# Patient Record
Sex: MALE | Race: WHITE | NOT HISPANIC OR LATINO | Employment: OTHER | URBAN - METROPOLITAN AREA
[De-identification: names, ages, dates, MRNs, and addresses within clinical notes are randomized per-mention and may not be internally consistent; named-entity substitution may affect disease eponyms.]

---

## 2017-03-09 ENCOUNTER — OFFICE VISIT (OUTPATIENT)
Dept: FAMILY MEDICINE | Facility: CLINIC | Age: 62
End: 2017-03-09
Payer: COMMERCIAL

## 2017-03-09 VITALS
RESPIRATION RATE: 18 BRPM | WEIGHT: 192.8 LBS | HEART RATE: 80 BPM | HEIGHT: 67 IN | BODY MASS INDEX: 30.26 KG/M2 | TEMPERATURE: 98.5 F | DIASTOLIC BLOOD PRESSURE: 72 MMHG | SYSTOLIC BLOOD PRESSURE: 128 MMHG

## 2017-03-09 DIAGNOSIS — J01.90 ACUTE SINUSITIS WITH SYMPTOMS > 10 DAYS: Primary | ICD-10-CM

## 2017-03-09 PROCEDURE — 99213 OFFICE O/P EST LOW 20 MIN: CPT | Performed by: PHYSICIAN ASSISTANT

## 2017-03-09 RX ORDER — LATANOPROST 50 UG/ML
SOLUTION/ DROPS OPHTHALMIC
COMMUNITY
Start: 2017-03-08 | End: 2017-03-24

## 2017-03-09 RX ORDER — OFLOXACIN 3 MG/ML
SOLUTION/ DROPS OPHTHALMIC
COMMUNITY
Start: 2017-03-08 | End: 2017-03-24

## 2017-03-09 ASSESSMENT — PAIN SCALES - GENERAL: PAINLEVEL: MODERATE PAIN (4)

## 2017-03-09 NOTE — PROGRESS NOTES
SUBJECTIVE:                                                    Deven Smith is a 61 year old male who presents to clinic today for the following health issues:    Patient has been having mild cold symptoms for several weeks and notes that earlier this week he developed facial pressure and pain as well as severe congestion. He also developed severe conjunctivitis in both eyes yesterday, he was seen by his eye doctor and started on eye drops but continues to have facial congestion and sinus symptoms.    Acute Illness   Acute illness concerns: eye and sinus  Onset: 03/05/17    Fever: YES- Feels like     Chills/Sweats: YES- chills    Headache (location?): YES- dizzy    Sinus Pressure:YES- tender, reddened, post-nasal drainage, facial pain and bloody nasal discharge    Conjunctivitis:  YES: bilateral, saw eye doctor yesterday rx given Ofloxacin    Ear Pain: YES- draining, popping    Rhinorrhea: YES    Congestion: YES    Sore Throat: YES- feels like a lump (pills getting stuck     Cough: YES    Wheeze: YES    Decreased Appetite: YES    Nausea: no    Vomiting: no    Diarrhea:  no    Dysuria/Freq.: no    Fatigue/Achiness: YES    Sick/Strep Exposure: YES- At crossfit     Therapies Tried and outcome: Hot shower, eye drops, warm rag       -------------------------------------    Problem list and histories reviewed & adjusted, as indicated.  Additional history: as documented    BP Readings from Last 3 Encounters:   03/09/17 128/72   04/04/16 157/90   02/08/16 124/68    Wt Readings from Last 3 Encounters:   03/09/17 192 lb 12.8 oz (87.5 kg)   04/04/16 185 lb (83.9 kg)   02/08/16 191 lb 11.2 oz (87 kg)         Reviewed and updated as needed this visit by clinical staff       Reviewed and updated as needed this visit by Provider         ROS:  Constitutional, HEENT, cardiovascular, pulmonary, gi and gu systems are negative, except as otherwise noted.    OBJECTIVE:                                                    /72  "(Cuff Size: Adult Large)  Pulse 80  Temp 98.5  F (36.9  C) (Temporal)  Resp 18  Ht 5' 7\" (1.702 m)  Wt 192 lb 12.8 oz (87.5 kg)  BMI 30.2 kg/m2  Body mass index is 30.2 kg/(m^2).  GENERAL: alert and no distress  EYES: PERRL, EOMI, eyelids- erythematous and conjunctiva/corneas- conjunctival injection bilaterally  HENT: normal cephalic/atraumatic, both ears: erythematous, rhinorrhea purulent, oropharynx clear, oral mucous membranes moist and sinuses: maxillary tenderness on both sides  NECK: bilateral anterior cervical adenopathy  RESP: lungs clear to auscultation - no rales, rhonchi or wheezes  CV: regular rates and rhythm, peripheral pulses strong and no peripheral edema  MS: no gross musculoskeletal defects noted, no edema  SKIN: no suspicious lesions or rashes    Diagnostic Test Results:  none      ASSESSMENT/PLAN:                                                        ICD-10-CM    1. Acute sinusitis with symptoms > 10 days J01.90 amoxicillin-clavulanate (AUGMENTIN) 875-125 MG per tablet       I will treat for sinusitis and have him follow up if worsening or not improving with treatment. Follow up with eye doctor on conjunctivitis as directed.   See Patient Instructions    Brissa Hancock PA-C  [facility]    "

## 2017-03-09 NOTE — MR AVS SNAPSHOT
After Visit Summary   3/9/2017    Deven Smith    MRN: [id number]           Patient Information     Date Of Birth          1955        Visit Information        Provider Department      3/9/2017 9:40 AM Brissa Hancock PA-C [facility]        Today's Diagnoses     Acute sinusitis with symptoms > 10 days    -  1      Care Instructions      Sinusitis (Antibiotic Treatment)    The sinuses are air-filled spaces within the bones of the face. They connect to the inside of the nose. Sinusitis is an inflammation of the tissue lining the sinus cavity. Sinus inflammation can occur during a cold. It can also be due to allergies to pollens and other particles in the air. Sinusitis can cause symptoms of sinus congestion and fullness. A sinus infection causes fever, headache and facial pain. There is often green or yellow drainage from the nose or into the back of the throat (post-nasal drip). You have been given antibiotics to treat this condition.  Home care:    Take the full course of antibiotics as instructed. Do not stop taking them, even if you feel better.    Drink plenty of water, hot tea, and other liquids. This may help thin mucus. It also may promote sinus drainage.    Heat may help soothe painful areas of the face. Use a towel soaked in hot water. Or,  the shower and direct the hot spray onto your face. Using a vaporizer along with a menthol rub at night may also help.     An expectorant containing guaifenesin may help thin the mucus and promote drainage from the sinuses.    Over-the-counter decongestants may be used unless a similar medicine was prescribed. Nasal sprays work the fastest. Use one that contains phenylephrine or oxymetazoline. First blow the nose gently. Then use the spray. Do not use these medicines more often than directed on the label or symptoms may get worse. You may also use tablets containing pseudoephedrine. Avoid products that combine ingredients,  because side effects may be increased. Read labels. You can also ask the pharmacist for help. (NOTE: Persons with high blood pressure should not use decongestants. They can raise blood pressure.)    Over-the-counter antihistamines may help if allergies contributed to your sinusitis.      Do not use nasal rinses or irrigation during an acute sinus infection, unless told to by your health care provider. Rinsing may spread the infection to other sinuses.    Use acetaminophen or ibuprofen to control pain, unless another pain medicine was prescribed. (If you have chronic liver or kidney disease or ever had a stomach ulcer, talk with your doctor before using these medicines. Aspirin should never be used in anyone under 18 years of age who is ill with a fever. It may cause severe liver damage.)    Don't smoke. This can worsen symptoms.  Follow-up care  Follow up with your healthcare provider or our staff if you are not improving within the next week.  When to seek medical advice  Call your healthcare provider if any of these occur:    Facial pain or headache becoming more severe    Stiff neck    Unusual drowsiness or confusion    Swelling of the forehead or eyelids    Vision problems, including blurred or double vision    Fever of 100.4 F (38 C) or higher, or as directed by your healthcare provider    Seizure    Breathing problems    Symptoms not resolving within 10 days    3486-4851 [employer]. [address] [place] [address], [place] [address]. All rights reserved. This information is not intended as a substitute for professional medical care. Always follow your healthcare professional's instructions.              Follow-ups after your visit        Who to contact     If you have questions or need follow up information about today's clinic visit or your schedule please contact [facility] directly at [phone].  Normal or non-critical lab and imaging results will be communicated to you by [employer],  "letter or phone within 4 business days after the clinic has received the results. If you do not hear from us within 7 days, please contact the clinic through [employer] or phone. If you have a critical or abnormal lab result, we will notify you by phone as soon as possible.  Submit refill requests through [employer] or call your pharmacy and they will forward the refill request to us. Please allow 3 business days for your refill to be completed.          Additional Information About Your Visit        Walkmore Information     [employer] gives you secure access to your electronic health record. If you see a primary care provider, you can also send messages to your care team and make appointments. If you have questions, please call your primary care clinic.  If you do not have a primary care provider, please call [phone] and they will assist you.        Care EveryWhere ID     This is your Care EveryWhere ID. This could be used by other organizations to access your [facility] medical records  [id number]        Your Vitals Were     Pulse Temperature Respirations Height BMI (Body Mass Index)       80 98.5  F (36.9  C) (Temporal) 18 5' 7\" (1.702 m) 30.2 kg/m2        Blood Pressure from Last 3 Encounters:   03/09/17 128/72   04/04/16 157/90   02/08/16 124/68    Weight from Last 3 Encounters:   03/09/17 192 lb 12.8 oz (87.5 kg)   04/04/16 185 lb (83.9 kg)   02/08/16 191 lb 11.2 oz (87 kg)              Today, you had the following     No orders found for display         Today's Medication Changes          These changes are accurate as of: 3/9/17  9:53 AM.  If you have any questions, ask your nurse or doctor.               Start taking these medicines.        Dose/Directions    amoxicillin-clavulanate 875-125 MG per tablet   Commonly known as:  AUGMENTIN   Used for:  Acute sinusitis with symptoms > 10 days   Started by:  Brissa Hancock PA-C        Dose:  1 tablet   Take 1 tablet by mouth 2 times daily   Quantity:  20 tablet "   Refills:  0            Where to get your medicines      These medications were sent to [facility] [place], [place] - [address]   [address] , [facility] [address]     Phone:  [phone]     amoxicillin-clavulanate 875-125 MG per tablet                Primary Care Provider Office Phone # Fax #    Isaac Christianson [phone]       [facility] [address] [facility] [address]        Thank you!     Thank you for choosing [facility]  for your care. Our goal is always to provide you with excellent care. Hearing back from our patients is one way we can continue to improve our services. Please take a few minutes to complete the written survey that you may receive in the mail after your visit with us. Thank you!             Your Updated Medication List - Protect others around you: Learn how to safely use, store and throw away your medicines at www.disposemymeds.org.          This list is accurate as of: 3/9/17  9:53 AM.  Always use your most recent med list.                   Brand Name Dispense Instructions for use    amoxicillin-clavulanate 875-125 MG per tablet    AUGMENTIN    20 tablet    Take 1 tablet by mouth 2 times daily       aspirin 81 MG tablet     0    1 tab po QD (Once per day)       Fish Oil 1200 MG Caps      Take 1 capsule by mouth daily       fluticasone 50 MCG/ACT spray    FLONASE    16 g    USE TWO SPRAYS IN EACH NOSTRIL EVERY DAY       GLUCOSAMINE CHONDROITIN ADV PO      Take 6 tablets by mouth daily.       latanoprost 0.005 % ophthalmic solution    XALATAN         MULTI-DAY vitamin  S Tabs     0    1 tablet daily       ofloxacin 0.3 % ophthalmic solution    OCUFLOX         oxyCODONE 5 MG IR tablet    ROXICODONE    18 tablet    Take 1-2 tablets (5-10 mg) by mouth every 6 hours as needed for pain       simvastatin 20 MG tablet    ZOCOR    90 tablet    TAKE ONE TABLET BY MOUTH EVERY DAY (LABS DUE)       TRAVATAN 0.004 % Soln   Generic  drug:  Travoprost      Reported on 3/9/2017

## 2017-03-09 NOTE — NURSING NOTE
"Chief Complaint   Patient presents with     Eye Problem     Panel Management     Hep C Screening, Lipids       Initial /72 (Cuff Size: Adult Large)  Pulse 80  Temp 98.5  F (36.9  C) (Temporal)  Resp 18  Ht 5' 7\" (1.702 m)  Wt 192 lb 12.8 oz (87.5 kg)  BMI 30.2 kg/m2 Estimated body mass index is 30.2 kg/(m^2) as calculated from the following:    Height as of this encounter: 5' 7\" (1.702 m).    Weight as of this encounter: 192 lb 12.8 oz (87.5 kg).  Medication Reconciliation: complete   Damaris Rivera CMA (AAMA)      "

## 2017-03-09 NOTE — PATIENT INSTRUCTIONS
Sinusitis (Antibiotic Treatment)    The sinuses are air-filled spaces within the bones of the face. They connect to the inside of the nose. Sinusitis is an inflammation of the tissue lining the sinus cavity. Sinus inflammation can occur during a cold. It can also be due to allergies to pollens and other particles in the air. Sinusitis can cause symptoms of sinus congestion and fullness. A sinus infection causes fever, headache and facial pain. There is often green or yellow drainage from the nose or into the back of the throat (post-nasal drip). You have been given antibiotics to treat this condition.  Home care:    Take the full course of antibiotics as instructed. Do not stop taking them, even if you feel better.    Drink plenty of water, hot tea, and other liquids. This may help thin mucus. It also may promote sinus drainage.    Heat may help soothe painful areas of the face. Use a towel soaked in hot water. Or,  the shower and direct the hot spray onto your face. Using a vaporizer along with a menthol rub at night may also help.     An expectorant containing guaifenesin may help thin the mucus and promote drainage from the sinuses.    Over-the-counter decongestants may be used unless a similar medicine was prescribed. Nasal sprays work the fastest. Use one that contains phenylephrine or oxymetazoline. First blow the nose gently. Then use the spray. Do not use these medicines more often than directed on the label or symptoms may get worse. You may also use tablets containing pseudoephedrine. Avoid products that combine ingredients, because side effects may be increased. Read labels. You can also ask the pharmacist for help. (NOTE: Persons with high blood pressure should not use decongestants. They can raise blood pressure.)    Over-the-counter antihistamines may help if allergies contributed to your sinusitis.      Do not use nasal rinses or irrigation during an acute sinus infection, unless told to by  your health care provider. Rinsing may spread the infection to other sinuses.    Use acetaminophen or ibuprofen to control pain, unless another pain medicine was prescribed. (If you have chronic liver or kidney disease or ever had a stomach ulcer, talk with your doctor before using these medicines. Aspirin should never be used in anyone under 18 years of age who is ill with a fever. It may cause severe liver damage.)    Don't smoke. This can worsen symptoms.  Follow-up care  Follow up with your healthcare provider or our staff if you are not improving within the next week.  When to seek medical advice  Call your healthcare provider if any of these occur:    Facial pain or headache becoming more severe    Stiff neck    Unusual drowsiness or confusion    Swelling of the forehead or eyelids    Vision problems, including blurred or double vision    Fever of 100.4 F (38 C) or higher, or as directed by your healthcare provider    Seizure    Breathing problems    Symptoms not resolving within 10 days    4650-5077 [employer]. [address] [place] [address], [place] [address]. All rights reserved. This information is not intended as a substitute for professional medical care. Always follow your healthcare professional's instructions.

## 2017-03-24 ENCOUNTER — OFFICE VISIT (OUTPATIENT)
Dept: FAMILY MEDICINE | Facility: CLINIC | Age: 62
End: 2017-03-24
Payer: COMMERCIAL

## 2017-03-24 VITALS
TEMPERATURE: 97.8 F | SYSTOLIC BLOOD PRESSURE: 100 MMHG | BODY MASS INDEX: 29.82 KG/M2 | OXYGEN SATURATION: 97 % | HEIGHT: 67 IN | WEIGHT: 190 LBS | RESPIRATION RATE: 18 BRPM | HEART RATE: 62 BPM | DIASTOLIC BLOOD PRESSURE: 50 MMHG

## 2017-03-24 DIAGNOSIS — Z00.00 ROUTINE GENERAL MEDICAL EXAMINATION AT A HEALTH CARE FACILITY: Primary | ICD-10-CM

## 2017-03-24 DIAGNOSIS — Z12.5 SCREENING FOR PROSTATE CANCER: ICD-10-CM

## 2017-03-24 DIAGNOSIS — E78.5 HYPERLIPIDEMIA WITH TARGET LDL LESS THAN 130: ICD-10-CM

## 2017-03-24 LAB
ALBUMIN SERPL-MCNC: 3.7 G/DL (ref 3.4–5)
ALBUMIN UR-MCNC: NEGATIVE MG/DL
ALP SERPL-CCNC: 64 U/L (ref 40–150)
ALT SERPL W P-5'-P-CCNC: 40 U/L (ref 0–70)
ANION GAP SERPL CALCULATED.3IONS-SCNC: 4 MMOL/L (ref 3–14)
APPEARANCE UR: CLEAR
AST SERPL W P-5'-P-CCNC: 34 U/L (ref 0–45)
BILIRUB SERPL-MCNC: 0.5 MG/DL (ref 0.2–1.3)
BILIRUB UR QL STRIP: NEGATIVE
BUN SERPL-MCNC: 20 MG/DL (ref 7–30)
CALCIUM SERPL-MCNC: 9 MG/DL (ref 8.5–10.1)
CHLORIDE SERPL-SCNC: 105 MMOL/L (ref 94–109)
CHOLEST SERPL-MCNC: 182 MG/DL
CO2 SERPL-SCNC: 34 MMOL/L (ref 20–32)
COLOR UR AUTO: YELLOW
CREAT SERPL-MCNC: 0.93 MG/DL (ref 0.66–1.25)
GFR SERPL CREATININE-BSD FRML MDRD: 83 ML/MIN/1.7M2
GLUCOSE SERPL-MCNC: 98 MG/DL (ref 70–99)
GLUCOSE UR STRIP-MCNC: NEGATIVE MG/DL
HCV AB SERPL QL IA: NORMAL
HDLC SERPL-MCNC: 69 MG/DL
HGB UR QL STRIP: NEGATIVE
KETONES UR STRIP-MCNC: NEGATIVE MG/DL
LDLC SERPL CALC-MCNC: 101 MG/DL
LEUKOCYTE ESTERASE UR QL STRIP: NEGATIVE
MUCOUS THREADS #/AREA URNS LPF: PRESENT /LPF
NITRATE UR QL: NEGATIVE
NONHDLC SERPL-MCNC: 113 MG/DL
PH UR STRIP: 5 PH (ref 5–7)
POTASSIUM SERPL-SCNC: 4.2 MMOL/L (ref 3.4–5.3)
PROT SERPL-MCNC: 6.8 G/DL (ref 6.8–8.8)
PSA SERPL-ACNC: 1.98 UG/L (ref 0–4)
RBC #/AREA URNS AUTO: 0 /HPF (ref 0–2)
SODIUM SERPL-SCNC: 143 MMOL/L (ref 133–144)
SP GR UR STRIP: 1.02 (ref 1–1.03)
TRIGL SERPL-MCNC: 59 MG/DL
URN SPEC COLLECT METH UR: ABNORMAL
UROBILINOGEN UR STRIP-MCNC: 0 MG/DL (ref 0–2)
WBC #/AREA URNS AUTO: 2 /HPF (ref 0–2)

## 2017-03-24 PROCEDURE — 80061 LIPID PANEL: CPT | Performed by: FAMILY MEDICINE

## 2017-03-24 PROCEDURE — 81001 URINALYSIS AUTO W/SCOPE: CPT | Performed by: FAMILY MEDICINE

## 2017-03-24 PROCEDURE — 99396 PREV VISIT EST AGE 40-64: CPT | Performed by: FAMILY MEDICINE

## 2017-03-24 PROCEDURE — 36415 COLL VENOUS BLD VENIPUNCTURE: CPT | Performed by: FAMILY MEDICINE

## 2017-03-24 PROCEDURE — G0103 PSA SCREENING: HCPCS | Performed by: FAMILY MEDICINE

## 2017-03-24 PROCEDURE — 86803 HEPATITIS C AB TEST: CPT | Performed by: FAMILY MEDICINE

## 2017-03-24 PROCEDURE — 80053 COMPREHEN METABOLIC PANEL: CPT | Performed by: FAMILY MEDICINE

## 2017-03-24 RX ORDER — SIMVASTATIN 20 MG
TABLET ORAL
Qty: 90 TABLET | Refills: 3 | Status: CANCELLED | OUTPATIENT
Start: 2017-03-24

## 2017-03-24 NOTE — PROGRESS NOTES
SUBJECTIVE:     CC: Deven Smith is an 61 year old male who presents for preventative health visit.     Healthy Habits:    Do you get at least three servings of calcium containing foods daily (dairy, green leafy vegetables, etc.)? yes    Amount of exercise or daily activities, outside of work: 5-6 day(s) per week    Problems taking medications regularly No    Medication side effects: No    Have you had an eye exam in the past two years? yes    Do you see a dentist twice per year? yes    Do you have sleep apnea, excessive snoring or daytime drowsiness?no            Today's PHQ-2 Score:   PHQ-2 ( 1999 Pfizer) 3/9/2017 2/8/2016   Q1: Little interest or pleasure in doing things 0 0   Q2: Feeling down, depressed or hopeless 0 0   PHQ-2 Score 0 0       Abuse: Current or Past(Physical, Sexual or Emotional)- No  Do you feel safe in your environment - Yes    Social History   Substance Use Topics     Smoking status: Former Smoker     Packs/day: 2.00     Years: 15.00     Quit date: 11/8/1987     Smokeless tobacco: Never Used     Alcohol use 36.0 oz/week     The patient does not drink >3 drinks per day nor >7 drinks per week.    Last PSA:   PSA   Date Value Ref Range Status   09/04/2015 1.60 0 - 4 ug/L Final       Recent Labs   Lab Test  09/04/15   0810  01/21/14   0807   CHOL  156  142   HDL  71  60   LDL  78  68   TRIG  36  65   CHOLHDLRATIO  2.2  2.0       Reviewed orders with patient. Reviewed health maintenance and updated orders accordingly - Yes    Reviewed and updated as needed this visit by clinical staff  Tobacco  Allergies  Meds  Soc Hx        Reviewed and updated as needed this visit by Provider        Past Medical History:   Diagnosis Date     DDD (degenerative disc disease), cervical      Hyperlipidemia LDL goal < 130       Past Surgical History:   Procedure Laterality Date     COLONOSCOPY  03/21/2007    Repeat  for screening purposes in 10 yrs.     COLONOSCOPY  1/2/2013    Procedure: COLONOSCOPY;   "Colonoscopy;  Surgeon: Emmett San MD;  Location: [facility]     [facility] SHLDR ARTHROSCOP,SURG,W/ROTAT CUFF REPR Left 2010     HC VASECTOMY UNILAT/BILAT W POSTOP SEMEN  1998    Vasectomy       ROS:  C: NEGATIVE for fever, chills, change in weight  I: NEGATIVE for worrisome rashes, moles or lesions  E: NEGATIVE for vision changes or irritation  ENT: NEGATIVE for ear, mouth and throat problems  R: NEGATIVE for significant cough or SOB  CV: NEGATIVE for chest pain, palpitations or peripheral edema  GI: NEGATIVE for nausea, abdominal pain, heartburn, or change in bowel habits   male: negative for dysuria, hematuria, decreased urinary stream, erectile dysfunction, urethral discharge  M: NEGATIVE for significant arthralgias or myalgia  N: NEGATIVE for weakness, dizziness or paresthesias  P: NEGATIVE for changes in mood or affect      OBJECTIVE:     /50 (Cuff Size: Adult Regular)  Pulse 62  Temp 97.8  F (36.6  C) (Temporal)  Resp 18  Ht 5' 7\" (1.702 m)  Wt 190 lb (86.2 kg)  SpO2 97%  BMI 29.76 kg/m2  EXAM:  GENERAL: healthy, alert and no distress  EYES: Eyes grossly normal to inspection, PERRL and conjunctivae and sclerae normal  HENT: ear canals and TM's normal, nose and mouth without ulcers or lesions  NECK: no adenopathy, no asymmetry, masses, or scars and thyroid normal to palpation  RESP: lungs clear to auscultation - no rales, rhonchi or wheezes  CV: regular rate and rhythm, normal S1 S2, no S3 or S4, no murmur, click or rub, no peripheral edema and peripheral pulses strong  ABDOMEN: soft, nontender, no hepatosplenomegaly, no masses and bowel sounds normal  MS: no gross musculoskeletal defects noted, no edema  SKIN: no suspicious lesions or rashes  NEURO: Normal strength and tone, mentation intact and speech normal  PSYCH: mentation appears normal, affect normal/bright    ASSESSMENT/PLAN:     1. Routine general medical examination at a health care facility  Generally healthy in good physical shape. Due " "for colonoscopy next year.  - Hepatitis C antibody  - UA reflex to Microscopic and Culture    2. Hyperlipidemia with target LDL less than 130  He discontinued his Zocor 3 months ago when he ran out. He was told he couldn't get any more until he had a physical. We will see what his lipid panel as an restart this if need be. He does have a strong family history of hyperlipidemia.  - Lipid Profile  - Comprehensive metabolic panel (BMP + Alb, Alk Phos, ALT, AST, Total. Bili, TP)    3. Screening for prostate cancer  No troubles here we'll notify him with results.  - Prostate spec antigen screen    COUNSELING:           reports that he quit smoking about 29 years ago. He has a 30.00 pack-year smoking history. He has never used smokeless tobacco.    Estimated body mass index is 29.76 kg/(m^2) as calculated from the following:    Height as of this encounter: 5' 7\" (1.702 m).    Weight as of this encounter: 190 lb (86.2 kg).       Counseling Resources:  ATP IV Guidelines  Pooled Cohorts Equation Calculator  FRAX Risk Assessment  ICSI Preventive Guidelines  Dietary Guidelines for Americans, 2010  USDA's MyPlate  ASA Prophylaxis  Lung CA Screening    Marc Pinedo MD  [facility]  "

## 2017-03-24 NOTE — NURSING NOTE
"Chief Complaint   Patient presents with     Physical       Initial /50 (Cuff Size: Adult Regular)  Pulse 62  Temp 97.8  F (36.6  C) (Temporal)  Resp 18  Ht 5' 7\" (1.702 m)  Wt 190 lb (86.2 kg)  SpO2 97%  BMI 29.76 kg/m2 Estimated body mass index is 29.76 kg/(m^2) as calculated from the following:    Height as of this encounter: 5' 7\" (1.702 m).    Weight as of this encounter: 190 lb (86.2 kg).  Medication Reconciliation: complete    "

## 2017-03-24 NOTE — LETTER
[address] [address]  Phone: [phone]  Fax: [phone]          April 6, 2017    Deven Smith  [id number] [address] [place] [id number]          Dear Deven,      LAB RESULTS:     The results of your recent labs were NORMAL.  If you have any further questions or problems, please contact our office.          Sincerely,      Marc Pinedo M.D.

## 2017-03-24 NOTE — MR AVS SNAPSHOT
After Visit Summary   3/24/2017    Deven Smith    MRN: [id number]           Patient Information     Date Of Birth          1955        Visit Information        Provider Department      3/24/2017 7:40 AM Marc Pinedo MD [facility]        Today's Diagnoses     Routine general medical examination at a health care facility    -  1    Hyperlipidemia with target LDL less than 130        Screening for prostate cancer          Care Instructions      Preventive Health Recommendations  Male Ages 50 - 64    Yearly exam:             See your health care provider every year in order to  o   Review health changes.   o   Discuss preventive care.    o   Review your medicines if your doctor has prescribed any.     Have a cholesterol test every 5 years, or more frequently if you are at risk for high cholesterol/heart disease.     Have a diabetes test (fasting glucose) every three years. If you are at risk for diabetes, you should have this test more often.     Have a colonoscopy at age 50, or have a yearly FIT test (stool test). These exams will check for colon cancer.      Talk with your health care provider about whether or not a prostate cancer screening test (PSA) is right for you.    You should be tested each year for STDs (sexually transmitted diseases), if you re at risk.     Shots: Get a flu shot each year. Get a tetanus shot every 10 years.     Nutrition:    Eat at least 5 servings of fruits and vegetables daily.     Eat whole-grain bread, whole-wheat pasta and brown rice instead of white grains and rice.     Talk to your provider about Calcium and Vitamin D.     Lifestyle    Exercise for at least 150 minutes a week (30 minutes a day, 5 days a week). This will help you control your weight and prevent disease.     Limit alcohol to one drink per day.     No smoking.     Wear sunscreen to prevent skin cancer.     See your dentist every six months for an exam and cleaning.     See  "your eye doctor every 1 to 2 years.          Follow-ups after your visit        Who to contact     If you have questions or need follow up information about today's clinic visit or your schedule please contact [facility] directly at [phone].  Normal or non-critical lab and imaging results will be communicated to you by Spotlimehart, letter or phone within 4 business days after the clinic has received the results. If you do not hear from us within 7 days, please contact the clinic through Compassoftt or phone. If you have a critical or abnormal lab result, we will notify you by phone as soon as possible.  Submit refill requests through Gazemetrix or call your pharmacy and they will forward the refill request to us. Please allow 3 business days for your refill to be completed.          Additional Information About Your Visit        SpotlimeharHEMINGWAY Information     Gazemetrix gives you secure access to your electronic health record. If you see a primary care provider, you can also send messages to your care team and make appointments. If you have questions, please call your primary care clinic.  If you do not have a primary care provider, please call [phone] and they will assist you.        Care EveryWhere ID     This is your Care EveryWhere ID. This could be used by other organizations to access your [place] medical records  [id number]        Your Vitals Were     Pulse Temperature Respirations Height Pulse Oximetry BMI (Body Mass Index)    62 97.8  F (36.6  C) (Temporal) 18 5' 7\" (1.702 m) 97% 29.76 kg/m2       Blood Pressure from Last 3 Encounters:   03/24/17 100/50   03/09/17 128/72   04/04/16 157/90    Weight from Last 3 Encounters:   03/24/17 190 lb (86.2 kg)   03/09/17 192 lb 12.8 oz (87.5 kg)   04/04/16 185 lb (83.9 kg)              We Performed the Following     Comprehensive metabolic panel (BMP + Alb, Alk Phos, ALT, AST, Total. Bili, TP)     Hepatitis C antibody     Lipid Profile     Prostate spec " antigen screen     UA reflex to Microscopic and Culture        Primary Care Provider Office Phone # Fax #    Isaac Christianson [phone]       [facility] [address] [facility] [address]        Thank you!     Thank you for choosing [facility]  for your care. Our goal is always to provide you with excellent care. Hearing back from our patients is one way we can continue to improve our services. Please take a few minutes to complete the written survey that you may receive in the mail after your visit with us. Thank you!             Your Updated Medication List - Protect others around you: Learn how to safely use, store and throw away your medicines at www.disposemymeds.org.          This list is accurate as of: 3/24/17  8:07 AM.  Always use your most recent med list.                   Brand Name Dispense Instructions for use    aspirin 81 MG tablet     0    1 tab po QD (Once per day)       Fish Oil 1200 MG Caps      Take 1 capsule by mouth daily       fluticasone 50 MCG/ACT spray    FLONASE    16 g    USE TWO SPRAYS IN EACH NOSTRIL EVERY DAY       GLUCOSAMINE CHONDROITIN ADV PO      Take 6 tablets by mouth daily.       MULTI-DAY vitamin  S Tabs     0    1 tablet daily       simvastatin 20 MG tablet    ZOCOR    90 tablet    TAKE ONE TABLET BY MOUTH EVERY DAY (LABS DUE)       TRAVATAN 0.004 % Soln   Generic drug:  Travoprost      Reported on 3/9/2017

## 2017-04-06 NOTE — PROGRESS NOTES
Labs show urinalysis was normal  hepatitis C negative. PSA was normal. Chemistry panel was normal. And the good news is her cholesterol was 182 with an LDL of 101 this is acceptable. HDL was 69. I would keep the diet and recheck in a year.

## 2017-05-15 ENCOUNTER — OFFICE VISIT (OUTPATIENT)
Dept: FAMILY MEDICINE | Facility: OTHER | Age: 62
End: 2017-05-15
Payer: COMMERCIAL

## 2017-05-15 ENCOUNTER — RADIANT APPOINTMENT (OUTPATIENT)
Dept: GENERAL RADIOLOGY | Facility: OTHER | Age: 62
End: 2017-05-15
Attending: NURSE PRACTITIONER
Payer: COMMERCIAL

## 2017-05-15 VITALS
RESPIRATION RATE: 20 BRPM | TEMPERATURE: 96.4 F | DIASTOLIC BLOOD PRESSURE: 62 MMHG | BODY MASS INDEX: 30.61 KG/M2 | HEIGHT: 67 IN | SYSTOLIC BLOOD PRESSURE: 138 MMHG | HEART RATE: 60 BPM | OXYGEN SATURATION: 97 % | WEIGHT: 195 LBS

## 2017-05-15 DIAGNOSIS — R07.81 RIB PAIN ON RIGHT SIDE: Primary | ICD-10-CM

## 2017-05-15 DIAGNOSIS — R07.81 RIB PAIN ON RIGHT SIDE: ICD-10-CM

## 2017-05-15 PROCEDURE — 71101 X-RAY EXAM UNILAT RIBS/CHEST: CPT | Mod: RT

## 2017-05-15 PROCEDURE — 99213 OFFICE O/P EST LOW 20 MIN: CPT | Performed by: NURSE PRACTITIONER

## 2017-05-15 RX ORDER — HYDROCODONE BITARTRATE AND ACETAMINOPHEN 5; 325 MG/1; MG/1
1-2 TABLET ORAL EVERY 4 HOURS PRN
Qty: 20 TABLET | Refills: 0 | Status: SHIPPED | OUTPATIENT
Start: 2017-05-15 | End: 2017-11-10

## 2017-05-15 ASSESSMENT — PAIN SCALES - GENERAL: PAINLEVEL: EXTREME PAIN (8)

## 2017-05-15 NOTE — MR AVS SNAPSHOT
After Visit Summary   5/15/2017    Deven Smith    MRN: [id number]           Patient Information     Date Of Birth          1955        Visit Information        Provider Department      5/15/2017 9:40 AM Elsy Ya APRN [facility]        Today's Diagnoses     Rib pain on right side    -  1      Care Instructions    I did not see any abnormalities on your x-ray.  The radiologist will review it as well and they will call if they see anything I did not see.     Follow up if symptoms fail to resolve as expected.     Take Acetaminophen (Tylenol) 650 mg by mouth every 4-6 hours for fevers or pain. Do not take more than 4000 mg total in a 24 hour period.     Take Ibuprofen 600 mg by mouth every 6-8 hours for pain/fevers.  Take this with food to avoid an upset stomach.  Do not take more than 3200 mg total in a 24 hour period.    Use the Norco as needed at night for sleep.  This may make you drowsy.     Rib Fracture    You broke one or more ribs. This is called a rib fracture. Rib fractures do not require a cast like other bones. They will heal by themselves in about 4-6 weeks. The first 3-4 weeks will be the most painful because deep breathing, coughing, or changing position from sitting to lying down, may cause the broken ends to move slightly.  Home care    Rest. You should not be doing any heavy lifting or strenuous exertion until the pain goes away.    It hurts to breathe when you have a broken rib. This puts you at risk of getting pneumonia from poor airflow through your lungs. To prevent this:    Take several very deep breaths once an hour while you're awake. Exhale through pursed lips as if you are blowing up a balloon. If possible, actually blow up a balloon or a rubber glove. This exercise builds up pressure inside the lung and prevents collapse of the small air sacs of the lung. This exercise may cause some pain at the site of injury, which is normal.    You may have  gotten a breathing exercise device called an incentive spirometer. Use it at least 4 times a day, or as directed.    Apply an ice pack over the injured area for 15 to 20 minutes every 1 to 2 hours. You should do this for the first 24 to 48 hours. You can make an ice pack by filling a plastic bag that seals at the top with ice cubes and then wrapping it with a thin towel. Continue with ice packs as needed for the relief of pain and swelling.    You may use over-the-counter pain medicine to control pain, unless another pain medicine was prescribed. If you have chronic liver or kidney disease or ever had a stomach ulcer or GI bleeding, talk with your healthcare provider before using these medicines.    If your pain is not controlled, contact your healthcare provider. Sometimes a stronger pain medicine may be needed. A nerve block can be done in case of severe pain. It will numb the nerve between the ribs.  Follow-up care  Follow up with your healthcare provider, or as advised. Rarely, a broken rib will cause complications within the first few days that may not be evident during your initial exam. This can include collapsed lung, bleeding around the lung or into the abdomen, or pneumonia. Therefore, watch for the signs below.  If X-rays were taken, you will be told of any new findings that may affect your care.  Call 911  Call 911 if you have:    Dizziness, weakness or fainting    Shortness of breath with or without chest discomfort    New or worsening abdominal pain    Discomfort in other areas of your upper body such as your shoulders, jaw, neck, or arms  When to seek medical advice  Call your healthcare provider right away if any of these occur:    Increasing chest pain with breathing    Fever of 100.4 F (38 C) or above lasting for 24 to 48 hours    Congested cough    8167-5457 [employer]. [address] [place] [address], [place] [address]. All rights reserved. This information is not intended as a substitute  "for professional medical care. Always follow your healthcare professional's instructions.              Follow-ups after your visit        Who to contact     If you have questions or need follow up information about today's clinic visit or your schedule please contact [facility] directly at [phone].  Normal or non-critical lab and imaging results will be communicated to you by Lethart, letter or phone within 4 business days after the clinic has received the results. If you do not hear from us within 7 days, please contact the clinic through Lethart or phone. If you have a critical or abnormal lab result, we will notify you by phone as soon as possible.  Submit refill requests through Needl or call your pharmacy and they will forward the refill request to us. Please allow 3 business days for your refill to be completed.          Additional Information About Your Visit        Lethart Information     Needl gives you secure access to your electronic health record. If you see a primary care provider, you can also send messages to your care team and make appointments. If you have questions, please call your primary care clinic.  If you do not have a primary care provider, please call [phone] and they will assist you.        Care EveryWhere ID     This is your Care EveryWhere ID. This could be used by other organizations to access your [place] medical records  [id number]        Your Vitals Were     Pulse Temperature Respirations Height Pulse Oximetry BMI (Body Mass Index)    60 96.4  F (35.8  C) (Tympanic) 20 5' 7\" (1.702 m) 97% 30.54 kg/m2       Blood Pressure from Last 3 Encounters:   05/15/17 138/62   03/24/17 100/50   03/09/17 128/72    Weight from Last 3 Encounters:   05/15/17 195 lb (88.5 kg)   03/24/17 190 lb (86.2 kg)   03/09/17 192 lb 12.8 oz (87.5 kg)                 Today's Medication Changes          These changes are accurate as of: 5/15/17 10:33 AM.  If you have any questions, ask " your nurse or doctor.               Start taking these medicines.        Dose/Directions    HYDROcodone-acetaminophen 5-325 MG per tablet   Commonly known as:  NORCO   Used for:  Rib pain on right side   Started by:  Elsy Ya APRN CNP        Dose:  1-2 tablet   Take 1-2 tablets by mouth every 4 hours as needed for moderate to severe pain maximum 3 tablet(s) per day   Quantity:  20 tablet   Refills:  0            Where to get your medicines      Some of these will need a paper prescription and others can be bought over the counter.  Ask your nurse if you have questions.     Bring a paper prescription for each of these medications     HYDROcodone-acetaminophen 5-325 MG per tablet                Primary Care Provider Office Phone # Fax #    Isaac Christianson [phone]       [facility] [address] [facility] [address]        Thank you!     Thank you for choosing [facility]  for your care. Our goal is always to provide you with excellent care. Hearing back from our patients is one way we can continue to improve our services. Please take a few minutes to complete the written survey that you may receive in the mail after your visit with us. Thank you!             Your Updated Medication List - Protect others around you: Learn how to safely use, store and throw away your medicines at www.disposemymeds.org.          This list is accurate as of: 5/15/17 10:33 AM.  Always use your most recent med list.                   Brand Name Dispense Instructions for use    aspirin 81 MG tablet     0    1 tab po QD (Once per day)       Fish Oil 1200 MG Caps      Take 1 capsule by mouth daily       fluticasone 50 MCG/ACT spray    FLONASE    16 g    USE TWO SPRAYS IN EACH NOSTRIL EVERY DAY       GLUCOSAMINE CHONDROITIN ADV PO      Take 6 tablets by mouth daily.       HYDROcodone-acetaminophen 5-325 MG per tablet    NORCO    20 tablet    Take 1-2 tablets by mouth every 4 hours  as needed for moderate to severe pain maximum 3 tablet(s) per day       MULTI-DAY vitamin  S Tabs     0    1 tablet daily       TRAVATAN 0.004 % Soln   Generic drug:  Travoprost      Reported on 3/9/2017

## 2017-05-15 NOTE — PATIENT INSTRUCTIONS
I did not see any abnormalities on your x-ray.  The radiologist will review it as well and they will call if they see anything I did not see.     Follow up if symptoms fail to resolve as expected.     Take Acetaminophen (Tylenol) 650 mg by mouth every 4-6 hours for fevers or pain. Do not take more than 4000 mg total in a 24 hour period.     Take Ibuprofen 600 mg by mouth every 6-8 hours for pain/fevers.  Take this with food to avoid an upset stomach.  Do not take more than 3200 mg total in a 24 hour period.    Use the Norco as needed at night for sleep.  This may make you drowsy.     Rib Fracture    You broke one or more ribs. This is called a rib fracture. Rib fractures do not require a cast like other bones. They will heal by themselves in about 4-6 weeks. The first 3-4 weeks will be the most painful because deep breathing, coughing, or changing position from sitting to lying down, may cause the broken ends to move slightly.  Home care    Rest. You should not be doing any heavy lifting or strenuous exertion until the pain goes away.    It hurts to breathe when you have a broken rib. This puts you at risk of getting pneumonia from poor airflow through your lungs. To prevent this:    Take several very deep breaths once an hour while you're awake. Exhale through pursed lips as if you are blowing up a balloon. If possible, actually blow up a balloon or a rubber glove. This exercise builds up pressure inside the lung and prevents collapse of the small air sacs of the lung. This exercise may cause some pain at the site of injury, which is normal.    You may have gotten a breathing exercise device called an incentive spirometer. Use it at least 4 times a day, or as directed.    Apply an ice pack over the injured area for 15 to 20 minutes every 1 to 2 hours. You should do this for the first 24 to 48 hours. You can make an ice pack by filling a plastic bag that seals at the top with ice cubes and then wrapping it with a  thin towel. Continue with ice packs as needed for the relief of pain and swelling.    You may use over-the-counter pain medicine to control pain, unless another pain medicine was prescribed. If you have chronic liver or kidney disease or ever had a stomach ulcer or GI bleeding, talk with your healthcare provider before using these medicines.    If your pain is not controlled, contact your healthcare provider. Sometimes a stronger pain medicine may be needed. A nerve block can be done in case of severe pain. It will numb the nerve between the ribs.  Follow-up care  Follow up with your healthcare provider, or as advised. Rarely, a broken rib will cause complications within the first few days that may not be evident during your initial exam. This can include collapsed lung, bleeding around the lung or into the abdomen, or pneumonia. Therefore, watch for the signs below.  If X-rays were taken, you will be told of any new findings that may affect your care.  Call 911  Call 911 if you have:    Dizziness, weakness or fainting    Shortness of breath with or without chest discomfort    New or worsening abdominal pain    Discomfort in other areas of your upper body such as your shoulders, jaw, neck, or arms  When to seek medical advice  Call your healthcare provider right away if any of these occur:    Increasing chest pain with breathing    Fever of 100.4 F (38 C) or above lasting for 24 to 48 hours    Congested cough    8670-9671 [employer]. [address] [place] [address], [place] [address]. All rights reserved. This information is not intended as a substitute for professional medical care. Always follow your healthcare professional's instructions.

## 2017-05-15 NOTE — NURSING NOTE
"Chief Complaint   Patient presents with     Rib Pain     x3 days       Initial /62  Pulse 60  Temp 96.4  F (35.8  C) (Tympanic)  Resp 20  Ht 5' 7\" (1.702 m)  Wt 195 lb (88.5 kg)  SpO2 97%  BMI 30.54 kg/m2 Estimated body mass index is 30.54 kg/(m^2) as calculated from the following:    Height as of this encounter: 5' 7\" (1.702 m).    Weight as of this encounter: 195 lb (88.5 kg).  Medication Reconciliation: complete   ................Adrian Adams LPN,   May 15, 2017,      10:00 AM,   [facility]     "

## 2017-05-15 NOTE — PROGRESS NOTES
SUBJECTIVE:                                                    Deven Smith is a 61 year old male who presents to clinic today for the following health issues:      Musculoskeletal problem/pain      Duration: 3 days    Description  Location: ribs, right sided    Intensity:  severe    Accompanying signs and symptoms: none    History  Previous similar problem: no   Previous evaluation:  none    Precipitating or alleviating factors:  Trauma or overuse: YES  Aggravating factors include: twisting, sitting up, moving the wrong way    Therapies tried and outcome: heat and NSAID - ibuprofen helped a little       Problem list and histories reviewed & adjusted, as indicated.  Additional history: none    Patient Active Problem List   Diagnosis     Other specified glaucoma     Allergic rhinitis     Family history of other cardiovascular diseases     Labyrinthitis     Hyperlipidemia with target LDL less than 130     Advanced directives, counseling/discussion     DDD (degenerative disc disease), cervical     Supraspinatus tendon tear, left, subsequent encounter     Past Surgical History:   Procedure Laterality Date     COLONOSCOPY  2007    Repeat  for screening purposes in 10 yrs.     COLONOSCOPY  2013    Procedure: COLONOSCOPY;  Colonoscopy;  Surgeon: Emmett San MD;  Location: [facility]     HC SHLDR ARTHROSCOP,SURG,W/ROTAT CUFF REPR Left      HC VASECTOMY UNILAT/BILAT W POSTOP SEMEN  1998    Vasectomy       Social History   Substance Use Topics     Smoking status: Former Smoker     Packs/day: 2.00     Years: 15.00     Quit date: 1987     Smokeless tobacco: Never Used     Alcohol use 36.0 oz/week     Family History   Problem Relation Age of Onset     Allergies Brother      on meds     Arthritis Mother      Depression Mother      on meds. is in nursing home     Hypertension Mother      OSTEOPOROSIS Mother      HEART DISEASE Father       at age 57  ? MI--no autopsy     CANCER Father       "jaw--smoked [place]     Hypertension Brother      OSTEOPOROSIS Sister      on meds     Family History Negative Other      No breast or colon cancer hx in family     Hypertension Sister      OSTEOPOROSIS Brother          Current Outpatient Prescriptions   Medication Sig Dispense Refill     fluticasone (FLONASE) 50 MCG/ACT nasal spray USE TWO SPRAYS IN EACH NOSTRIL EVERY DAY 16 g 6     Omega-3 Fatty Acids (FISH OIL) 1200 MG CAPS Take 1 capsule by mouth daily       Misc Natural Products (GLUCOSAMINE CHONDROITIN ADV PO) Take 6 tablets by mouth daily.       TRAVATAN 0.004 % OP SOLN Reported on 3/9/2017  0     ASPIRIN 81 MG OR TABS 1 tab po QD (Once per day) 0 0     MULTI-DAY VITAMINS OR TABS 1 tablet daily 0 0     Allergies   Allergen Reactions     Seasonal Allergies      Tramadol      Muscle spasms,headache     BP Readings from Last 3 Encounters:   05/15/17 138/62   03/24/17 100/50   03/09/17 128/72    Wt Readings from Last 3 Encounters:   05/15/17 195 lb (88.5 kg)   03/24/17 190 lb (86.2 kg)   03/09/17 192 lb 12.8 oz (87.5 kg)                    Reviewed and updated as needed this visit by clinical staff  Tobacco  Allergies  Meds  Med Hx  Surg Hx  Fam Hx  Soc Hx      Reviewed and updated as needed this visit by Provider         ROS:  C: NEGATIVE for fever, chills, change in weight  E/M: NEGATIVE for ear, mouth and throat problems  R: NEGATIVE for significant cough or SOB  CV: NEGATIVE for chest pain, palpitations or peripheral edema  MUSCULOSKELETAL: right side rib pain as above     OBJECTIVE:                                                    /62  Pulse 60  Temp 96.4  F (35.8  C) (Tympanic)  Resp 20  Ht 5' 7\" (1.702 m)  Wt 195 lb (88.5 kg)  SpO2 97%  BMI 30.54 kg/m2  Body mass index is 30.54 kg/(m^2).  GENERAL: healthy, alert and no distress  NECK: no adenopathy, no asymmetry, masses, or scars and thyroid normal to palpation  RESP: lungs clear to auscultation - no rales, rhonchi or " wheezes  CV: regular rates and rhythm, normal S1 S2, no S3 or S4, no murmur, click or rub, peripheral pulses strong and no peripheral edema  ABDOMEN: soft, nontender, no hepatosplenomegaly, no masses and bowel sounds normal  MS: no gross musculoskeletal defects noted, no edema   He has significant point tenderness on the right side lower ribs.     Diagnostic Test Results:  Xray - chest with right rib detail: possible irregularity on the medial aspect of ninth rib.  It is not significantly displaced.  Reviewed by myself, pending radiology review.      ASSESSMENT/PLAN:                                                        1. Rib pain on right side  I think this is likely a fractured rib.  Will treat with activity modification and pain medications as needed.  Follow up if symptoms fail to resolve as expected.   - XR Ribs & Chest Right G/E 3 Views; Future  - HYDROcodone-acetaminophen (NORCO) 5-325 MG per tablet; Take 1-2 tablets by mouth every 4 hours as needed for moderate to severe pain maximum 3 tablet(s) per day  Dispense: 20 tablet; Refill: 0    FUTURE APPOINTMENTS:       - Follow up if symptoms fail to resolve as expected.   See Patient Instructions    SCOTT Griffiths [facility]

## 2017-05-16 DIAGNOSIS — J30.9 ALLERGIC RHINITIS: ICD-10-CM

## 2017-05-16 RX ORDER — FLUTICASONE PROPIONATE 50 MCG
SPRAY, SUSPENSION (ML) NASAL
Qty: 16 G | Refills: 6 | Status: SHIPPED | OUTPATIENT
Start: 2017-05-16 | End: 2018-04-07

## 2017-05-16 NOTE — TELEPHONE ENCOUNTER
fluticasone (FLONASE) 50 MCG/ACT nasal spray      Last Written Prescription Date: 7/7/16  Last Fill Quantity: 16,  # refills: 6   Last Office Visit with [facility], [facility] or [facility] prescribing provider: 5/16/16

## 2017-05-16 NOTE — TELEPHONE ENCOUNTER
Prescription approved per [facility] Refill Protocol.    Sanjana Melara RN  [facility]

## 2017-07-20 ENCOUNTER — TRANSFERRED RECORDS (OUTPATIENT)
Dept: HEALTH INFORMATION MANAGEMENT | Facility: CLINIC | Age: 62
End: 2017-07-20

## 2017-11-09 ENCOUNTER — TRANSFERRED RECORDS (OUTPATIENT)
Dept: HEALTH INFORMATION MANAGEMENT | Facility: CLINIC | Age: 62
End: 2017-11-09

## 2017-11-10 ENCOUNTER — RADIANT APPOINTMENT (OUTPATIENT)
Dept: GENERAL RADIOLOGY | Facility: CLINIC | Age: 62
End: 2017-11-10
Attending: FAMILY MEDICINE
Payer: COMMERCIAL

## 2017-11-10 ENCOUNTER — OFFICE VISIT (OUTPATIENT)
Dept: ORTHOPEDICS | Facility: CLINIC | Age: 62
End: 2017-11-10
Payer: COMMERCIAL

## 2017-11-10 VITALS
WEIGHT: 186.7 LBS | HEART RATE: 67 BPM | OXYGEN SATURATION: 97 % | DIASTOLIC BLOOD PRESSURE: 71 MMHG | SYSTOLIC BLOOD PRESSURE: 180 MMHG | HEIGHT: 69 IN | BODY MASS INDEX: 27.65 KG/M2

## 2017-11-10 DIAGNOSIS — M54.16 LUMBAR RADICULOPATHY: Primary | ICD-10-CM

## 2017-11-10 DIAGNOSIS — M54.16 LUMBAR RADICULOPATHY: ICD-10-CM

## 2017-11-10 DIAGNOSIS — M47.816 LUMBAR FACET ARTHROPATHY: ICD-10-CM

## 2017-11-10 PROCEDURE — 99213 OFFICE O/P EST LOW 20 MIN: CPT | Performed by: FAMILY MEDICINE

## 2017-11-10 PROCEDURE — 72100 X-RAY EXAM L-S SPINE 2/3 VWS: CPT | Performed by: RADIOLOGY

## 2017-11-10 RX ORDER — DICLOFENAC SODIUM 75 MG/1
75 TABLET, DELAYED RELEASE ORAL 2 TIMES DAILY PRN
Qty: 60 TABLET | Refills: 1 | Status: SHIPPED | OUTPATIENT
Start: 2017-11-10 | End: 2018-02-08

## 2017-11-10 ASSESSMENT — PAIN SCALES - GENERAL: PAINLEVEL: MILD PAIN (2)

## 2017-11-10 NOTE — PROGRESS NOTES
"CHIEF COMPLAINT:  Consult and Lumbar/SI       HISTORY OF PRESENT ILLNESS  Mr. Smith is a pleasant 62 year old year old male who presents to clinic today with back pain.    Homero started to have a new pain in his lower back that he noticed a couple of months ago.  Focal, positional, getting worse over time. He points to a very specific spot in his low back, just off the right side of his L5 to S1 vertebral junction. Worse the more he is on his feet, somewhat better when he can rest. Homero is a very active flaquito and has a active job and in addition does cross for 3-5 days a week. At times he does feel numbness and tingling down his right leg, \"throughout the whole leg.\" Mostly down the posterior aspect of his right thigh and then radiating down his leg.    Additional history: as documented    MEDICAL HISTORY  Patient Active Problem List   Diagnosis     Other specified glaucoma     Allergic rhinitis     Family history of other cardiovascular diseases     Labyrinthitis     Hyperlipidemia with target LDL less than 130     Advanced directives, counseling/discussion     DDD (degenerative disc disease), cervical     Supraspinatus tendon tear, left, subsequent encounter       Current Outpatient Prescriptions   Medication Sig Dispense Refill     fluticasone (FLONASE) 50 MCG/ACT spray SPRAY TWO SPRAYS IN EACH NOSTRIL EVERY DAY 16 g 6     Omega-3 Fatty Acids (FISH OIL) 1200 MG CAPS Take 1 capsule by mouth daily       Misc Natural Products (GLUCOSAMINE CHONDROITIN ADV PO) Take 6 tablets by mouth daily.       TRAVATAN 0.004 % OP SOLN Reported on 3/9/2017  0     ASPIRIN 81 MG OR TABS 1 tab po QD (Once per day) 0 0     MULTI-DAY VITAMINS OR TABS 1 tablet daily 0 0       Allergies   Allergen Reactions     Seasonal Allergies      Tramadol      Muscle spasms,headache       Family History   Problem Relation Age of Onset     Allergies Brother      on meds     Arthritis Mother      Depression Mother      on meds. is in nursing home     " "Hypertension Mother      OSTEOPOROSIS Mother      HEART DISEASE Father       at age 57  ? MI--no autopsy     CANCER Father      jaw--smoked [place]     Hypertension Brother      OSTEOPOROSIS Sister      on meds     Family History Negative Other      No breast or colon cancer hx in family     Hypertension Sister      OSTEOPOROSIS Brother        Additional medical/Social/Surgical histories reviewed in [facility] and updated as appropriate.     REVIEW OF SYSTEMS (11/10/2017)  CONSTITUTIONAL: Denies fever and weight loss  EYES: Denies acute vision changes  ENT: Denies hearing changes or difficulty swallowing  CARDIAC: Denies chest pain or edema  RESPIRATORY: Denies dyspnea, cough or wheeze  GASTROINTESTINAL: Denies abdominal pain, nausea, vomiting  MUSCULOSKELETAL: See HPI  SKIN: Denies any recent rash or lesion  NEUROLOGICAL: Denies numbness or focal weakness  PSYCHIATRIC: No history of psychiatric symptoms or problems  ENDOCRINE: Denies current diagnosis of diabetes   HEMATOLOGY: Denies episodes of easy bleeding      PHYSICAL EXAM  Vitals:    11/10/17 0654   BP: 180/71   Pulse: 67   SpO2: 97%   Weight: 84.7 kg (186 lb 11.2 oz)   Height: 1.745 m (5' 8.7\")     General  - normal appearance, in no obvious distress  CV  - normal peripheral perfusion  Pulm  - normal respiratory pattern, non-labored  Musculoskeletal - lumbar spine  - stance: normal giat  - inspection: normal bone and joint alignment, no obvious scoliosis  - palpation: no pain with palpation of the lumbar spine or paraspinals  - ROM: pain with extension, extended rotation  - strength: lower extremities 5/5 in all planes  - special tests:  (-) slump test on right  Neuro  - patellar and Achilles DTRs 2+ bilaterally, no sensory or motor deficit, grossly normal coordination, normal muscle tone  Skin  - no ecchymosis, erythema, warmth, or induration, no obvious rash  Psych  - interactive, appropriate, normal mood and affect        IMAGING :  Xray lumbar spine. " Final results and radiologist's interpretation available in the [facility] health record. Images were reviewed with the patient / family members in the office today. My personal interpretation of the performed imaging is positive for multilevel degenerative changes of the lumbar spine.         ASSESSMENT & PLAN  Mr. Smith is a 62 year old year old male who presents to clinic today with low back pain.    I do believe that he more than likely has a component of facet arthropathy with or without radiculopathy.  We discussed managements in detail including preventative measures for him to take while working out.  We also discussed the utility of an MRI.    I do think at this point is reasonable to employ anti-inflammatories, activity modification, and avoidance of exacerbating activities.  He is going to modify his CrossFit schedule and take full tear in, which I prescribed to him, as needed.    Homero is going to let me know how he is doing in the next 3-4 weeks.  If no better I will likely order an MRI.    It was a pleasure seeing Homero.        Taz Maurice DO, CAQSM  Primary Care Sports Medicine

## 2017-11-10 NOTE — PATIENT INSTRUCTIONS
Thanks for coming today.  Ortho/Sports Medicine Clinic  [address] [place] [address]    To schedule future appointments in Ortho Clinic, you may call [phone].    To schedule ordered imaging by your Provider: Call [facility] at [phone]    CyberSponse available online at:   [email or web address]    Please call if any further questions or concerns [phone] and ask for the Orthopedic Department. Clinic hours 8 am to 5 pm.    Return to clinic if symptoms worsen.

## 2017-11-10 NOTE — NURSING NOTE
"Deven Smith's goals for this visit include: Low back pain-worse the last couple of months.   He requests these members of his care team be copied on today's visit information: yes    PCP: Isaac Christianson    Referring Provider:  Referred Self, MD  No address on file    Chief Complaint   Patient presents with     Consult     Lumbar/SI       Initial /71  Pulse 67  Ht 1.745 m (5' 8.7\")  Wt 84.7 kg (186 lb 11.2 oz)  SpO2 97%  BMI 27.81 kg/m2 Estimated body mass index is 27.81 kg/(m^2) as calculated from the following:    Height as of this encounter: 1.745 m (5' 8.7\").    Weight as of this encounter: 84.7 kg (186 lb 11.2 oz).  Medication Reconciliation: complete    "

## 2017-11-10 NOTE — MR AVS SNAPSHOT
After Visit Summary   11/10/2017    Deven Smith    MRN: [id number]           Patient Information     Date Of Birth          1955        Visit Information        Provider Department      11/10/2017 7:00 AM Taz Maurice, DO [facility]        Today's Diagnoses     Lumbar radiculopathy    -  1    Lumbar facet arthropathy          Care Instructions    Thanks for coming today.  Ortho/Sports Medicine Clinic  [address] [place] [address]    To schedule future appointments in Ortho Clinic, you may call [phone].    To schedule ordered imaging by your Provider: Call [facility] at [phone]    Wisecam available online at:   [email or web address]    Please call if any further questions or concerns [phone] and ask for the Orthopedic Department. Clinic hours 8 am to 5 pm.    Return to clinic if symptoms worsen.            Follow-ups after your visit        Who to contact     If you have questions or need follow up information about today's clinic visit or your schedule please contact [facility] directly at [phone].  Normal or non-critical lab and imaging results will be communicated to you by [employer], letter or phone within 4 business days after the clinic has received the results. If you do not hear from us within 7 days, please contact the clinic through Wisecam or phone. If you have a critical or abnormal lab result, we will notify you by phone as soon as possible.  Submit refill requests through Wisecam or call your pharmacy and they will forward the refill request to us. Please allow 3 business days for your refill to be completed.          Additional Information About Your Visit        [employer] [facility] Information     Wisecam gives you secure access to your electronic health record. If you see a primary care provider, you can also send messages to your care team and make appointments. If you have questions, please call your  "primary care clinic.  If you do not have a primary care provider, please call [phone] and they will assist you.      Software [employer]istry is an electronic gateway that provides easy, online access to your medical records. With [employer], you can request a clinic appointment, read your test results, renew a prescription or communicate with your care team.     To access your existing account, please contact your [facility] or call [phone] for assistance.        Care EveryWhere ID     This is your Care EveryWhere ID. This could be used by other organizations to access your [facility] medical records  [id number]        Your Vitals Were     Pulse Height Pulse Oximetry BMI (Body Mass Index)          67 1.745 m (5' 8.7\") 97% 27.81 kg/m2         Blood Pressure from Last 3 Encounters:   11/10/17 180/71   05/15/17 138/62   03/24/17 100/50    Weight from Last 3 Encounters:   11/10/17 84.7 kg (186 lb 11.2 oz)   05/15/17 88.5 kg (195 lb)   03/24/17 86.2 kg (190 lb)                 Today's Medication Changes          These changes are accurate as of: 11/10/17 11:42 AM.  If you have any questions, ask your nurse or doctor.               Start taking these medicines.        Dose/Directions    diclofenac 75 MG EC tablet   Commonly known as:  VOLTAREN   Used for:  Lumbar facet arthropathy   Started by:  Taz Maurice DO        Dose:  75 mg   Take 1 tablet (75 mg) by mouth 2 times daily as needed for moderate pain   Quantity:  60 tablet   Refills:  1            Where to get your medicines      These medications were sent to [facility] [place], [place] - [address]   [address] [facility] [address]     Phone:  [phone]     diclofenac 75 MG EC tablet                Primary Care Provider Office Phone # Fax #    Isaac Christianson [phone]       [address] [facility] [address]        Equal Access to Services     GREY YAN AH: Hadii neida Chun " judd ana fallonebenezer dent. So [facility] [phone].    ATENCIÓN: Si mary ann tyson, tiene a jimenez disposición servicios gratuitos de asistencia lingüística. Mckay al [phone].    We comply with applicable federal civil rights laws and [place] laws. We do not discriminate on the basis of race, color, national origin, age, disability, sex, sexual orientation, or gender identity.            Thank you!     Thank you for choosing [facility]  for your care. Our goal is always to provide you with excellent care. Hearing back from our patients is one way we can continue to improve our services. Please take a few minutes to complete the written survey that you may receive in the mail after your visit with us. Thank you!             Your Updated Medication List - Protect others around you: Learn how to safely use, store and throw away your medicines at www.disposemymeds.org.          This list is accurate as of: 11/10/17 11:42 AM.  Always use your most recent med list.                   Brand Name Dispense Instructions for use Diagnosis    aspirin 81 MG tablet     0    1 tab po QD (Once per day)        diclofenac 75 MG EC tablet    VOLTAREN    60 tablet    Take 1 tablet (75 mg) by mouth 2 times daily as needed for moderate pain    Lumbar facet arthropathy       fish Oil 1200 MG capsule      Take 1 capsule by mouth daily        fluticasone 50 MCG/ACT spray    FLONASE    16 g    SPRAY TWO SPRAYS IN EACH NOSTRIL EVERY DAY    Allergic rhinitis       GLUCOSAMINE CHONDROITIN ADV PO      Take 6 tablets by mouth daily.        MULTI-DAY vitamin  S Tabs     0    1 tablet daily        TRAVATAN 0.004 % Soln   Generic drug:  Travoprost      Reported on 3/9/2017    Other specified glaucoma

## 2017-12-20 ENCOUNTER — MYC MEDICAL ADVICE (OUTPATIENT)
Dept: ORTHOPEDICS | Facility: CLINIC | Age: 62
End: 2017-12-20

## 2017-12-20 ENCOUNTER — RADIANT APPOINTMENT (OUTPATIENT)
Dept: MRI IMAGING | Facility: CLINIC | Age: 62
End: 2017-12-20
Attending: FAMILY MEDICINE
Payer: COMMERCIAL

## 2017-12-20 DIAGNOSIS — M54.16 LUMBAR RADICULOPATHY: Primary | ICD-10-CM

## 2017-12-20 DIAGNOSIS — M54.16 LUMBAR RADICULOPATHY: ICD-10-CM

## 2017-12-20 PROCEDURE — 72148 MRI LUMBAR SPINE W/O DYE: CPT | Performed by: RADIOLOGY

## 2018-01-04 ENCOUNTER — RADIANT APPOINTMENT (OUTPATIENT)
Dept: GENERAL RADIOLOGY | Facility: CLINIC | Age: 63
End: 2018-01-04
Attending: FAMILY MEDICINE
Payer: COMMERCIAL

## 2018-01-04 PROCEDURE — 62323 NJX INTERLAMINAR LMBR/SAC: CPT | Performed by: RADIOLOGY

## 2018-01-04 RX ORDER — LIDOCAINE HYDROCHLORIDE 10 MG/ML
5 INJECTION, SOLUTION EPIDURAL; INFILTRATION; INTRACAUDAL; PERINEURAL ONCE
Status: COMPLETED | OUTPATIENT
Start: 2018-01-04 | End: 2018-01-04

## 2018-01-04 RX ORDER — BUPIVACAINE HYDROCHLORIDE 5 MG/ML
3 INJECTION, SOLUTION PERINEURAL ONCE
Status: COMPLETED | OUTPATIENT
Start: 2018-01-04 | End: 2018-01-04

## 2018-01-04 RX ORDER — IOPAMIDOL 408 MG/ML
10 INJECTION, SOLUTION INTRATHECAL ONCE
Status: COMPLETED | OUTPATIENT
Start: 2018-01-04 | End: 2018-01-04

## 2018-01-04 RX ORDER — METHYLPREDNISOLONE ACETATE 80 MG/ML
80 INJECTION, SUSPENSION INTRA-ARTICULAR; INTRALESIONAL; INTRAMUSCULAR; SOFT TISSUE ONCE
Status: COMPLETED | OUTPATIENT
Start: 2018-01-04 | End: 2018-01-04

## 2018-01-04 RX ADMIN — IOPAMIDOL 10 ML: 408 INJECTION, SOLUTION INTRATHECAL at 15:23

## 2018-01-04 RX ADMIN — METHYLPREDNISOLONE ACETATE 80 MG: 80 INJECTION, SUSPENSION INTRA-ARTICULAR; INTRALESIONAL; INTRAMUSCULAR; SOFT TISSUE at 15:23

## 2018-01-04 RX ADMIN — LIDOCAINE HYDROCHLORIDE 50 MG: 10 INJECTION, SOLUTION EPIDURAL; INFILTRATION; INTRACAUDAL; PERINEURAL at 15:23

## 2018-01-04 RX ADMIN — BUPIVACAINE HYDROCHLORIDE 50 MG: 5 INJECTION, SOLUTION PERINEURAL at 15:22

## 2018-01-04 NOTE — PROGRESS NOTES
: Deven was seen in X-ray today for a lumbar epidural injection. Patient rated pain before procedure 2/10. After procedure patient rated pain 0/10. This pain level is (is/is not) acceptable to patient. Patient discharged home with Wife      AFTER YOU GO HOME    ? DO relax; minimize your activity for 24 hours  ? You may resume normal activity tomorrow  ? You may remove the bandage in the evening or next morning  ? You may resume bathing the next day  ? Drink at least 4 extra glasses of fluid today if not on fluid restrictions  ? DO NOT drive or operate machinery at home or at work for at least 24 hours      VISIT THE EMERGENCY ROOM OR URGENT CARE IF:    ? There is redness or swelling at the injection site  ? There is discharge from the injection site  ? You develop a temperature of 101  F or greater      ADDITIONAL INSTRUCTIONS:     ? You may resume your Coumadin or other blood thinner at your regular dose today.  Follow up with your physician to have your INR rechecked if indicated.  ? If you gain no relief from the injection after two (2) weeks, follow-up with your provider for your options.        Contacts:    During business hours from 8 to 5 pm, you may call [phone] to reach a nurse advisor at [facility].  After hours, call [facility]  [phone].  Ask for the Radiologist on-call.  Someone is on-call 24 hrs/day.  [facility] Toll Free Number   [phone]

## 2018-01-29 ENCOUNTER — ALLIED HEALTH/NURSE VISIT (OUTPATIENT)
Dept: FAMILY MEDICINE | Facility: CLINIC | Age: 63
End: 2018-01-29
Payer: COMMERCIAL

## 2018-01-29 DIAGNOSIS — Z23 NEED FOR PROPHYLACTIC VACCINATION AND INOCULATION AGAINST INFLUENZA: Primary | ICD-10-CM

## 2018-01-29 PROCEDURE — 90686 IIV4 VACC NO PRSV 0.5 ML IM: CPT

## 2018-01-29 PROCEDURE — 99207 ZZC NO CHARGE NURSE ONLY: CPT

## 2018-01-29 PROCEDURE — 90471 IMMUNIZATION ADMIN: CPT

## 2018-01-29 NOTE — PROGRESS NOTES

## 2018-01-29 NOTE — NURSING NOTE
Prior to injection verified patient identity using patient's name and date of birth.  Due to injection administration, patient instructed to remain in clinic for 15 minutes  afterwards, and to report any adverse reaction to me immediately.

## 2018-01-29 NOTE — MR AVS SNAPSHOT
After Visit Summary   1/29/2018    Deven Smith    MRN: [id number]           Patient Information     Date Of Birth          1955        Visit Information        Provider Department      1/29/2018 4:30 PM JESS YAO [facility]        Today's Diagnoses     Need for prophylactic vaccination and inoculation against influenza    -  1       Follow-ups after your visit        Your next 10 appointments already scheduled     Jan 29, 2018  4:30 PM CST   Nurse Only with [facility] ([facility])    [address] [place] [address] [phone]              Who to contact     If you have questions or need follow up information about today's clinic visit or your schedule please contact [facility] directly at [phone].  Normal or non-critical lab and imaging results will be communicated to you by MyChart, letter or phone within 4 business days after the clinic has received the results. If you do not hear from us within 7 days, please contact the clinic through MyChart or phone. If you have a critical or abnormal lab result, we will notify you by phone as soon as possible.  Submit refill requests through [employer] or call your pharmacy and they will forward the refill request to us. Please allow 3 business days for your refill to be completed.          Additional Information About Your Visit        MyChart Information     Benesight gives you secure access to your electronic health record. If you see a primary care provider, you can also send messages to your care team and make appointments. If you have questions, please call your primary care clinic.  If you do not have a primary care provider, please call [phone] and they will assist you.        Care EveryWhere ID     This is your Care EveryWhere ID. This could be used by other organizations to access your [facility] medical records  [id number]         Blood  Pressure from Last 3 Encounters:   11/10/17 180/71   05/15/17 138/62   03/24/17 100/50    Weight from Last 3 Encounters:   11/10/17 186 lb 11.2 oz (84.7 kg)   05/15/17 195 lb (88.5 kg)   03/24/17 190 lb (86.2 kg)              We Performed the Following     FLU VAC, SPLIT VIRUS IM > 3 YO (QUADRIVALENT) [35804]     Vaccine Administration, Initial [90491]        Primary Care Provider Office Phone # Fax #    Marc Pinedo [phone]       [facility] [address] [place] [address]        Equal Access to Services     GREY YAN : Hadii dione lazaro Somitra, waaxda luqadaha, qaybta kaalmada adeashleyyabrain, ebenezer torrez. So [facility] [phone].    ATENCIÓN: Si habla español, tiene a jimenez disposición servicios gratuitos de asistencia lingüística. Llame al [phone].    We comply with applicable federal civil rights laws and [place] laws. We do not discriminate on the basis of race, color, national origin, age, disability, sex, sexual orientation, or gender identity.            Thank you!     Thank you for choosing [facility]  for your care. Our goal is always to provide you with excellent care. Hearing back from our patients is one way we can continue to improve our services. Please take a few minutes to complete the written survey that you may receive in the mail after your visit with us. Thank you!             Your Updated Medication List - Protect others around you: Learn how to safely use, store and throw away your medicines at www.disposemymeds.org.          This list is accurate as of 1/29/18  4:16 PM.  Always use your most recent med list.                   Brand Name Dispense Instructions for use Diagnosis    aspirin 81 MG tablet     0    1 tab po QD (Once per day)        diclofenac 75 MG EC tablet    VOLTAREN    60 tablet    Take 1 tablet (75 mg) by mouth 2 times daily as needed for moderate pain    Lumbar facet arthropathy        fish Oil 1200 MG capsule      Take 1 capsule by mouth daily        fluticasone 50 MCG/ACT spray    FLONASE    16 g    SPRAY TWO SPRAYS IN EACH NOSTRIL EVERY DAY    Allergic rhinitis       GLUCOSAMINE CHONDROITIN ADV PO      Take 6 tablets by mouth daily.        MULTI-DAY vitamin  S Tabs     0    1 tablet daily        TRAVATAN 0.004 % Soln   Generic drug:  Travoprost      Reported on 3/9/2017    Other specified glaucoma

## 2018-02-08 ENCOUNTER — OFFICE VISIT (OUTPATIENT)
Dept: FAMILY MEDICINE | Facility: OTHER | Age: 63
End: 2018-02-08
Payer: COMMERCIAL

## 2018-02-08 VITALS
HEART RATE: 83 BPM | OXYGEN SATURATION: 98 % | TEMPERATURE: 98.7 F | WEIGHT: 190.3 LBS | SYSTOLIC BLOOD PRESSURE: 114 MMHG | BODY MASS INDEX: 28.35 KG/M2 | DIASTOLIC BLOOD PRESSURE: 60 MMHG | RESPIRATION RATE: 20 BRPM

## 2018-02-08 DIAGNOSIS — J01.90 ACUTE SINUSITIS WITH SYMPTOMS > 10 DAYS: Primary | ICD-10-CM

## 2018-02-08 PROCEDURE — 99213 OFFICE O/P EST LOW 20 MIN: CPT | Performed by: FAMILY MEDICINE

## 2018-02-08 RX ORDER — LATANOPROST 50 UG/ML
0.01 SOLUTION/ DROPS OPHTHALMIC AT BEDTIME
COMMUNITY
Start: 2018-01-30

## 2018-02-08 ASSESSMENT — PAIN SCALES - GENERAL: PAINLEVEL: MODERATE PAIN (4)

## 2018-02-08 NOTE — PROGRESS NOTES
SUBJECTIVE:   Deven Smith is a 62 year old male who presents to clinic today for the following health issues:        Acute Illness   Acute illness concerns: Sinus right side  Onset: over 2 weeks    Fever: YES    Chills/Sweats: YES    Headache (location?): YES    Sinus Pressure:YES- tender, facial pain and teeth pain right upper    Conjunctivitis:  YES: right    Ear Pain: YES: left    Rhinorrhea: YES    Congestion: YES    Sore Throat: no     Cough: YES-productive of yellow sputum, productive of green sputum in the morning    Wheeze: YES    Decreased Appetite: no    Nausea: no    Vomiting: no    Diarrhea:  no    Dysuria/Freq.: no    Fatigue/Achiness: no    Sick/Strep Exposure: YES, at work     Therapies Tried and outcome: Nyquil, IBU, Tylenol, Mucinex not much relief. Thought was better over the weekend but came back.         Problem list and histories reviewed & adjusted, as indicated.  Additional history: as documented    Patient Active Problem List   Diagnosis     Other specified glaucoma     Allergic rhinitis     Family history of other cardiovascular diseases     Labyrinthitis     Hyperlipidemia with target LDL less than 130     Advanced directives, counseling/discussion     DDD (degenerative disc disease), cervical     Supraspinatus tendon tear, left, subsequent encounter     Past Surgical History:   Procedure Laterality Date     COLONOSCOPY  03/21/2007    Repeat  for screening purposes in 10 yrs.     COLONOSCOPY  1/2/2013    Procedure: COLONOSCOPY;  Colonoscopy;  Surgeon: Emmett San MD;  Location:  GI     HC SHLDR ARTHROSCOP,SURG,W/ROTAT CUFF REPR Left 2010      VASECTOMY UNILAT/BILAT W POSTOP SEMEN  1998    Vasectomy       Social History   Substance Use Topics     Smoking status: Former Smoker     Packs/day: 2.00     Years: 15.00     Quit date: 11/8/1987     Smokeless tobacco: Never Used     Alcohol use 36.0 oz/week     Family History   Problem Relation Age of Onset     Allergies  Brother      on meds     Arthritis Mother      Depression Mother      on meds. is in nursing home     Hypertension Mother      OSTEOPOROSIS Mother      HEART DISEASE Father       at age 57  ? MI--no autopsy     CANCER Father      jaw--smoked [place]     Hypertension Brother      OSTEOPOROSIS Sister      on meds     Family History Negative Other      No breast or colon cancer hx in family     Hypertension Sister      OSTEOPOROSIS Brother          Current Outpatient Prescriptions   Medication Sig Dispense Refill     latanoprost (XALATAN) 0.005 % ophthalmic solution Place 0.005 drops into both eyes At Bedtime       fluticasone (FLONASE) 50 MCG/ACT spray SPRAY TWO SPRAYS IN EACH NOSTRIL EVERY DAY 16 g 6     Omega-3 Fatty Acids (FISH OIL) 1200 MG CAPS Take 1 capsule by mouth daily       Misc Natural Products (GLUCOSAMINE CHONDROITIN ADV PO) Take 6 tablets by mouth daily.       ASPIRIN 81 MG OR TABS 1 tab po QD (Once per day) 0 0     MULTI-DAY VITAMINS OR TABS 1 tablet daily 0 0     Allergies   Allergen Reactions     Seasonal Allergies      Tramadol      Muscle spasms,headache     Recent Labs   Lab Test  17   0752  09/16/15   1155  09/04/15   0810  14   0807   LDL  101*   --   78  68   HDL  69   --   71  60   TRIG  59   --   36  65   ALT  40  49  47  27   CR  0.93  1.05  1.00  0.89   GFRESTIMATED  83  72  77  88   GFRESTBLACK  >90   GFR Calc    88  >90   GFR Calc    >90   POTASSIUM  4.2  4.3  4.0  4.5   TSH   --    --   1.89  1.25      BP Readings from Last 3 Encounters:   18 114/60   11/10/17 180/71   05/15/17 138/62    Wt Readings from Last 3 Encounters:   18 190 lb 4.8 oz (86.3 kg)   11/10/17 186 lb 11.2 oz (84.7 kg)   05/15/17 195 lb (88.5 kg)                  Labs reviewed in EPIC    Reviewed and updated as needed this visit by clinical staff  Tobacco  Allergies  Meds  Problems  Med Hx  Surg Hx  Fam Hx  Soc Hx        Reviewed and updated as  needed this visit by Provider  Allergies  Meds  Problems         ROS:  C: NEGATIVE for fever, chills, change in weight  ENT/MOUTH: POSITIVE for Hx sinus infections, nasal congestion, rhinorrhea-clear, sinus pressure and tooth pain and NEGATIVE for fever and sore throat  R: NEGATIVE for significant cough or SOB  CV: NEGATIVE for chest pain, palpitations or peripheral edema  ROS otherwise negative    OBJECTIVE:     /60 (BP Location: Left arm, Patient Position: Chair, Cuff Size: Adult Large)  Pulse 83  Temp 98.7  F (37.1  C) (Temporal)  Resp 20  Wt 190 lb 4.8 oz (86.3 kg)  SpO2 98%  BMI 28.35 kg/m2  Body mass index is 28.35 kg/(m^2).  GENERAL: healthy, alert and no distress  HENT: normal cephalic/atraumatic, ear canals and TM's normal, nose and mouth without ulcers or lesions, nasal mucosa edematous , rhinorrhea purulent, oropharynx clear, oral mucous membranes moist and sinuses: maxillary, frontal tenderness on right  NECK: no adenopathy, no asymmetry, masses, or scars and thyroid normal to palpation  RESP: lungs clear to auscultation - no rales, rhonchi or wheezes  CV: regular rate and rhythm, normal S1 S2, no S3 or S4, no murmur, click or rub, no peripheral edema and peripheral pulses strong  ABDOMEN: soft, nontender, no hepatosplenomegaly, no masses and bowel sounds normal  MS: no gross musculoskeletal defects noted, no edema    Diagnostic Test Results:  none     ASSESSMENT/PLAN:     1. Acute sinusitis with symptoms > 10 days  Acute sinusitis for 2 weeks despite conservative treatment with NS and antihistamines. Symptomatic therapy suggested: gargle for sore throat, use mist at bedside for congestion.  Apply facial warm packs for sinus pain.  May use ibuprofen, antihistamine-decongestant of choice, saline nasal irrigation as needed prn.   - amoxicillin-clavulanate (AUGMENTIN) 875-125 MG per tablet; Take 1 tablet by mouth 2 times daily  Dispense: 20 tablet; Refill: 0    Patient Instructions      Sinusitis (Antibiotic Treatment)    The sinuses are air-filled spaces within the bones of the face. They connect to the inside of the nose. Sinusitis is an inflammation of the tissue lining the sinus cavity. Sinus inflammation can occur during a cold. It can also be due to allergies to pollens and other particles in the air. Sinusitis can cause symptoms of sinus congestion and fullness. A sinus infection causes fever, headache and facial pain. There is often green or yellow drainage from the nose or into the back of the throat (post-nasal drip). You have been given antibiotics to treat this condition.  Home care:    Take the full course of antibiotics as instructed. Do not stop taking them, even if you feel better.    Drink plenty of water, hot tea, and other liquids. This may help thin mucus. It also may promote sinus drainage.    Heat may help soothe painful areas of the face. Use a towel soaked in hot water. Or,  the shower and direct the hot spray onto your face. Using a vaporizer along with a menthol rub at night may also help.     An expectorant containing guaifenesin may help thin the mucus and promote drainage from the sinuses.    Over-the-counter decongestants may be used unless a similar medicine was prescribed. Nasal sprays work the fastest. Use one that contains phenylephrine or oxymetazoline. First blow the nose gently. Then use the spray. Do not use these medicines more often than directed on the label or symptoms may get worse. You may also use tablets containing pseudoephedrine. Avoid products that combine ingredients, because side effects may be increased. Read labels. You can also ask the pharmacist for help. (NOTE: Persons with high blood pressure should not use decongestants. They can raise blood pressure.)    Over-the-counter antihistamines may help if allergies contributed to your sinusitis.      Do not use nasal rinses or irrigation during an acute sinus infection, unless told to  by your health care provider. Rinsing may spread the infection to other sinuses.    Use acetaminophen or ibuprofen to control pain, unless another pain medicine was prescribed. (If you have chronic liver or kidney disease or ever had a stomach ulcer, talk with your doctor before using these medicines. Aspirin should never be used in anyone under 18 years of age who is ill with a fever. It may cause severe liver damage.)    Don't smoke. This can worsen symptoms.  Follow-up care  Follow up with your healthcare provider or our staff if you are not improving within the next week.  When to seek medical advice  Call your healthcare provider if any of these occur:    Facial pain or headache becoming more severe    Stiff neck    Unusual drowsiness or confusion    Swelling of the forehead or eyelids    Vision problems, including blurred or double vision    Fever of 100.4 F (38 C) or higher, or as directed by your healthcare provider    Seizure    Breathing problems    Symptoms not resolving within 10 days  Date Last Reviewed: 4/13/2015 2000-2017 [employer]. [address] [place] [address], [place] [address]. All rights reserved. This information is not intended as a substitute for professional medical care. Always follow your healthcare professional's instructions.            Simon Montero MD  [facility]

## 2018-02-08 NOTE — NURSING NOTE
"Chief Complaint   Patient presents with     Sinus Problem       Initial /60 (BP Location: Left arm, Patient Position: Chair, Cuff Size: Adult Large)  Pulse 83  Temp 98.7  F (37.1  C) (Temporal)  Resp 20  Wt 190 lb 4.8 oz (86.3 kg)  SpO2 98%  BMI 28.35 kg/m2 Estimated body mass index is 28.35 kg/(m^2) as calculated from the following:    Height as of 11/10/17: 5' 8.7\" (1.745 m).    Weight as of this encounter: 190 lb 4.8 oz (86.3 kg).  Medication Reconciliation: complete     Shari Polanco MA 2/8/2018  9:11 AM            "

## 2018-02-08 NOTE — PATIENT INSTRUCTIONS
Sinusitis (Antibiotic Treatment)    The sinuses are air-filled spaces within the bones of the face. They connect to the inside of the nose. Sinusitis is an inflammation of the tissue lining the sinus cavity. Sinus inflammation can occur during a cold. It can also be due to allergies to pollens and other particles in the air. Sinusitis can cause symptoms of sinus congestion and fullness. A sinus infection causes fever, headache and facial pain. There is often green or yellow drainage from the nose or into the back of the throat (post-nasal drip). You have been given antibiotics to treat this condition.  Home care:    Take the full course of antibiotics as instructed. Do not stop taking them, even if you feel better.    Drink plenty of water, hot tea, and other liquids. This may help thin mucus. It also may promote sinus drainage.    Heat may help soothe painful areas of the face. Use a towel soaked in hot water. Or,  the shower and direct the hot spray onto your face. Using a vaporizer along with a menthol rub at night may also help.     An expectorant containing guaifenesin may help thin the mucus and promote drainage from the sinuses.    Over-the-counter decongestants may be used unless a similar medicine was prescribed. Nasal sprays work the fastest. Use one that contains phenylephrine or oxymetazoline. First blow the nose gently. Then use the spray. Do not use these medicines more often than directed on the label or symptoms may get worse. You may also use tablets containing pseudoephedrine. Avoid products that combine ingredients, because side effects may be increased. Read labels. You can also ask the pharmacist for help. (NOTE: Persons with high blood pressure should not use decongestants. They can raise blood pressure.)    Over-the-counter antihistamines may help if allergies contributed to your sinusitis.      Do not use nasal rinses or irrigation during an acute sinus infection, unless told to by  your health care provider. Rinsing may spread the infection to other sinuses.    Use acetaminophen or ibuprofen to control pain, unless another pain medicine was prescribed. (If you have chronic liver or kidney disease or ever had a stomach ulcer, talk with your doctor before using these medicines. Aspirin should never be used in anyone under 18 years of age who is ill with a fever. It may cause severe liver damage.)    Don't smoke. This can worsen symptoms.  Follow-up care  Follow up with your healthcare provider or our staff if you are not improving within the next week.  When to seek medical advice  Call your healthcare provider if any of these occur:    Facial pain or headache becoming more severe    Stiff neck    Unusual drowsiness or confusion    Swelling of the forehead or eyelids    Vision problems, including blurred or double vision    Fever of 100.4 F (38 C) or higher, or as directed by your healthcare provider    Seizure    Breathing problems    Symptoms not resolving within 10 days  Date Last Reviewed: 4/13/2015 2000-2017 [employer]. [address] [place] [address], [place] [id number]. All rights reserved. This information is not intended as a substitute for professional medical care. Always follow your healthcare professional's instructions.

## 2018-02-08 NOTE — MR AVS SNAPSHOT
After Visit Summary   2/8/2018    Deven Smith    MRN: [id number]           Patient Information     Date Of Birth          1955        Visit Information        Provider Department      2/8/2018 9:00 AM Simon Montero MD [facility]        Today's Diagnoses     Acute sinusitis with symptoms > 10 days    -  1      Care Instructions      Sinusitis (Antibiotic Treatment)    The sinuses are air-filled spaces within the bones of the face. They connect to the inside of the nose. Sinusitis is an inflammation of the tissue lining the sinus cavity. Sinus inflammation can occur during a cold. It can also be due to allergies to pollens and other particles in the air. Sinusitis can cause symptoms of sinus congestion and fullness. A sinus infection causes fever, headache and facial pain. There is often green or yellow drainage from the nose or into the back of the throat (post-nasal drip). You have been given antibiotics to treat this condition.  Home care:    Take the full course of antibiotics as instructed. Do not stop taking them, even if you feel better.    Drink plenty of water, hot tea, and other liquids. This may help thin mucus. It also may promote sinus drainage.    Heat may help soothe painful areas of the face. Use a towel soaked in hot water. Or,  the shower and direct the hot spray onto your face. Using a vaporizer along with a menthol rub at night may also help.     An expectorant containing guaifenesin may help thin the mucus and promote drainage from the sinuses.    Over-the-counter decongestants may be used unless a similar medicine was prescribed. Nasal sprays work the fastest. Use one that contains phenylephrine or oxymetazoline. First blow the nose gently. Then use the spray. Do not use these medicines more often than directed on the label or symptoms may get worse. You may also use tablets containing pseudoephedrine. Avoid products that combine ingredients,  because side effects may be increased. Read labels. You can also ask the pharmacist for help. (NOTE: Persons with high blood pressure should not use decongestants. They can raise blood pressure.)    Over-the-counter antihistamines may help if allergies contributed to your sinusitis.      Do not use nasal rinses or irrigation during an acute sinus infection, unless told to by your health care provider. Rinsing may spread the infection to other sinuses.    Use acetaminophen or ibuprofen to control pain, unless another pain medicine was prescribed. (If you have chronic liver or kidney disease or ever had a stomach ulcer, talk with your doctor before using these medicines. Aspirin should never be used in anyone under 18 years of age who is ill with a fever. It may cause severe liver damage.)    Don't smoke. This can worsen symptoms.  Follow-up care  Follow up with your healthcare provider or our staff if you are not improving within the next week.  When to seek medical advice  Call your healthcare provider if any of these occur:    Facial pain or headache becoming more severe    Stiff neck    Unusual drowsiness or confusion    Swelling of the forehead or eyelids    Vision problems, including blurred or double vision    Fever of 100.4 F (38 C) or higher, or as directed by your healthcare provider    Seizure    Breathing problems    Symptoms not resolving within 10 days  Date Last Reviewed: 4/13/2015 2000-2017 [employer]. [address] [place] [address], [place] [id number]. All rights reserved. This information is not intended as a substitute for professional medical care. Always follow your healthcare professional's instructions.                Follow-ups after your visit        Who to contact     If you have questions or need follow up information about today's clinic visit or your schedule please contact [facility] directly at [phone].  Normal or non-critical lab and imaging results will be  communicated to you by Stayzillahart, letter or phone within 4 business days after the clinic has received the results. If you do not hear from us within 7 days, please contact the clinic through [employer] or phone. If you have a critical or abnormal lab result, we will notify you by phone as soon as possible.  Submit refill requests through [employer] or call your pharmacy and they will forward the refill request to us. Please allow 3 business days for your refill to be completed.          Additional Information About Your Visit        [employer] Information     [employer] gives you secure access to your electronic health record. If you see a primary care provider, you can also send messages to your care team and make appointments. If you have questions, please call your primary care clinic.  If you do not have a primary care provider, please call [phone] and they will assist you.        Care EveryWhere ID     This is your Care EveryWhere ID. This could be used by other organizations to access your [facility] medical records  [id number]        Your Vitals Were     Pulse Temperature Respirations Pulse Oximetry BMI (Body Mass Index)       83 98.7  F (37.1  C) (Temporal) 20 98% 28.35 kg/m2        Blood Pressure from Last 3 Encounters:   02/08/18 114/60   11/10/17 180/71   05/15/17 138/62    Weight from Last 3 Encounters:   02/08/18 190 lb 4.8 oz (86.3 kg)   11/10/17 186 lb 11.2 oz (84.7 kg)   05/15/17 195 lb (88.5 kg)              Today, you had the following     No orders found for display         Today's Medication Changes          These changes are accurate as of 2/8/18  9:33 AM.  If you have any questions, ask your nurse or doctor.               Start taking these medicines.        Dose/Directions    amoxicillin-clavulanate 875-125 MG per tablet   Commonly known as:  AUGMENTIN   Used for:  Acute sinusitis with symptoms > 10 days   Started by:  Simon Montero MD        Dose:  1 tablet   Take 1 tablet by mouth 2 times daily    Quantity:  20 tablet   Refills:  0            Where to get your medicines      These medications were sent to [facility] [place] - [address]  [address] , [facility] [address]     Phone:  [phone]     amoxicillin-clavulanate 875-125 MG per tablet                Primary Care Provider Office Phone # Fax #    Marc Pinedo [phone]       [facility] [address] [place] [address]        Equal Access to Services     [facility] CenterSTEPHANIE : Hadii aad ku hadasho Soomaali, waaxda luqadaha, qaybta kaalmada adeegyada, waxay idiin hayaan adeeg kharash lafranny . So [facility] [phone].    ATENCIÓN: Si habla español, tiene a jimenez disposición servicios gratuitos de asistencia lingüística. [facility] [phone].    We comply with applicable federal civil rights laws and [place] laws. We do not discriminate on the basis of race, color, national origin, age, disability, sex, sexual orientation, or gender identity.            Thank you!     Thank you for choosing [facility]  for your care. Our goal is always to provide you with excellent care. Hearing back from our patients is one way we can continue to improve our services. Please take a few minutes to complete the written survey that you may receive in the mail after your visit with us. Thank you!             Your Updated Medication List - Protect others around you: Learn how to safely use, store and throw away your medicines at www.disposemymeds.org.          This list is accurate as of 2/8/18  9:33 AM.  Always use your most recent med list.                   Brand Name Dispense Instructions for use Diagnosis    amoxicillin-clavulanate 875-125 MG per tablet    AUGMENTIN    20 tablet    Take 1 tablet by mouth 2 times daily    Acute sinusitis with symptoms > 10 days       aspirin 81 MG tablet     0    1 tab po QD (Once per day)        fish Oil 1200 MG capsule      Take 1 capsule by mouth daily         fluticasone 50 MCG/ACT spray    FLONASE    16 g    SPRAY TWO SPRAYS IN EACH NOSTRIL EVERY DAY    Allergic rhinitis       GLUCOSAMINE CHONDROITIN ADV PO      Take 6 tablets by mouth daily.        latanoprost 0.005 % ophthalmic solution    XALATAN     Place 0.005 drops into both eyes At Bedtime        MULTI-DAY vitamin  S Tabs     0    1 tablet daily

## 2018-02-27 ENCOUNTER — TRANSFERRED RECORDS (OUTPATIENT)
Dept: HEALTH INFORMATION MANAGEMENT | Facility: CLINIC | Age: 63
End: 2018-02-27

## 2018-04-07 DIAGNOSIS — J30.2 SEASONAL ALLERGIC RHINITIS, UNSPECIFIED CHRONICITY, UNSPECIFIED TRIGGER: Primary | ICD-10-CM

## 2018-04-07 DIAGNOSIS — J30.9 ALLERGIC RHINITIS: ICD-10-CM

## 2018-04-09 RX ORDER — FLUTICASONE PROPIONATE 50 MCG
SPRAY, SUSPENSION (ML) NASAL
Qty: 16 G | Refills: 6 | Status: SHIPPED | OUTPATIENT
Start: 2018-04-09 | End: 2019-02-21

## 2018-04-09 NOTE — TELEPHONE ENCOUNTER
Routing refill request to provider for review/approval because:  A break in medication- pt should have ran out in December  Sara Overton, RN, BSN

## 2018-04-09 NOTE — TELEPHONE ENCOUNTER
"Last Written Prescription Date:  5/16/2017  Last Fill Quantity: 16 g ,  # refills: 6   Last office visit: 2/8/2018 with prescribing provider:  Dr. Montero   Future Office Visit:    Requested Prescriptions   Pending Prescriptions Disp Refills     fluticasone (FLONASE) 50 MCG/ACT spray 16 g 6     Sig: SPRAY TWO SPRAYS IN EACH NOSTRIL EVERY DAY    Inhaled Steroids Protocol Failed    4/7/2018 10:09 AM       Failed - Recent (12 mo) or future (30 days) visit within the authorizing provider's specialty    Patient had office visit in the last 12 months or has a visit in the next 30 days with authorizing provider or within the authorizing provider's specialty.  See \"Patient Info\" tab in inbasket, or \"Choose Columns\" in Meds & Orders section of the refill encounter.           Passed - Patient is age 12 or older          "

## 2018-05-10 ENCOUNTER — TRANSFERRED RECORDS (OUTPATIENT)
Dept: HEALTH INFORMATION MANAGEMENT | Facility: CLINIC | Age: 63
End: 2018-05-10

## 2018-06-27 ENCOUNTER — OFFICE VISIT (OUTPATIENT)
Dept: FAMILY MEDICINE | Facility: CLINIC | Age: 63
End: 2018-06-27
Payer: COMMERCIAL

## 2018-06-27 VITALS
OXYGEN SATURATION: 98 % | BODY MASS INDEX: 27.3 KG/M2 | WEIGHT: 184.3 LBS | DIASTOLIC BLOOD PRESSURE: 70 MMHG | HEIGHT: 69 IN | SYSTOLIC BLOOD PRESSURE: 124 MMHG | TEMPERATURE: 97.2 F | HEART RATE: 54 BPM

## 2018-06-27 DIAGNOSIS — Z12.11 SPECIAL SCREENING FOR MALIGNANT NEOPLASM OF COLON: ICD-10-CM

## 2018-06-27 DIAGNOSIS — Z00.00 ROUTINE GENERAL MEDICAL EXAMINATION AT A HEALTH CARE FACILITY: Primary | ICD-10-CM

## 2018-06-27 DIAGNOSIS — Z12.5 SCREENING FOR PROSTATE CANCER: ICD-10-CM

## 2018-06-27 DIAGNOSIS — E78.5 HYPERLIPIDEMIA WITH TARGET LDL LESS THAN 130: ICD-10-CM

## 2018-06-27 LAB
ALBUMIN SERPL-MCNC: 3.7 G/DL (ref 3.4–5)
ALP SERPL-CCNC: 58 U/L (ref 40–150)
ALT SERPL W P-5'-P-CCNC: 27 U/L (ref 0–70)
ANION GAP SERPL CALCULATED.3IONS-SCNC: 8 MMOL/L (ref 3–14)
AST SERPL W P-5'-P-CCNC: 21 U/L (ref 0–45)
BILIRUB SERPL-MCNC: 0.4 MG/DL (ref 0.2–1.3)
BUN SERPL-MCNC: 22 MG/DL (ref 7–30)
CALCIUM SERPL-MCNC: 8.8 MG/DL (ref 8.5–10.1)
CHLORIDE SERPL-SCNC: 103 MMOL/L (ref 94–109)
CHOLEST SERPL-MCNC: 194 MG/DL
CO2 SERPL-SCNC: 30 MMOL/L (ref 20–32)
CREAT SERPL-MCNC: 1 MG/DL (ref 0.66–1.25)
GFR SERPL CREATININE-BSD FRML MDRD: 76 ML/MIN/1.7M2
GLUCOSE SERPL-MCNC: 96 MG/DL (ref 70–99)
HDLC SERPL-MCNC: 71 MG/DL
LDLC SERPL CALC-MCNC: 113 MG/DL
NONHDLC SERPL-MCNC: 123 MG/DL
POTASSIUM SERPL-SCNC: 4 MMOL/L (ref 3.4–5.3)
PROT SERPL-MCNC: 6.7 G/DL (ref 6.8–8.8)
PSA SERPL-ACNC: 1.5 UG/L (ref 0–4)
SODIUM SERPL-SCNC: 141 MMOL/L (ref 133–144)
TRIGL SERPL-MCNC: 50 MG/DL

## 2018-06-27 PROCEDURE — 99396 PREV VISIT EST AGE 40-64: CPT | Performed by: FAMILY MEDICINE

## 2018-06-27 PROCEDURE — 80061 LIPID PANEL: CPT | Performed by: FAMILY MEDICINE

## 2018-06-27 PROCEDURE — G0103 PSA SCREENING: HCPCS | Performed by: FAMILY MEDICINE

## 2018-06-27 PROCEDURE — 80053 COMPREHEN METABOLIC PANEL: CPT | Performed by: FAMILY MEDICINE

## 2018-06-27 PROCEDURE — 36415 COLL VENOUS BLD VENIPUNCTURE: CPT | Performed by: FAMILY MEDICINE

## 2018-06-27 NOTE — LETTER
June 28, 2018      Deven Smith  [address] [address] [place] [id number]        Dear ,    We are writing to inform you of your test results.    Your PSA was normal.  Chemistry panel including blood sugar was all normal.  Your cholesterol is fine at 194 with an excellent HDL cholesterol of 71 your LDL cholesterol was acceptable at 113.     Resulted Orders   Lipid panel reflex to direct LDL Fasting   Result Value Ref Range    Cholesterol 194 <200 mg/dL    Triglycerides 50 <150 mg/dL      Comment:      Fasting specimen    HDL Cholesterol 71 >39 mg/dL    LDL Cholesterol Calculated 113 (H) <100 mg/dL      Comment:      Above desirable:  100-129 mg/dl  Borderline High:  130-159 mg/dL  High:             160-189 mg/dL  Very high:       >189 mg/dl      Non HDL Cholesterol 123 <130 mg/dL   PSA, screen   Result Value Ref Range    PSA 1.50 0 - 4 ug/L      Comment:      Assay Method:  Chemiluminescence using Siemens Vista analyzer   Comprehensive metabolic panel   Result Value Ref Range    Sodium 141 133 - 144 mmol/L    Potassium 4.0 3.4 - 5.3 mmol/L    Chloride 103 94 - 109 mmol/L    Carbon Dioxide 30 20 - 32 mmol/L    Anion Gap 8 3 - 14 mmol/L    Glucose 96 70 - 99 mg/dL      Comment:      Fasting specimen    Urea Nitrogen 22 7 - 30 mg/dL    Creatinine 1.00 0.66 - 1.25 mg/dL    GFR Estimate 76 >60 mL/min/1.7m2      Comment:      Non  GFR Calc    GFR Estimate If Black >90 >60 mL/min/1.7m2      Comment:       GFR Calc    Calcium 8.8 8.5 - 10.1 mg/dL    Bilirubin Total 0.4 0.2 - 1.3 mg/dL    Albumin 3.7 3.4 - 5.0 g/dL    Protein Total 6.7 (L) 6.8 - 8.8 g/dL    Alkaline Phosphatase 58 40 - 150 U/L    ALT 27 0 - 70 U/L    AST 21 0 - 45 U/L       If you have any questions or concerns, please call the clinic at the number listed above.       Sincerely,        Marc Pinedo MD

## 2018-06-27 NOTE — MR AVS SNAPSHOT
After Visit Summary   6/27/2018    Deven Smith    MRN: [id number]           Patient Information     Date Of Birth          1955        Visit Information        Provider Department      6/27/2018 7:40 AM Marc Pinedo MD [facility]        Today's Diagnoses     Routine general medical examination at a health care facility    -  1    Supraspinatus tendon tear, left, subsequent encounter        Hyperlipidemia with target LDL less than 130        Screening for prostate cancer        Special screening for malignant neoplasm of colon          Care Instructions      Preventive Health Recommendations  Male Ages 50 - 64    Yearly exam:             See your health care provider every year in order to  o   Review health changes.   o   Discuss preventive care.    o   Review your medicines if your doctor has prescribed any.     Have a cholesterol test every 5 years, or more frequently if you are at risk for high cholesterol/heart disease.     Have a diabetes test (fasting glucose) every three years. If you are at risk for diabetes, you should have this test more often.     Have a colonoscopy at age 50, or have a yearly FIT test (stool test). These exams will check for colon cancer.      Talk with your health care provider about whether or not a prostate cancer screening test (PSA) is right for you.    You should be tested each year for STDs (sexually transmitted diseases), if you re at risk.     Shots: Get a flu shot each year. Get a tetanus shot every 10 years.     Nutrition:    Eat at least 5 servings of fruits and vegetables daily.     Eat whole-grain bread, whole-wheat pasta and brown rice instead of white grains and rice.     Get adequate Calcium and Vitamin D.     Lifestyle    Exercise for at least 150 minutes a week (30 minutes a day, 5 days a week). This will help you control your weight and prevent disease.     Limit alcohol to one drink per day.     No smoking.     Wear  sunscreen to prevent skin cancer.     See your dentist every six months for an exam and cleaning.     See your eye doctor every 1 to 2 years.            Follow-ups after your visit        Additional Services     GASTROENTEROLOGY ADULT REF PROCEDURE ONLY [facility][note 3] [phone]       Last Lab Result: Creatinine (mg/dL)       Date                     Value                 06/27/2018               1.00             ----------  Body mass index is 27.45 kg/(m^2).      Patient will be contacted to schedule procedure.     Please be aware that coverage of these services is subject to the terms and limitations of your health insurance plan.  Call member services at your health plan with any benefit or coverage questions.  Any procedures must be performed at a [facility] facility OR coordinated by your clinic's referral office.    Please bring the following with you to your appointment:    (1) Any X-Rays, CTs or MRIs which have been performed.  Contact the facility where they were done to arrange for  prior to your scheduled appointment.    (2) List of current medications   (3) This referral request   (4) Any documents/labs given to you for this referral            ORTHOPEDICS ADULT REFERRAL       Your provider has referred you to: [facility]: [facility] [phone]   [email or web address]    Please be aware that coverage of these services is subject to the terms and limitations of your health insurance plan.  Call member services at your health plan with any benefit or coverage questions.      Please bring the following to your appointment:    >>   Any x-rays, CTs or MRIs which have been performed.  Contact the facility where they were done to arrange for  prior to your scheduled appointment.  Any new CT, MRI or other procedures ordered by your specialist must be performed at a  "[facility] facility or coordinated by your clinic's referral office.    >>   List of current medications   >>   This referral request   >>   Any documents/labs given to you for this referral                  Who to contact     If you have questions or need follow up information about today's clinic visit or your schedule please contact [facility] directly at [phone].  Normal or non-critical lab and imaging results will be communicated to you by MyChart, letter or phone within 4 business days after the clinic has received the results. If you do not hear from us within 7 days, please contact the clinic through vozerohart or phone. If you have a critical or abnormal lab result, we will notify you by phone as soon as possible.  Submit refill requests through [employer] or call your pharmacy and they will forward the refill request to us. Please allow 3 business days for your refill to be completed.          Additional Information About Your Visit        vozerohart Information     [employer] gives you secure access to your electronic health record. If you see a primary care provider, you can also send messages to your care team and make appointments. If you have questions, please call your primary care clinic.  If you do not have a primary care provider, please call [phone] and they will assist you.        Care EveryWhere ID     This is your Care EveryWhere ID. This could be used by other organizations to access your [facility] medical records  [id number]        Your Vitals Were     Pulse Temperature Height Pulse Oximetry BMI (Body Mass Index)       54 97.2  F (36.2  C) (Temporal) 5' 8.7\" (1.745 m) 98% 27.45 kg/m2        Blood Pressure from Last 3 Encounters:   06/27/18 124/70   02/08/18 114/60   11/10/17 180/71    Weight from Last 3 Encounters:   06/27/18 184 lb 4.8 oz (83.6 kg)   02/08/18 190 lb 4.8 oz (86.3 kg)   11/10/17 186 lb 11.2 oz (84.7 kg)              We Performed the Following     Comprehensive " metabolic panel     GASTROENTEROLOGY ADULT REF PROCEDURE ONLY [facility] [phone]     Lipid panel reflex to direct LDL Fasting     ORTHOPEDICS ADULT REFERRAL     PSA, screen        Primary Care Provider Office Phone # Fax #    Marc Pinedo [phone] [address] [facility] [phone]        Equal Access to Services     GREY YAN : Hadii aad ku hadasho Soomaali, waaxda luqadaha, qaybta kaalmada adeegyada, waxay idiin hayaan adeeg kharash laMaria Del Carmenaan . So [facility] [phone].    ATENCIÓN: Si habla español, tiene a jimenez disposición servicios gratuitos de asistencia lingüística. Rafaame al [phone].    We comply with applicable federal civil rights laws and [place] laws. We do not discriminate on the basis of race, color, national origin, age, disability, sex, sexual orientation, or gender identity.            Thank you!     Thank you for choosing [facility]  for your care. Our goal is always to provide you with excellent care. Hearing back from our patients is one way we can continue to improve our services. Please take a few minutes to complete the written survey that you may receive in the mail after your visit with us. Thank you!             Your Updated Medication List - Protect others around you: Learn how to safely use, store and throw away your medicines at www.disposemymeds.org.          This list is accurate as of 6/27/18  8:36 AM.  Always use your most recent med list.                   Brand Name Dispense Instructions for use Diagnosis    aspirin 81 MG tablet     0    1 tab po QD (Once per day)        fish Oil 1200 MG capsule      Take 1 capsule by mouth daily        fluticasone 50 MCG/ACT spray    FLONASE    16 g    SPRAY TWO SPRAYS IN EACH NOSTRIL EVERY DAY    Seasonal allergic rhinitis, unspecified chronicity, unspecified trigger       GLUCOSAMINE CHONDROITIN ADV PO      Take 6 tablets by mouth daily.        latanoprost 0.005 % ophthalmic  solution    XALATAN     Place 0.005 drops into both eyes At Bedtime        MULTI-DAY vitamin  S Tabs     0    1 tablet daily

## 2018-06-27 NOTE — PROGRESS NOTES
SUBJECTIVE:   CC: Deven Smith is an 63 year old male who presents for preventative health visit.       Patient has questions about his bilateral shoulder pain. He has been seeing [facility] for a few years now and knows that he will need to replace one shoulder. He would like to discuss options.       Physical   Annual:     Getting at least 3 servings of Calcium per day:  Yes    Bi-annual eye exam:  Yes    Dental care twice a year:  Yes    Sleep apnea or symptoms of sleep apnea:  None    Diet:  Regular (no restrictions)    Frequency of exercise:  4-5 days/week    Duration of exercise:  45-60 minutes    Taking medications regularly:  Not Applicable    Additional concerns today:  YES            Today's PHQ-2 Score:   PHQ-2 ( 1999 [employer]) 6/25/2018   Q1: Little interest or pleasure in doing things 0   Q2: Feeling down, depressed or hopeless 0   PHQ-2 Score 0   Q1: Little interest or pleasure in doing things Not at all   Q2: Feeling down, depressed or hopeless Not at all   PHQ-2 Score 0       Abuse: Current or Past(Physical, Sexual or Emotional)- No  Do you feel safe in your environment - Yes    Social History   Substance Use Topics     Smoking status: Former Smoker     Packs/day: 2.00     Years: 15.00     Quit date: 11/8/1987     Smokeless tobacco: Never Used     Alcohol use 36.0 oz/week     Alcohol Use 6/25/2018   If you drink alcohol do you typically have greater than 3 drinks per day OR greater than 7 drinks per week? No   No flowsheet data found.    Last PSA:   PSA   Date Value Ref Range Status   03/24/2017 1.98 0 - 4 ug/L Final     Comment:     Assay Method:  Chemiluminescence using Siemens Vista analyzer       Reviewed orders with patient. Reviewed health maintenance and updated orders accordingly - Yes      Reviewed and updated as needed this visit by clinical staff         Reviewed and updated as needed this visit by Provider        Past Medical History:   Diagnosis Date     DDD (degenerative disc  disease), cervical      Hyperlipidemia LDL goal < 130       Past Surgical History:   Procedure Laterality Date     COLONOSCOPY  03/21/2007    Repeat  for screening purposes in 10 yrs.     COLONOSCOPY  1/2/2013    Procedure: COLONOSCOPY;  Colonoscopy;  Surgeon: Emmett San MD;  Location: [facility]     HC SHLDR ARTHROSCOP,SURG,W/ROTAT CUFF REPR Left 2010     HC VASECTOMY UNILAT/BILAT W POSTOP SEMEN  1998    Vasectomy       Review of Systems  CONSTITUTIONAL: NEGATIVE for fever, chills, change in weight  INTEGUMENTARY/SKIN: NEGATIVE for worrisome rashes, moles or lesions  EYES: NEGATIVE for vision changes or irritation  ENT: NEGATIVE for ear, mouth and throat problems  RESP: NEGATIVE for significant cough or SOB  CV: NEGATIVE for chest pain, palpitations or peripheral edema  GI: NEGATIVE for nausea, abdominal pain, heartburn, or change in bowel habits   male: negative for dysuria, hematuria, decreased urinary stream, erectile dysfunction, urethral discharge  MUSCULOSKELETAL: Long-term trouble with his shoulders left more than right.  Has had numerous injections.  They have entertained the idea of a total shoulder replacement.  NEURO: NEGATIVE for weakness, dizziness or paresthesias  PSYCHIATRIC: NEGATIVE for changes in mood or affect    OBJECTIVE:   There were no vitals taken for this visit.    Physical Exam  GENERAL: healthy, alert and no distress  EYES: Eyes grossly normal to inspection, PERRL and conjunctivae and sclerae normal  HENT: ear canals and TM's normal, nose and mouth without ulcers or lesions  NECK: no adenopathy, no asymmetry, masses, or scars and thyroid normal to palpation  RESP: lungs clear to auscultation - no rales, rhonchi or wheezes  CV: regular rate and rhythm, normal S1 S2, no S3 or S4, no murmur, click or rub, no peripheral edema and peripheral pulses strong  ABDOMEN: soft, nontender, no hepatosplenomegaly, no masses and bowel sounds normal  MS: Crepitance to range of motion of the left  "shoulder.  Fairly good range of motion.  SKIN: no suspicious lesions or rashes  NEURO: Normal strength and tone, mentation intact and speech normal  PSYCH: mentation appears normal, affect normal/bright        ASSESSMENT/PLAN:   1. Routine general medical examination at a health care facility  Really healthy works out.  Sees ophthalmology for glaucoma.  Another colonoscopy history of family polyps.    2. Supraspinatus tendon tear, left, subsequent encounter  He has seen a doctor at the [place] system wants to follow-up with her    3. Hyperlipidemia with target LDL less than 130  Was on statins in the past but through diet and exercise he has been able to maintain his lipids down we will see what it is at now off of statins for over a year.  - Lipid panel reflex to direct LDL Fasting  - Comprehensive metabolic panel    4. Screening for prostate cancer  We will notify with results.  - PSA, screen    5. Special screening for malignant neoplasm of colon  See above.  - GASTROENTEROLOGY ADULT REF PROCEDURE ONLY [facility] [phone]    6. Left shoulder pain  See #2 above  - ORTHOPEDICS ADULT REFERRAL    COUNSELING:   Reviewed preventive health counseling, as reflected in patient instructions       Regular exercise       Healthy diet/nutrition    BP Readings from Last 1 Encounters:   02/08/18 114/60     Estimated body mass index is 28.35 kg/(m^2) as calculated from the following:    Height as of 11/10/17: 5' 8.7\" (1.745 m).    Weight as of 2/8/18: 190 lb 4.8 oz (86.3 kg).           reports that he quit smoking about 30 years ago. He has a 30.00 pack-year smoking history. He has never used smokeless tobacco.      Counseling Resources:  ATP IV Guidelines  Pooled Cohorts Equation Calculator  FRAX Risk Assessment  ICSI Preventive Guidelines  Dietary Guidelines for Americans, 2010  [employer]'s MyPlate  ASA Prophylaxis  Lung CA Screening    Marc Pinedo MD  [facility]  Answers for HPI/ROS submitted " by the patient on 6/25/2018   PHQ-2 Score: 0

## 2018-06-27 NOTE — PATIENT INSTRUCTIONS
Preventive Health Recommendations  Male Ages 50   64    Yearly exam:             See your health care provider every year in order to  o   Review health changes.   o   Discuss preventive care.    o   Review your medicines if your doctor has prescribed any.     Have a cholesterol test every 5 years, or more frequently if you are at risk for high cholesterol/heart disease.     Have a diabetes test (fasting glucose) every three years. If you are at risk for diabetes, you should have this test more often.     Have a colonoscopy at age 50, or have a yearly FIT test (stool test). These exams will check for colon cancer.      Talk with your health care provider about whether or not a prostate cancer screening test (PSA) is right for you.    You should be tested each year for STDs (sexually transmitted diseases), if you re at risk.     Shots: Get a flu shot each year. Get a tetanus shot every 10 years.     Nutrition:    Eat at least 5 servings of fruits and vegetables daily.     Eat whole-grain bread, whole-wheat pasta and brown rice instead of white grains and rice.     Get adequate Calcium and Vitamin D.     Lifestyle    Exercise for at least 150 minutes a week (30 minutes a day, 5 days a week). This will help you control your weight and prevent disease.     Limit alcohol to one drink per day.     No smoking.     Wear sunscreen to prevent skin cancer.     See your dentist every six months for an exam and cleaning.     See your eye doctor every 1 to 2 years.

## 2018-06-28 ENCOUNTER — TELEPHONE (OUTPATIENT)
Dept: FAMILY MEDICINE | Facility: CLINIC | Age: 63
End: 2018-06-28

## 2018-06-28 NOTE — LETTER
[address] [address]  [phone]        June 28, 2018    Deven Smith  [address] [address] [place] [id number]

## 2018-06-28 NOTE — LETTER

## 2018-06-28 NOTE — PROGRESS NOTES
Your PSA was normal.  Chemistry panel including blood sugar was all normal.  Your cholesterol is fine at 194 with an excellent HDL cholesterol of 71 your LDL cholesterol was acceptable at 113.

## 2018-06-28 NOTE — TELEPHONE ENCOUNTER
Date of colonoscopy/EGD: 8/6/18  Surgeon: Dr. Moreland  Prep:Miralax  Packet:Colonoscopy/EGD instructions mailed to patient's home address.   Date: 6/28/2018      Surgery Scheduler

## 2018-07-03 ENCOUNTER — PRE VISIT (OUTPATIENT)
Dept: ORTHOPEDICS | Facility: CLINIC | Age: 63
End: 2018-07-03

## 2018-07-03 DIAGNOSIS — G89.29 CHRONIC PAIN OF BOTH SHOULDERS: Primary | ICD-10-CM

## 2018-07-03 DIAGNOSIS — M25.511 CHRONIC PAIN OF BOTH SHOULDERS: Primary | ICD-10-CM

## 2018-07-03 DIAGNOSIS — M25.512 CHRONIC PAIN OF BOTH SHOULDERS: Primary | ICD-10-CM

## 2018-07-03 NOTE — TELEPHONE ENCOUNTER
Bilateral shoulder pain, [facility] provider Oscar, injections every 3-4 bilat shoulders, rec'd TSA on left, right has most pain.   Called over to [facility], records to be sent, images on left shoulder pushed already.     1. Do you have recent xrays (if not seen in EPIC)? Yes of left, no of right  2. Do you have any MRI's (if not seen in EPIC)?Yes - Confirmed in EPIC.  3. Have you had surgery in the past for the same issue?No.   4. Are you being referred by another provider? No  If yes-Records in Epic or being obtained by: na.  5. Is this work comp or MVA related? No.     Mickey Argueta RN

## 2018-07-05 ENCOUNTER — RADIANT APPOINTMENT (OUTPATIENT)
Dept: GENERAL RADIOLOGY | Facility: CLINIC | Age: 63
End: 2018-07-05
Attending: ORTHOPAEDIC SURGERY
Payer: COMMERCIAL

## 2018-07-05 ENCOUNTER — OFFICE VISIT (OUTPATIENT)
Dept: ORTHOPEDICS | Facility: CLINIC | Age: 63
End: 2018-07-05
Payer: COMMERCIAL

## 2018-07-05 VITALS
DIASTOLIC BLOOD PRESSURE: 77 MMHG | SYSTOLIC BLOOD PRESSURE: 148 MMHG | WEIGHT: 184.5 LBS | HEART RATE: 57 BPM | HEIGHT: 69 IN | OXYGEN SATURATION: 98 % | BODY MASS INDEX: 27.33 KG/M2

## 2018-07-05 DIAGNOSIS — M19.012 ARTHRITIS OF SHOULDER REGION, LEFT: ICD-10-CM

## 2018-07-05 DIAGNOSIS — G89.29 CHRONIC PAIN OF BOTH SHOULDERS: ICD-10-CM

## 2018-07-05 DIAGNOSIS — M75.122 COMPLETE TEAR OF LEFT ROTATOR CUFF: Primary | ICD-10-CM

## 2018-07-05 DIAGNOSIS — M25.512 CHRONIC PAIN OF BOTH SHOULDERS: ICD-10-CM

## 2018-07-05 DIAGNOSIS — M19.011 ARTHRITIS OF SHOULDER REGION, RIGHT: ICD-10-CM

## 2018-07-05 DIAGNOSIS — M25.511 CHRONIC PAIN OF BOTH SHOULDERS: ICD-10-CM

## 2018-07-05 PROCEDURE — 99214 OFFICE O/P EST MOD 30 MIN: CPT | Performed by: ORTHOPAEDIC SURGERY

## 2018-07-05 PROCEDURE — 73030 X-RAY EXAM OF SHOULDER: CPT | Mod: RT | Performed by: RADIOLOGY

## 2018-07-05 NOTE — NURSING NOTE
"Deven Smith's chief complaint for this visit includes:  Chief Complaint   Patient presents with     Left Shoulder - Pain     Right Shoulder - Pain     Consult     PCP: Marc Pinedo    Referring Provider:  No referring provider defined for this encounter.    /77 (BP Location: Left arm)  Pulse 57  Ht 1.753 m (5' 9\")  Wt 83.7 kg (184 lb 8 oz)  SpO2 98%  BMI 27.25 kg/m2  Data Unavailable     Do you need any medication refills at today's visit? No    Right handed       Flaquita Reina [facility]        "

## 2018-07-05 NOTE — MR AVS SNAPSHOT
After Visit Summary   2018    Deven Smith    MRN: [id number]           Patient Information     Date Of Birth          1955        Visit Information        Provider Department      2018 9:00 AM Kell Carpenter MD [facility]        Today's Diagnoses     Complete tear of left rotator cuff    -  1    Arthritis of shoulder region, right        Arthritis of shoulder region, left          Care Instructions    Thanks for coming today.  Ortho/Sports Medicine Clinic  [address] [place] [address]    To schedule future appointments in Ortho Clinic, you may call [phone].    To schedule ordered imaging by your provider:   Call Central Imaging [phone]    To schedule an injection ordered by your provider:  Call Central Imaging Injection scheduling line: [phone]  MyChart available online at:  [email or web address]    Please call if any further questions or concerns ([phone]).  Clinic hours 8 am to 5 pm.    Return to clinic (call) if symptoms worsen or fail to improve.            Follow-ups after your visit        Your next 10 appointments already scheduled     2018  5:45 PM CDT   MR SHOULDER RIGHT W/O CONTRAST with PHMR1   [facility] MRI ([facility])    [address] [place] [id number] [phone]           Take your medicines as usual, unless your doctor tells you not to. Bring a list of your current medicines to your exam (including vitamins, minerals and over-the-counter drugs). Also bring the results of similar scans you may have had.  Please remove any body piercings and hair extensions before you arrive.  Follow your doctor s orders. If you do not, we may have to postpone your exam.  You may or may not receive IV contrast for this exam pending the discretion of the Radiologist.  You do not need to do anything special to prepare.  The MRI machine uses a strong magnet.  Please wear clothes without metal (snaps, zippers). A sweatsuit works well, or we may give you a hospital gown.   **IMPORTANT** THE INSTRUCTIONS BELOW ARE ONLY FOR THOSE PATIENTS WHO HAVE BEEN PRESCRIBED SEDATION OR GENERAL ANESTHESIA DURING THEIR MRI PROCEDURE:  IF YOUR DOCTOR PRESCRIBED ORAL SEDATION (take medicine to help you relax during your exam):   You must get the medicine from your doctor (oral medication) before you arrive. Bring the medicine to the exam. Do not take it at home. You ll be told when to take it upon arriving for your exam.   Arrive one hour early. Bring someone who can take you home after the test. Your medicine will make you sleepy. After the exam, you may not drive, take a bus or take a taxi by yourself.  IF YOUR DOCTOR PRESCRIBED IV SEDATION:   Arrive one hour early. Bring someone who can take you home after the test. Your medicine will make you sleepy. After the exam, you may not drive, take a bus or take a taxi by yourself.   No eating 6 hours before your exam. You may have clear liquids up until 4 hours before your exam. (Clear liquids include water, clear tea, black coffee and fruit juice without pulp.)  IF YOUR DOCTOR PRESCRIBED ANESTHESIA (be asleep for your exam):   Arrive 1 1/2 hours early. Bring someone who can take you home after the test. You may not drive, take a bus or take a taxi by yourself.   No eating 8 hours before your exam. You may have clear liquids up until 4 hours before your exam. (Clear liquids include water, clear tea, black coffee and fruit juice without pulp.)   You will spend four to five hours in the recovery room.  Please call the Imaging Department at your exam site with any questions.            Jul 18, 2018  7:00 AM CDT   PROCEDURE with Taz Maurice DO   [facility] ([facility])    [address] [place] [address] [phone]            Jul 30, 2018  1:00 PM CDT   Ortho Eval with Danette Apodaca, PT    [facility] ([facility])    [address] [place] [address] Dr Staci [place] [id number] [phone]            Aug 06, 2018   Procedure with Israel Moreland MD   [facility] ([facility])    [address] [facility] [address] [place] [id number] [phone]              Who to contact     If you have questions or need follow up information about today's clinic visit or your schedule please contact [facility] directly at [phone].  Normal or non-critical lab and imaging results will be communicated to you by Gro[employer], letter or phone within 4 business days after the clinic has received the results. If you do not hear from us within 7 days, please contact the clinic through [employer] or phone. If you have a critical or abnormal lab result, we will notify you by phone as soon as possible.  Submit refill requests through [facility] or call your pharmacy and they will forward the refill request to us. Please allow 3 business days for your refill to be completed.          Additional Information About Your Visit        [employer] Information     [facility] gives you secure access to your electronic health record. If you see a primary care provider, you can also send messages to your care team and make appointments. If you have questions, please call your primary care clinic.  If you do not have a primary care provider, please call [phone] and they will assist you.      [facility] is an electronic gateway that provides easy, online access to your medical records. With [facility], you can request a clinic appointment, read your test results, renew a prescription or communicate with your care team.     To access your existing account, please contact your [facility] or call [phone] for assistance.        Care EveryWhere ID     This is your Care EveryWhere ID. This could be used by other organizations to access your [place]  "medical records  [id number]        Your Vitals Were     Pulse Height Pulse Oximetry BMI (Body Mass Index)          57 1.753 m (5' 9\") 98% 27.25 kg/m2         Blood Pressure from Last 3 Encounters:   07/05/18 148/77   06/27/18 124/70   02/08/18 114/60    Weight from Last 3 Encounters:   07/05/18 83.7 kg (184 lb 8 oz)   06/27/18 83.6 kg (184 lb 4.8 oz)   02/08/18 86.3 kg (190 lb 4.8 oz)              Today, you had the following     No orders found for display       Primary Care Provider Office Phone # Fax #    Marc Pinedo [phone]       [address] [facility] [address]        Equal Access to Services     [facility]: Hadii dione lazaro Somitra, waaxda luqadaha, qaybta kaalmada greg, ebenezer rob . So [facility] [phone].    ATENCIÓN: Si habla español, tiene a jimenez disposición servicios gratuitos de asistencia lingüística. [facility] [phone].    We comply with applicable federal civil rights laws and [place] laws. We do not discriminate on the basis of race, color, national origin, age, disability, sex, sexual orientation, or gender identity.            Thank you!     Thank you for choosing [facility]  for your care. Our goal is always to provide you with excellent care. Hearing back from our patients is one way we can continue to improve our services. Please take a few minutes to complete the written survey that you may receive in the mail after your visit with us. Thank you!             Your Updated Medication List - Protect others around you: Learn how to safely use, store and throw away your medicines at www.disposemymeds.org.          This list is accurate as of 7/5/18 11:59 PM.  Always use your most recent med list.                   Brand Name Dispense Instructions for use Diagnosis    aspirin 81 MG tablet     0    1 tab po QD (Once per day)        fish Oil 1200 MG capsule      Take 1 capsule by mouth daily        " fluticasone 50 MCG/ACT spray    FLONASE    16 g    SPRAY TWO SPRAYS IN EACH NOSTRIL EVERY DAY    Seasonal allergic rhinitis, unspecified chronicity, unspecified trigger       GLUCOSAMINE CHONDROITIN ADV PO      Take 6 tablets by mouth daily.        latanoprost 0.005 % ophthalmic solution    XALATAN     Place 0.005 drops into both eyes At Bedtime        MULTI-DAY vitamin  S Tabs     0    1 tablet daily

## 2018-07-05 NOTE — LETTER
"    7/5/2018         RE: Deven Smith  [address] [facility] [address]        Dear Colleague,    Thank you for referring your patient, Deven Smith, to the [facility]. Please see a copy of my visit note below.    CHIEF CONCERN:  Bilateral shoulder pain    HISTORY OF PRESENT ILLNESS:  Mr. Smith is a 63 year old male who is seen today for bilateral shoulder pain. I last saw him on 12/3/15 as a second opinion for his irreparable left cuff tear (s/p prior repair). He has been having more trouble with both shoulders. He see's a physician at [facility] and has been getting bilateral shoulder injections, last 5/10/18 for his right and bilateral on 2/27/18. The injections no longer last him as long. He is a very active gentleman and only recently \"backed off\" on his Crossfit activities. He had recently competed at \"[employer]\" and he felt that activity really set him back in terms of his shoulder symptoms. Both shoulders hurt, not one more than the other. He has accepted he is going to need to scale back his aggressive physical fitness but he is still able to be quite active.    Past Medical History:   Diagnosis Date     DDD (degenerative disc disease), cervical      Hyperlipidemia LDL goal < 130        Past Surgical History:   Procedure Laterality Date     COLONOSCOPY  03/21/2007    Repeat  for screening purposes in 10 yrs.     COLONOSCOPY  1/2/2013    Procedure: COLONOSCOPY;  Colonoscopy;  Surgeon: Emmett San MD;  Location: [facility]     HC SHLDR ARTHROSCOP,SURG,W/ROTAT CUFF REPR Left 2010      VASECTOMY UNILAT/BILAT W POSTOP SEMEN  1998    Vasectomy       Current Outpatient Prescriptions   Medication Sig Dispense Refill     ASPIRIN 81 MG OR TABS 1 tab po QD (Once per day) 0 0     fluticasone (FLONASE) 50 MCG/ACT spray SPRAY TWO SPRAYS IN EACH NOSTRIL EVERY DAY 16 g 6     latanoprost (XALATAN) 0.005 % ophthalmic solution Place 0.005 drops into both eyes At Bedtime       Misc Natural " Products (GLUCOSAMINE CHONDROITIN ADV PO) Take 6 tablets by mouth daily.       MULTI-DAY VITAMINS OR TABS 1 tablet daily 0 0     Omega-3 Fatty Acids (FISH OIL) 1200 MG CAPS Take 1 capsule by mouth daily            Allergies   Allergen Reactions     Seasonal Allergies      Tramadol      Muscle spasms,headache       SOCIAL HISTORY:    Social History     Social History     Marital status:      Spouse name: koko     Number of children: 2     Years of education: 13     Occupational History           Social History Main Topics     Smoking status: Former Smoker     Packs/day: 2.00     Years: 15.00     Quit date: 11/8/1987     Smokeless tobacco: Never Used     Alcohol use 36.0 oz/week      Comment: rare      Drug use: No     Sexual activity: Yes     Partners: Female     Birth control/ protection: Surgical      Comment: Vasectomy     Other Topics Concern      Service No     Blood Transfusions No     Caffeine Concern No     3 cans soda/day     Occupational Exposure Yes     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     Hobby Hazards Yes     [employer]     Sleep Concern Yes     is awake after 6 hrs of sleep-     Stress Concern No     Weight Concern No     Special Diet No     Back Care No     Exercise Yes     walks, does physical labor,     Bike Helmet No     Seat Belt Yes     Self-Exams No     Social History Narrative       FAMILY HISTORY: Reviewed in EMR      REVIEW OF SYSTEMS: Positive for that noted in past medical history and history of present illness and otherwise reviewed in EMR     PHYSICAL EXAM:    Adult male in no distress. Seen with his wife.  Respirations even and unlabored  Focused upper extremity exam: Skin intact. No erythema. Sensation intact all dermatomes into the hand to light touch. EPL, FPL, and Intrinsics intact. Right shoulder active motion is FE to 175, ER at side to 75, and IR to L2. Left shoulder active motion is FE to 175, ER to 0 (passive to 30 so a 30 degree ER lag), and IR to  T10.  Positive Neer and Abbott bilaterally. No pain on palpation over the AC joint. No focal pain on palpation over the long head of the biceps either shoulder. FE strength 4-/5 on the left, 5-/5 on the Right.  ER strength 3-4/5 on the left, 5/5 on the right.    IMAGING:  Right shoulder xrays today demonstrate narrowing of the glenohumeral joint space (but not completely loss). There is cystic change of the greater tuberosity suggestive of cuff disease. No marked superior humeral migration.  Left shoulder xrays 5/10/18 from outside clinic demonstrate superior humeral migration and joint space narrowing suggestive of cuff tear arthropathy    The patient states he has had a right shoulder MRI which showed a cuff tear but those images are not available for my review today.    ASSESSMENT:    1. Left shoulder cuff tear arthropathy  2. Right glenohumeral arthritis with report of a right shoulder cuff tear    PLAN:  I reviewed the imaging and exam with the patient and his wife and I described them using lay language. We discussed that as before, his surgical option on the left is a reverse TSA. We discussed the expected postop lifetime restrictions and he does not feel he would be able to tolerate those now (and I agree, Reverse TSA would not be a great option for him now given his current activities/abilities and desires). I would have to review his MRI on the right but depending on the tear size and tendon/muscle quality he could be a candidate for anatomic TSA with cuff repair. Either way, he would have a 50# lifting restriction and he is not sure that is something he would want right now. He and I agree arthroplasty is not currently a great option for him. We discussed pain management options including suprascapular nerve block (as glenohumeral injections no longer work for him) and he is interested in this. We also discussed alternative medical pain management options which he is looking into.  He will schedule the  suprascapular nerve block with Dr. Maurice and I remain available to him as needed. We discussed monitoring his arthrosis annually with xrays.    Kell Carpenter MD      Again, thank you for allowing me to participate in the care of your patient.        Sincerely,        Kell Carpenter MD

## 2018-07-05 NOTE — PATIENT INSTRUCTIONS
Thanks for coming today.  Ortho/Sports Medicine Clinic  [address] [place] [address]    To schedule future appointments in Ortho Clinic, you may call [phone].    To schedule ordered imaging by your provider:   Call Central Imaging [phone]    To schedule an injection ordered by your provider:  Call Central Imaging Injection scheduling line: [phone]  Tr[employer] available online at:  [email or web address]    Please call if any further questions or concerns ([phone]).  Clinic hours 8 am to 5 pm.    Return to clinic (call) if symptoms worsen or fail to improve.

## 2018-07-09 ENCOUNTER — TELEPHONE (OUTPATIENT)
Dept: ORTHOPEDICS | Facility: CLINIC | Age: 63
End: 2018-07-09

## 2018-07-09 DIAGNOSIS — M25.512 CHRONIC PAIN OF BOTH SHOULDERS: Primary | ICD-10-CM

## 2018-07-09 DIAGNOSIS — G89.29 CHRONIC PAIN OF BOTH SHOULDERS: Primary | ICD-10-CM

## 2018-07-09 DIAGNOSIS — M25.511 CHRONIC PAIN OF BOTH SHOULDERS: Primary | ICD-10-CM

## 2018-07-09 NOTE — TELEPHONE ENCOUNTER
Called and discussed with Pt. He would prefer to have the injection done closer to home. Need to ask Dr. Carpenter if ok to place injection order and then have him do it at [facility]. I advised that once injection order is in the computer, they will be able to see and schedule off of it.  Pain management referral placed, Pt ok with [place] location. Will send him the referral and fax to the pain clinic as well.    Mickey Argueta RN

## 2018-07-09 NOTE — TELEPHONE ENCOUNTER
7.9.18  Pt is waiting for order to be put in for injection of left shoulder with ultrasound and information and number to a pain clinic.  Please call back today [phone]    Or page patient at [phone] at his work

## 2018-07-09 NOTE — TELEPHONE ENCOUNTER
Checked with Dr. Carpenter and called [facility], they do not do SS nerve blocks. Advised Pt and he will stick with Dr. Maurice then. Injection scheduled for Wednesday next week.     Mickey Argueta RN

## 2018-07-11 ENCOUNTER — TRANSFERRED RECORDS (OUTPATIENT)
Dept: HEALTH INFORMATION MANAGEMENT | Facility: CLINIC | Age: 63
End: 2018-07-11

## 2018-07-11 LAB — PHQ9 SCORE: 4

## 2018-07-15 NOTE — PROGRESS NOTES
"CHIEF CONCERN:  Bilateral shoulder pain    HISTORY OF PRESENT ILLNESS:  Mr. Smith is a 63 year old male who is seen today for bilateral shoulder pain. I last saw him on 12/3/15 as a second opinion for his irreparable left cuff tear (s/p prior repair). He has been having more trouble with both shoulders. He see's a physician at [facility] and has been getting bilateral shoulder injections, last 5/10/18 for his right and bilateral on 2/27/18. The injections no longer last him as long. He is a very active gentleman and only recently \"backed off\" on his Crossfit activities. He had recently competed at \"[employer]\" and he felt that activity really set him back in terms of his shoulder symptoms. Both shoulders hurt, not one more than the other. He has accepted he is going to need to scale back his aggressive physical fitness but he is still able to be quite active.    Past Medical History:   Diagnosis Date     DDD (degenerative disc disease), cervical      Hyperlipidemia LDL goal < 130        Past Surgical History:   Procedure Laterality Date     COLONOSCOPY  03/21/2007    Repeat  for screening purposes in 10 yrs.     COLONOSCOPY  1/2/2013    Procedure: COLONOSCOPY;  Colonoscopy;  Surgeon: Emmett San MD;  Location: [facility]     HC SHLDR ARTHROSCOP,SURG,W/ROTAT CUFF REPR Left 2010      VASECTOMY UNILAT/BILAT W POSTOP SEMEN  1998    Vasectomy       Current Outpatient Prescriptions   Medication Sig Dispense Refill     ASPIRIN 81 MG OR TABS 1 tab po QD (Once per day) 0 0     fluticasone (FLONASE) 50 MCG/ACT spray SPRAY TWO SPRAYS IN EACH NOSTRIL EVERY DAY 16 g 6     latanoprost (XALATAN) 0.005 % ophthalmic solution Place 0.005 drops into both eyes At Bedtime       Misc Natural Products (GLUCOSAMINE CHONDROITIN ADV PO) Take 6 tablets by mouth daily.       MULTI-DAY VITAMINS OR TABS 1 tablet daily 0 0     Omega-3 Fatty Acids (FISH OIL) 1200 MG CAPS Take 1 capsule by mouth daily            Allergies   Allergen Reactions     " Seasonal Allergies      Tramadol      Muscle spasms,headache       SOCIAL HISTORY:    Social History     Social History     Marital status:      Spouse name: koko     Number of children: 2     Years of education: 13     Occupational History           Social History Main Topics     Smoking status: Former Smoker     Packs/day: 2.00     Years: 15.00     Quit date: 11/8/1987     Smokeless tobacco: Never Used     Alcohol use 36.0 oz/week      Comment: rare      Drug use: No     Sexual activity: Yes     Partners: Female     Birth control/ protection: Surgical      Comment: Vasectomy     Other Topics Concern      Service No     Blood Transfusions No     Caffeine Concern No     3 cans soda/day     Occupational Exposure Yes     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     Hobby Hazards Yes     [employer]     Sleep Concern Yes     is awake after 6 hrs of sleep-     Stress Concern No     Weight Concern No     Special Diet No     Back Care No     Exercise Yes     walks, does physical labor,     Bike Helmet No     Seat Belt Yes     Self-Exams No     Social History Narrative       FAMILY HISTORY: Reviewed in EMR      REVIEW OF SYSTEMS: Positive for that noted in past medical history and history of present illness and otherwise reviewed in EMR     PHYSICAL EXAM:    Adult male in no distress. Seen with his wife.  Respirations even and unlabored  Focused upper extremity exam: Skin intact. No erythema. Sensation intact all dermatomes into the hand to light touch. EPL, FPL, and Intrinsics intact. Right shoulder active motion is FE to 175, ER at side to 75, and IR to L2. Left shoulder active motion is FE to 175, ER to 0 (passive to 30 so a 30 degree ER lag), and IR to T10.  Positive Neer and Abbott bilaterally. No pain on palpation over the AC joint. No focal pain on palpation over the long head of the biceps either shoulder. FE strength 4-/5 on the left, 5-/5 on the Right.  ER strength 3-4/5 on the left, 5/5 on  the right.    IMAGING:  Right shoulder xrays today demonstrate narrowing of the glenohumeral joint space (but not completely loss). There is cystic change of the greater tuberosity suggestive of cuff disease. No marked superior humeral migration.  Left shoulder xrays 5/10/18 from outside clinic demonstrate superior humeral migration and joint space narrowing suggestive of cuff tear arthropathy    The patient states he has had a right shoulder MRI which showed a cuff tear but those images are not available for my review today.    ASSESSMENT:    1. Left shoulder cuff tear arthropathy  2. Right glenohumeral arthritis with report of a right shoulder cuff tear    PLAN:  I reviewed the imaging and exam with the patient and his wife and I described them using lay language. We discussed that as before, his surgical option on the left is a reverse TSA. We discussed the expected postop lifetime restrictions and he does not feel he would be able to tolerate those now (and I agree, Reverse TSA would not be a great option for him now given his current activities/abilities and desires). I would have to review his MRI on the right but depending on the tear size and tendon/muscle quality he could be a candidate for anatomic TSA with cuff repair. Either way, he would have a 50# lifting restriction and he is not sure that is something he would want right now. He and I agree arthroplasty is not currently a great option for him. We discussed pain management options including suprascapular nerve block (as glenohumeral injections no longer work for him) and he is interested in this. We also discussed alternative medical pain management options which he is looking into.  He will schedule the suprascapular nerve block with Dr. Maurice and I remain available to him as needed. We discussed monitoring his arthrosis annually with xrays.    Kell Carpenter MD

## 2018-07-16 ENCOUNTER — HOSPITAL ENCOUNTER (OUTPATIENT)
Dept: MRI IMAGING | Facility: CLINIC | Age: 63
Discharge: HOME | End: 2018-07-16
Attending: ORTHOPAEDIC SURGERY | Admitting: ORTHOPAEDIC SURGERY
Payer: COMMERCIAL

## 2018-07-16 DIAGNOSIS — G89.29 CHRONIC RIGHT SHOULDER PAIN: ICD-10-CM

## 2018-07-16 DIAGNOSIS — M25.511 CHRONIC RIGHT SHOULDER PAIN: ICD-10-CM

## 2018-07-16 PROCEDURE — 73221 MRI JOINT UPR EXTREM W/O DYE: CPT | Mod: RT

## 2018-07-18 ENCOUNTER — OFFICE VISIT (OUTPATIENT)
Dept: ORTHOPEDICS | Facility: CLINIC | Age: 63
End: 2018-07-18
Payer: COMMERCIAL

## 2018-07-18 VITALS — SYSTOLIC BLOOD PRESSURE: 141 MMHG | HEART RATE: 50 BPM | OXYGEN SATURATION: 98 % | DIASTOLIC BLOOD PRESSURE: 72 MMHG

## 2018-07-18 DIAGNOSIS — G89.29 CHRONIC LEFT SHOULDER PAIN: Primary | ICD-10-CM

## 2018-07-18 DIAGNOSIS — M25.512 CHRONIC LEFT SHOULDER PAIN: Primary | ICD-10-CM

## 2018-07-18 PROCEDURE — 99207 ZZC NO CHARGE LOS: CPT | Performed by: FAMILY MEDICINE

## 2018-07-18 PROCEDURE — 64450 NJX AA&/STRD OTHER PN/BRANCH: CPT | Mod: LT | Performed by: FAMILY MEDICINE

## 2018-07-18 RX ORDER — TRIAMCINOLONE ACETONIDE 40 MG/ML
40 INJECTION, SUSPENSION INTRA-ARTICULAR; INTRAMUSCULAR ONCE
Qty: 1 ML | Refills: 0 | OUTPATIENT
Start: 2018-07-18 | End: 2018-07-18

## 2018-07-18 ASSESSMENT — PAIN SCALES - GENERAL: PAINLEVEL: MILD PAIN (2)

## 2018-07-18 NOTE — PATIENT INSTRUCTIONS
Thanks for coming today.  Ortho/Sports Medicine Clinic  [address] [place] [address]    To schedule future appointments in Ortho Clinic, you may call [phone].    To schedule ordered imaging by your provider:   Call Central Imaging [phone]    To schedule an injection ordered by your provider:  Call Central Imaging Injection scheduling line: [phone]  Nemediahart available online at:  [email or web address]    Please call if any further questions or concerns ([phone]).  Clinic hours 8 am to 5 pm.    Return to clinic (call) if symptoms worsen or fail to improve.

## 2018-07-18 NOTE — MR AVS SNAPSHOT
After Visit Summary   2018    Deven Smith    MRN: [id number]           Patient Information     Date Of Birth          1955        Visit Information        Provider Department      2018 7:00 AM Taz Maurice,  [facility]        Today's Diagnoses     Chronic left shoulder pain    -  1      Care Instructions    Thanks for coming today.  Ortho/Sports Medicine Clinic  [address] [place] [address]    To schedule future appointments in Ortho Clinic, you may call [phone].    To schedule ordered imaging by your provider:   Call Central Imaging [phone]    To schedule an injection ordered by your provider:  Call Central Imaging Injection scheduling line: [phone]  MyChart available online at:  [email or web address]    Please call if any further questions or concerns ([phone]).  Clinic hours 8 am to 5 pm.    Return to clinic (call) if symptoms worsen or fail to improve.            Follow-ups after your visit        Your next 10 appointments already scheduled     2018  1:00 PM CDT   Ortho Eval with Danette Apodaca PT   [facility] Physical Therapy ([facility])    [address] [place] [address] [phone]            Aug 06, 2018   Procedure with Israel Moreland MD   [facility] Endoscopy ([facility])    [address] [place] [address] [phone]              Future tests that were ordered for you today     Open Future Orders        Priority Expected Expires Ordered    US Guided Needle Placement Routine  2019            Who to contact     If you have questions or need follow up information about today's clinic visit or your schedule please contact [facility] directly at [phone].  Normal or non-critical lab and imaging results will be communicated to you by MyChart, letter or phone within 4  business days after the clinic has received the results. If you do not hear from us within 7 days, please contact the clinic through Posterbee or phone. If you have a critical or abnormal lab result, we will notify you by phone as soon as possible.  Submit refill requests through Posterbee or call your pharmacy and they will forward the refill request to us. Please allow 3 business days for your refill to be completed.          Additional Information About Your Visit        Posterbee Information     Posterbee gives you secure access to your electronic health record. If you see a primary care provider, you can also send messages to your care team and make appointments. If you have questions, please call your primary care clinic.  If you do not have a primary care provider, please call [phone] and they will assist you.      Posterbee is an electronic gateway that provides easy, online access to your medical records. With Posterbee, you can request a clinic appointment, read your test results, renew a prescription or communicate with your care team.     To access your existing account, please contact your [facility] or call [phone] for assistance.        Care EveryWhere ID     This is your Care EveryWhere ID. This could be used by other organizations to access your [place] medical records  [id number]        Your Vitals Were     Pulse Pulse Oximetry                50 98%           Blood Pressure from Last 3 Encounters:   07/18/18 141/72   07/05/18 148/77   06/27/18 124/70    Weight from Last 3 Encounters:   07/05/18 83.7 kg (184 lb 8 oz)   06/27/18 83.6 kg (184 lb 4.8 oz)   02/08/18 86.3 kg (190 lb 4.8 oz)              We Performed the Following     INJECTION NERVE BLOCK, OTHER PERIPHERAL          Today's Medication Changes          These changes are accurate as of 7/18/18  7:35 AM.  If you have any questions, ask your nurse or doctor.               Start taking these medicines.         Dose/Directions    triamcinolone acetonide 40 MG/ML injection   Commonly known as:  KENALOG   Used for:  Chronic left shoulder pain   Started by:  Taz Maurice,         Dose:  40 mg   1 mL (40 mg) by INTRA-ARTICULAR route once for 1 dose   Quantity:  1 mL   Refills:  0            Where to get your medicines      Some of these will need a paper prescription and others can be bought over the counter.  Ask your nurse if you have questions.     You don't need a prescription for these medications     triamcinolone acetonide 40 MG/ML injection                Primary Care Provider Office Phone # Fax #    Marc Pinedo [phone]        [facility] [address]        Equal Access to Services     GREY YAN : Shantel Lebron, wakathyda judd, qaybta kaalmada greg, ebenezer torrez. So [facility] [phone].    ATENCIÓN: Si habla español, tiene a jimenez disposición servicios gratuitos de asistencia lingüística. Llame al [phone].    We comply with applicable federal civil rights laws and [place] laws. We do not discriminate on the basis of race, color, national origin, age, disability, sex, sexual orientation, or gender identity.            Thank you!     Thank you for choosing [facility]  for your care. Our goal is always to provide you with excellent care. Hearing back from our patients is one way we can continue to improve our services. Please take a few minutes to complete the written survey that you may receive in the mail after your visit with us. Thank you!             Your Updated Medication List - Protect others around you: Learn how to safely use, store and throw away your medicines at www.disposemymeds.org.          This list is accurate as of 7/18/18  7:35 AM.  Always use your most recent med list.                   Brand Name Dispense Instructions for use Diagnosis    aspirin 81 MG tablet     0    1 tab  po QD (Once per day)        diclofenac 1 % Gel topical gel    VOLTAREN     Place onto the skin 4 times daily        fish Oil 1200 MG capsule      Take 1 capsule by mouth daily        fluticasone 50 MCG/ACT spray    FLONASE    16 g    SPRAY TWO SPRAYS IN EACH NOSTRIL EVERY DAY    Seasonal allergic rhinitis, unspecified chronicity, unspecified trigger       GABAPENTIN PO      Take 300 mg by mouth 2 times daily        GLUCOSAMINE CHONDROITIN ADV PO      Take 6 tablets by mouth daily.        latanoprost 0.005 % ophthalmic solution    XALATAN     Place 0.005 drops into both eyes At Bedtime        MULTI-DAY vitamin  S Tabs     0    1 tablet daily        triamcinolone acetonide 40 MG/ML injection    KENALOG    1 mL    1 mL (40 mg) by INTRA-ARTICULAR route once for 1 dose    Chronic left shoulder pain

## 2018-07-18 NOTE — NURSING NOTE
Deven Smith's chief complaint for this visit includes:  Chief Complaint   Patient presents with     RECHECK     Left shoulder injection.      PCP: Marc Pinedo    Referring Provider:  No referring provider defined for this encounter.    /72  Pulse 50  SpO2 98%  Mild Pain (2)     Do you need any medication refills at today's visit? No

## 2018-07-18 NOTE — PROGRESS NOTES
Homero is here for a left suprascapular nerve block, he has a history of chronic left shoulder pain.     Vitals:    07/18/18 0702   BP: 141/72   Pulse: 50   SpO2: 98%       Suprascapular Nerve Injection - Ultrasound Guided  The patient was informed of the risks and the benefits of the procedure and a written consent was signed.  The patient s left scapula was prepped with chlorhexidine in sterile fashion.   40 mg of triamcinolone suspension was drawn up into a 5 mL syringe with 4 mL of 1% lidocaine.  Injection was performed using sterile technique.  Under ultrasound guidance a 3.5-inch 22-gauge needle was used to visualize the suprascapular nerve at the spinoglenoid notch.  Injection performed in long axis to the probe with a medial to lateral approach.  There were no complications. The patient tolerated the procedure well. There was negligible bleeding.   The patient was instructed to ice the shoulder upon leaving clinic and refrain from overuse over the next 3 days.   The patient was instructed to call or go to the emergency room with any unusual pain, swelling, redness, or if otherwise concerned.  A follow up appointment will be scheduled to evaluate response to the injection, and to assess range of motion and pain.  Kenalog: NDC 1876-0822-36        Marek Maurice DO CAQSM

## 2018-07-18 NOTE — LETTER
7/18/2018         RE: Deven Smith  [address] [facility] [address]        Dear Colleague,    Thank you for referring your patient, Deven Smith, to the [facility]. Please see a copy of my visit note below.    Homero is here for a left suprascapular nerve block, he has a history of chronic left shoulder pain.     Vitals:    07/18/18 0702   BP: 141/72   Pulse: 50   SpO2: 98%       Suprascapular Nerve Injection - Ultrasound Guided  The patient was informed of the risks and the benefits of the procedure and a written consent was signed.  The patient s left scapula was prepped with chlorhexidine in sterile fashion.   40 mg of triamcinolone suspension was drawn up into a 5 mL syringe with 4 mL of 1% lidocaine.  Injection was performed using sterile technique.  Under ultrasound guidance a 3.5-inch 22-gauge needle was used to visualize the suprascapular nerve at the spinoglenoid notch.  Injection performed in long axis to the probe with a medial to lateral approach.  There were no complications. The patient tolerated the procedure well. There was negligible bleeding.   The patient was instructed to ice the shoulder upon leaving clinic and refrain from overuse over the next 3 days.   The patient was instructed to call or go to the emergency room with any unusual pain, swelling, redness, or if otherwise concerned.  A follow up appointment will be scheduled to evaluate response to the injection, and to assess range of motion and pain.  Kenalog: NDC 4937-6279-85        Marek Maurice DO [facility]      Again, thank you for allowing me to participate in the care of your patient.        Sincerely,        Taz Maurice DO

## 2018-07-27 ENCOUNTER — OFFICE VISIT (OUTPATIENT)
Dept: ORTHOPEDICS | Facility: CLINIC | Age: 63
End: 2018-07-27
Payer: COMMERCIAL

## 2018-07-27 VITALS
WEIGHT: 184 LBS | HEIGHT: 69 IN | HEART RATE: 47 BPM | SYSTOLIC BLOOD PRESSURE: 138 MMHG | BODY MASS INDEX: 27.25 KG/M2 | DIASTOLIC BLOOD PRESSURE: 73 MMHG

## 2018-07-27 DIAGNOSIS — M25.511 CHRONIC RIGHT SHOULDER PAIN: Primary | ICD-10-CM

## 2018-07-27 DIAGNOSIS — G89.29 CHRONIC RIGHT SHOULDER PAIN: Primary | ICD-10-CM

## 2018-07-27 PROCEDURE — 64450 NJX AA&/STRD OTHER PN/BRANCH: CPT | Mod: RT | Performed by: FAMILY MEDICINE

## 2018-07-27 PROCEDURE — 99207 ZZC NO CHARGE LOS: CPT | Performed by: FAMILY MEDICINE

## 2018-07-27 RX ORDER — TRIAMCINOLONE ACETONIDE 40 MG/ML
40 INJECTION, SUSPENSION INTRA-ARTICULAR; INTRAMUSCULAR ONCE
Qty: 1 ML | Refills: 0 | OUTPATIENT
Start: 2018-07-27 | End: 2018-07-27

## 2018-07-27 ASSESSMENT — PAIN SCALES - GENERAL: PAINLEVEL: MILD PAIN (2)

## 2018-07-27 NOTE — PROGRESS NOTES
"Homero is here for a RIGHT suprascapular nerve block.    Vitals:    07/27/18 0715   BP: 138/73   Pulse: (!) 47   Weight: 83.5 kg (184 lb)   Height: 1.753 m (5' 9\")     Suprascapular Nerve Injection - Ultrasound Guided  The patient was informed of the risks and the benefits of the procedure and a written consent was signed.  The patient s right scapula was prepped with chlorhexidine in sterile fashion.   40 mg of triamcinolone suspension was drawn up into a 5 mL syringe with 4 mL of 1% lidocaine.  Injection was performed using sterile technique.  Under ultrasound guidance a 3.5-inch 22-gauge needle was used to visualize the suprascapular nerve at the spinoglenoid notch.  Injection performed in long axis to the probe with a medial to lateral approach.  There were no complications. The patient tolerated the procedure well. There was negligible bleeding.   The patient was instructed to ice the shoulder upon leaving clinic and refrain from overuse over the next 3 days.   The patient was instructed to call or go to the emergency room with any unusual pain, swelling, redness, or if otherwise concerned.  A follow up appointment will be scheduled to evaluate response to the injection, and to assess range of motion and pain.  Kenalog: NDC 7104-4739-75        DO LAURA Galeano    "

## 2018-07-27 NOTE — LETTER
"    7/27/2018         RE: Deven Smith  [address] [address] [facility] [address]        Dear Colleague,    Thank you for referring your patient, Deven Smith, to the [facility]. Please see a copy of my visit note below.    Homero is here for a RIGHT suprascapular nerve block.    Vitals:    07/27/18 0715   BP: 138/73   Pulse: (!) 47   Weight: 83.5 kg (184 lb)   Height: 1.753 m (5' 9\")     Suprascapular Nerve Injection - Ultrasound Guided  The patient was informed of the risks and the benefits of the procedure and a written consent was signed.  The patient s right scapula was prepped with chlorhexidine in sterile fashion.   40 mg of triamcinolone suspension was drawn up into a 5 mL syringe with 4 mL of 1% lidocaine.  Injection was performed using sterile technique.  Under ultrasound guidance a 3.5-inch 22-gauge needle was used to visualize the suprascapular nerve at the spinoglenoid notch.  Injection performed in long axis to the probe with a medial to lateral approach.  There were no complications. The patient tolerated the procedure well. There was negligible bleeding.   The patient was instructed to ice the shoulder upon leaving clinic and refrain from overuse over the next 3 days.   The patient was instructed to call or go to the emergency room with any unusual pain, swelling, redness, or if otherwise concerned.  A follow up appointment will be scheduled to evaluate response to the injection, and to assess range of motion and pain.  Kenalog: NDC 7143-2232-15        Marek [facility] DO [facility]      Again, thank you for allowing me to participate in the care of your patient.        Sincerely,        Taz Maurice DO    "

## 2018-07-27 NOTE — MR AVS SNAPSHOT
After Visit Summary   2018    Deven Smith    MRN: [id number]           Patient Information     Date Of Birth          1955        Visit Information        Provider Department      2018 7:20 AM Taz Maurice,  [facility]        Today's Diagnoses     Chronic right shoulder pain    -  1      Care Instructions    Thanks for coming today.  Ortho/Sports Medicine Clinic  [address] [place] [address]    To schedule future appointments in Ortho Clinic, you may call [phone].    To schedule ordered imaging by your provider:   Call Central Imaging [phone]    To schedule an injection ordered by your provider:  Call Central Imaging Injection scheduling line: [phone]  MyChart available online at:  [email or web address]    Please call if any further questions or concerns ([phone]).  Clinic hours 8 am to 5 pm.    Return to clinic (call) if symptoms worsen or fail to improve.          Follow-ups after your visit        Your next 10 appointments already scheduled     2018  1:00 PM CDT   Ortho Eval with Danette Apodaca PT   [facility] Physical Therapy ([facility])    [address] [place] [address] [phone]            Aug 06, 2018   Procedure with Israel Moreland MD   [facility] Endoscopy ([facility])    [address] [place] [address] [phone]              Who to contact     If you have questions or need follow up information about today's clinic visit or your schedule please contact [facility] directly at [phone].  Normal or non-critical lab and imaging results will be communicated to you by MyChart, letter or phone within 4 business days after the clinic has received the results. If you do not hear from us within 7 days, please contact the clinic through MyChart or phone. If you have a critical or abnormal  "lab result, we will notify you by phone as soon as possible.  Submit refill requests through CrowdCompass or call your pharmacy and they will forward the refill request to us. Please allow 3 business days for your refill to be completed.          Additional Information About Your Visit        CausePlayhart Information     CrowdCompass gives you secure access to your electronic health record. If you see a primary care provider, you can also send messages to your care team and make appointments. If you have questions, please call your primary care clinic.  If you do not have a primary care provider, please call [phone] and they will assist you.      CrowdCompass is an electronic gateway that provides easy, online access to your medical records. With CrowdCompass, you can request a clinic appointment, read your test results, renew a prescription or communicate with your care team.     To access your existing account, please contact your [facility] or call [phone] for assistance.        Care EveryWhere ID     This is your Care EveryWhere ID. This could be used by other organizations to access your [place] medical records  [id number]        Your Vitals Were     Pulse Height BMI (Body Mass Index)             47 1.753 m (5' 9\") 27.17 kg/m2          Blood Pressure from Last 3 Encounters:   07/27/18 138/73   07/18/18 141/72   07/05/18 148/77    Weight from Last 3 Encounters:   07/27/18 83.5 kg (184 lb)   07/05/18 83.7 kg (184 lb 8 oz)   06/27/18 83.6 kg (184 lb 4.8 oz)              We Performed the Following     INJECTION NERVE BLOCK, OTHER PERIPHERAL          Today's Medication Changes          These changes are accurate as of 7/27/18  8:05 AM.  If you have any questions, ask your nurse or doctor.               Start taking these medicines.        Dose/Directions    triamcinolone acetonide 40 MG/ML injection   Commonly known as:  KENALOG   Used for:  Chronic right shoulder pain        Dose:  40 mg   1 mL " (40 mg) by INTRA-ARTICULAR route once for 1 dose   Quantity:  1 mL   Refills:  0         Stop taking these medicines if you haven't already. Please contact your care team if you have questions.     diclofenac 1 % Gel topical gel   Commonly known as:  VOLTAREN                Where to get your medicines      Some of these will need a paper prescription and others can be bought over the counter.  Ask your nurse if you have questions.     You don't need a prescription for these medications     triamcinolone acetonide 40 MG/ML injection                Primary Care Provider Office Phone # Fax #    Marc Pinedo [phone] [address] [facility] [address]        Equal Access to Services     [facility] : Hadii dione Lebron, waaxda judd, qaybta kaalmada greg, ebenezer torrez. So [facility] [phone].    ATENCIÓN: Si habla español, tiene a jimenez disposición servicios gratuitos de asistencia lingüística. Llame al [phone].    We comply with applicable federal civil rights laws and [place] laws. We do not discriminate on the basis of race, color, national origin, age, disability, sex, sexual orientation, or gender identity.            Thank you!     Thank you for choosing [facility]  for your care. Our goal is always to provide you with excellent care. Hearing back from our patients is one way we can continue to improve our services. Please take a few minutes to complete the written survey that you may receive in the mail after your visit with us. Thank you!             Your Updated Medication List - Protect others around you: Learn how to safely use, store and throw away your medicines at www.disposemymeds.org.          This list is accurate as of 7/27/18  8:05 AM.  Always use your most recent med list.                   Brand Name Dispense Instructions for use Diagnosis    aspirin 81 MG tablet     0    1 tab po QD (Once  per day)        fish Oil 1200 MG capsule      Take 1 capsule by mouth daily        fluticasone 50 MCG/ACT spray    FLONASE    16 g    SPRAY TWO SPRAYS IN EACH NOSTRIL EVERY DAY    Seasonal allergic rhinitis, unspecified chronicity, unspecified trigger       GABAPENTIN PO      Take 300 mg by mouth 2 times daily        GLUCOSAMINE CHONDROITIN ADV PO      Take 6 tablets by mouth daily.        latanoprost 0.005 % ophthalmic solution    XALATAN     Place 0.005 drops into both eyes At Bedtime        MULTI-DAY vitamin  S Tabs     0    1 tablet daily        triamcinolone acetonide 40 MG/ML injection    KENALOG    1 mL    1 mL (40 mg) by INTRA-ARTICULAR route once for 1 dose    Chronic right shoulder pain

## 2018-07-27 NOTE — PATIENT INSTRUCTIONS
Thanks for coming today.  Ortho/Sports Medicine Clinic  [address] [place] [address]    To schedule future appointments in Ortho Clinic, you may call [phone].    To schedule ordered imaging by your provider:   Call Central Imaging [phone]    To schedule an injection ordered by your provider:  Call Central Imaging Injection scheduling line: [phone]  [employer] Display Systemhart available online at:  [email or web address]    Please call if any further questions or concerns ([phone]).  Clinic hours 8 am to 5 pm.    Return to clinic (call) if symptoms worsen or fail to improve.

## 2018-07-30 ENCOUNTER — HOSPITAL ENCOUNTER (OUTPATIENT)
Dept: PHYSICAL THERAPY | Facility: CLINIC | Age: 63
Setting detail: THERAPIES SERIES
End: 2018-07-30
Attending: PHYSICIAN ASSISTANT
Payer: COMMERCIAL

## 2018-07-30 PROCEDURE — 97162 PT EVAL MOD COMPLEX 30 MIN: CPT | Mod: GP | Performed by: PHYSICAL THERAPIST

## 2018-07-30 PROCEDURE — 40000718 ZZHC STATISTIC PT DEPARTMENT ORTHO VISIT: Performed by: PHYSICAL THERAPIST

## 2018-07-30 PROCEDURE — 97110 THERAPEUTIC EXERCISES: CPT | Mod: GP | Performed by: PHYSICAL THERAPIST

## 2018-07-30 NOTE — PROGRESS NOTES
07/30/18 6217   General Information   Type of Visit Initial OP Ortho PT Evaluation   Start of Care Date 07/30/18   Referring Physician Josh Mendieta PAC   Patient/Family Goals Statement hold off on Justyna bilaterally, Main issue is to decrease symptoms which injections have helped.    Orders Evaluate and Treat   Orders Comment TENS unit and 10 visits   Date of Order 06/08/18   Insurance Type Private   Insurance Comments/Visits Authorized [employer]   Medical Diagnosis Pain in left shoulder, Pain in right shoulder   Surgical/Medical history reviewed Yes   Precautions/Limitations other (see comments)  (Limit activity to manage symptoms)   Weight-Bearing Status - LUE weight-bearing as tolerated   Weight-Bearing Status - RUE weight-bearing as tolerated   Weight-Bearing Status - LLE full weight-bearing   Weight-Bearing Status - RLE full weight-bearing       Present No   Body Part(s)   Body Part(s) Shoulder   Presentation and Etiology   Pertinent history of current problem (include personal factors and/or comorbidities that impact the POC) 64 yo male with bilateral chronic pain. He is R handed. He is on medication so he sleeps better. He had injections that also helped. He is trying to avoid TSAs and he is adjusting his activities. He is at midline in maintenance. He can lift heavy objects next to body. He has increased symptoms with reaching especially when lifting. Pronation and supination can also increase symptoms. He is being seen at the [facility] and has a follow up in the near future. He finished the ointment (Diclofenac Sodium 1%) he is also on NOHEMY. He is continuing with cross fit and his gym workouts as much as he is able. He is still stretching with the PVC pipe. However he has increased symptoms with pectoralis stretch position with arms at 90 degrees. He has had shoulder issues for yrs. He had a surgery for the L shoulder and has a torn biceps on the R which cannot be  surgerically repaired at this time. He has no had L arm tingling in a while. He did use cool pack for the R shoulder and hot tub which help. He feels stretching is helping the most. Spring 2013 R injury and 2015 found out he had retorn the L RC. He was trying to compete in cross fit. He is contnuing to complete dead lifts and other lifts (around #300) as he has less pain when arms next to body.    Impairments A. Pain;D. Decreased ROM;E. Decreased flexibility;F. Decreased strength and endurance   Functional Limitations perform activities of daily living;perform required work activities;perform desired leisure / sports activities   Symptom Location anterior superior shoulder. No arm or neck symptoms. Noting muscle spasms in the lower mid back and burning in the L upper to lower thoracic area. This began approximately the third day of the NOHEMY.    How/Where did it occur (Chronic)   Onset date of current episode/exacerbation (yrs, torn R 2015 and retorn the L at some time but not sure)   Chronicity Chronic   Pain rating (0-10 point scale) Other   Pain rating comment R: 1-2/10 increasing to 10+/10; L: now: 1-2/10 increasing to 10+/10   Pain quality D. Burning;A. Sharp;H. Other   Pain quality comment Crunching - audible and palpable   Frequency of pain/symptoms A. Constant   Pain/symptoms are: Other   Pain symptoms comment activity related   Pain exacerbation comment lifting, breaking nuts and bolts loose (inital break to let loose), arm in wrong position, WB, computer work, rolling over    Pain/symptoms eased by K. Other   Pain eased by comment Cool, heat, injections, getting out of position of discomfort   Progression of symptoms since onset: Improved  (better since injection)   Current / Previous Interventions   Diagnostic Tests: MRI   MRI Results Results   MRI results See EPIC: R shoulder torn supraspinatus, infraspinatus, subscapularis and biceps   Prior Level of Function   Functional Level Prior Comment independent   - for a while, ADLs were difficult in the morning. Better now.    Current Level of Function   Current Community Support Family/friend caregiver  (wife)   Patient role/employment history A. Employed   Employment Comments maintenance   Living environment House/townhome   Home/community accessibility no concerns   Current equipment-Gait/Locomotion None   Current equipment-ADL None   Fall Risk Screen   Fall screen completed by PT   Have you fallen 2 or more times in the past year? No   Have you fallen and had an injury in the past year? No   Is patient a fall risk? No   Functional Scales   Functional Scales Other   Other Scales  SPADI: 60-12-50   Shoulder Objective Findings   Observation no acute distress   Integumentary  well healed incision L   Posture forward   Cervical Screen (ROM, quadrant) WNL except R SB more tight than L SB.    Shoulder ROM Comment Shoulder standing AROM (R/L): flexion: 0-140 degrees/162 degrees; extension: 0-68 degrees/68 degrees; abduction: 0-174 degrees/172 degrees; rotation 90 degrees abduction in the plane of the scapulae: ER: 0-100 degrees/60 degrees; IR: R: -10 degrees/13 degrees   Pec Minor (supine) Flexibility moderately tight   Planned Therapy Interventions   Planned Therapy Interventions Comment Scapular stabilization, shoulder AROM (gentle and to tolerance)->stability, posture, shoulder protection and home progra    Planned Modality Interventions   Planned Modality Interventions Comments Will try TNS the next 2-3 visits and issue if it is helpful.    Clinical Impression   Criteria for Skilled Therapeutic Interventions Met yes, treatment indicated   PT Diagnosis Decreased AROM, pain and limited activity due to bilateral rotator cuff and biceps tendon tears   Influenced by the following impairments pain, limited AROM and strength   Functional limitations due to impairments Reaching, lifting and carrying weight away from body, computer work, running comfortably, completing job/home and  workout activities, rolling over in bed   Clinical Presentation Evolving/Changing   Clinical Presentation Rationale Decreased ROM, strength, activity level limited, pain   Clinical Decision Making (Complexity) Moderate complexity   Predicted Duration of Therapy Intervention (days/wks) 1-2 times per week for 5-10 weeks/10 total visits based on patient's needs   Risk & Benefits of therapy have been explained Yes   Patient, Family & other staff in agreement with plan of care Yes   Clinical Impression Comments Very active 62 yo male with pain in his shoulders. He has had shoulder pain for a long time as noted above. He wants to remain active and believes he may get to a point when he cannot work. He has difficulty ER the L shoulder due to the retears of the rotator cuff. He wants to continue to be as active as he can and avoid surgeryif able due to the limits of lifting. He is very motivated.    Education Assessment   Preferred Learning Style Reading   Barriers to Learning No barriers   ORTHO GOALS   PT Ortho Eval Goals 2;3;1   Ortho Goal 1   Goal Identifier Symptoms   Goal Description Homero will be able to decrease his symptoms to 25% with activity so he can complete his workouts and job tasks with less pain   Target Date 09/28/18   Ortho Goal 2   Goal Identifier AROM   Goal Description Homero wll be able to use AAROM and AROM of his shoulders to prevent capsular tightness, muscle tightness allowing him to reach overhead and to improve ER L 10 degrees   Target Date 08/31/18   Ortho Goal 3   Goal Identifier Home program   Goal Description Homero will be able to complete a home prorgam of strengthening and ROM as well as scapular stabilization so he can continue with his job and workouts    Target Date 09/28/18   Total Evaluation Time   Total Evaluation Time 20     Thank you for referring Homero  to [facility] - Staci Apodaca, PT  [phone]

## 2018-08-06 ENCOUNTER — ANESTHESIA (OUTPATIENT)
Dept: GASTROENTEROLOGY | Facility: CLINIC | Age: 63
End: 2018-08-06
Payer: COMMERCIAL

## 2018-08-06 ENCOUNTER — HOSPITAL ENCOUNTER (OUTPATIENT)
Facility: CLINIC | Age: 63
Discharge: HOME | End: 2018-08-06
Attending: FAMILY MEDICINE | Admitting: FAMILY MEDICINE
Payer: COMMERCIAL

## 2018-08-06 ENCOUNTER — ANESTHESIA EVENT (OUTPATIENT)
Dept: GASTROENTEROLOGY | Facility: CLINIC | Age: 63
End: 2018-08-06
Payer: COMMERCIAL

## 2018-08-06 ENCOUNTER — SURGERY (OUTPATIENT)
Age: 63
End: 2018-08-06
Payer: COMMERCIAL

## 2018-08-06 VITALS
SYSTOLIC BLOOD PRESSURE: 118 MMHG | RESPIRATION RATE: 16 BRPM | TEMPERATURE: 97.9 F | OXYGEN SATURATION: 96 % | DIASTOLIC BLOOD PRESSURE: 76 MMHG

## 2018-08-06 LAB — COLONOSCOPY: NORMAL

## 2018-08-06 PROCEDURE — 25000125 ZZHC RX 250: Performed by: FAMILY MEDICINE

## 2018-08-06 PROCEDURE — 25000128 H RX IP 250 OP 636: Performed by: FAMILY MEDICINE

## 2018-08-06 PROCEDURE — 25000128 H RX IP 250 OP 636: Performed by: NURSE ANESTHETIST, CERTIFIED REGISTERED

## 2018-08-06 PROCEDURE — 25000125 ZZHC RX 250: Performed by: NURSE ANESTHETIST, CERTIFIED REGISTERED

## 2018-08-06 PROCEDURE — 45378 DIAGNOSTIC COLONOSCOPY: CPT | Performed by: FAMILY MEDICINE

## 2018-08-06 PROCEDURE — 40000297 ZZH STATISTIC ENDO RECOVERY CLASS 1:2 EACH ADDL HOUR: Performed by: FAMILY MEDICINE

## 2018-08-06 PROCEDURE — 40000296 ZZH STATISTIC ENDO RECOVERY CLASS 1:2 FIRST HOUR: Performed by: FAMILY MEDICINE

## 2018-08-06 PROCEDURE — 25000132 ZZH RX MED GY IP 250 OP 250 PS 637: Performed by: FAMILY MEDICINE

## 2018-08-06 PROCEDURE — G0105 COLORECTAL SCRN; HI RISK IND: HCPCS | Performed by: FAMILY MEDICINE

## 2018-08-06 PROCEDURE — G0121 COLON CA SCRN NOT HI RSK IND: HCPCS | Performed by: FAMILY MEDICINE

## 2018-08-06 RX ORDER — LIDOCAINE HYDROCHLORIDE 20 MG/ML
INJECTION, SOLUTION INFILTRATION; PERINEURAL PRN
Status: DISCONTINUED | OUTPATIENT
Start: 2018-08-06 | End: 2018-08-06

## 2018-08-06 RX ORDER — ONDANSETRON 2 MG/ML
4 INJECTION INTRAMUSCULAR; INTRAVENOUS
Status: DISCONTINUED | OUTPATIENT
Start: 2018-08-06 | End: 2018-08-06 | Stop reason: HOSPADM

## 2018-08-06 RX ORDER — ONDANSETRON 2 MG/ML
4 INJECTION INTRAMUSCULAR; INTRAVENOUS EVERY 30 MIN PRN
Status: DISCONTINUED | OUTPATIENT
Start: 2018-08-06 | End: 2018-08-06 | Stop reason: HOSPADM

## 2018-08-06 RX ORDER — SODIUM CHLORIDE, SODIUM LACTATE, POTASSIUM CHLORIDE, CALCIUM CHLORIDE 600; 310; 30; 20 MG/100ML; MG/100ML; MG/100ML; MG/100ML
INJECTION, SOLUTION INTRAVENOUS CONTINUOUS
Status: DISCONTINUED | OUTPATIENT
Start: 2018-08-06 | End: 2018-08-06 | Stop reason: HOSPADM

## 2018-08-06 RX ORDER — PROPOFOL 10 MG/ML
INJECTION, EMULSION INTRAVENOUS PRN
Status: DISCONTINUED | OUTPATIENT
Start: 2018-08-06 | End: 2018-08-06

## 2018-08-06 RX ORDER — LIDOCAINE 40 MG/G
CREAM TOPICAL
Status: DISCONTINUED | OUTPATIENT
Start: 2018-08-06 | End: 2018-08-06 | Stop reason: HOSPADM

## 2018-08-06 RX ORDER — GLYCOPYRROLATE 0.2 MG/ML
INJECTION, SOLUTION INTRAMUSCULAR; INTRAVENOUS PRN
Status: DISCONTINUED | OUTPATIENT
Start: 2018-08-06 | End: 2018-08-06

## 2018-08-06 RX ORDER — MEPERIDINE HYDROCHLORIDE 25 MG/ML
12.5 INJECTION INTRAMUSCULAR; INTRAVENOUS; SUBCUTANEOUS
Status: DISCONTINUED | OUTPATIENT
Start: 2018-08-06 | End: 2018-08-06 | Stop reason: HOSPADM

## 2018-08-06 RX ORDER — PROPOFOL 10 MG/ML
INJECTION, EMULSION INTRAVENOUS CONTINUOUS PRN
Status: DISCONTINUED | OUTPATIENT
Start: 2018-08-06 | End: 2018-08-06

## 2018-08-06 RX ORDER — NALOXONE HYDROCHLORIDE 0.4 MG/ML
.1-.4 INJECTION, SOLUTION INTRAMUSCULAR; INTRAVENOUS; SUBCUTANEOUS
Status: DISCONTINUED | OUTPATIENT
Start: 2018-08-06 | End: 2018-08-06 | Stop reason: HOSPADM

## 2018-08-06 RX ORDER — ONDANSETRON 4 MG/1
4 TABLET, ORALLY DISINTEGRATING ORAL EVERY 30 MIN PRN
Status: DISCONTINUED | OUTPATIENT
Start: 2018-08-06 | End: 2018-08-06 | Stop reason: HOSPADM

## 2018-08-06 RX ADMIN — GLYCOPYRROLATE 0.2 MCG: 0.2 INJECTION, SOLUTION INTRAMUSCULAR; INTRAVENOUS at 10:41

## 2018-08-06 RX ADMIN — PROPOFOL 150 MCG/KG/MIN: 10 INJECTION, EMULSION INTRAVENOUS at 10:25

## 2018-08-06 RX ADMIN — PROPOFOL 20 MG: 10 INJECTION, EMULSION INTRAVENOUS at 10:27

## 2018-08-06 RX ADMIN — PROPOFOL 40 MG: 10 INJECTION, EMULSION INTRAVENOUS at 10:25

## 2018-08-06 RX ADMIN — LIDOCAINE HYDROCHLORIDE 1 ML: 10 INJECTION, SOLUTION EPIDURAL; INFILTRATION; INTRACAUDAL; PERINEURAL at 10:07

## 2018-08-06 RX ADMIN — SIMETHICONE 250 ML: 20 SUSPENSION/ DROPS ORAL at 10:22

## 2018-08-06 RX ADMIN — SODIUM CHLORIDE, POTASSIUM CHLORIDE, SODIUM LACTATE AND CALCIUM CHLORIDE: 600; 310; 30; 20 INJECTION, SOLUTION INTRAVENOUS at 10:12

## 2018-08-06 RX ADMIN — LIDOCAINE HYDROCHLORIDE 40 MG: 20 INJECTION, SOLUTION INFILTRATION; PERINEURAL at 10:25

## 2018-08-06 ASSESSMENT — LIFESTYLE VARIABLES: TOBACCO_USE: 1

## 2018-08-06 NOTE — ANESTHESIA POSTPROCEDURE EVALUATION
Patient: Deven Smith    Procedure(s):  colonoscopy - Wound Class: II-Clean Contaminated    Diagnosis:Screening   Diagnosis Additional Information: No value filed.    Anesthesia Type:  MAC    Note:  Anesthesia Post Evaluation    Patient location during evaluation: Phase 2  Patient participation: Able to fully participate in evaluation  Level of consciousness: awake and alert  Pain management: adequate  Airway patency: patent  Cardiovascular status: blood pressure returned to baseline  Respiratory status: ventilator and spontaneous ventilation  Hydration status: acceptable  PONV: none     Anesthetic complications: None    Comments: Patient was very pleased with his anesthesia today. He is currently resting without complaint. No anesthesia concerns.         Last vitals:  Vitals:    08/06/18 0948 08/06/18 1100   BP: 157/81 118/76   Resp: 16    Temp: 97.9  F (36.6  C)    SpO2: 97% 96%         Electronically Signed By: SCOTT Mobley CRNA  August 6, 2018  11:36 AM

## 2018-08-06 NOTE — ANESTHESIA CARE TRANSFER NOTE
Patient: Deven Smith    Procedure(s):  colonoscopy - Wound Class: II-Clean Contaminated    Diagnosis: Screening   Diagnosis Additional Information: No value filed.    Anesthesia Type:   MAC     Note:    Patient transferred to:Phase II  Handoff Report: Identifed the Patient, Identified the Reponsible Provider, Reviewed the pertinent medical history, Discussed the surgical course, Reviewed Intra-OP anesthesia mangement and issues during anesthesia, Set expectations for post-procedure period and Allowed opportunity for questions and acknowledgement of understanding      Vitals: (Last set prior to Anesthesia Care Transfer)    CRNA VITALS  8/6/2018 1026 - 8/6/2018 1056      8/6/2018             Resp Rate (observed): 20                Electronically Signed By: SCOTT Mobley CRNA  August 6, 2018  10:56 AM

## 2018-08-06 NOTE — ANESTHESIA PREPROCEDURE EVALUATION
Anesthesia Evaluation     . Pt has had prior anesthetic. Type: MAC and General           ROS/MED HX    ENT/Pulmonary:     (+)allergic rhinitis, other ENT- labyrinthitis, tobacco use, Past use , . .    Neurologic:       Cardiovascular:     (+) Dyslipidemia, ----. : . . . :. . Previous cardiac testing Echodate:4/24/14results:EF 55-60  No regional wall motion abnormalitiesdate: results:ECG reviewed date:4/24/14 results:NSR date: results:          METS/Exercise Tolerance:     Hematologic:         Musculoskeletal:   (+) arthritis, , , other musculoskeletal- DDD cervical      GI/Hepatic:  - neg GI/hepatic ROS       Renal/Genitourinary:  - ROS Renal section negative       Endo:  - neg endo ROS       Psychiatric:         Infectious Disease:  - neg infectious disease ROS       Malignancy:      - no malignancy   Other:    (+) No chance of pregnancy C-spine cleared: N/A, H/O Chronic Pain,                   Physical Exam  Normal systems: cardiovascular, pulmonary and dental    Airway   Mallampati: II  TM distance: >3 FB  Neck ROM: full    Dental     Cardiovascular   Rhythm and rate: regular and normal      Pulmonary    breath sounds clear to auscultation                    Anesthesia Plan      History & Physical Review  History and physical reviewed and following examination; no interval change.    ASA Status:  2 .    NPO Status:  > 8 hours    Plan for MAC with Intravenous and Propofol induction. Maintenance will be Balanced.  Reason for MAC:  Deep or markedly invasive procedure (G8)         Postoperative Care  Postoperative pain management:  Oral pain medications.      Consents  Anesthetic plan, risks, benefits and alternatives discussed with:  Patient..                          .

## 2018-08-06 NOTE — H&P
"Pre-Endoscopy History and Physical     Deven Smith MRN# [id number]   YOB: 1955 Age: 63 year old     Date of Procedure: 8/6/2018  Primary care provider: Marc Pinedo  Type of Endoscopy: colonoscopy  Type of Anesthesia Anticipated: MAC    HPI:    Deven is a 63 year old male who will be undergoing the above procedure.      Deven is feeling well today. Can get up a single flight of stairs without dyspnea. Estimated METS > 4. The risks and benefits of the procedure and the sedation options and risks were discussed with the patient. These include infection, bleeding, and small risk of colon perforation (1/1000 to 1/17357 depending on patient characteristics and type of procedure). Deven was also explained to alternatives for colo-rectal screening. All questions were answered and informed consent was obtained.      Patient Active Problem List   Diagnosis     Other specified glaucoma     Allergic rhinitis     Family history of other cardiovascular diseases     Labyrinthitis     Hyperlipidemia with target LDL less than 130     Advanced directives, counseling/discussion     DDD (degenerative disc disease), cervical     Supraspinatus tendon tear, left, subsequent encounter        No prescriptions prior to admission.        REVIEW OF SYSTEMS:     5 point ROS negative except as noted above in HPI, including Gen., Resp., CV, GI &  system review.      PHYSICAL EXAM:   /76  Temp 97.9  F (36.6  C) (Oral)  Resp 16  SpO2 96%   Estimated body mass index is 27.17 kg/(m^2) as calculated from the following:    Height as of 7/27/18: 5' 9\" (1.753 m).    Weight as of 7/27/18: 184 lb (83.5 kg).    GENERAL APPEARANCE: alert and no distress  RESP: lungs clear and unlabored  CV: regular  ABD: soft, nt/nd    DIAGNOSTICS:    Not indicated      IMPRESSION   ASA Class 2 - Mild systemic disease        PLAN:     Plan for colonoscopy. No medical contraindications to proceed, or further work up needed. We " discussed the risks, benefits and alternatives and the patient wished to proceed.    The above has been forwarded to the consulting provider.      Signed Electronically by: Israel Moreland  August 6, 2018

## 2018-08-06 NOTE — IP AVS SNAPSHOT
[facility]    [address] [address]    Phone:  [phone]                                       After Visit Summary   8/6/2018    Deven Smith    MRN: [id number]           After Visit Summary Signature Page     I have received my discharge instructions, and my questions have been answered. I have discussed any challenges I see with this plan with the nurse or doctor.    ..........................................................................................................................................  Patient/Patient Representative Signature      ..........................................................................................................................................  Patient Representative Print Name and Relationship to Patient    ..................................................               ................................................  Date                                            Time    ..........................................................................................................................................  Reviewed by Signature/Title    ...................................................              ..............................................  Date                                                            Time

## 2018-08-06 NOTE — IP AVS SNAPSHOT
MRN:[id number]                      After Visit Summary   8/6/2018    Deven Smith    MRN: [id number]           Thank you!     Thank you for choosing [facility] for your care. Our goal is always to provide you with excellent care. Hearing back from our patients is one way we can continue to improve our services. Please take a few minutes to complete the written survey that you may receive in the mail after you visit with us. Thank you!        Patient Information     Date Of Birth          1955        About your hospital stay     You were admitted on:  August 6, 2018 You last received care in the:  [facility]    You were discharged on:  August 6, 2018       Who to Call     For medical emergencies, please call 911.  For non-urgent questions about your medical care, please call your primary care provider or clinic, [phone]  For questions related to your surgery, please call your surgery clinic        Attending Provider     Provider Israel Mendoza MD [facility]       Primary Care Provider Office Phone # Fax #    Marc Pinedo [phone]      Your next 10 appointments already scheduled     Aug 10, 2018  2:30 PM CDT   Ortho Treatment with Danette Apodaca, PT   [facility] ([facility])    [address] [place] [address] Dr Staci [place] [address]   [phone]            Aug 15, 2018  2:30 PM CDT   Ortho Treatment with Danette Apodaca, PT   [facility] ([facility])    [address] [place] [address] Dr [place] [place] [address]   [phone]            Aug 22, 2018  1:00 PM CDT   Ortho Treatment with Danette Apodaca, PT   [facility] ([facility])    [address] [place] [address] Dr [place]aci [place] [address]   [phone]            Aug 29, 2018  1:00 PM CDT   Ortho Treatment with Danette Apodaca, PT   [facility] ([facility])    [address] [place] [place] [address]   [phone]            Sep 19, 2018  1:00 PM CDT   Ortho Treatment with Danette Apodaca, PT   [facility] ([facility])    [address] [place] [place] [address]   [phone]            Sep 26, 2018  1:00 PM CDT   Ortho Treatment with Danette Apodaca, PT   [facility] ([facility])    [address] [place] [place] [address]   [phone]              Further instructions from your care team         [facility]    Home Care Following Endoscopy    Dr. Moreland       Activity:    You have just undergone an endoscopic procedure usually performed with conscious sedation.  Do not work or operate machinery (including a car) for at least 12 hours.      I encourage you to walk and attempt to pass this air as soon as possible.    Diet:    Return to the diet you were on before your procedure but eat lightly for the first 12-24 hours.    Drink plenty of water.    Resume any regular medications unless otherwise advised by your physician.  Please begin any new medication prescribed as a result of your procedure as directed by your physician.     If you had any biopsy or polyp removed please refrain from aspirin or aspirin products for 2 days.  If on Coumadin please restart as instructed by your physician.   Pain:    You may take Tylenol as needed for pain.  Expected Recovery:    You can expect some mild abdominal fullness and/or discomfort due to the air used to inflate your intestinal tract. It is also normal to have a mild sore throat after upper endoscopy.    Call Your Physician if You Have:    After Upper Endoscopy:  o Shoulder, back or chest pain.  o Difficulty breathing or swallowing.  o Vomiting blood.    After Colonoscopy:  o Worsening persisting abdominal pain which is worse with activity.  o Fevers (>101 degrees F), chills or shakes.  o Passage of continued blood  with bowel movements.   Any questions or concerns about your recovery, please call [phone] or after hours [phone] Nurse Advice Line.    Follow-up Care:    You should receive a call or letter with your results within 1 week. Please call if you have not received a notification of your results.  If asked to return to clinic please make an appointment 1 week after your procedure.  Call [phone].     Pending Results     No orders found from 8/4/2018 to 8/7/2018.            Admission Information     Date & Time Provider Department Dept. Phone    8/6/2018 Israel Moreland MD [facility] [phone]      Your Vitals Were     Blood Pressure Temperature Respirations Pulse Oximetry          157/81 97.9  F (36.6  C) (Oral) 16 97%        MyChart Information     [employer] gives you secure access to your electronic health record. If you see a primary care provider, you can also send messages to your care team and make appointments. If you have questions, please call your primary care clinic.  If you do not have a primary care provider, please call [phone] and they will assist you.        Care EveryWhere ID     This is your Care EveryWhere ID. This could be used by other organizations to access your [facility] medical records  [id number]        Equal Access to Services     GREY YAN : Hadii dione lazaro Somitra, waaxda luqadaha, qaybta kaalmada adeashleyyada, ebenezer torrez. So [facility] [phone].    ATENCIÓN: Si habla español, tiene a jimenez disposición servicios gratuitos de asistencia lingüística. Llame al [phone].    We comply with applicable federal civil rights laws and [place] laws. We do not discriminate on the basis of race, color, national origin, age, disability, sex, sexual orientation, or gender identity.               Review of your medicines      UNREVIEWED medicines. Ask your doctor about these medicines        Dose / Directions    aspirin 81 MG  tablet        1 tab po QD (Once per day)   Quantity:  0   Refills:  0       fish Oil 1200 MG capsule        Dose:  1 capsule   Take 1 capsule by mouth daily   Refills:  0       fluticasone 50 MCG/ACT spray   Commonly known as:  FLONASE   Used for:  Seasonal allergic rhinitis, unspecified chronicity, unspecified trigger        SPRAY TWO SPRAYS IN EACH NOSTRIL EVERY DAY   Quantity:  16 g   Refills:  6       GABAPENTIN PO        Dose:  300 mg   Take 300 mg by mouth 2 times daily   Refills:  0       GLUCOSAMINE CHONDROITIN ADV PO        Dose:  6 tablet   Take 6 tablets by mouth daily.   Refills:  0       latanoprost 0.005 % ophthalmic solution   Commonly known as:  XALATAN        Dose:  0.005 drop   Place 0.005 drops into both eyes At Bedtime   Refills:  0       MULTI-DAY vitamin  S Tabs        1 tablet daily   Quantity:  0   Refills:  0                Protect others around you: Learn how to safely use, store and throw away your medicines at www.disposemymeds.org.             Medication List: This is a list of all your medications and when to take them. Check marks below indicate your daily home schedule. Keep this list as a reference.      Medications           Morning Afternoon Evening Bedtime As Needed    aspirin 81 MG tablet   1 tab po QD (Once per day)                                fish Oil 1200 MG capsule   Take 1 capsule by mouth daily                                fluticasone 50 MCG/ACT spray   Commonly known as:  FLONASE   SPRAY TWO SPRAYS IN EACH NOSTRIL EVERY DAY                                GABAPENTIN PO   Take 300 mg by mouth 2 times daily                                GLUCOSAMINE CHONDROITIN ADV PO   Take 6 tablets by mouth daily.                                latanoprost 0.005 % ophthalmic solution   Commonly known as:  XALATAN   Place 0.005 drops into both eyes At Bedtime                                MULTI-DAY vitamin  S Tabs   1 tablet daily

## 2018-08-06 NOTE — DISCHARGE INSTRUCTIONS
[facility]    Home Care Following Endoscopy    Dr. Moreland       Activity:    You have just undergone an endoscopic procedure usually performed with conscious sedation.  Do not work or operate machinery (including a car) for at least 12 hours.      I encourage you to walk and attempt to pass this air as soon as possible.    Diet:    Return to the diet you were on before your procedure but eat lightly for the first 12-24 hours.    Drink plenty of water.    Resume any regular medications unless otherwise advised by your physician.  Please begin any new medication prescribed as a result of your procedure as directed by your physician.     If you had any biopsy or polyp removed please refrain from aspirin or aspirin products for 2 days.  If on Coumadin please restart as instructed by your physician.   Pain:    You may take Tylenol as needed for pain.  Expected Recovery:    You can expect some mild abdominal fullness and/or discomfort due to the air used to inflate your intestinal tract. It is also normal to have a mild sore throat after upper endoscopy.    Call Your Physician if You Have:    After Upper Endoscopy:  o Shoulder, back or chest pain.  o Difficulty breathing or swallowing.  o Vomiting blood.    After Colonoscopy:  o Worsening persisting abdominal pain which is worse with activity.  o Fevers (>101 degrees F), chills or shakes.  o Passage of continued blood with bowel movements.   Any questions or concerns about your recovery, please call [phone] or after hours [phone] Nurse Advice Line.    Follow-up Care:    You should receive a call or letter with your results within 1 week. Please call if you have not received a notification of your results.  If asked to return to clinic please make an appointment 1 week after your procedure.  Call [phone].

## 2018-08-10 ENCOUNTER — HOSPITAL ENCOUNTER (OUTPATIENT)
Dept: PHYSICAL THERAPY | Facility: CLINIC | Age: 63
Setting detail: THERAPIES SERIES
End: 2018-08-10
Attending: PHYSICIAN ASSISTANT
Payer: COMMERCIAL

## 2018-08-10 PROCEDURE — 40000718 ZZHC STATISTIC PT DEPARTMENT ORTHO VISIT: Performed by: PHYSICAL THERAPIST

## 2018-08-10 PROCEDURE — 97110 THERAPEUTIC EXERCISES: CPT | Mod: GP | Performed by: PHYSICAL THERAPIST

## 2018-08-15 ENCOUNTER — TRANSFERRED RECORDS (OUTPATIENT)
Dept: HEALTH INFORMATION MANAGEMENT | Facility: CLINIC | Age: 63
End: 2018-08-15

## 2018-08-15 LAB — PHQ9 SCORE: 0

## 2018-08-22 ENCOUNTER — HOSPITAL ENCOUNTER (OUTPATIENT)
Dept: PHYSICAL THERAPY | Facility: CLINIC | Age: 63
Setting detail: THERAPIES SERIES
End: 2018-08-22
Attending: PHYSICIAN ASSISTANT
Payer: COMMERCIAL

## 2018-08-22 PROCEDURE — 97110 THERAPEUTIC EXERCISES: CPT | Mod: GP | Performed by: PHYSICAL THERAPIST

## 2018-08-22 PROCEDURE — 40000718 ZZHC STATISTIC PT DEPARTMENT ORTHO VISIT: Performed by: PHYSICAL THERAPIST

## 2018-08-24 NOTE — PROGRESS NOTES
Outpatient Physical Therapy Progress Note     Patient: Deven Smith  : 1955    Beginning/End Dates of Reporting Period:  3 visits  Between 18 and 18    Referring Provider: Josh Mendieta PAC    Therapy Diagnosis: Decreased AROM, pain and limited activity due to bilateral rotator cuff and biceps tendon tears     Client Self Report: Feels he is the best he has been in at 2 yrs as far as pain level. He has 2-3/10 with arm overhead and this increases with reaching out and when lifting weights. The reaching out is burning and sharp. He stops when he feels the increase in symptoms. There is still significant discomfort pulling medium weight quilt over him - IR with adduction movement. It is the pulling vs the weight that increases the symptoms.     Objective Measurements:  Objective Measure: Shoulder AROM in standing  Details: Rod AROM in standing: elevation: 0-161 L/164 degrees; extension: 0-62 degrees L/64 degrees R; abduction: 0-180 degrees bilaterally; ER at 90 degrees elevation in scapular plane: 0-57 degrees/95 degrees; IR: 0-26 degrees  Objective Measure: SPADI  Details: 11.56 degrees from 50  Objective Measure: Shoulder MMT  Details: 5-/5 for abduction, adduction, IR flexion and extension in neutral and ER is 3+/5 bilaterally       Goals:  Goal Identifier Symptoms   Goal Description Homero will be able to decrease his symptoms to 25% with activity so he can complete his workouts and job tasks with less pain (2-3/10 w/elevation above 90deg. 80% improvement)   Target Date 18   Date Met      Progress:     Goal Identifier AROM   Goal Description Homero wll be able to use AAROM and AROM of his shoulders to prevent capsular tightness, muscle tightness allowing him to reach overhead and to improve ER L 10 degrees   Target Date 18   Date Met      Progress:     Goal Identifier Home program   Goal Description Homero will be able to complete a home prorgam of strengthening and ROM as well as  scapular stabilization so he can continue with his job and workouts  (progressing)   Target Date 09/28/18   Date Met      Progress:       Progress Toward Goals:   Progress this reporting period: Homero feels he is doing great. He continues to have discomfort, but he is able to move through this and he feels he knows when to stop the activities. He is continuing to work on his exercises regularly. He felt ready to continue with a home program. He was asked to work on the ER strength and he understood his AROM especially the L and strength for ER may take time and amount of improvement is unclear. He would like try home program x 3 weeks and then we will call for follow up. He was asked to call if he had issues.           Plan:  Changes to therapy plan of care: Hold x 3 weeks and he will complete a home program. Will recheck by phone in 3 weeks and he will schedule appt if needed.     Discharge:  No    Thank you for referring Homero  to [facility]   [place]    Danette Apodaca, PT  [phone]

## 2018-09-21 ENCOUNTER — OFFICE VISIT (OUTPATIENT)
Dept: PODIATRY | Facility: CLINIC | Age: 63
End: 2018-09-21
Payer: COMMERCIAL

## 2018-09-21 ENCOUNTER — RADIANT APPOINTMENT (OUTPATIENT)
Dept: GENERAL RADIOLOGY | Facility: CLINIC | Age: 63
End: 2018-09-21
Attending: PODIATRIST
Payer: COMMERCIAL

## 2018-09-21 VITALS
WEIGHT: 184 LBS | DIASTOLIC BLOOD PRESSURE: 64 MMHG | BODY MASS INDEX: 27.25 KG/M2 | SYSTOLIC BLOOD PRESSURE: 138 MMHG | HEIGHT: 69 IN

## 2018-09-21 DIAGNOSIS — M79.672 LEFT FOOT PAIN: Primary | ICD-10-CM

## 2018-09-21 DIAGNOSIS — M72.2 PLANTAR FASCIAL FIBROMATOSIS: ICD-10-CM

## 2018-09-21 PROCEDURE — 99203 OFFICE O/P NEW LOW 30 MIN: CPT | Performed by: PODIATRIST

## 2018-09-21 PROCEDURE — 73650 X-RAY EXAM OF HEEL: CPT | Mod: TC

## 2018-09-21 RX ORDER — NAPROXEN 500 MG/1
500 TABLET ORAL 2 TIMES DAILY WITH MEALS
Qty: 60 TABLET | Refills: 1 | Status: SHIPPED | OUTPATIENT
Start: 2018-09-21 | End: 2019-11-18

## 2018-09-21 ASSESSMENT — PAIN SCALES - GENERAL: PAINLEVEL: MODERATE PAIN (5)

## 2018-09-21 NOTE — NURSING NOTE
Dispensed 1 Dorsal (Anterior) Night Splint, Size S/M, with [facility] agreement signed by patient. Sabiha Dowling CMA, September 21, 2018

## 2018-09-21 NOTE — PROGRESS NOTES
HPI:  Deven Smith is a 63 year old male who is seen in consultation at the request of self.    Pt presents for eval of:   (Onset, Location, L/R, Character, Treatments, Injury if yes)    XR Left calcaneus today, 9/21/2018     Onset late Aug 2018, plantar Left heel and arch pain from increase in physical activity. Presents today with supportive athletic shoes.  Intermittent, sharp, throbbing, pain 5-10  Constant, dull ache  OTC inserts, Night Splint  Patient has had this in the right foot a year ago and continues CrossFit and fitness.    Works in maintenance.    Weight management plan: Patient was referred to their PCP to discuss a diet and exercise plan.       ROS: 10 point ROS neg other than the symptoms noted above in the HPI.    Patient Active Problem List   Diagnosis     Other specified glaucoma     Allergic rhinitis     Family history of other cardiovascular diseases     Labyrinthitis     Hyperlipidemia with target LDL less than 130     Advanced directives, counseling/discussion     DDD (degenerative disc disease), cervical     Supraspinatus tendon tear, left, subsequent encounter       PAST MEDICAL HISTORY:   Past Medical History:   Diagnosis Date     DDD (degenerative disc disease), cervical      Hyperlipidemia LDL goal < 130      PAST SURGICAL HISTORY:   Past Surgical History:   Procedure Laterality Date     COLONOSCOPY  03/21/2007    Repeat  for screening purposes in 10 yrs.     COLONOSCOPY  1/2/2013    Procedure: COLONOSCOPY;  Colonoscopy;  Surgeon: Emmett San MD;  Location:  GI     COLONOSCOPY N/A 8/6/2018    Procedure: COLONOSCOPY;  colonoscopy;  Surgeon: Israel Moreland MD;  Location:  GI     HC SHLDR ARTHROSCOP,SURG,W/ROTAT CUFF REPR Left 2010     HC VASECTOMY UNILAT/BILAT W POSTOP SEMEN  1998    Vasectomy     MEDICATIONS:   Current Outpatient Prescriptions:      fluticasone (FLONASE) 50 MCG/ACT spray, SPRAY TWO SPRAYS IN EACH NOSTRIL EVERY DAY, Disp: 16 g, Rfl: 6      GABAPENTIN PO, Take 300 mg by mouth 2 times daily, Disp: , Rfl:      latanoprost (XALATAN) 0.005 % ophthalmic solution, Place 0.005 drops into both eyes At Bedtime, Disp: , Rfl:      Misc Natural Products (GLUCOSAMINE CHONDROITIN ADV PO), Take 6 tablets by mouth daily., Disp: , Rfl:      MULTI-DAY VITAMINS OR TABS, 1 tablet daily, Disp: 0, Rfl: 0     naproxen (NAPROSYN) 500 MG tablet, Take 1 tablet (500 mg) by mouth 2 times daily (with meals), Disp: 60 tablet, Rfl: 1     Omega-3 Fatty Acids (FISH OIL) 1200 MG CAPS, Take 1 capsule by mouth daily, Disp: , Rfl:      ASPIRIN 81 MG OR TABS, 1 tab po QD (Once per day), Disp: 0, Rfl: 0  ALLERGIES:    Allergies   Allergen Reactions     Seasonal Allergies      Tramadol      Muscle spasms,headache     SOCIAL HISTORY:   Social History     Social History     Marital status:      Spouse name: koko     Number of children: 2     Years of education: 13     Occupational History           Social History Main Topics     Smoking status: Former Smoker     Packs/day: 2.00     Years: 15.00     Quit date: 11/8/1987     Smokeless tobacco: Never Used     Alcohol use 36.0 oz/week      Comment: rare      Drug use: No     Sexual activity: Yes     Partners: Female     Birth control/ protection: Surgical      Comment: Vasectomy     Other Topics Concern      Service No     Blood Transfusions No     Caffeine Concern No     3 cans soda/day     Occupational Exposure Yes     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     Hobby Hazards Yes     [employer] hunting     Sleep Concern Yes     is awake after 6 hrs of sleep-     Stress Concern No     Weight Concern No     Special Diet No     Back Care No     Exercise Yes     walks, does physical labor,     Bike Helmet No     Seat Belt Yes     Self-Exams No     Social History Narrative     FAMILY HISTORY:   Family History   Problem Relation Age of Onset     Allergies Brother      on meds     Arthritis Mother      Depression Mother      on meds. is  "in nursing home     Hypertension Mother      Osteoporosis Mother      HEART DISEASE Father       at age 57  ? MI--no autopsy     Cancer Father      jaw--smoked [place]     Hypertension Brother      Osteoporosis Sister      on meds     Family History Negative Other      No breast or colon cancer hx in family     Hypertension Sister      Osteoporosis Brother        EXAM:Vitals: /64 (BP Location: Left arm, Cuff Size: Adult Regular)  Ht 5' 9\" (1.753 m)  Wt 184 lb (83.5 kg)  BMI 27.17 kg/m2  BMI= Body mass index is 27.17 kg/(m^2).    General appearance: Patient is alert and fully cooperative with history & exam.  No sign of distress is noted during the visit.     Psychiatric: Affect is pleasant & appropriate.  Patient appears motivated to improve health.     Respiratory: Breathing is regular & unlabored while sitting.     HEENT: Hearing is intact to spoken word.  Speech is clear.  No gross evidence of visual impairment that would impact ambulation.     Vascular: DP & PT 2/4 & regular bilaterally.  No significant edema, rubor or varicosities noted.  CFT and skin temperature is normal to both lower extremities.       Neurologic: Lower extremity sensation is intact to light touch.  No evidence of weakness in the lower extremities.  No evidence of neuropathy and negative tinel sign.     Dermatologic: Skin is intact to both lower extremities without significant lesions, rash or abrasion.  Normal texture turgor and tone. No paronychia or evidence of soft tissue infection is noted.    Musculoskeletal: Patient is ambulatory without assistive device or brace. Pain is noted with firm palpation along the medial band of the plantar fascia left foot most notably at the origination upon the calcaneus not through the arch.  No pain with compression of the calcaneus medial to lateral or with palpation of the achilles, peroneal or posterior tibial tendons.  Slightly more than 0  of ankle joint dorsiflexion without " crepitus or pain throughout the ankle, subtalar or midtarsal joints.  No pain or limitations throughout manual muscle strength testing plus 5/5 to all four quadrants bilateral.  No palpable edema noted.      Radiographs:  Osteophyte noted about the plantar calcaneus consistent with plantar fasciitis.     ASSESSMENT:       ICD-10-CM    1. Left foot pain M79.672 XR Calcaneus Left G/E 2 Views   2. Plantar fascial fibromatosis M72.2 ORTHOTICS REFERRAL     naproxen (NAPROSYN) 500 MG tablet       PLAN:  Reviewed patient's [facility] in [facility] and discussed etiology and treatment options.      Treatments:  9/21/2018  Seen and interpreted radiographs  Discontinue barefoot walking or unsupported walking in shoes without shank.  Dispensed written instructions to obtain appropriate shoe gear and/or OTC inserts.  Dispensed anterior night splint to use all night every night.  Prescription oral Naprosyn for a short course. Discussed risks.  Prescription for custom molded orthotics 9/21/2018, as he has had this in his other foot one year ago.  Follow up in 4-5 weeks   Continue CrossFit no specific work restrictions.  Reduce running intensity by 10%.      Vu Tineo DPM

## 2018-09-21 NOTE — MR AVS SNAPSHOT
After Visit Summary   9/21/2018    Deven Smith    MRN: [id number]           Patient Information     Date Of Birth          1955        Visit Information        Provider Department      9/21/2018 7:30 AM Vu Tineo DPM [facility]'s Diagnoses     Left foot pain    -  1    Plantar fascial fibromatosis          Care Instructions    Reliable shoe stores: To maximize your experience and provide the best possible fit.  Be sure to show them your foot concerns and tell them Dr. Tineo sent you.      Stores listed in bold have only athletic shoes, and stores that are not bold are mostly casual or variety of shoes    [employer]  [address] [place] [address]  [phone]    [employer] [place]  [address] [place] [address]  [phone]    [employer] [place]  [address] [place] [address]  [phone]    [employer]  [address] [facility]  [facility] [address]  [phone]    [employer]  [address] [place] [address]  [phone]    [employer]  [address] [place] [address]  [phone]                         [employer] Locations:     [address] [place] [address]   [phone]     [address] [place] [address] [address] [place] [address]   [phone]     [address] [place] [address]   [phone]     [address] [place] [address]   [phone]     [address] [place] [address]   [phone]     [address] [place] [address]   [phone]     [address] [facility] [address]   [place] [address]   [phone]     [address]   [place], [place] [id number] [phone]             How to find reasonable shoes          The correct width    Correct Fitting    Correct Length      Foot Distortion    Posture Distortion                          Torsional Rigidity      Grasp behind the heel and  underneath the foot and twist      Bad    Excessive torsion/twist in midfoot     Less torsion/twist in midfoot is better                   Heel Counter Rigidity      Grasp just above   midsole and squeeze      Bad    Soft heel counter      Good    Rigid Heel Counter      Flexion Rigidity      Grasp shoe and bend from forefoot to rearfoot              PLANTAR FASCIITIS  The  plantar fascia  is a thick fibrous layer of tissue that covers the bones on the bottom of your foot. It supports the foot bones in an arched position.  Plantar fasciitis  is a painful inflammation of the plantar fascia due to overuse. This can develop gradually or suddenly. It usually affects one foot at a time but can affect both feet. Heel pain can be sharp and feel like a knife sticking in the bottom of your foot. Pain may occur after exercising, long distance jogging, stair climbing, long periods of standing, or after getting up from a seated position.  Risk factors include arthritis, diabetes, obesity or recent weight gain, flat-foot, high arch, wearing high heels or loose shoes or shoes with poor arch support.  Sudden changes in activity or shoe gear may contribute to symptoms.  Foot pain from this condition is usually worse in the morning and improves with walking. By the end of the day there may be a dull aching. Treatment requires improved support of feet, short-term rest and controlling inflammation. It may take up to nine months before all symptoms go away with the measures described below.  A steroid injection into the foot, or surgery may be needed if this is becomes long standing or severe.  HOME CARE  1. If you are overweight, lose weight to promote healing.  2. Choose supportive shoes (stiff through the shank) with good arch support and shock absorbency. Replace athletic shoes when they become worn out. Don t walk or run barefoot.  3. Shoe inserts are an important part of treatment. You can buy off-the-shelf shoe inserts  "inexpensively such as [employer].  The best ones are custom molded to your foot with a prescription.  4. Night splints keep the plantar fascia gently stretched while you sleep and will eliminate morning pain. Wear it ALL NIGHT EVERY NIGHT, or any time you sit for a long time.  5. Reduce by 10% or more the activities that stress the feet: jogging, prolonged standing or walking, high impact sports, etc.  6. Stretch your feet. Gently flex your ankle by leaning into a wall or counter or drop your heel from a step.  Stretch two minutes of every hour you are awake.  7. Icing or massage may help heel pain. Apply an ice pack or frozen water or Coke bottle to the heel for 10-20 minutes as a preventive or after an acute flare of symptoms. You may repeat this as needed.   Follow up with your Doctor in 3 weeks as instructed.            Follow-ups after your visit        Additional Services     ORTHOTICS REFERRAL       [facility] scheduling staff may contact patient to arrange appointments for casting of orthotics and often do not leave messages.  The patient may call this number for scheduling at their convenience. Scheduling Phone [phone].      One pair custom functional foot orthotics.   Flexible polypropylene shell with 1/8\" Spenco cushioned top cover, crepe rearfoot post, medial density arch fill, WILLARD bottom cover.  Aerobic model.                  Who to contact     If you have questions or need follow up information about today's clinic visit or your schedule please contact [facility] directly at [phone].  Normal or non-critical lab and imaging results will be communicated to you by MyChart, letter or phone within 4 business days after the clinic has received the results. If you do not hear from us within 7 days, please contact the clinic through [employer] or phone. If you have a critical or abnormal lab result, we will notify you by phone as soon as possible.  Submit refill requests through [employer] or " "call your pharmacy and they will forward the refill request to us. Please allow 3 business days for your refill to be completed.          Additional Information About Your Visit        "[employer] Information     [employer] (Closed) gives you secure access to your electronic health record. If you see a primary care provider, you can also send messages to your care team and make appointments. If you have questions, please call your primary care clinic.  If you do not have a primary care provider, please call [phone] and they will assist you.        Care EveryWhere ID     This is your Care EveryWhere ID. This could be used by other organizations to access your [facility] medical records  [id number]        Your Vitals Were     Height BMI (Body Mass Index)                5' 9\" (1.753 m) 27.17 kg/m2           Blood Pressure from Last 3 Encounters:   09/21/18 138/64   08/06/18 118/76   07/27/18 138/73    Weight from Last 3 Encounters:   09/21/18 184 lb (83.5 kg)   07/27/18 184 lb (83.5 kg)   07/05/18 184 lb 8 oz (83.7 kg)              We Performed the Following     ORTHOTICS REFERRAL     XR Calcaneus Left G/E 2 Views          Today's Medication Changes          These changes are accurate as of 9/21/18  8:00 AM.  If you have any questions, ask your nurse or doctor.               Start taking these medicines.        Dose/Directions    naproxen 500 MG tablet   Commonly known as:  NAPROSYN   Used for:  Plantar fascial fibromatosis   Started by:  Vu Tineo DPM        Dose:  500 mg   Take 1 tablet (500 mg) by mouth 2 times daily (with meals)   Quantity:  60 tablet   Refills:  1            Where to get your medicines      These medications were sent to [facility] [place], [place] - [address]  [address] [facility] [address]     Phone:  [phone]     naproxen 500 MG tablet                Primary Care Provider Office Phone # Fax #    Marc Pinedo [phone]       [address]  [facility] [address]        Equal Access to Services     GREY YAN : Hadii aad ku hadmirandatrenton Lebron, wakathybrain whaley, ericksonallyson princemabrain garza, ebenezer valverdereinakeshia torrez. So [facility] [phone].    ATENCIÓN: Si habla español, tiene a jimenez disposición servicios gratuitos de asistencia lingüística. [facility] [phone].    We comply with applicable federal civil rights laws and [place] laws. We do not discriminate on the basis of race, color, national origin, age, disability, sex, sexual orientation, or gender identity.            Thank you!     Thank you for choosing [facility]  for your care. Our goal is always to provide you with excellent care. Hearing back from our patients is one way we can continue to improve our services. Please take a few minutes to complete the written survey that you may receive in the mail after your visit with us. Thank you!             Your Updated Medication List - Protect others around you: Learn how to safely use, store and throw away your medicines at www.disposemymeds.org.          This list is accurate as of 9/21/18  8:00 AM.  Always use your most recent med list.                   Brand Name Dispense Instructions for use Diagnosis    aspirin 81 MG tablet     0    1 tab po QD (Once per day)        fish Oil 1200 MG capsule      Take 1 capsule by mouth daily        fluticasone 50 MCG/ACT spray    FLONASE    16 g    SPRAY TWO SPRAYS IN EACH NOSTRIL EVERY DAY    Seasonal allergic rhinitis, unspecified chronicity, unspecified trigger       GABAPENTIN PO      Take 300 mg by mouth 2 times daily        GLUCOSAMINE CHONDROITIN ADV PO      Take 6 tablets by mouth daily.        latanoprost 0.005 % ophthalmic solution    XALATAN     Place 0.005 drops into both eyes At Bedtime        MULTI-DAY vitamin  S Tabs     0    1 tablet daily        naproxen 500 MG tablet    NAPROSYN    60 tablet    Take 1 tablet (500 mg) by mouth 2 times daily (with meals)     Plantar fascial fibromatosis

## 2018-09-21 NOTE — LETTER
9/21/2018         RE: Deven Smith  [address] [facility] [address]        Dear Colleague,    Thank you for referring your patient, Deven Smith, to the [facility]. Please see a copy of my visit note below.    HPI:  Deven Smith is a 63 year old male who is seen in consultation at the request of self.    Pt presents for eval of:   (Onset, Location, L/R, Character, Treatments, Injury if yes)    XR Left calcaneus today, 9/21/2018     Onset late Aug 2018, plantar Left heel and arch pain from increase in physical activity. Presents today with supportive athletic shoes.  Intermittent, sharp, throbbing, pain 5-10  Constant, dull ache  OTC inserts, Night Splint  Patient has had this in the right foot a year ago and continues CrossFit and fitness.    Works in maintenance.    Weight management plan: Patient was referred to their PCP to discuss a diet and exercise plan.       ROS: 10 point ROS neg other than the symptoms noted above in the HPI.    Patient Active Problem List   Diagnosis     Other specified glaucoma     Allergic rhinitis     Family history of other cardiovascular diseases     Labyrinthitis     Hyperlipidemia with target LDL less than 130     Advanced directives, counseling/discussion     DDD (degenerative disc disease), cervical     Supraspinatus tendon tear, left, subsequent encounter       PAST MEDICAL HISTORY:   Past Medical History:   Diagnosis Date     DDD (degenerative disc disease), cervical      Hyperlipidemia LDL goal < 130      PAST SURGICAL HISTORY:   Past Surgical History:   Procedure Laterality Date     COLONOSCOPY  03/21/2007    Repeat  for screening purposes in 10 yrs.     COLONOSCOPY  1/2/2013    Procedure: COLONOSCOPY;  Colonoscopy;  Surgeon: Emmett San MD;  Location: [facility]     COLONOSCOPY N/A 8/6/2018    Procedure: COLONOSCOPY;  colonoscopy;  Surgeon: Israel Moreland MD;  Location:  GI     HC SHLDR ARTHROSCOP,SURG,W/ROTAT CUFF  REPR Left 2010     HC VASECTOMY UNILAT/BILAT W POSTOP SEMEN  1998    Vasectomy     MEDICATIONS:   Current Outpatient Prescriptions:      fluticasone (FLONASE) 50 MCG/ACT spray, SPRAY TWO SPRAYS IN EACH NOSTRIL EVERY DAY, Disp: 16 g, Rfl: 6     GABAPENTIN PO, Take 300 mg by mouth 2 times daily, Disp: , Rfl:      latanoprost (XALATAN) 0.005 % ophthalmic solution, Place 0.005 drops into both eyes At Bedtime, Disp: , Rfl:      Misc Natural Products (GLUCOSAMINE CHONDROITIN ADV PO), Take 6 tablets by mouth daily., Disp: , Rfl:      MULTI-DAY VITAMINS OR TABS, 1 tablet daily, Disp: 0, Rfl: 0     naproxen (NAPROSYN) 500 MG tablet, Take 1 tablet (500 mg) by mouth 2 times daily (with meals), Disp: 60 tablet, Rfl: 1     Omega-3 Fatty Acids (FISH OIL) 1200 MG CAPS, Take 1 capsule by mouth daily, Disp: , Rfl:      ASPIRIN 81 MG OR TABS, 1 tab po QD (Once per day), Disp: 0, Rfl: 0  ALLERGIES:    Allergies   Allergen Reactions     Seasonal Allergies      Tramadol      Muscle spasms,headache     SOCIAL HISTORY:   Social History     Social History     Marital status:      Spouse name: koko     Number of children: 2     Years of education: 13     Occupational History           Social History Main Topics     Smoking status: Former Smoker     Packs/day: 2.00     Years: 15.00     Quit date: 11/8/1987     Smokeless tobacco: Never Used     Alcohol use 36.0 oz/week      Comment: rare      Drug use: No     Sexual activity: Yes     Partners: Female     Birth control/ protection: Surgical      Comment: Vasectomy     Other Topics Concern      Service No     Blood Transfusions No     Caffeine Concern No     3 cans soda/day     Occupational Exposure Yes     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     Hobby Hazards Yes     [employer]     Sleep Concern Yes     is awake after 6 hrs of sleep-     Stress Concern No     Weight Concern No     Special Diet No     Back Care No     Exercise Yes     walks, does physical labor,      "Bike Helmet No     Seat Belt Yes     Self-Exams No     Social History Narrative     FAMILY HISTORY:   Family History   Problem Relation Age of Onset     Allergies Brother      on meds     Arthritis Mother      Depression Mother      on meds. is in nursing home     Hypertension Mother      Osteoporosis Mother      HEART DISEASE Father       at age 57  ? MI--no autopsy     Cancer Father      jaw--smoked [place]     Hypertension Brother      Osteoporosis Sister      on meds     Family History Negative Other      No breast or colon cancer hx in family     Hypertension Sister      Osteoporosis Brother        EXAM:Vitals: /64 (BP Location: Left arm, Cuff Size: Adult Regular)  Ht 5' 9\" (1.753 m)  Wt 184 lb (83.5 kg)  BMI 27.17 kg/m2  BMI= Body mass index is 27.17 kg/(m^2).    General appearance: Patient is alert and fully cooperative with history & exam.  No sign of distress is noted during the visit.     Psychiatric: Affect is pleasant & appropriate.  Patient appears motivated to improve health.     Respiratory: Breathing is regular & unlabored while sitting.     HEENT: Hearing is intact to spoken word.  Speech is clear.  No gross evidence of visual impairment that would impact ambulation.     Vascular: DP & PT 2/4 & regular bilaterally.  No significant edema, rubor or varicosities noted.  CFT and skin temperature is normal to both lower extremities.       Neurologic: Lower extremity sensation is intact to light touch.  No evidence of weakness in the lower extremities.  No evidence of neuropathy and negative tinel sign.     Dermatologic: Skin is intact to both lower extremities without significant lesions, rash or abrasion.  Normal texture turgor and tone. No paronychia or evidence of soft tissue infection is noted.    Musculoskeletal: Patient is ambulatory without assistive device or brace. Pain is noted with firm palpation along the medial band of the plantar fascia left foot most notably at the " origination upon the calcaneus not through the arch.  No pain with compression of the calcaneus medial to lateral or with palpation of the achilles, peroneal or posterior tibial tendons.  Slightly more than 0  of ankle joint dorsiflexion without crepitus or pain throughout the ankle, subtalar or midtarsal joints.  No pain or limitations throughout manual muscle strength testing plus 5/5 to all four quadrants bilateral.  No palpable edema noted.      Radiographs:  Osteophyte noted about the plantar calcaneus consistent with plantar fasciitis.     ASSESSMENT:       ICD-10-CM    1. Left foot pain M79.672 XR Calcaneus Left G/E 2 Views   2. Plantar fascial fibromatosis M72.2 ORTHOTICS REFERRAL     naproxen (NAPROSYN) 500 MG tablet       PLAN:  Reviewed patient's chart in [facility] and discussed etiology and treatment options.      Treatments:  9/21/2018  Seen and interpreted radiographs  Discontinue barefoot walking or unsupported walking in shoes without shank.  Dispensed written instructions to obtain appropriate shoe gear and/or OTC inserts.  Dispensed anterior night splint to use all night every night.  Prescription oral Naprosyn for a short course. Discussed risks.  Prescription for custom molded orthotics 9/21/2018, as he has had this in his other foot one year ago.  Follow up in 4-5 weeks   Continue CrossFit no specific work restrictions.  Reduce running intensity by 10%.      Vu Tineo DPM      Again, thank you for allowing me to participate in the care of your patient.        Sincerely,        Vu Tineo DPM

## 2018-09-21 NOTE — PATIENT INSTRUCTIONS
Reliable shoe stores: To maximize your experience and provide the best possible fit.  Be sure to show them your foot concerns and tell them Dr. Tineo sent you.      Stores listed in bold have only athletic shoes, and stores that are not bold are mostly casual or variety of shoes    [employer]  [address] [place] [address]  [phone]    [employer] [place]  [address] [place] [address]  [phone]     [employer]  [address] [place] [address]  [phone]    [employer]  [address] [facility] [address]  [phone]    [employer]  [address] [place], [place] [address] [phone]    [employer]  [address] [place] [address]  [phone]                         [employer] Locations:     [address] [place] [address]   [phone]     [address] [place] [address] [address] [place] [address]   [phone]     [address] [place] [address]   [phone]     [address] [place] [address]   [phone]     [address] [place] [address]   [phone]     [address] [place] [address]   [phone]     [address] [facility] [address]   [place] [address]   [phone]     [address] [facility]   [place] [address]   [phone]             How to find reasonable shoes          The correct width    Correct Fitting    Correct Length      Foot Distortion    Posture Distortion                          Torsional Rigidity      Grasp behind the heel and underneath the foot and twist      Bad    Excessive torsion/twist in midfoot     Less torsion/twist in midfoot is better                   Heel Counter Rigidity      Grasp just above   midsole and squeeze      Bad    Soft heel counter      Good    Rigid Heel Counter      Flexion Rigidity      Grasp shoe and bend from forefoot to rearfoot              PLANTAR FASCIITIS  The  plantar fascia  is a  thick fibrous layer of tissue that covers the bones on the bottom of your foot. It supports the foot bones in an arched position.  Plantar fasciitis  is a painful inflammation of the plantar fascia due to overuse. This can develop gradually or suddenly. It usually affects one foot at a time but can affect both feet. Heel pain can be sharp and feel like a knife sticking in the bottom of your foot. Pain may occur after exercising, long distance jogging, stair climbing, long periods of standing, or after getting up from a seated position.  Risk factors include arthritis, diabetes, obesity or recent weight gain, flat-foot, high arch, wearing high heels or loose shoes or shoes with poor arch support.  Sudden changes in activity or shoe gear may contribute to symptoms.  Foot pain from this condition is usually worse in the morning and improves with walking. By the end of the day there may be a dull aching. Treatment requires improved support of feet, short-term rest and controlling inflammation. It may take up to nine months before all symptoms go away with the measures described below.  A steroid injection into the foot, or surgery may be needed if this is becomes long standing or severe.  HOME CARE  1. If you are overweight, lose weight to promote healing.  2. Choose supportive shoes (stiff through the shank) with good arch support and shock absorbency. Replace athletic shoes when they become worn out. Don t walk or run barefoot.  3. Shoe inserts are an important part of treatment. You can buy off-the-shelf shoe inserts inexpensively such as [employer].  The best ones are custom molded to your foot with a prescription.  4. Night splints keep the plantar fascia gently stretched while you sleep and will eliminate morning pain. Wear it ALL NIGHT EVERY NIGHT, or any time you sit for a long time.  5. Reduce by 10% or more the activities that stress the feet: jogging, prolonged standing or walking, high impact sports, etc.  6.  Stretch your feet. Gently flex your ankle by leaning into a wall or counter or drop your heel from a step.  Stretch two minutes of every hour you are awake.  7. Icing or massage may help heel pain. Apply an ice pack or frozen water or Coke bottle to the heel for 10-20 minutes as a preventive or after an acute flare of symptoms. You may repeat this as needed.   Follow up with your Doctor in 3 weeks as instructed.

## 2018-09-25 ENCOUNTER — TELEPHONE (OUTPATIENT)
Dept: PHYSICAL THERAPY | Facility: CLINIC | Age: 63
End: 2018-09-25

## 2018-09-25 NOTE — DISCHARGE SUMMARY
"Outpatient Physical Therapy Discharge Note     Patient: Deven Smith  : 1955    Beginning/End Dates of Reporting Period:  3 visits  Between 18 and 18    Referring Provider: Josh Mendieta PAC    Therapy Diagnosis: Decreased AROM, pain and limited activity due to bilateral rotator cuff and biceps tendon tears     Client Self Report:  PLease see the 18 note for details. He returned writer's phone message and stated he was doing \"very well\" and did not need further PT at this time.     Objective Measurements:  Please see 18 note for details.     Goals:  Goal Identifier Symptoms   Goal Description Homero will be able to decrease his symptoms to 25% with activity so he can complete his workouts and job tasks with less pain (2-3/10 w/elevation above 90deg. 80% improvement)   Target Date 18   Date Met      Progress:     Goal Identifier AROM   Goal Description Homero wll be able to use AAROM and AROM of his shoulders to prevent capsular tightness, muscle tightness allowing him to reach overhead and to improve ER L 10 degrees   Target Date 18   Date Met      Progress:     Goal Identifier Home program   Goal Description Homero will be able to complete a home prorgam of strengthening and ROM as well as scapular stabilization so he can continue with his job and workouts  (progressing)   Target Date 18   Date Met      Progress:     Progress Toward Goals:   Not assessed this period.    Plan:  Discharge from therapy.    Discharge:    Reason for Discharge: Patient has met all goals.    Equipment Issued: band    Discharge Plan: Patient to continue home program.    Thank you for referring Homero  to [facility] - ,Staci Apodaca, PT  [phone]    "

## 2018-09-25 NOTE — ADDENDUM NOTE
Encounter addended by: Danette Apodaca PT on: 9/25/2018 12:37 PM<BR>     Actions taken: Episode resolved, Sign clinical note

## 2018-11-01 ENCOUNTER — OFFICE VISIT (OUTPATIENT)
Dept: PODIATRY | Facility: CLINIC | Age: 63
End: 2018-11-01
Payer: COMMERCIAL

## 2018-11-01 VITALS
WEIGHT: 187 LBS | DIASTOLIC BLOOD PRESSURE: 58 MMHG | BODY MASS INDEX: 27.7 KG/M2 | HEIGHT: 69 IN | SYSTOLIC BLOOD PRESSURE: 114 MMHG

## 2018-11-01 DIAGNOSIS — M76.70 PERONEAL TENDINITIS, UNSPECIFIED LATERALITY: ICD-10-CM

## 2018-11-01 DIAGNOSIS — M72.2 PLANTAR FASCIAL FIBROMATOSIS: Primary | ICD-10-CM

## 2018-11-01 PROCEDURE — 99213 OFFICE O/P EST LOW 20 MIN: CPT | Performed by: PODIATRIST

## 2018-11-01 ASSESSMENT — PAIN SCALES - GENERAL: PAINLEVEL: MILD PAIN (2)

## 2018-11-01 NOTE — MR AVS SNAPSHOT
After Visit Summary   11/1/2018    Deven Smith    MRN: [id number]           Patient Information     Date Of Birth          1955        Visit Information        Provider Department      11/1/2018 10:45 AM Vu Tineo DPM [facility]        Today's Diagnoses     Plantar fascial fibromatosis    -  1    Peroneal tendinitis, unspecified laterality          Care Instructions    PLANTAR FASCIITIS  The  plantar fascia  is a thick fibrous layer of tissue that covers the bones on the bottom of your foot. It supports the foot bones in an arched position.  Plantar fasciitis  is a painful inflammation of the plantar fascia due to overuse. This can develop gradually or suddenly. It usually affects one foot at a time but can affect both feet. Heel pain can be sharp and feel like a knife sticking in the bottom of your foot. Pain may occur after exercising, long distance jogging, stair climbing, long periods of standing, or after getting up from a seated position.  Risk factors include arthritis, diabetes, obesity or recent weight gain, flat-foot, high arch, wearing high heels or loose shoes or shoes with poor arch support.  Sudden changes in activity or shoe gear may contribute to symptoms.  Foot pain from this condition is usually worse in the morning and improves with walking. By the end of the day there may be a dull aching. Treatment requires improved support of feet, short-term rest and controlling inflammation. It may take up to nine months before all symptoms go away with the measures described below.  A steroid injection into the foot, or surgery may be needed if this is becomes long standing or severe.  HOME CARE  1. If you are overweight, lose weight to promote healing.  2. Choose supportive shoes (stiff through the shank) with good arch support and shock absorbency. Replace athletic shoes when they become worn out. Don t walk or run barefoot.  3. Shoe inserts are an  important part of treatment. You can buy off-the-shelf shoe inserts inexpensively such as [employer].  The best ones are custom molded to your foot with a prescription.  4. Night splints keep the plantar fascia gently stretched while you sleep and will eliminate morning pain. Wear it ALL NIGHT EVERY NIGHT, or any time you sit for a long time.  5. Reduce by 10% or more the activities that stress the feet: jogging, prolonged standing or walking, high impact sports, etc.  6. Stretch your feet. Gently flex your ankle by leaning into a wall or counter or drop your heel from a step.  Stretch two minutes of every hour you are awake.  7. Icing or massage may help heel pain. Apply an ice pack or frozen water or Coke bottle to the heel for 10-20 minutes as a preventive or after an acute flare of symptoms. You may repeat this as needed.   Follow up with your Doctor in 3 weeks as instructed.            Follow-ups after your visit        Additional Services     ORTHOTICS REFERRAL       [facility] scheduling staff may contact patient to arrange appointments for casting of orthotics and often do not leave messages.  The patient may call this number for scheduling at their convenience. Scheduling Phone [phone].      Lower the arch height of orthotics just a bit to relieve the lateral column of foot and peroneals                  Who to contact     If you have questions or need follow up information about today's clinic visit or your schedule please contact [facility] directly at [phone].  Normal or non-critical lab and imaging results will be communicated to you by MyChart, letter or phone within 4 business days after the clinic has received the results. If you do not hear from us within 7 days, please contact the clinic through [employer] or phone. If you have a critical or abnormal lab result, we will notify you by phone as soon as possible.  Submit refill requests through [employer] or call your pharmacy and  "they will forward the refill request to us. Please allow 3 business days for your refill to be completed.          Additional Information About Your Visit        [employer] Information     [facility] gives you secure access to your electronic health record. If you see a primary care provider, you can also send messages to your care team and make appointments. If you have questions, please call your primary care clinic.  If you do not have a primary care provider, please call [phone] and they will assist you.        Care EveryWhere ID     This is your Care EveryWhere ID. This could be used by other organizations to access your [facility] medical records  [id number]        Your Vitals Were     Height BMI (Body Mass Index)                5' 9\" (1.753 m) 27.62 kg/m2           Blood Pressure from Last 3 Encounters:   11/01/18 114/58   09/21/18 138/64   08/06/18 118/76    Weight from Last 3 Encounters:   11/01/18 187 lb (84.8 kg)   09/21/18 184 lb (83.5 kg)   07/27/18 184 lb (83.5 kg)              We Performed the Following     ORTHOTICS REFERRAL        Primary Care Provider Office Phone # Fax #    Marc Pinedo [phone]       [address] [facility] [address]        Equal Access to Services     GREY YAN : Hadii aad ku hadasho Soomaali, waaxda luqadaha, qaybta kaalmada adeegyada, waxay idiin hayhoracen sean torrez. So [facility] [phone].    ATENCIÓN: Si habla español, tiene a jimenez disposición servicios gratuitos de asistencia lingüística. Llame al [phone].    We comply with applicable federal civil rights laws and [place] laws. We do not discriminate on the basis of race, color, national origin, age, disability, sex, sexual orientation, or gender identity.            Thank you!     Thank you for choosing [facility]  for your care. Our goal is always to provide you with excellent care. Hearing back from our patients is one way we can continue to improve our " services. Please take a few minutes to complete the written survey that you may receive in the mail after your visit with us. Thank you!             Your Updated Medication List - Protect others around you: Learn how to safely use, store and throw away your medicines at www.disposemymeds.org.          This list is accurate as of 11/1/18 12:45 PM.  Always use your most recent med list.                   Brand Name Dispense Instructions for use Diagnosis    aspirin 81 MG tablet     0    1 tab po QD (Once per day)        fish Oil 1200 MG capsule      Take 1 capsule by mouth daily        fluticasone 50 MCG/ACT spray    FLONASE    16 g    SPRAY TWO SPRAYS IN EACH NOSTRIL EVERY DAY    Seasonal allergic rhinitis, unspecified chronicity, unspecified trigger       GABAPENTIN PO      Take 300 mg by mouth 2 times daily        GLUCOSAMINE CHONDROITIN ADV PO      Take 6 tablets by mouth daily.        latanoprost 0.005 % ophthalmic solution    XALATAN     Place 0.005 drops into both eyes At Bedtime        MULTI-DAY vitamin  S Tabs     0    1 tablet daily        naproxen 500 MG tablet    NAPROSYN    60 tablet    Take 1 tablet (500 mg) by mouth 2 times daily (with meals)    Plantar fascial fibromatosis

## 2018-11-01 NOTE — PATIENT INSTRUCTIONS
PLANTAR FASCIITIS  The  plantar fascia  is a thick fibrous layer of tissue that covers the bones on the bottom of your foot. It supports the foot bones in an arched position.  Plantar fasciitis  is a painful inflammation of the plantar fascia due to overuse. This can develop gradually or suddenly. It usually affects one foot at a time but can affect both feet. Heel pain can be sharp and feel like a knife sticking in the bottom of your foot. Pain may occur after exercising, long distance jogging, stair climbing, long periods of standing, or after getting up from a seated position.  Risk factors include arthritis, diabetes, obesity or recent weight gain, flat-foot, high arch, wearing high heels or loose shoes or shoes with poor arch support.  Sudden changes in activity or shoe gear may contribute to symptoms.  Foot pain from this condition is usually worse in the morning and improves with walking. By the end of the day there may be a dull aching. Treatment requires improved support of feet, short-term rest and controlling inflammation. It may take up to nine months before all symptoms go away with the measures described below.  A steroid injection into the foot, or surgery may be needed if this is becomes long standing or severe.  HOME CARE  1. If you are overweight, lose weight to promote healing.  2. Choose supportive shoes (stiff through the shank) with good arch support and shock absorbency. Replace athletic shoes when they become worn out. Don t walk or run barefoot.  3. Shoe inserts are an important part of treatment. You can buy off-the-shelf shoe inserts inexpensively such as [employer].  The best ones are custom molded to your foot with a prescription.  4. Night splints keep the plantar fascia gently stretched while you sleep and will eliminate morning pain. Wear it ALL NIGHT EVERY NIGHT, or any time you sit for a long time.  5. Reduce by 10% or more the activities that stress the feet: jogging, prolonged  standing or walking, high impact sports, etc.  6. Stretch your feet. Gently flex your ankle by leaning into a wall or counter or drop your heel from a step.  Stretch two minutes of every hour you are awake.  7. Icing or massage may help heel pain. Apply an ice pack or frozen water or Coke bottle to the heel for 10-20 minutes as a preventive or after an acute flare of symptoms. You may repeat this as needed.   Follow up with your Doctor in 3 weeks as instructed.

## 2018-11-01 NOTE — PROGRESS NOTES
"Chief Complaint   Patient presents with     RECHECK     100%, NS, orthotics for 3 weeks, no pain - Left heel plantar fasciitis, onset Aug 2018; LOV 9/21/2018     Consult     lateral B/L foot pain after rec orthotics       Weight management plan: Patient was referred to their PCP to discuss a diet and exercise plan.     HPI:  Deven Smith is a 63 year old male who is seen in consultation at the request of self.    Pt presents for eval of:   (Onset, Location, L/R, Character, Treatments, Injury if yes)    XR Left calcaneus today, 9/21/2018     Onset late Aug 2018, plantar Left heel and arch pain from increase in physical activity. Presents today with supportive athletic shoes.  Intermittent, sharp, throbbing, pain 5-10  Constant, dull ache  OTC inserts, Night Splint  Patient has had this in the right foot a year ago and continues CrossFit and fitness.    Works in maintenance.    EXAM:Vitals: /58 (BP Location: Left arm, Cuff Size: Adult Regular)  Ht 5' 9\" (1.753 m)  Wt 187 lb (84.8 kg)  BMI 27.62 kg/m2  BMI= Body mass index is 27.62 kg/(m^2).    General appearance: Patient is alert and fully cooperative with history & exam.  No sign of distress is noted during the visit.     Psychiatric: Affect is pleasant & appropriate.  Patient appears motivated to improve health.     Respiratory: Breathing is regular & unlabored while sitting.     HEENT: Hearing is intact to spoken word.  Speech is clear.  No gross evidence of visual impairment that would impact ambulation.     Vascular: DP & PT 2/4 & regular bilaterally.  No significant edema, rubor or varicosities noted.  CFT and skin temperature is normal to both lower extremities.       Neurologic: Lower extremity sensation is intact to light touch.  No evidence of weakness in the lower extremities.  No evidence of neuropathy and negative tinel sign.     Dermatologic: Skin is intact to both lower extremities without significant lesions, rash or abrasion.  Normal " texture turgor and tone. No paronychia or evidence of soft tissue infection is noted.    Musculoskeletal: Patient is ambulatory without assistive device or brace.  No further discomfort is noted with firm palpation along the medial band of the plantar fascia left foot most notably at the origination upon the calcaneus not through the arch.  Generally a high arch foot type is noted.  No painful or limited range of motion throughout the ankle subtalar mid tarsal metatarsal phalangeal joint.  Most discomfort noted with very firm palpation about the plantar and lateral aspect of the fifth metatarsal base and lateral cuboid peroneal brevis tendon bilateral.  No peroneal edema is identified.  Manual muscle strength was 5/5 to all 4 quadrants.    Radiographs:  Osteophyte noted about the plantar calcaneus consistent with plantar fasciitis.     ASSESSMENT:       ICD-10-CM    1. Plantar fascial fibromatosis M72.2    2. Peroneal tendinitis, unspecified laterality M76.70        PLAN:  Reviewed patient's chart in [facility] and discussed etiology and treatment options.      Treatments:  9/21/2018  Seen and interpreted radiographs  Discontinue barefoot walking or unsupported walking in shoes without shank.  Dispensed written instructions to obtain appropriate shoe gear and/or OTC inserts.  Dispensed anterior night splint to use all night every night.  Prescription oral Naprosyn for a short course. Discussed risks.  Prescription for custom molded orthotics 9/21/2018, as he has had this in his other foot one year ago.  Follow up in 4-5 weeks   Continue CrossFit no specific work restrictions.  Reduce running intensity by 10%.    11/1/2018  Heel pain gone but now pain at bilateral 5th met base.    Stopped pills and night splint  During this time he won a CrossFit competition.  Discomfort is noted with very firm palpation at the fifth metatarsal base and lateral cuboid peroneal brevis tendon.  Manual muscle strength remains 5/5 bilateral.   Will reduce arch height subtly to reduce overloading of the lateral column as he has a high instep foot.  Follow-up in the next month or 2 if this remains symptomatic in any way otherwise as needed      Vu Tineo DPM

## 2019-02-21 DIAGNOSIS — J30.2 SEASONAL ALLERGIC RHINITIS: ICD-10-CM

## 2019-02-22 RX ORDER — FLUTICASONE PROPIONATE 50 MCG
SPRAY, SUSPENSION (ML) NASAL
Qty: 16 G | Refills: 4 | Status: SHIPPED | OUTPATIENT
Start: 2019-02-22 | End: 2019-10-11

## 2019-02-22 NOTE — TELEPHONE ENCOUNTER
"Requested Prescriptions   Pending Prescriptions Disp Refills     fluticasone (FLONASE) 50 MCG/ACT nasal spray [Pharmacy Med Name: FLUTICASONE 50MCG NASAL SPRAY] 16 g 6    Last Written Prescription Date:  4/9/18  Last Fill Quantity: 16g,  # refills: 6   Last office visit: 6/27/2018 with prescribing provider:     Future Office Visit:     Sig: USE TWO SPRAYS IN EACH NOSTRIL EVERY DAY    Inhaled Steroids Protocol Passed - 2/21/2019  7:53 PM       Passed - Patient is age 12 or older       Passed - Recent (12 mo) or future (30 days) visit within the authorizing provider's specialty    Patient had office visit in the last 12 months or has a visit in the next 30 days with authorizing provider or within the authorizing provider's specialty.  See \"Patient Info\" tab in inbasket, or \"Choose Columns\" in Meds & Orders section of the refill encounter.             Passed - Medication is active on med list        Rx refilled per RN protocol.  TRUDI RomeroN, RN    "

## 2019-05-15 ENCOUNTER — OFFICE VISIT (OUTPATIENT)
Dept: ORTHOPEDICS | Facility: CLINIC | Age: 64
End: 2019-05-15
Payer: COMMERCIAL

## 2019-05-15 VITALS — WEIGHT: 187 LBS | BODY MASS INDEX: 27.7 KG/M2 | HEIGHT: 69 IN

## 2019-05-15 DIAGNOSIS — M25.511 CHRONIC RIGHT SHOULDER PAIN: Primary | ICD-10-CM

## 2019-05-15 DIAGNOSIS — M25.512 CHRONIC LEFT SHOULDER PAIN: ICD-10-CM

## 2019-05-15 DIAGNOSIS — G89.29 CHRONIC LEFT SHOULDER PAIN: ICD-10-CM

## 2019-05-15 DIAGNOSIS — G89.29 CHRONIC RIGHT SHOULDER PAIN: Primary | ICD-10-CM

## 2019-05-15 PROCEDURE — 99207 ZZC DROP WITH A PROCEDURE: CPT | Performed by: FAMILY MEDICINE

## 2019-05-15 PROCEDURE — 64418 NJX AA&/STRD SPRSCAP NRV: CPT | Mod: 50 | Performed by: FAMILY MEDICINE

## 2019-05-15 RX ORDER — TRIAMCINOLONE ACETONIDE 40 MG/ML
40 INJECTION, SUSPENSION INTRA-ARTICULAR; INTRAMUSCULAR ONCE
Status: COMPLETED | OUTPATIENT
Start: 2019-05-15 | End: 2019-05-15

## 2019-05-15 RX ADMIN — TRIAMCINOLONE ACETONIDE 40 MG: 40 INJECTION, SUSPENSION INTRA-ARTICULAR; INTRAMUSCULAR at 13:50

## 2019-05-15 ASSESSMENT — PAIN SCALES - GENERAL: PAINLEVEL: MILD PAIN (3)

## 2019-05-15 ASSESSMENT — MIFFLIN-ST. JEOR: SCORE: 1643.61

## 2019-05-15 NOTE — PROGRESS NOTES
"Homero has bilateral chronic shoulder pain.  He is here for repeat suprascapular nerve blocks.      Suprascapular Nerve Injection - Ultrasound Guided  The patient was informed of the risks and the benefits of the procedure and a written consent was signed.  The patient s left scapula was prepped with chlorhexidine in sterile fashion.   40 mg of triamcinolone suspension was drawn up into a 5 mL syringe with 4 mL of 1% lidocaine.  Injection was performed using sterile technique.  Under ultrasound guidance a 3.5-inch 22-gauge needle was used to visualize the suprascapular nerve at the spinoglenoid notch.  Injection performed in long axis to the probe with a medial to lateral approach.  The patient s right scapula was prepped with chlorhexidine in sterile fashion.   40 mg of triamcinolone suspension was drawn up into a 5 mL syringe with 4 mL of 1% lidocaine.  Injection was performed using sterile technique.  Under ultrasound guidance a 3.5-inch 22-gauge needle was used to visualize the suprascapular nerve at the spinoglenoid notch.  Injection performed in long axis to the probe with a medial to lateral approach.  There were no complications. The patient tolerated the procedure well. There was negligible bleeding.   The patient was instructed to ice the shoulder upon leaving clinic and refrain from overuse over the next 3 days.   The patient was instructed to call or go to the emergency room with any unusual pain, swelling, redness, or if otherwise concerned.          Taz Maurice DO, CAQSM            [facility] FOLLOW-UP VISIT 5/15/2019    Deven Smith's chief complaint for this visit includes:  Chief Complaint   Patient presents with     Procedure     The patient is here today for bilateral shoulder injections      PCP: Marc Pinedo    Referring Provider:  No referring provider defined for this encounter.    Ht 1.753 m (5' 9\")   Wt 84.8 kg (187 lb)   BMI 27.62 kg/m    Mild Pain (3) "       Interval History:     Follow up reason: bilateral shoulder injections     Following Therapeutic Plan: Yes     Pain: Worsening    Function: Worsening      Medical History:    Any recent changes to your medical history? No    Any new medication prescribed since last visit? No    Review of Systems:    Do you have fever, chills, weight loss? No    Do you have any vision problems? No    Do you have any chest pain or edema? No    Do you have any shortness of breath or wheezing?  No    Do you have stomach problems? No    Do you have any numbness or focal weakness? No    Do you have diabetes? No    Do you have problems with bleeding or clotting? No    Do you have an rashes or other skin lesions? No

## 2019-05-15 NOTE — LETTER
5/15/2019         RE: Deven Smith  [id number] [address] [place] [id number]        Dear Colleague,    Thank you for referring your patient, Deven Smith, to the [facility]. Please see a copy of my visit note below.    Homero has bilateral chronic shoulder pain.  He is here for repeat suprascapular nerve blocks.      Suprascapular Nerve Injection - Ultrasound Guided  The patient was informed of the risks and the benefits of the procedure and a written consent was signed.  The patient s left scapula was prepped with chlorhexidine in sterile fashion.   40 mg of triamcinolone suspension was drawn up into a 5 mL syringe with 4 mL of 1% lidocaine.  Injection was performed using sterile technique.  Under ultrasound guidance a 3.5-inch 22-gauge needle was used to visualize the suprascapular nerve at the spinoglenoid notch.  Injection performed in long axis to the probe with a medial to lateral approach.  The patient s right scapula was prepped with chlorhexidine in sterile fashion.   40 mg of triamcinolone suspension was drawn up into a 5 mL syringe with 4 mL of 1% lidocaine.  Injection was performed using sterile technique.  Under ultrasound guidance a 3.5-inch 22-gauge needle was used to visualize the suprascapular nerve at the spinoglenoid notch.  Injection performed in long axis to the probe with a medial to lateral approach.  There were no complications. The patient tolerated the procedure well. There was negligible bleeding.   The patient was instructed to ice the shoulder upon leaving clinic and refrain from overuse over the next 3 days.   The patient was instructed to call or go to the emergency room with any unusual pain, swelling, redness, or if otherwise concerned.          Taz Maurice DO, CAQSRidge[facility] FOLLOW-UP VISIT 5/15/2019    Deven Smith's chief complaint for this visit includes:  Chief Complaint   Patient presents with     Procedure     The  "patient is here today for bilateral shoulder injections      PCP: Marc Pinedo    Referring Provider:  No referring provider defined for this encounter.    Ht 1.753 m (5' 9\")   Wt 84.8 kg (187 lb)   BMI 27.62 kg/m     Mild Pain (3)       Interval History:     Follow up reason: bilateral shoulder injections     Following Therapeutic Plan: Yes     Pain: Worsening    Function: Worsening      Medical History:    Any recent changes to your medical history? No    Any new medication prescribed since last visit? No    Review of Systems:    Do you have fever, chills, weight loss? No    Do you have any vision problems? No    Do you have any chest pain or edema? No    Do you have any shortness of breath or wheezing?  No    Do you have stomach problems? No    Do you have any numbness or focal weakness? No    Do you have diabetes? No    Do you have problems with bleeding or clotting? No    Do you have an rashes or other skin lesions? No      Again, thank you for allowing me to participate in the care of your patient.        Sincerely,        Taz Maurice, DO    "

## 2019-05-15 NOTE — NURSING NOTE
Prior to injection, verified patient identity using patient's name and date of birth.  Due to injection administration, patient instructed to remain in clinic for 15 minutes  afterwards, and to report any adverse reaction to me immediately.    bilateral suprascapular nerve block     Drug Amount Wasted:  None.  Vial/Syringe: Single dose vial  Expiration Date:  1/1/21  NDC [address]-5    Drug Amount Wasted:  None.  Vial/Syringe: Single dose vial  Expiration Date:  1/1/21  NDC [address]-2

## 2019-06-12 ENCOUNTER — OFFICE VISIT (OUTPATIENT)
Dept: FAMILY MEDICINE | Facility: CLINIC | Age: 64
End: 2019-06-12
Payer: COMMERCIAL

## 2019-06-12 VITALS
HEART RATE: 73 BPM | SYSTOLIC BLOOD PRESSURE: 110 MMHG | WEIGHT: 175.8 LBS | RESPIRATION RATE: 12 BRPM | HEIGHT: 69 IN | TEMPERATURE: 97.3 F | BODY MASS INDEX: 26.04 KG/M2 | OXYGEN SATURATION: 96 % | DIASTOLIC BLOOD PRESSURE: 60 MMHG

## 2019-06-12 DIAGNOSIS — Z12.5 SCREENING FOR PROSTATE CANCER: ICD-10-CM

## 2019-06-12 DIAGNOSIS — Z13.6 SCREENING FOR CARDIOVASCULAR CONDITION: ICD-10-CM

## 2019-06-12 DIAGNOSIS — E78.5 HYPERLIPIDEMIA WITH TARGET LDL LESS THAN 130: ICD-10-CM

## 2019-06-12 DIAGNOSIS — Z00.00 ROUTINE HISTORY AND PHYSICAL EXAMINATION OF ADULT: Primary | ICD-10-CM

## 2019-06-12 LAB
ALBUMIN SERPL-MCNC: 3.8 G/DL (ref 3.4–5)
ALBUMIN UR-MCNC: NEGATIVE MG/DL
ALP SERPL-CCNC: 64 U/L (ref 40–150)
ALT SERPL W P-5'-P-CCNC: 37 U/L (ref 0–70)
ANION GAP SERPL CALCULATED.3IONS-SCNC: 4 MMOL/L (ref 3–14)
APPEARANCE UR: CLEAR
AST SERPL W P-5'-P-CCNC: 26 U/L (ref 0–45)
BILIRUB SERPL-MCNC: 0.6 MG/DL (ref 0.2–1.3)
BILIRUB UR QL STRIP: NEGATIVE
BUN SERPL-MCNC: 20 MG/DL (ref 7–30)
CALCIUM SERPL-MCNC: 9 MG/DL (ref 8.5–10.1)
CHLORIDE SERPL-SCNC: 106 MMOL/L (ref 94–109)
CHOLEST SERPL-MCNC: 168 MG/DL
CO2 SERPL-SCNC: 31 MMOL/L (ref 20–32)
COLOR UR AUTO: YELLOW
CREAT SERPL-MCNC: 0.96 MG/DL (ref 0.66–1.25)
GFR SERPL CREATININE-BSD FRML MDRD: 84 ML/MIN/{1.73_M2}
GLUCOSE SERPL-MCNC: 98 MG/DL (ref 70–99)
GLUCOSE UR STRIP-MCNC: NEGATIVE MG/DL
HDLC SERPL-MCNC: 76 MG/DL
HGB UR QL STRIP: NEGATIVE
KETONES UR STRIP-MCNC: NEGATIVE MG/DL
LDLC SERPL CALC-MCNC: 86 MG/DL
LEUKOCYTE ESTERASE UR QL STRIP: NEGATIVE
NITRATE UR QL: NEGATIVE
NONHDLC SERPL-MCNC: 92 MG/DL
PH UR STRIP: 5 PH (ref 5–7)
POTASSIUM SERPL-SCNC: 3.8 MMOL/L (ref 3.4–5.3)
PROT SERPL-MCNC: 6.7 G/DL (ref 6.8–8.8)
PSA SERPL-ACNC: 2.33 UG/L (ref 0–4)
SODIUM SERPL-SCNC: 141 MMOL/L (ref 133–144)
SOURCE: NORMAL
SP GR UR STRIP: 1.02 (ref 1–1.03)
TRIGL SERPL-MCNC: 30 MG/DL
UROBILINOGEN UR STRIP-MCNC: 0 MG/DL (ref 0–2)

## 2019-06-12 PROCEDURE — 36415 COLL VENOUS BLD VENIPUNCTURE: CPT | Performed by: FAMILY MEDICINE

## 2019-06-12 PROCEDURE — G0103 PSA SCREENING: HCPCS | Performed by: FAMILY MEDICINE

## 2019-06-12 PROCEDURE — 99396 PREV VISIT EST AGE 40-64: CPT | Performed by: FAMILY MEDICINE

## 2019-06-12 PROCEDURE — 80061 LIPID PANEL: CPT | Performed by: FAMILY MEDICINE

## 2019-06-12 PROCEDURE — 80053 COMPREHEN METABOLIC PANEL: CPT | Performed by: FAMILY MEDICINE

## 2019-06-12 PROCEDURE — 81003 URINALYSIS AUTO W/O SCOPE: CPT | Performed by: FAMILY MEDICINE

## 2019-06-12 ASSESSMENT — ENCOUNTER SYMPTOMS
NAUSEA: 0
ARTHRALGIAS: 1
PALPITATIONS: 0
HEMATURIA: 0
DIARRHEA: 0
HEADACHES: 0
NERVOUS/ANXIOUS: 0
CHILLS: 0
EYE PAIN: 0
JOINT SWELLING: 0
HEARTBURN: 0
FEVER: 0
HEMATOCHEZIA: 0
COUGH: 0
SORE THROAT: 0
MYALGIAS: 1
ABDOMINAL PAIN: 0
DIZZINESS: 0
WEAKNESS: 0
FREQUENCY: 0
PARESTHESIAS: 0
DYSURIA: 0
SHORTNESS OF BREATH: 0
CONSTIPATION: 0

## 2019-06-12 ASSESSMENT — MIFFLIN-ST. JEOR: SCORE: 1588.05

## 2019-06-12 NOTE — PROGRESS NOTES
SUBJECTIVE:   CC: Deven Smith is an 64 year old male who presents for preventative health visit.     Healthy Habits:     Getting at least 3 servings of Calcium per day:  NO    Bi-annual eye exam:  Yes    Dental care twice a year:  Yes    Sleep apnea or symptoms of sleep apnea:  None    Diet:  Regular (no restrictions)    Frequency of exercise:  4-5 days/week    Duration of exercise:  Greater than 60 minutes    Taking medications regularly:  Yes    Medication side effects:  Not applicable    PHQ-2 Total Score: 0    Additional concerns today:  No          Patient is fasting labs placed    Today's PHQ-2 Score:   PHQ-2 (  [employer]) 2019   Q1: Little interest or pleasure in doing things 0   Q2: Feeling down, depressed or hopeless 0   PHQ-2 Score 0   Q1: Little interest or pleasure in doing things Not at all   Q2: Feeling down, depressed or hopeless Not at all   PHQ-2 Score 0       Abuse: Current or Past(Physical, Sexual or Emotional)- No  Do you feel safe in your environment? Yes    Social History     Tobacco Use     Smoking status: Former Smoker     Packs/day: 2.00     Years: 15.00     Pack years: 30.00     Last attempt to quit: 1987     Years since quittin.6     Smokeless tobacco: Never Used   Substance Use Topics     Alcohol use: Yes     Alcohol/week: 36.0 oz     Comment: rare          Alcohol Use 2019   Prescreen: >3 drinks/day or >7 drinks/week? Not Applicable   Prescreen: >3 drinks/day or >7 drinks/week? -       Last PSA:   PSA   Date Value Ref Range Status   2018 1.50 0 - 4 ug/L Final     Comment:     Assay Method:  Chemiluminescence using Siemens Vista analyzer       Reviewed orders with patient. Reviewed health maintenance and updated orders accordingly - Yes  Lab work is in process    Reviewed and updated as needed this visit by clinical staff  Tobacco  Allergies  Meds  Med Hx  Surg Hx  Fam Hx  Soc Hx        Reviewed and updated as needed this visit by Provider         Past Medical History:   Diagnosis Date     DDD (degenerative disc disease), cervical      Hyperlipidemia LDL goal < 130       Past Surgical History:   Procedure Laterality Date     COLONOSCOPY  03/21/2007    Repeat  for screening purposes in 10 yrs.     COLONOSCOPY  1/2/2013    Procedure: COLONOSCOPY;  Colonoscopy;  Surgeon: Emmett San MD;  Location:  GI     COLONOSCOPY N/A 8/6/2018    Procedure: COLONOSCOPY;  colonoscopy;  Surgeon: Israel Moreland MD;  Location:  GI     HC SHLDR ARTHROSCOP,SURG,W/ROTAT CUFF REPR Left 2010     HC VASECTOMY UNILAT/BILAT W POSTOP SEMEN  1998    Vasectomy       Review of Systems   Constitutional: Negative for chills and fever.   HENT: Negative for congestion, ear pain, hearing loss and sore throat.    Eyes: Negative for pain and visual disturbance.   Respiratory: Negative for cough and shortness of breath.    Cardiovascular: Negative for chest pain, palpitations and peripheral edema.   Gastrointestinal: Negative for abdominal pain, constipation, diarrhea, heartburn, hematochezia and nausea.   Genitourinary: Negative for discharge, dysuria, frequency, genital sores, hematuria, impotence and urgency.   Musculoskeletal: Positive for arthralgias and myalgias. Negative for joint swelling.   Skin: Negative for rash.   Neurological: Negative for dizziness, weakness, headaches and paresthesias.   Psychiatric/Behavioral: Negative for mood changes. The patient is not nervous/anxious.          OBJECTIVE:   There were no vitals taken for this visit.    Physical Exam  GENERAL: healthy, alert and no distress  EYES: Eyes grossly normal to inspection, PERRL and conjunctivae and sclerae normal  HENT: ear canals and TM's normal, nose and mouth without ulcers or lesions  NECK: no adenopathy, no asymmetry, masses, or scars and thyroid normal to palpation  RESP: lungs clear to auscultation - no rales, rhonchi or wheezes  CV: regular rate and rhythm, normal S1 S2, no S3 or S4, no  murmur, click or rub, no peripheral edema and peripheral pulses strong  ABDOMEN: soft, nontender, no hepatosplenomegaly, no masses and bowel sounds normal  MS: no gross musculoskeletal defects noted, no edema  SKIN: no suspicious lesions or rashes  NEURO: Normal strength and tone, mentation intact and speech normal  PSYCH: mentation appears normal, affect normal/bright    Diagnostic Test Results:  Labs reviewed in Epic  Results for orders placed or performed in visit on 06/12/19 (from the past 24 hour(s))   Comprehensive metabolic panel (BMP + Alb, Alk Phos, ALT, AST, Total. Bili, TP)   Result Value Ref Range    Sodium 141 133 - 144 mmol/L    Potassium 3.8 3.4 - 5.3 mmol/L    Chloride 106 94 - 109 mmol/L    Carbon Dioxide 31 20 - 32 mmol/L    Anion Gap 4 3 - 14 mmol/L    Glucose 98 70 - 99 mg/dL    Urea Nitrogen 20 7 - 30 mg/dL    Creatinine 0.96 0.66 - 1.25 mg/dL    GFR Estimate 84 >60 mL/min/[1.73_m2]    GFR Estimate If Black >90 >60 mL/min/[1.73_m2]    Calcium 9.0 8.5 - 10.1 mg/dL    Bilirubin Total 0.6 0.2 - 1.3 mg/dL    Albumin 3.8 3.4 - 5.0 g/dL    Protein Total 6.7 (L) 6.8 - 8.8 g/dL    Alkaline Phosphatase 64 40 - 150 U/L    ALT 37 0 - 70 U/L    AST 26 0 - 45 U/L   PSA, screen   Result Value Ref Range    PSA 2.33 0 - 4 ug/L   Lipid panel reflex to direct LDL Fasting   Result Value Ref Range    Cholesterol 168 <200 mg/dL    Triglycerides 30 <150 mg/dL    HDL Cholesterol 76 >39 mg/dL    LDL Cholesterol Calculated 86 <100 mg/dL    Non HDL Cholesterol 92 <130 mg/dL   *UA reflex to Microscopic and Culture ([place]; [facility]; [facility]; [facility]; [facility]; [facility]; [place]; [place])   Result Value Ref Range    Color Urine Yellow     Appearance Urine Clear     Glucose Urine Negative NEG^Negative mg/dL    Bilirubin Urine Negative NEG^Negative    Ketones Urine Negative NEG^Negative mg/dL    Specific Gravity Urine 1.024 1.003 - 1.035    Blood Urine Negative NEG^Negative    pH Urine 5.0  "5.0 - 7.0 pH    Protein Albumin Urine Negative NEG^Negative mg/dL    Urobilinogen mg/dL 0.0 0.0 - 2.0 mg/dL    Nitrite Urine Negative NEG^Negative    Leukocyte Esterase Urine Negative NEG^Negative    Source Midstream Urine        ASSESSMENT/PLAN:   1. Routine history and physical examination of adult  Generally healthy has had some shoulder pain that is being taken care of now with injections.  - Comprehensive metabolic panel (BMP + Alb, Alk Phos, ALT, AST, Total. Bili, TP)    2. Screening for prostate cancer  We will notify with results.  - PSA, screen    3. Hyperlipidemia with target LDL less than 130  We will notify with results.  - Lipid panel reflex to direct LDL Fasting    4. Screening for cardiovascular condition  .  - *UA reflex to Microscopic and Culture ([place]; [facility]; [facility]; [facility]; [facility]; [facility]; [place]; [place])    COUNSELING:   Reviewed preventive health counseling, as reflected in patient instructions       Regular exercise       Healthy diet/nutrition       Vision screening    Estimated body mass index is 27.62 kg/m  as calculated from the following:    Height as of 5/15/19: 1.753 m (5' 9\").    Weight as of 5/15/19: 84.8 kg (187 lb).          reports that he quit smoking about 31 years ago. He has a 30.00 pack-year smoking history. He has never used smokeless tobacco.      Counseling Resources:  ATP IV Guidelines  Pooled Cohorts Equation Calculator  FRAX Risk Assessment  ICSI Preventive Guidelines  Dietary Guidelines for Americans, 2010  USDA's MyPlate  ASA Prophylaxis  Lung CA Screening    Marc Pinedo MD  [facility]  "

## 2019-07-26 ENCOUNTER — OFFICE VISIT (OUTPATIENT)
Dept: ORTHOPEDICS | Facility: CLINIC | Age: 64
End: 2019-07-26
Payer: COMMERCIAL

## 2019-07-26 ENCOUNTER — ANCILLARY PROCEDURE (OUTPATIENT)
Dept: GENERAL RADIOLOGY | Facility: CLINIC | Age: 64
End: 2019-07-26
Attending: FAMILY MEDICINE
Payer: COMMERCIAL

## 2019-07-26 VITALS — BODY MASS INDEX: 25.92 KG/M2 | HEIGHT: 69 IN | WEIGHT: 175 LBS

## 2019-07-26 DIAGNOSIS — M25.562 CHRONIC PAIN OF BOTH KNEES: Primary | ICD-10-CM

## 2019-07-26 DIAGNOSIS — G89.29 CHRONIC PAIN OF BOTH KNEES: Primary | ICD-10-CM

## 2019-07-26 DIAGNOSIS — M25.561 BILATERAL KNEE PAIN: ICD-10-CM

## 2019-07-26 DIAGNOSIS — M25.562 BILATERAL KNEE PAIN: ICD-10-CM

## 2019-07-26 DIAGNOSIS — M25.561 CHRONIC PAIN OF BOTH KNEES: Primary | ICD-10-CM

## 2019-07-26 PROCEDURE — 99213 OFFICE O/P EST LOW 20 MIN: CPT | Performed by: FAMILY MEDICINE

## 2019-07-26 PROCEDURE — 73564 X-RAY EXAM KNEE 4 OR MORE: CPT | Mod: RT | Performed by: RADIOLOGY

## 2019-07-26 ASSESSMENT — MIFFLIN-ST. JEOR: SCORE: 1584.17

## 2019-07-26 ASSESSMENT — PAIN SCALES - GENERAL: PAINLEVEL: MILD PAIN (2)

## 2019-07-26 NOTE — LETTER
7/26/2019         RE: Deven Smith  [id number] [address] [address]        Dear Colleague,    Thank you for referring your patient, Deven Smith, to the [facility]. Please see a copy of my visit note below.    CHIEF COMPLAINT:  RECHECK (bilateral knee pain, pain for years, no specific injury )       HISTORY OF PRESENT ILLNESS  Mr. Smith is a pleasant 64 year old year old male who presents to clinic today with bilateral knee pain.  Homero has had some degree of pain in his knees for years, they have gotten worse over the past few years with no clear injury.  His left knee is slightly worse than his right, he feels a crunching sensation in his knee whenever he engages in loadbearing flexion.  This is now present with open chain activities as well.  His right knee gives him a sharp pain whenever he flexes when loadbearing at about 30 degrees.  When he flexes past 30 degrees the pain subsides, representing at the 30 degree soniya in extension.  He denies any swelling, no numbness or tingling he did have a remote left knee injury, this was when he was younger while skydiving.  He may have torn a lateral quadricep muscle, he is unsure.        Additional history: as documented    MEDICAL HISTORY  Patient Active Problem List   Diagnosis     Other specified glaucoma     Allergic rhinitis     Family history of other cardiovascular diseases     Labyrinthitis     Hyperlipidemia with target LDL less than 130     Advanced directives, counseling/discussion     DDD (degenerative disc disease), cervical     Supraspinatus tendon tear, left, subsequent encounter       Current Outpatient Medications   Medication Sig Dispense Refill     ASPIRIN 81 MG OR TABS 1 tab po QD (Once per day) 0 0     fluticasone (FLONASE) 50 MCG/ACT nasal spray USE TWO SPRAYS IN EACH NOSTRIL EVERY DAY 16 g 4     GABAPENTIN PO Take 1,200 mg by mouth daily        latanoprost (XALATAN) 0.005 % ophthalmic solution Place 0.005  "drops into both eyes At Bedtime       Misc Natural Products (GLUCOSAMINE CHONDROITIN ADV PO) Take 6 tablets by mouth daily.       MULTI-DAY VITAMINS OR TABS 1 tablet daily 0 0     naproxen (NAPROSYN) 500 MG tablet Take 1 tablet (500 mg) by mouth 2 times daily (with meals) 60 tablet 1     Omega-3 Fatty Acids (FISH OIL) 1200 MG CAPS Take 1 capsule by mouth daily         Allergies   Allergen Reactions     Seasonal Allergies      Tramadol      Muscle spasms,headache       Family History   Problem Relation Age of Onset     Allergies Brother         on meds     Arthritis Mother      Depression Mother         on meds. is in nursing home     Hypertension Mother      Osteoporosis Mother      Heart Disease Father          at age 57  ? MI--no autopsy     Cancer Father         jaw--smoked [place]     Hypertension Brother      Osteoporosis Sister         on meds     Family History Negative Other         No breast or colon cancer hx in family     Hypertension Sister      Osteoporosis Brother        Additional medical/Social/Surgical histories reviewed in [facility] and updated as appropriate.        PHYSICAL EXAM    Vitals:    19 0743   Weight: 79.4 kg (175 lb)   Height: 1.753 m (5' 9\")     General  - normal appearance, in no obvious distress  CV  - normal popliteal pulse  Pulm  - normal respiratory pattern, non-labored  Musculoskeletal - knees  - stance: Normal gait  - inspection: No swelling, no effusion  - palpation: medial patellar facet tenderness bilaterally  - ROM: 120 degrees flexion, 0 degrees extension, painful active flexion and extension of left knee, palpable crepitus  - strength: 5/5 in flexion, 5/5 in extension  - special tests:  (-) Lachman  (-) Brenden  (-) varus at 0 and 30 degrees flexion  (-) valgus at 0 and 30 degrees flexion  Neuro  - no sensory or motor deficit, grossly normal coordination, normal muscle tone  Skin  - no ecchymosis, erythema, warmth, or induration, no obvious rash  Psych  - " "interactive, appropriate, normal mood and affect             ASSESSMENT & PLAN  Mr. Smith is a 64 year old year old male who presents to clinic today with bilateral knee pain.    I ordered and reviewed x-rays of his knees which mild medial compartment osteoarthritis.    The symptoms in times knees do raise concern for focal chondral issues.  Given his level of disability and limitation I am ordering MRIs of each knee.    We should follow-up after his scans.    It was a pleasure seeing Homero today.    Taz Maurice DO, CAM  Primary Care Sports Medicine      This note was constructed using Dragon dictation software, please excuse any minor errors in spelling, grammar, or syntax.        [facility] FOLLOW-UP VISIT 7/26/2019    Deven Smith's chief complaint for this visit includes:  Chief Complaint   Patient presents with     RECHECK     bilateral knee pain, pain for years, no specific injury      PCP: Marc Pinedo    Referring Provider:  No referring provider defined for this encounter.    Ht 1.753 m (5' 9\")   Wt 79.4 kg (175 lb)   BMI 25.84 kg/m     Mild Pain (2)       Interval History:     Follow up reason: bilateral knee pain     Medical History:    Any recent changes to your medical history? No    Any new medication prescribed since last visit? No    Review of Systems:    Do you have fever, chills, weight loss? No    Do you have any vision problems? No    Do you have any chest pain or edema? No    Do you have any shortness of breath or wheezing?  No    Do you have stomach problems? No    Do you have any numbness or focal weakness? No    Do you have diabetes? No    Do you have problems with bleeding or clotting? No    Do you have an rashes or other skin lesions? No          Again, thank you for allowing me to participate in the care of your patient.        Sincerely,        Taz Maurice DO    "

## 2019-07-26 NOTE — PROGRESS NOTES
CHIEF COMPLAINT:  RECHECK (bilateral knee pain, pain for years, no specific injury )       HISTORY OF PRESENT ILLNESS  Mr. Smith is a pleasant 64 year old year old male who presents to clinic today with bilateral knee pain.  Homero has had some degree of pain in his knees for years, they have gotten worse over the past few years with no clear injury.  His left knee is slightly worse than his right, he feels a crunching sensation in his knee whenever he engages in loadbearing flexion.  This is now present with open chain activities as well.  His right knee gives him a sharp pain whenever he flexes when loadbearing at about 30 degrees.  When he flexes past 30 degrees the pain subsides, representing at the 30 degree soniya in extension.  He denies any swelling, no numbness or tingling he did have a remote left knee injury, this was when he was younger while skydiving.  He may have torn a lateral quadricep muscle, he is unsure.        Additional history: as documented    MEDICAL HISTORY  Patient Active Problem List   Diagnosis     Other specified glaucoma     Allergic rhinitis     Family history of other cardiovascular diseases     Labyrinthitis     Hyperlipidemia with target LDL less than 130     Advanced directives, counseling/discussion     DDD (degenerative disc disease), cervical     Supraspinatus tendon tear, left, subsequent encounter       Current Outpatient Medications   Medication Sig Dispense Refill     ASPIRIN 81 MG OR TABS 1 tab po QD (Once per day) 0 0     fluticasone (FLONASE) 50 MCG/ACT nasal spray USE TWO SPRAYS IN EACH NOSTRIL EVERY DAY 16 g 4     GABAPENTIN PO Take 1,200 mg by mouth daily        latanoprost (XALATAN) 0.005 % ophthalmic solution Place 0.005 drops into both eyes At Bedtime       Misc Natural Products (GLUCOSAMINE CHONDROITIN ADV PO) Take 6 tablets by mouth daily.       MULTI-DAY VITAMINS OR TABS 1 tablet daily 0 0     naproxen (NAPROSYN) 500 MG tablet Take 1 tablet (500 mg) by mouth 2  "times daily (with meals) 60 tablet 1     Omega-3 Fatty Acids (FISH OIL) 1200 MG CAPS Take 1 capsule by mouth daily         Allergies   Allergen Reactions     Seasonal Allergies      Tramadol      Muscle spasms,headache       Family History   Problem Relation Age of Onset     Allergies Brother         on meds     Arthritis Mother      Depression Mother         on meds. is in nursing home     Hypertension Mother      Osteoporosis Mother      Heart Disease Father          at age 57  ? MI--no autopsy     Cancer Father         jaw--smoked [place]     Hypertension Brother      Osteoporosis Sister         on meds     Family History Negative Other         No breast or colon cancer hx in family     Hypertension Sister      Osteoporosis Brother        Additional medical/Social/Surgical histories reviewed in [facility] and updated as appropriate.        PHYSICAL EXAM    Vitals:    19 0743   Weight: 79.4 kg (175 lb)   Height: 1.753 m (5' 9\")     General  - normal appearance, in no obvious distress  CV  - normal popliteal pulse  Pulm  - normal respiratory pattern, non-labored  Musculoskeletal - knees  - stance: Normal gait  - inspection: No swelling, no effusion  - palpation: medial patellar facet tenderness bilaterally  - ROM: 120 degrees flexion, 0 degrees extension, painful active flexion and extension of left knee, palpable crepitus  - strength: 5/5 in flexion, 5/5 in extension  - special tests:  (-) Lachman  (-) Brenden  (-) varus at 0 and 30 degrees flexion  (-) valgus at 0 and 30 degrees flexion  Neuro  - no sensory or motor deficit, grossly normal coordination, normal muscle tone  Skin  - no ecchymosis, erythema, warmth, or induration, no obvious rash  Psych  - interactive, appropriate, normal mood and affect             ASSESSMENT & PLAN  Mr. Smith is a 64 year old year old male who presents to clinic today with bilateral knee pain.    I ordered and reviewed x-rays of his knees which mild medial " "compartment osteoarthritis.    The symptoms in times knees do raise concern for focal chondral issues.  Given his level of disability and limitation I am ordering MRIs of each knee.    We should follow-up after his scans.    It was a pleasure seeing Homero today.    Taz Maurice DO, [facility]  Primary Care Sports Medicine      This note was constructed using Dragon dictation software, please excuse any minor errors in spelling, grammar, or syntax.        [facility] FOLLOW-UP VISIT 7/26/2019    Deven Smith's chief complaint for this visit includes:  Chief Complaint   Patient presents with     RECHECK     bilateral knee pain, pain for years, no specific injury      PCP: Marc Pinedo    Referring Provider:  No referring provider defined for this encounter.    Ht 1.753 m (5' 9\")   Wt 79.4 kg (175 lb)   BMI 25.84 kg/m    Mild Pain (2)       Interval History:     Follow up reason: bilateral knee pain     Medical History:    Any recent changes to your medical history? No    Any new medication prescribed since last visit? No    Review of Systems:    Do you have fever, chills, weight loss? No    Do you have any vision problems? No    Do you have any chest pain or edema? No    Do you have any shortness of breath or wheezing?  No    Do you have stomach problems? No    Do you have any numbness or focal weakness? No    Do you have diabetes? No    Do you have problems with bleeding or clotting? No    Do you have an rashes or other skin lesions? No        "

## 2019-07-31 ENCOUNTER — MEDICAL CORRESPONDENCE (OUTPATIENT)
Dept: HEALTH INFORMATION MANAGEMENT | Facility: CLINIC | Age: 64
End: 2019-07-31

## 2019-07-31 ENCOUNTER — ANCILLARY PROCEDURE (OUTPATIENT)
Dept: MRI IMAGING | Facility: CLINIC | Age: 64
End: 2019-07-31
Attending: FAMILY MEDICINE
Payer: COMMERCIAL

## 2019-07-31 DIAGNOSIS — M25.562 CHRONIC PAIN OF BOTH KNEES: ICD-10-CM

## 2019-07-31 DIAGNOSIS — M25.561 CHRONIC PAIN OF BOTH KNEES: ICD-10-CM

## 2019-07-31 DIAGNOSIS — G89.29 CHRONIC PAIN OF BOTH KNEES: ICD-10-CM

## 2019-07-31 PROCEDURE — 73721 MRI JNT OF LWR EXTRE W/O DYE: CPT | Mod: LT | Performed by: RADIOLOGY

## 2019-07-31 PROCEDURE — 73721 MRI JNT OF LWR EXTRE W/O DYE: CPT | Mod: RT | Performed by: RADIOLOGY

## 2019-08-12 ENCOUNTER — OFFICE VISIT (OUTPATIENT)
Dept: ORTHOPEDICS | Facility: CLINIC | Age: 64
End: 2019-08-12
Payer: COMMERCIAL

## 2019-08-12 VITALS — BODY MASS INDEX: 25.92 KG/M2 | HEIGHT: 69 IN | WEIGHT: 175 LBS

## 2019-08-12 DIAGNOSIS — M17.0 BILATERAL PRIMARY OSTEOARTHRITIS OF KNEE: ICD-10-CM

## 2019-08-12 DIAGNOSIS — M25.561 CHRONIC PAIN OF BOTH KNEES: Primary | ICD-10-CM

## 2019-08-12 DIAGNOSIS — M25.562 CHRONIC PAIN OF BOTH KNEES: Primary | ICD-10-CM

## 2019-08-12 DIAGNOSIS — G89.29 CHRONIC PAIN OF BOTH KNEES: Primary | ICD-10-CM

## 2019-08-12 PROCEDURE — 20610 DRAIN/INJ JOINT/BURSA W/O US: CPT | Mod: 50 | Performed by: FAMILY MEDICINE

## 2019-08-12 PROCEDURE — 99207 ZZC DROP WITH A PROCEDURE: CPT | Performed by: FAMILY MEDICINE

## 2019-08-12 RX ORDER — TRIAMCINOLONE ACETONIDE 40 MG/ML
40 INJECTION, SUSPENSION INTRA-ARTICULAR; INTRAMUSCULAR
Status: DISCONTINUED | OUTPATIENT
Start: 2019-08-12 | End: 2024-02-26

## 2019-08-12 RX ADMIN — TRIAMCINOLONE ACETONIDE 40 MG: 40 INJECTION, SUSPENSION INTRA-ARTICULAR; INTRAMUSCULAR at 10:22

## 2019-08-12 ASSESSMENT — MIFFLIN-ST. JEOR: SCORE: 1584.17

## 2019-08-12 ASSESSMENT — PATIENT HEALTH QUESTIONNAIRE - PHQ9: SUM OF ALL RESPONSES TO PHQ QUESTIONS 1-9: 1

## 2019-08-12 ASSESSMENT — PAIN SCALES - GENERAL: PAINLEVEL: MODERATE PAIN (4)

## 2019-08-12 NOTE — LETTER
"    8/12/2019         RE: Deven Smith  [address] [facility] [address]        Dear Colleague,    Thank you for referring your patient, Deven Smith, to the [facility]. Please see a copy of my visit note below.    Large Joint Injection/Arthocentesis: bilateral knee  Date/Time: 8/12/2019 10:22 AM  Performed by: Taz Maurice DO  Authorized by: Taz Maurice DO     Indications:  Pain  Needle Size:  25 G  Approach:  Lateral  Location:  Knee  Laterality:  Bilateral      Medications (Right):  40 mg triamcinolone 40 MG/ML  Medications (Left):  40 mg triamcinolone 40 MG/ML  Outcome:  Tolerated well, no immediate complications  Procedure discussed: discussed risks, benefits, and alternatives    Consent Given by:  Patient  Timeout: timeout called immediately prior to procedure    Prep: patient was prepped and draped in usual sterile fashion        HISTORY OF PRESENT ILLNESS  Mr. Smith is a pleasant 64 year old year old male following up with bilateral knee pain.  Homero is here to review his MR imaging.     PHYSICAL EXAM  Vitals:    08/12/19 1018   Weight: 79.4 kg (175 lb)   Height: 1.753 m (5' 9\")       ASSESSMENT & PLAN  Mr. Smith is a 64 year old year old male following up with bilateral knee pain.    I reviewed his MR images in the room with him.  Right knee:  Impression:  1.  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  2.  The anterior and posterior cruciate ligaments, medial and lateral  supporting structures, and bilateral menisci are intact.    Left knee:  Impression:  1.  Diffuse grade IV left knee chondromalacia patella.  2.  Nonspecific bone marrow edema involving the posterior lateral  femoral epicondyle and lateral tibial condyle.   3.  Small left knee joint effusion.  4.  The anterior and posterior cruciate ligaments, medial and lateral  supporting structures, and bilateral menisci are intact.     We discussed these " findings and their implications in depth.  We had a good discussion centering around the spectrum of treatment options for osteoarthritis that preclude surgery.  We discussed nonoperative treatment with pain relievers, icing, low impact exercise, and weight maintenance.  We also talked about the role of injection therapy with corticosteroids and hyaluronic acid, as well as the potential role of biologics.    I did inject his knee today (see procedure note).    If this injection does not bring him significant relief I would consider injecting his patellofemoral joint exclusively.    It was a pleasure seeing Homero.    20 minutes was spent in the room, 15 of which was spent on counseling and coordination of care.      PROCEDURE    Knee Injection - Intraarticular  The patient was informed of the risks and the benefits of the procedure and a written consent was signed.  The patient s left knee was prepped with chlorhexidine in sterile fashion.   40 mg of triamcinolone suspension was drawn up into a 5 mL syringe with 4 mL of 1% lidocaine.  Injection was performed using substerile technique.  A 1.5-inch 25-gauge needle was used to enter the lateral aspect of the left knee.  Injection performed successfully without difficulty.  There were no complications. The patient tolerated the procedure well. There was negligible bleeding.   The patient s right knee was prepped with chlorhexidine in sterile fashion.   40 mg of triamcinolone suspension was drawn up into a 5 mL syringe with 4 mL of 1% lidocaine.  Injection was performed using substerile technique.  A 1.5-inch 25-gauge needle was used to enter the lateral aspect of the right knee.  Injection performed successfully without difficulty.  There were no complications. The patient tolerated the procedure well. There was negligible bleeding.  The patient was instructed to ice the knee upon leaving clinic and refrain from overuse over the next 3 days.   The patient was instructed  to call or go to the emergency room with any unusual pain, swelling, redness, or if otherwise concerned.        Taz Maurice DO, LAURA      This note was constructed using Dragon dictation software, please excuse any minor errors in spelling, grammar, or syntax.      Again, thank you for allowing me to participate in the care of your patient.        Sincerely,        Taz Maurice DO

## 2019-08-12 NOTE — PROGRESS NOTES
"Large Joint Injection/Arthocentesis: bilateral knee  Date/Time: 8/12/2019 10:22 AM  Performed by: Taz Maurice DO  Authorized by: Taz Maurice DO     Indications:  Pain  Needle Size:  25 G  Approach:  Lateral  Location:  Knee  Laterality:  Bilateral      Medications (Right):  40 mg triamcinolone 40 MG/ML  Medications (Left):  40 mg triamcinolone 40 MG/ML  Outcome:  Tolerated well, no immediate complications  Procedure discussed: discussed risks, benefits, and alternatives    Consent Given by:  Patient  Timeout: timeout called immediately prior to procedure    Prep: patient was prepped and draped in usual sterile fashion        HISTORY OF PRESENT ILLNESS  Mr. Smith is a pleasant 64 year old year old male following up with bilateral knee pain.  Homero is here to review his MR imaging.     PHYSICAL EXAM  Vitals:    08/12/19 1018   Weight: 79.4 kg (175 lb)   Height: 1.753 m (5' 9\")       ASSESSMENT & PLAN  Mr. Smith is a 64 year old year old male following up with bilateral knee pain.    I reviewed his MR images in the room with him.  Right knee:  Impression:  1.  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  2.  The anterior and posterior cruciate ligaments, medial and lateral  supporting structures, and bilateral menisci are intact.    Left knee:  Impression:  1.  Diffuse grade IV left knee chondromalacia patella.  2.  Nonspecific bone marrow edema involving the posterior lateral  femoral epicondyle and lateral tibial condyle.   3.  Small left knee joint effusion.  4.  The anterior and posterior cruciate ligaments, medial and lateral  supporting structures, and bilateral menisci are intact.     We discussed these findings and their implications in depth.  We had a good discussion centering around the spectrum of treatment options for osteoarthritis that preclude surgery.  We discussed nonoperative treatment with pain relievers, icing, low impact exercise, and " weight maintenance.  We also talked about the role of injection therapy with corticosteroids and hyaluronic acid, as well as the potential role of biologics.    I did inject his knee today (see procedure note).    If this injection does not bring him significant relief I would consider injecting his patellofemoral joint exclusively.    It was a pleasure seeing Homero.    20 minutes was spent in the room, 15 of which was spent on counseling and coordination of care.      PROCEDURE    Knee Injection - Intraarticular  The patient was informed of the risks and the benefits of the procedure and a written consent was signed.  The patient s left knee was prepped with chlorhexidine in sterile fashion.   40 mg of triamcinolone suspension was drawn up into a 5 mL syringe with 4 mL of 1% lidocaine.  Injection was performed using substerile technique.  A 1.5-inch 25-gauge needle was used to enter the lateral aspect of the left knee.  Injection performed successfully without difficulty.  There were no complications. The patient tolerated the procedure well. There was negligible bleeding.   The patient s right knee was prepped with chlorhexidine in sterile fashion.   40 mg of triamcinolone suspension was drawn up into a 5 mL syringe with 4 mL of 1% lidocaine.  Injection was performed using substerile technique.  A 1.5-inch 25-gauge needle was used to enter the lateral aspect of the right knee.  Injection performed successfully without difficulty.  There were no complications. The patient tolerated the procedure well. There was negligible bleeding.  The patient was instructed to ice the knee upon leaving clinic and refrain from overuse over the next 3 days.   The patient was instructed to call or go to the emergency room with any unusual pain, swelling, redness, or if otherwise concerned.        Taz Maurice DO, CAQSM      This note was constructed using Dragon dictation software, please excuse any minor errors in spelling, grammar,  or syntax.

## 2019-08-12 NOTE — NURSING NOTE
"Deven Smith's chief complaint for this visit includes:  Chief Complaint   Patient presents with     Procedure     bilateral knee joint cortisone injections , MRI f.u      PCP: Marc Pinedo    Referring Provider:  No referring provider defined for this encounter.    Ht 1.753 m (5' 9\")   Wt 79.4 kg (175 lb)   BMI 25.84 kg/m    Moderate Pain (4)     Do you need any medication refills at today's visit? No         "

## 2019-10-09 ENCOUNTER — TELEPHONE (OUTPATIENT)
Dept: FAMILY MEDICINE | Facility: CLINIC | Age: 64
End: 2019-10-09

## 2019-10-09 ENCOUNTER — NURSE TRIAGE (OUTPATIENT)
Dept: FAMILY MEDICINE | Facility: CLINIC | Age: 64
End: 2019-10-09

## 2019-10-09 NOTE — TELEPHONE ENCOUNTER
Per Dr. Pinedo:  Dr. Pinedo does not have the availability to see this patient in the next 2 hours. We can place the patient on the schedule next week. Dr. Pinedo recommends that the patient seek emergency medical care should he feel he is worsening. Called patient to notify him of this. Left a  for return call.  Leta Rios on 10/9/2019 at 3:19 PM

## 2019-10-09 NOTE — TELEPHONE ENCOUNTER
"Spoke with patient who reports that he has been feeling his heart skip beats and beat faster in the evenings for the past two weeks. He is concerned because he is starting to have an increase in occurrence during the day. Episodes last a few seconds and return a few minutes later.  He denies shortness of breath or chest pain. Heart rate does not change during routine exercise 3-4 times per week. Protocol recommends patient be seen today. Please advise.    Additional Information    Age > 60 years    Answer Assessment - Initial Assessment Questions  1. DESCRIPTION: \"Please describe your heart rate or heart beat that you are having\" (e.g., fast/slow, regular/irregular, skipped or extra beats, \"palpitations\")      palpitations  2. ONSET: \"When did it start?\" (Minutes, hours or days)       2 weeks ago in evening getting more frequesnt  3. DURATION: \"How long does it last\" (e.g., seconds, minutes, hours)      Seconds comes back a few min later  4. PATTERN \"Does it come and go, or has it been constant since it started?\"  \"Does it get worse with exertion?\"   \"Are you feeling it now?\"      Does not feel when working out  5. TAP: \"Using your hand, can you tap out what you are feeling on a chair or table in front of you, so that I can hear?\" (Note: not all patients can do this)      6. HEART RATE: \"Can you tell me your heart rate?\" \"How many beats in 15 seconds?\"  (Note: not all patients can do this)        Under 60 per [employer] works oout  7. RECURRENT SYMPTOM: \"Have you ever had this before?\" If so, ask: \"When was the last time?\" and \"What happened that time?\"       no  8. CAUSE: \"What do you think is causing the palpitations?\"      *No Answer*  9. CARDIAC HISTORY: \"Do you have any history of heart disease?\" (e.g., heart attack, angina, bypass surgery, angioplasty, arrhythmia)       denies  10. OTHER SYMPTOMS: \"Do you have any other symptoms?\" (e.g., dizziness, chest pain, sweating, difficulty breathing)        none  11. " "PREGNANCY: \"Is there any chance you are pregnant?\" \"When was your last menstrual period?\"     NA    Protocols used: HEART RATE AND HEARTBEAT [id number]      "

## 2019-10-09 NOTE — TELEPHONE ENCOUNTER
"Patient will need to be \"worked in\" sometime next week. Patient will seek emergency medical care if he feels that he is worsening.  Leta Rios on 10/9/2019 at 3:30 PM    "

## 2019-10-09 NOTE — TELEPHONE ENCOUNTER
Reason for Call:  Same Day Appointment, Requested Provider:  Marc Pinedo MD    PCP: Marc Pinedo    Reason for visit: heart palpitations. Please see triage nurse encounter and recommendations from Dr. Pinedo for appointment next week. Homero felt he could wait until next week for an appointment. Asking if he can be worked in on 10/14?     Duration of symptoms:  Two weeks      Have you been treated for this in the past? No    Additional comments: Please see triage nurse encounter and recommendations from Dr. Pinedo for appointment next week. Homero felt he could wait until next week for an appointment. Asking if he can be worked in on 10/14?       Can we leave a detailed message on this number? YES    Phone number patient can be reached at: Cell number on file:    Telephone Information:   Mobile [phone]       Best Time:      Call taken on 10/9/2019 at 3:38 PM by Faiza Pinedo

## 2019-10-10 ENCOUNTER — HOSPITAL ENCOUNTER (EMERGENCY)
Facility: CLINIC | Age: 64
Discharge: HOME | End: 2019-10-11
Attending: FAMILY MEDICINE | Admitting: FAMILY MEDICINE
Payer: COMMERCIAL

## 2019-10-10 DIAGNOSIS — I49.3 PVC'S (PREMATURE VENTRICULAR CONTRACTIONS): ICD-10-CM

## 2019-10-10 LAB
ANION GAP SERPL CALCULATED.3IONS-SCNC: 5 MMOL/L (ref 3–14)
BASOPHILS # BLD AUTO: 0 10E9/L (ref 0–0.2)
BASOPHILS NFR BLD AUTO: 0.7 %
BUN SERPL-MCNC: 33 MG/DL (ref 7–30)
CALCIUM SERPL-MCNC: 8.5 MG/DL (ref 8.5–10.1)
CHLORIDE SERPL-SCNC: 108 MMOL/L (ref 94–109)
CO2 SERPL-SCNC: 29 MMOL/L (ref 20–32)
CREAT SERPL-MCNC: 0.91 MG/DL (ref 0.66–1.25)
DIFFERENTIAL METHOD BLD: NORMAL
EOSINOPHIL NFR BLD AUTO: 4.7 %
ERYTHROCYTE [DISTWIDTH] IN BLOOD BY AUTOMATED COUNT: 12.9 % (ref 10–15)
GFR SERPL CREATININE-BSD FRML MDRD: 89 ML/MIN/{1.73_M2}
GLUCOSE SERPL-MCNC: 102 MG/DL (ref 70–99)
HCT VFR BLD AUTO: 41.1 % (ref 40–53)
HGB BLD-MCNC: 14.2 G/DL (ref 13.3–17.7)
IMM GRANULOCYTES # BLD: 0 10E9/L (ref 0–0.4)
IMM GRANULOCYTES NFR BLD: 0.2 %
LYMPHOCYTES # BLD AUTO: 2.2 10E9/L (ref 0.8–5.3)
LYMPHOCYTES NFR BLD AUTO: 38.5 %
MCH RBC QN AUTO: 32 PG (ref 26.5–33)
MCHC RBC AUTO-ENTMCNC: 34.5 G/DL (ref 31.5–36.5)
MCV RBC AUTO: 93 FL (ref 78–100)
MONOCYTES # BLD AUTO: 0.5 10E9/L (ref 0–1.3)
MONOCYTES NFR BLD AUTO: 8.4 %
NEUTROPHILS # BLD AUTO: 2.7 10E9/L (ref 1.6–8.3)
NEUTROPHILS NFR BLD AUTO: 47.5 %
NRBC # BLD AUTO: 0 10*3/UL
NRBC BLD AUTO-RTO: 0 /100
PLATELET # BLD AUTO: 167 10E9/L (ref 150–450)
POTASSIUM SERPL-SCNC: 3.7 MMOL/L (ref 3.4–5.3)
RBC # BLD AUTO: 4.44 10E12/L (ref 4.4–5.9)
SODIUM SERPL-SCNC: 142 MMOL/L (ref 133–144)
TROPONIN I SERPL-MCNC: <0.015 UG/L (ref 0–0.04)
WBC # BLD AUTO: 5.6 10E9/L (ref 4–11)

## 2019-10-10 PROCEDURE — 93010 ELECTROCARDIOGRAM REPORT: CPT | Mod: Z6 | Performed by: FAMILY MEDICINE

## 2019-10-10 PROCEDURE — 80048 BASIC METABOLIC PNL TOTAL CA: CPT | Performed by: FAMILY MEDICINE

## 2019-10-10 PROCEDURE — 84439 ASSAY OF FREE THYROXINE: CPT | Performed by: FAMILY MEDICINE

## 2019-10-10 PROCEDURE — 99285 EMERGENCY DEPT VISIT HI MDM: CPT | Mod: 25 | Performed by: FAMILY MEDICINE

## 2019-10-10 PROCEDURE — 93005 ELECTROCARDIOGRAM TRACING: CPT | Performed by: FAMILY MEDICINE

## 2019-10-10 PROCEDURE — 84443 ASSAY THYROID STIM HORMONE: CPT | Performed by: FAMILY MEDICINE

## 2019-10-10 PROCEDURE — 84484 ASSAY OF TROPONIN QUANT: CPT | Performed by: FAMILY MEDICINE

## 2019-10-10 PROCEDURE — 85025 COMPLETE CBC W/AUTO DIFF WBC: CPT | Performed by: FAMILY MEDICINE

## 2019-10-10 PROCEDURE — 99284 EMERGENCY DEPT VISIT MOD MDM: CPT | Mod: 25 | Performed by: FAMILY MEDICINE

## 2019-10-10 NOTE — ED AVS SNAPSHOT
[facility] Emergency Department  [address] [place] [address]  Phone:  [phone]  Fax:  [phone]                                    Deven Smith   MRN: [id number]    Department:  [facility] Emergency Department   Date of Visit:  10/10/2019           After Visit Summary Signature Page    I have received my discharge instructions, and my questions have been answered. I have discussed any challenges I see with this plan with the nurse or doctor.    ..........................................................................................................................................  Patient/Patient Representative Signature      ..........................................................................................................................................  Patient Representative Print Name and Relationship to Patient    ..................................................               ................................................  Date                                   Time    ..........................................................................................................................................  Reviewed by Signature/Title    ...................................................              ..............................................  Date                                               Time          22EPIC Rev 08/18

## 2019-10-11 ENCOUNTER — TELEPHONE (OUTPATIENT)
Dept: FAMILY MEDICINE | Facility: CLINIC | Age: 64
End: 2019-10-11

## 2019-10-11 VITALS
SYSTOLIC BLOOD PRESSURE: 170 MMHG | RESPIRATION RATE: 9 BRPM | TEMPERATURE: 98 F | DIASTOLIC BLOOD PRESSURE: 86 MMHG | HEART RATE: 47 BPM | OXYGEN SATURATION: 96 %

## 2019-10-11 DIAGNOSIS — J30.2 SEASONAL ALLERGIC RHINITIS: ICD-10-CM

## 2019-10-11 DIAGNOSIS — I49.3 PVC'S (PREMATURE VENTRICULAR CONTRACTIONS): ICD-10-CM

## 2019-10-11 DIAGNOSIS — R00.1 SINUS BRADYCARDIA: Primary | ICD-10-CM

## 2019-10-11 LAB
T4 FREE SERPL-MCNC: 0.82 NG/DL (ref 0.76–1.46)
TSH SERPL DL<=0.005 MIU/L-ACNC: 4.49 MU/L (ref 0.4–4)

## 2019-10-11 RX ORDER — FLUTICASONE PROPIONATE 50 MCG
SPRAY, SUSPENSION (ML) NASAL
Qty: 16 G | Refills: 4 | Status: SHIPPED | OUTPATIENT
Start: 2019-10-11 | End: 2020-06-04

## 2019-10-11 NOTE — ED PROVIDER NOTES
History     Chief Complaint   Patient presents with     Irregular Heart Beat     HPI  Deven Smith is a 64 year old male who presents to the ED tonight with concerns about irregular heartbeat.  This is been going on for a couple of weeks but seems to be getting more frequent and noticeable.  He feels like his heart stops and then starts again.  He denies any dizziness or lightheadedness, chest pain, shortness of breath associated with these episodes.  Initially he would just notice it in the evening when he was relaxing but sometimes he notices it during the day.  It does not limit his activity and he still able to work out consistently doing CrossFit.    His father  at age 57 of a massive MI.  That was on 1972.  He was on Zocor for cholesterol but his lipid profile has improved significantly with diet changes and exercising so he is off that now.  No diabetes.  No hypertension.  Non-smoker.  Takes some vitamins as well as his glaucoma medication.    Some caffeine in the morning with a vitamin drink.  Does not drink coffee or energy drinks.      Allergies:  Allergies   Allergen Reactions     Seasonal Allergies      Tramadol      Muscle spasms,headache       Problem List:    Patient Active Problem List    Diagnosis Date Noted     Supraspinatus tendon tear, left, subsequent encounter 2015     Priority: Medium     Advanced directives, counseling/discussion 2012     Priority: Medium     Discussed advance care planning with patient; information given to patient to review.          DDD (degenerative disc disease), cervical 2012     Priority: Medium     Hyperlipidemia with target LDL less than 130      Priority: Medium     Diagnosis updated by automated process. Provider to review and confirm.       Labyrinthitis 2011     Priority: Medium     Family history of other cardiovascular diseases 2007     Priority: Medium     Problem list name updated by automated process.  Provider to review and confirm  Problem list name updated by automated process. Provider to review       Allergic rhinitis 2006     Priority: Medium     Problem list name updated by automated process. Provider to review       Other specified glaucoma 2006     Priority: Medium        Past Medical History:    Past Medical History:   Diagnosis Date     DDD (degenerative disc disease), cervical      Hyperlipidemia LDL goal < 130        Past Surgical History:    Past Surgical History:   Procedure Laterality Date     COLONOSCOPY  2007    Repeat  for screening purposes in 10 yrs.     COLONOSCOPY  2013    Procedure: COLONOSCOPY;  Colonoscopy;  Surgeon: Emmett San MD;  Location:  GI     COLONOSCOPY N/A 2018    Procedure: COLONOSCOPY;  colonoscopy;  Surgeon: Israel Moreland MD;  Location:  GI     HC SHLDR ARTHROSCOP,SURG,W/ROTAT CUFF REPR Left      HC VASECTOMY UNILAT/BILAT W POSTOP SEMEN  1998    Vasectomy       Family History:    Family History   Problem Relation Age of Onset     Allergies Brother         on meds     Arthritis Mother      Depression Mother         on meds. is in nursing home     Hypertension Mother      Osteoporosis Mother      Heart Disease Father          at age 57  ? MI--no autopsy     Cancer Father         jaw--smoked [place]     Hypertension Brother      Osteoporosis Sister         on meds     Family History Negative Other         No breast or colon cancer hx in family     Hypertension Sister      Osteoporosis Brother        Social History:  Marital Status:   [2]  Social History     Tobacco Use     Smoking status: Former Smoker     Packs/day: 2.00     Years: 15.00     Pack years: 30.00     Last attempt to quit: 1987     Years since quittin.9     Smokeless tobacco: Never Used   Substance Use Topics     Alcohol use: Yes     Alcohol/week: 60.0 standard drinks     Comment: rare      Drug use: No        Medications:    ASPIRIN 81  MG OR TABS  fluticasone (FLONASE) 50 MCG/ACT nasal spray  GABAPENTIN PO  latanoprost (XALATAN) 0.005 % ophthalmic solution  Misc Natural Products (GLUCOSAMINE CHONDROITIN ADV PO)  MULTI-DAY VITAMINS OR TABS  naproxen (NAPROSYN) 500 MG tablet  Omega-3 Fatty Acids (FISH OIL) 1200 MG CAPS          Review of Systems   All other systems reviewed and are negative.      Physical Exam   BP: (!) 171/98  Pulse: 53  Heart Rate: 66  Temp: 98  F (36.7  C)  Resp: 18  SpO2: 98 %      Physical Exam  Constitutional:       General: He is not in acute distress.     Appearance: Normal appearance. He is not ill-appearing.   HENT:      Mouth/Throat:      Mouth: Mucous membranes are moist.      Pharynx: Oropharynx is clear.   Eyes:      Extraocular Movements: Extraocular movements intact.      Pupils: Pupils are equal, round, and reactive to light.   Cardiovascular:      Rate and Rhythm: Normal rate and regular rhythm. Occasional extrasystoles (PVC with compensatory pause on monitor) are present.     Pulses: Normal pulses.   Pulmonary:      Effort: Pulmonary effort is normal.      Breath sounds: Normal breath sounds.   Abdominal:      General: There is no distension.      Tenderness: There is no tenderness.   Musculoskeletal: Normal range of motion.      Right lower leg: No edema.      Left lower leg: No edema.   Skin:     General: Skin is warm and dry.   Neurological:      General: No focal deficit present.      Mental Status: He is alert and oriented to person, place, and time.         ED Course  (with Medical Decision Making)    64-year-old very fit gentleman noticing irregular heartbeat over the past couple of weeks more noticeable the last couple of days.  Does like his heart stops and then restarts again.  On monitor he has occasional unifocal PVC which is not perfusing and is what he is feeling.  It is not limiting his activity and other than noticing it, he is asymptomatic.    EKG shows a sinus bradycardia at 57 bpm first-degree  AV block with a MO interval of 218 ms.  Occasional unifocal PVC on the monitor.    We will check a CBC, electrolytes, troponin, TSH.    Labs are reassuring.  He remained stable during his ED stay.  I think it would be reasonable to wear a ZIO Patch to see what his PVC burden is and to see if he is having any other arrhythmias.  I sent Dr. Pinedo a note to that effect and asked him to follow-up in clinic.  He is comfortable with this plan.              Procedures               EKG Interpretation:      Interpreted by Nik Solares MD  Time reviewed: 2310  Symptoms at time of EKG: occ skipped beats   Rhythm: sinus bradycardia and 1 degree AV block  Rate: 57  Axis: Normal  Ectopy: none but occ PVC on cardiac monitor  Conduction: 1st degree AV block,  ms  ST Segments/ T Waves: No acute ischemic changes  Q Waves: none  Comparison to prior:.  The EKG from 1/10/2012, there has been essentially no change other than the MO interval is slightly longer tonight.    Clinical Impression: Sinus bradycardia at 57 bpm with first-degree AV block with MO interval of 218 ms.  No acute ischemic changes.                Critical Care time:  none               Results for orders placed or performed during the hospital encounter of 10/10/19 (from the past 24 hour(s))   CBC with platelets differential   Result Value Ref Range    WBC 5.6 4.0 - 11.0 10e9/L    RBC Count 4.44 4.4 - 5.9 10e12/L    Hemoglobin 14.2 13.3 - 17.7 g/dL    Hematocrit 41.1 40.0 - 53.0 %    MCV 93 78 - 100 fl    MCH 32.0 26.5 - 33.0 pg    MCHC 34.5 31.5 - 36.5 g/dL    RDW 12.9 10.0 - 15.0 %    Platelet Count 167 150 - 450 10e9/L    Diff Method Automated Method     % Neutrophils 47.5 %    % Lymphocytes 38.5 %    % Monocytes 8.4 %    % Eosinophils 4.7 %    % Basophils 0.7 %    % Immature Granulocytes 0.2 %    Nucleated RBCs 0 0 /100    Absolute Neutrophil 2.7 1.6 - 8.3 10e9/L    Absolute Lymphocytes 2.2 0.8 - 5.3 10e9/L    Absolute Monocytes 0.5 0.0 - 1.3  10e9/L    Absolute Basophils 0.0 0.0 - 0.2 10e9/L    Abs Immature Granulocytes 0.0 0 - 0.4 10e9/L    Absolute Nucleated RBC 0.0    Basic metabolic panel   Result Value Ref Range    Sodium 142 133 - 144 mmol/L    Potassium 3.7 3.4 - 5.3 mmol/L    Chloride 108 94 - 109 mmol/L    Carbon Dioxide 29 20 - 32 mmol/L    Anion Gap 5 3 - 14 mmol/L    Glucose 102 (H) 70 - 99 mg/dL    Urea Nitrogen 33 (H) 7 - 30 mg/dL    Creatinine 0.91 0.66 - 1.25 mg/dL    GFR Estimate 89 >60 mL/min/[1.73_m2]    GFR Estimate If Black >90 >60 mL/min/[1.73_m2]    Calcium 8.5 8.5 - 10.1 mg/dL   Troponin I   Result Value Ref Range    Troponin I ES <0.015 0.000 - 0.045 ug/L   TSH with free T4 reflex   Result Value Ref Range    TSH 4.49 (H) 0.40 - 4.00 mU/L   T4 free   Result Value Ref Range    T4 Free 0.82 0.76 - 1.46 ng/dL       Medications - No data to display    Assessments & Plan     I have reviewed the nursing notes.    I have reviewed the findings, diagnosis, plan and need for follow up with the patient.       New Prescriptions    No medications on file       Final diagnoses:   PVC's (premature ventricular contractions) - occasional unifocal       10/10/2019   [facility] EMERGENCY DEPARTMENT     Nik Solares MD  10/11/19 0137

## 2019-10-11 NOTE — TELEPHONE ENCOUNTER
----- Message from Nik Solares MD sent at 10/11/2019  1:37 AM CDT -----  Seen in the ED tonight with some PVCs that has become more aware of.  Seem to be low unifocal.  He has a sinus bradycardia with first-degree AV block.  He is doing CrossFit and this does not limit his working out.  I think a ZIO Patch would be reasonable to see what his PVC burden is.  I tried to reassure him and he can certainly check in with cardiology if he has any further issues.  Labs are reassuring.  Could you contact him and help him arrange this?  Thanks    Santo

## 2019-10-11 NOTE — ED TRIAGE NOTES
Pt waiting for appt with cardiology about irregular heart rate. Pt states it feels like it stops. No chest pain or shortness of breath. Just getting uncomfortable

## 2019-10-11 NOTE — TELEPHONE ENCOUNTER
"flonase  Last Written Prescription Date:  02/22/2019  Last Fill Quantity: 16 g,  # refills: 4   Last office visit: 6/12/2019 with prescribing provider:  Khris  Future Office Visit:    Prescription approved per [facility] Refill Protocol.  Requested Prescriptions   Pending Prescriptions Disp Refills     fluticasone (FLONASE) 50 MCG/ACT nasal spray [Pharmacy Med Name: FLUTICASONE NASAL SPRAY] 16 g 4     Sig: USE TWO SPRAYS IN EACH NOSTRIL EVERY DAY       Inhaled Steroids Protocol Passed - 10/11/2019 11:28 AM        Passed - Patient is age 12 or older        Passed - Recent (12 mo) or future (30 days) visit within the authorizing provider's specialty     Patient has had an office visit with the authorizing provider or a provider within the authorizing providers department within the previous 12 mos or has a future within next 30 days. See \"Patient Info\" tab in inbasket, or \"Choose Columns\" in Meds & Orders section of the refill encounter.              Passed - Medication is active on med list          "

## 2019-10-11 NOTE — DISCHARGE INSTRUCTIONS
You have occasional unifocal PVCs which are not concerning.  If you have an extreme number of them or there particularly bothersome, cardiology may be able to put you on some medication to help with that.  Think it would be reasonable to wear a ZIO Patch to determine how frequent you are having them and to see if there is any other irregular heartbeats that are occurring.  Dr. Pinedo can help you arrange that.  I sent him a message to that effect.  Your blood work was reassuring.  It was a pleasure visiting with both of you.  Good luck with your upcoming [employer] competition!    Thank you for choosing [facility]. We appreciate the opportunity to meet your urgent medical needs. Please let us know if we could have done anything to make your stay more satisfying.    After discharge, please closely monitor for any new or worsening symptoms. Return to the Emergency Department if you develop any acute worsening signs or symptoms.    If you had lab work, cultures or imaging studies done during your stay, the final results may still be pending. We will call you if your plan of care needs to change. However, if you are not improving as expected, please follow up with your primary care provider or clinic.     Start any prescription medications that were prescribed to you and take them as directed.     Please see additional handouts that may be pertinent to your condition.

## 2019-10-14 NOTE — TELEPHONE ENCOUNTER
Homero was seen in the ER on Friday and would like to be seen as soon as possible today.  [phone]  Ok to leave message.

## 2019-10-14 NOTE — TELEPHONE ENCOUNTER
Please Triage patient. Dr. Pinedo has signed orders for patient to have a zio patch done. If Patient is not having any difficulty then Dr. Pinedo would like the patient to do the Zio patch, wait for the results to come in and THEN see the patient in clinic for a follow up. He will be called about the Zio patch to be placed.   Leta Rios on 10/14/2019 at 9:15 AM

## 2019-10-14 NOTE — TELEPHONE ENCOUNTER
Homero reports he has not worsened since his ED visit. He was still able to do his cross fit training that past three days. No SOB. He continues to have skiped beats/beats faster as described on 10/9 to the Triage Rn.    Warm transferred to specialty for zio patch placement.

## 2019-10-16 ENCOUNTER — HOSPITAL ENCOUNTER (OUTPATIENT)
Dept: CARDIOLOGY | Facility: CLINIC | Age: 64
Discharge: HOME | End: 2019-10-16
Attending: FAMILY MEDICINE | Admitting: FAMILY MEDICINE
Payer: COMMERCIAL

## 2019-10-16 DIAGNOSIS — R00.1 SINUS BRADYCARDIA: ICD-10-CM

## 2019-10-16 PROCEDURE — 93225 XTRNL ECG REC<48 HRS REC: CPT

## 2019-10-16 PROCEDURE — 0298T ZZC EXT ECG > 48HR TO 21 DAY REVIEW AND INTERPRETATN: CPT | Performed by: INTERNAL MEDICINE

## 2019-10-30 NOTE — TELEPHONE ENCOUNTER
Reason for Call:  Request for results:    Name of test or procedure: Zio Patch    Date of test of procedure: started it 2 weeks ago and wore it for 1 week    Location of the test or procedure:     OK to leave the result message on voice mail or with a family member? YES    Phone number Patient can be reached at:  Home number on file [phone] (home) or [phone] (Work) Ok to ask for him    Additional comments:     Call taken on 10/30/2019 at 7:47 AM by Liv Montero CNA

## 2019-10-30 NOTE — TELEPHONE ENCOUNTER
Per Dr. Pinedo the Zio patch showed mostly normal. A few skipped beats. If patient is still having symptoms he can be referred to cardiology.

## 2019-11-07 ENCOUNTER — TELEPHONE (OUTPATIENT)
Dept: FAMILY MEDICINE | Facility: CLINIC | Age: 64
End: 2019-11-07

## 2019-11-07 NOTE — TELEPHONE ENCOUNTER
Patient would like the report printed for him to  at the  in [place]. This was done as requested.  Leta Rios on 11/7/2019 at 8:34 AM

## 2019-11-07 NOTE — TELEPHONE ENCOUNTER
Reason for Call:  Request for results:    Name of test or procedure: Zio Patch    Date of test of procedure: 10/16/19    Location of the test or procedure: [facility]    OK to leave the result message on voice mail or with a family member? YES    Phone number Patient can be reached at:  Cell number on file:    Telephone Information:   Mobile [phone] or work [phone]       Additional comments:     Call taken on 11/7/2019 at 8:06 AM by Lucille Weaver

## 2019-11-18 ENCOUNTER — OFFICE VISIT (OUTPATIENT)
Dept: CARDIOLOGY | Facility: CLINIC | Age: 64
End: 2019-11-18
Payer: COMMERCIAL

## 2019-11-18 VITALS
WEIGHT: 184 LBS | HEART RATE: 58 BPM | HEIGHT: 67 IN | BODY MASS INDEX: 28.88 KG/M2 | DIASTOLIC BLOOD PRESSURE: 60 MMHG | OXYGEN SATURATION: 97 % | SYSTOLIC BLOOD PRESSURE: 140 MMHG

## 2019-11-18 DIAGNOSIS — I49.3 PVC'S (PREMATURE VENTRICULAR CONTRACTIONS): ICD-10-CM

## 2019-11-18 DIAGNOSIS — R00.2 PALPITATIONS: Primary | ICD-10-CM

## 2019-11-18 PROCEDURE — 99204 OFFICE O/P NEW MOD 45 MIN: CPT | Performed by: INTERNAL MEDICINE

## 2019-11-18 ASSESSMENT — MIFFLIN-ST. JEOR: SCORE: 1593.25

## 2019-11-18 NOTE — LETTER
11/18/2019    Marc Pinedo MD  [address] [facility] [id number]    RE: Deven Smith       Dear Colleague,    I had the pleasure of seeing Deven Smith in the [facility].    HISTORY:    Deven Smith is a pleasant 64-year-old male who was asked to see me for evaluation of PVCs.  He recalls noticing occasional palpitations going back many years but only for the last couple of months have they become frequent and bothersome for him.  He notices them particularly when he is sitting quietly or sleeping, less so throughout the day.    Overall Deven is an extremely healthy and fit 64-year-old.  He competes internationally doing [employer] and holds a world wide rank of about 400th,, putting him at the top 3 or 4%, he states.  He works out vigorously and frequently without any type of chest, arm, neck, or jaw discomfort.  He also denies symptoms of syncope/near syncope, orthostasis, strokelike symptoms, PND/orthopnea, peripheral edema, or claudication.    Baseline ECG is normal.  He had a Holter monitor done showing 1.1% of his beats to be PVCs, no dangerous or or concerning arrhythmias were seen.    Electrolytes are normal, CBC is normal, TSH is mildly elevated with a normal T4.    Deven has paid careful attention to possible triggers for his PVCs and has not been able to identify any.  He reports he does not use over-the-counter cold medications or illicit drugs, nor does he use anabolic steroids.  He uses alcohol only very infrequently and has minimal caffeine.  He has not noticed any association of his PVCs with stress, lack of sleep, or any other possible stimuli.    ASSESSMENT/PLAN:    1.  PVCs.  The burden of 1.1% PVCs is not a concerning level and does not put him at risk of PVC induced cardiomyopathy.  Find nothing else on exam concern and he has excellent stamina so I think it is unlikely he has a cardiomyopathy.  I will, nevertheless, arrange an  echocardiogram to make sure were not missing any structural abnormalities.  He does have a soft murmur, likely secondary to some mild MR.  The only thing that I see that could possibly be a trigger would be his high TSH, but the elevation is only minimal and a be surprised if this were a contributing factor, especially since this would indicate mild hypothyroidism which is unlikely to cause arrhythmias.  His low heart rate, which is due to his heavy exercise, may be a contributing factor to his frequent PVCs.  I encouraged him to continue exercising.    Today I reassured Homero that his arrhythmias are benign.  I talked about possible medical therapies including beta-blocker or antiarrhythmic, but he would prefer not to take medications, which I think is appropriate.    Thank you for inviting me to participate in your patient's care.  Please do not hesitate to call if I can be of further assistance.    Orders Placed This Encounter   Procedures     Echocardiogram Complete     No orders of the defined types were placed in this encounter.    Medications Discontinued During This Encounter   Medication Reason     ASPIRIN 81 MG OR TABS Therapy completed     GABAPENTIN PO Therapy completed     naproxen (NAPROSYN) 500 MG tablet Therapy completed       10 year ASCVD risk: The 10-year ASCVD risk score (Mesa MORENO Jr., et al., 2013) is: 7.7%    Values used to calculate the score:      Age: 64 years      Sex: Male      Is Non- : No      Diabetic: No      Tobacco smoker: No      Systolic Blood Pressure: 140 mmHg      Is BP treated: No      HDL Cholesterol: 76 mg/dL      Total Cholesterol: 168 mg/dL    Encounter Diagnoses   Name Primary?     Palpitations Yes     PVC's (premature ventricular contractions)        CURRENT MEDICATIONS:  Current Outpatient Medications   Medication Sig Dispense Refill     fluticasone (FLONASE) 50 MCG/ACT nasal spray USE TWO SPRAYS IN EACH NOSTRIL EVERY DAY 16 g 4     latanoprost  (XALATAN) 0.005 % ophthalmic solution Place 0.005 drops into both eyes At Bedtime       Misc Natural Products (GLUCOSAMINE CHONDROITIN ADV PO) Take 6 tablets by mouth daily.       MULTI-DAY VITAMINS OR TABS 1 tablet daily 0 0     Omega-3 Fatty Acids (FISH OIL) 1200 MG CAPS Take 1 capsule by mouth daily         ALLERGIES     Allergies   Allergen Reactions     Seasonal Allergies      Tramadol      Muscle spasms,headache       PAST MEDICAL HISTORY:  Past Medical History:   Diagnosis Date     DDD (degenerative disc disease), cervical      Hyperlipidemia LDL goal < 130        PAST SURGICAL HISTORY:  Past Surgical History:   Procedure Laterality Date     COLONOSCOPY  2007    Repeat  for screening purposes in 10 yrs.     COLONOSCOPY  2013    Procedure: COLONOSCOPY;  Colonoscopy;  Surgeon: Emmett San MD;  Location:  GI     COLONOSCOPY N/A 2018    Procedure: COLONOSCOPY;  colonoscopy;  Surgeon: Israel Moreland MD;  Location:  GI     HC SHLDR ARTHROSCOP,SURG,W/ROTAT CUFF REPR Left      HC VASECTOMY UNILAT/BILAT W POSTOP SEMEN  1998    Vasectomy       FAMILY HISTORY:  Family History   Problem Relation Age of Onset     Allergies Brother         on meds     Arthritis Mother      Depression Mother         on meds. is in nursing home     Hypertension Mother      Osteoporosis Mother      Heart Disease Father          at age 57  ? MI--no autopsy     Cancer Father         jaw--smoked [place]     Hypertension Brother      Osteoporosis Sister         on meds     Family History Negative Other         No breast or colon cancer hx in family     Hypertension Sister      Osteoporosis Brother        SOCIAL HISTORY:  Social History     Socioeconomic History     Marital status:      Spouse name: koko     Number of children: 2     Years of education: 13     Highest education level: Not on file   Occupational History     Occupation:    Social Needs     Financial resource strain: Not  on file     Food insecurity:     Worry: Not on file     Inability: Not on file     Transportation needs:     Medical: Not on file     Non-medical: Not on file   Tobacco Use     Smoking status: Former Smoker     Packs/day: 2.00     Years: 15.00     Pack years: 30.00     Last attempt to quit: 1987     Years since quittin.0     Smokeless tobacco: Never Used   Substance and Sexual Activity     Alcohol use: Yes     Alcohol/week: 60.0 standard drinks     Comment: rare      Drug use: No     Sexual activity: Yes     Partners: Female     Birth control/protection: Surgical     Comment: Vasectomy   Lifestyle     Physical activity:     Days per week: Not on file     Minutes per session: Not on file     Stress: Not on file   Relationships     Social connections:     Talks on phone: Not on file     Gets together: Not on file     Attends Taoist service: Not on file     Active member of club or organization: Not on file     Attends meetings of clubs or organizations: Not on file     Relationship status: Not on file     Intimate partner violence:     Fear of current or ex partner: Not on file     Emotionally abused: Not on file     Physically abused: Not on file     Forced sexual activity: Not on file   Other Topics Concern      Service No     Blood Transfusions No     Caffeine Concern No     Comment: 3 cans soda/day     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     Hobby Hazards Yes     Comment: fishing[employer]     Sleep Concern Yes     Comment: is awake after 6 hrs of sleep-     Stress Concern No     Weight Concern No     Special Diet No     Back Care No     Exercise Yes     Comment: walks, does physical labor,     Bike Helmet No     Seat Belt Yes     Self-Exams No     Parent/sibling w/ CABG, MI or angioplasty before 65F 55M? Not Asked   Social History Narrative     Not on file       Review of Systems:  Skin:  Negative     Eyes:  Negative    ENT:  Negative    Respiratory:   "Negative    Cardiovascular:  Negative for;chest pain;edema;lightheadedness;dizziness Positive for;palpitations  Gastroenterology: Negative    Genitourinary:  Negative    Musculoskeletal:  Negative    Neurologic:  Negative    Psychiatric:  Negative    Heme/Lymph/Imm:  Positive for allergies  Endocrine:  Negative      Physical Exam:  Vitals: BP (!) 140/60 (BP Location: Right arm, Patient Position: Fowlers, Cuff Size: Adult Regular)   Pulse 58   Ht 1.702 m (5' 7\")   Wt 83.5 kg (184 lb)   SpO2 97%   BMI 28.82 kg/m       Constitutional:  cooperative, alert and oriented, well developed, well nourished, in no acute distress   Fit in appearance    Skin:  warm and dry to the touch, no apparent skin lesions or masses noted        Head:  normocephalic        Eyes:  no xanthalasma        ENT:  no pallor or cyanosis        Neck:  carotid pulses are full and equal bilaterally, JVP normal, no carotid bruit        Chest:  normal breath sounds, clear to auscultation, normal A-P diameter, normal symmetry, normal respiratory excursion, no use of accessory muscles        Cardiac: regular rhythm;normal S1 and S2;no S3 or S4;apical impulse not displaced       grade 1;holosystolic murmur;apical          Abdomen:  abdomen soft;BS normoactive        Vascular: pulses full and equal                                      Extremities and Back:  no edema        Neurological:  no gross motor deficits          Recent Lab Results:  LIPID RESULTS:  Lab Results   Component Value Date    CHOL 168 06/12/2019    HDL 76 06/12/2019    LDL 86 06/12/2019    TRIG 30 06/12/2019    CHOLHDLRATIO 2.2 09/04/2015       LIVER ENZYME RESULTS:  Lab Results   Component Value Date    AST 26 06/12/2019    ALT 37 06/12/2019       CBC RESULTS:  Lab Results   Component Value Date    WBC 5.6 10/10/2019    RBC 4.44 10/10/2019    HGB 14.2 10/10/2019    HCT 41.1 10/10/2019    MCV 93 10/10/2019    MCH 32.0 10/10/2019    MCHC 34.5 10/10/2019    RDW 12.9 10/10/2019     " 10/10/2019       BMP RESULTS:  Lab Results   Component Value Date     10/10/2019    POTASSIUM 3.7 10/10/2019    CHLORIDE 108 10/10/2019    CO2 29 10/10/2019    ANIONGAP 5 10/10/2019     (H) 10/10/2019    BUN 33 (H) 10/10/2019    CR 0.91 10/10/2019    GFRESTIMATED 89 10/10/2019    GFRESTBLACK >90 10/10/2019    ROSIE 8.5 10/10/2019        A1C RESULTS:  No results found for: A1C    INR RESULTS:  No results found for: INR      Mario Jarrett MD, FACC    CC  Marc Pinedo MD  [address] [place] [id number]                      Thank you for allowing me to participate in the care of your patient.    Sincerely,     Mario Jarrett MD     [facility]

## 2019-11-18 NOTE — PROGRESS NOTES
HISTORY:    Deven Smith is a pleasant 64-year-old male who was asked to see me for evaluation of PVCs.  He recalls noticing occasional palpitations going back many years but only for the last couple of months have they become frequent and bothersome for him.  He notices them particularly when he is sitting quietly or sleeping, less so throughout the day.    Overall Deven is an extremely healthy and fit 64-year-old.  He competes internationally doing [employer] and holds a world wide rank of about 400th,, putting him at the top 3 or 4%, he states.  He works out vigorously and frequently without any type of chest, arm, neck, or jaw discomfort.  He also denies symptoms of syncope/near syncope, orthostasis, strokelike symptoms, PND/orthopnea, peripheral edema, or claudication.    Baseline ECG is normal.  He had a Holter monitor done showing 1.1% of his beats to be PVCs, no dangerous or or concerning arrhythmias were seen.    Electrolytes are normal, CBC is normal, TSH is mildly elevated with a normal T4.    Deven has paid careful attention to possible triggers for his PVCs and has not been able to identify any.  He reports he does not use over-the-counter cold medications or illicit drugs, nor does he use anabolic steroids.  He uses alcohol only very infrequently and has minimal caffeine.  He has not noticed any association of his PVCs with stress, lack of sleep, or any other possible stimuli.    ASSESSMENT/PLAN:    1.  PVCs.  The burden of 1.1% PVCs is not a concerning level and does not put him at risk of PVC induced cardiomyopathy.  Find nothing else on exam concern and he has excellent stamina so I think it is unlikely he has a cardiomyopathy.  I will, nevertheless, arrange an echocardiogram to make sure were not missing any structural abnormalities.  He does have a soft murmur, likely secondary to some mild MR.  The only thing that I see that could possibly be a trigger would be his high TSH, but the  elevation is only minimal and a be surprised if this were a contributing factor, especially since this would indicate mild hypothyroidism which is unlikely to cause arrhythmias.  His low heart rate, which is due to his heavy exercise, may be a contributing factor to his frequent PVCs.  I encouraged him to continue exercising.    Today I reassured Homero that his arrhythmias are benign.  I talked about possible medical therapies including beta-blocker or antiarrhythmic, but he would prefer not to take medications, which I think is appropriate.    Thank you for inviting me to participate in your patient's care.  Please do not hesitate to call if I can be of further assistance.    Orders Placed This Encounter   Procedures     Echocardiogram Complete     No orders of the defined types were placed in this encounter.    Medications Discontinued During This Encounter   Medication Reason     ASPIRIN 81 MG OR TABS Therapy completed     GABAPENTIN PO Therapy completed     naproxen (NAPROSYN) 500 MG tablet Therapy completed       10 year ASCVD risk: The 10-year ASCVD risk score (Feliberto MAYER Jr., et al., 2013) is: 7.7%    Values used to calculate the score:      Age: 64 years      Sex: Male      Is Non- : No      Diabetic: No      Tobacco smoker: No      Systolic Blood Pressure: 140 mmHg      Is BP treated: No      HDL Cholesterol: 76 mg/dL      Total Cholesterol: 168 mg/dL    Encounter Diagnoses   Name Primary?     Palpitations Yes     PVC's (premature ventricular contractions)        CURRENT MEDICATIONS:  Current Outpatient Medications   Medication Sig Dispense Refill     fluticasone (FLONASE) 50 MCG/ACT nasal spray USE TWO SPRAYS IN EACH NOSTRIL EVERY DAY 16 g 4     latanoprost (XALATAN) 0.005 % ophthalmic solution Place 0.005 drops into both eyes At Bedtime       Misc Natural Products (GLUCOSAMINE CHONDROITIN ADV PO) Take 6 tablets by mouth daily.       MULTI-DAY VITAMINS OR TABS 1 tablet daily 0 0      Omega-3 Fatty Acids (FISH OIL) 1200 MG CAPS Take 1 capsule by mouth daily         ALLERGIES     Allergies   Allergen Reactions     Seasonal Allergies      Tramadol      Muscle spasms,headache       PAST MEDICAL HISTORY:  Past Medical History:   Diagnosis Date     DDD (degenerative disc disease), cervical      Hyperlipidemia LDL goal < 130        PAST SURGICAL HISTORY:  Past Surgical History:   Procedure Laterality Date     COLONOSCOPY  2007    Repeat  for screening purposes in 10 yrs.     COLONOSCOPY  2013    Procedure: COLONOSCOPY;  Colonoscopy;  Surgeon: Emmett San MD;  Location:  GI     COLONOSCOPY N/A 2018    Procedure: COLONOSCOPY;  colonoscopy;  Surgeon: Israel Moreland MD;  Location:  GI     HC SHLDR ARTHROSCOP,SURG,W/ROTAT CUFF REPR Left      HC VASECTOMY UNILAT/BILAT W POSTOP SEMEN  1998    Vasectomy       FAMILY HISTORY:  Family History   Problem Relation Age of Onset     Allergies Brother         on meds     Arthritis Mother      Depression Mother         on meds. is in nursing home     Hypertension Mother      Osteoporosis Mother      Heart Disease Father          at age 57  ? MI--no autopsy     Cancer Father         jaw--smoked [place]     Hypertension Brother      Osteoporosis Sister         on meds     Family History Negative Other         No breast or colon cancer hx in family     Hypertension Sister      Osteoporosis Brother        SOCIAL HISTORY:  Social History     Socioeconomic History     Marital status:      Spouse name: koko     Number of children: 2     Years of education: 13     Highest education level: Not on file   Occupational History     Occupation:    Social Needs     Financial resource strain: Not on file     Food insecurity:     Worry: Not on file     Inability: Not on file     Transportation needs:     Medical: Not on file     Non-medical: Not on file   Tobacco Use     Smoking status: Former Smoker     Packs/day: 2.00      Years: 15.00     Pack years: 30.00     Last attempt to quit: 1987     Years since quittin.0     Smokeless tobacco: Never Used   Substance and Sexual Activity     Alcohol use: Yes     Alcohol/week: 60.0 standard drinks     Comment: rare      Drug use: No     Sexual activity: Yes     Partners: Female     Birth control/protection: Surgical     Comment: Vasectomy   Lifestyle     Physical activity:     Days per week: Not on file     Minutes per session: Not on file     Stress: Not on file   Relationships     Social connections:     Talks on phone: Not on file     Gets together: Not on file     Attends Scientology service: Not on file     Active member of club or organization: Not on file     Attends meetings of clubs or organizations: Not on file     Relationship status: Not on file     Intimate partner violence:     Fear of current or ex partner: Not on file     Emotionally abused: Not on file     Physically abused: Not on file     Forced sexual activity: Not on file   Other Topics Concern      Service No     Blood Transfusions No     Caffeine Concern No     Comment: 3 cans soda/day     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     Hobby Hazards Yes     Comment: [employer]     Sleep Concern Yes     Comment: is awake after 6 hrs of sleep-     Stress Concern No     Weight Concern No     Special Diet No     Back Care No     Exercise Yes     Comment: walks, does physical labor,     Bike Helmet No     Seat Belt Yes     Self-Exams No     Parent/sibling w/ CABG, MI or angioplasty before 65F 55M? Not Asked   Social History Narrative     Not on file       Review of Systems:  Skin:  Negative     Eyes:  Negative    ENT:  Negative    Respiratory:  Negative    Cardiovascular:  Negative for;chest pain;edema;lightheadedness;dizziness Positive for;palpitations  Gastroenterology: Negative    Genitourinary:  Negative    Musculoskeletal:  Negative    Neurologic:  Negative   "  Psychiatric:  Negative    Heme/Lymph/Imm:  Positive for allergies  Endocrine:  Negative      Physical Exam:  Vitals: BP (!) 140/60 (BP Location: Right arm, Patient Position: Fowlers, Cuff Size: Adult Regular)   Pulse 58   Ht 1.702 m (5' 7\")   Wt 83.5 kg (184 lb)   SpO2 97%   BMI 28.82 kg/m      Constitutional:  cooperative, alert and oriented, well developed, well nourished, in no acute distress   Fit in appearance    Skin:  warm and dry to the touch, no apparent skin lesions or masses noted        Head:  normocephalic        Eyes:  no xanthalasma        ENT:  no pallor or cyanosis        Neck:  carotid pulses are full and equal bilaterally, JVP normal, no carotid bruit        Chest:  normal breath sounds, clear to auscultation, normal A-P diameter, normal symmetry, normal respiratory excursion, no use of accessory muscles        Cardiac: regular rhythm;normal S1 and S2;no S3 or S4;apical impulse not displaced       grade 1;holosystolic murmur;apical          Abdomen:  abdomen soft;BS normoactive        Vascular: pulses full and equal                                      Extremities and Back:  no edema        Neurological:  no gross motor deficits          Recent Lab Results:  LIPID RESULTS:  Lab Results   Component Value Date    CHOL 168 06/12/2019    HDL 76 06/12/2019    LDL 86 06/12/2019    TRIG 30 06/12/2019    CHOLHDLRATIO 2.2 09/04/2015       LIVER ENZYME RESULTS:  Lab Results   Component Value Date    AST 26 06/12/2019    ALT 37 06/12/2019       CBC RESULTS:  Lab Results   Component Value Date    WBC 5.6 10/10/2019    RBC 4.44 10/10/2019    HGB 14.2 10/10/2019    HCT 41.1 10/10/2019    MCV 93 10/10/2019    MCH 32.0 10/10/2019    MCHC 34.5 10/10/2019    RDW 12.9 10/10/2019     10/10/2019       BMP RESULTS:  Lab Results   Component Value Date     10/10/2019    POTASSIUM 3.7 10/10/2019    CHLORIDE 108 10/10/2019    CO2 29 10/10/2019    ANIONGAP 5 10/10/2019     (H) 10/10/2019    BUN 33 " (H) 10/10/2019    CR 0.91 10/10/2019    GFRESTIMATED 89 10/10/2019    GFRESTBLACK >90 10/10/2019    ROSIE 8.5 10/10/2019        A1C RESULTS:  No results found for: A1C    INR RESULTS:  No results found for: INR      Mario Jarrett MD, FACC    CC  Marc Pinedo MD  [address] [place] [id number]

## 2019-11-19 ENCOUNTER — HOSPITAL ENCOUNTER (OUTPATIENT)
Dept: CARDIOLOGY | Facility: CLINIC | Age: 64
Discharge: HOME | End: 2019-11-19
Attending: INTERNAL MEDICINE | Admitting: INTERNAL MEDICINE
Payer: COMMERCIAL

## 2019-11-19 DIAGNOSIS — R00.2 PALPITATIONS: ICD-10-CM

## 2019-11-19 DIAGNOSIS — I49.3 PVC'S (PREMATURE VENTRICULAR CONTRACTIONS): ICD-10-CM

## 2019-11-19 PROCEDURE — 93306 TTE W/DOPPLER COMPLETE: CPT | Mod: 26 | Performed by: INTERNAL MEDICINE

## 2019-11-19 PROCEDURE — 93306 TTE W/DOPPLER COMPLETE: CPT

## 2019-11-25 ENCOUNTER — TELEPHONE (OUTPATIENT)
Dept: CARDIOLOGY | Facility: CLINIC | Age: 64
End: 2019-11-25

## 2019-11-25 ENCOUNTER — MYC MEDICAL ADVICE (OUTPATIENT)
Dept: CARDIOLOGY | Facility: CLINIC | Age: 64
End: 2019-11-25

## 2019-11-25 NOTE — TELEPHONE ENCOUNTER
Echocardiogram was completed on 11-19-19    Interpretation Summary     There is mild to moderate concentric left ventricular hypertrophy.  Left ventricular systolic function is normal.  The visual ejection fraction is estimated at 60-65%.  No regional wall motion abnormalities noted.  There is no comparison study available    Notes recorded by Carmelina Stratton RN on 11/20/2019 at 11:14 AM CST    Dr. Jarrett, patient has PVC's, burden 1.1%. Noted soft murmur. Pt had a elevated TSH 4.49. Noted that mild hypothyroidism would unlikely cause arrhythmias. Noted low heart rate (patient exercises), may contributed to frequent PVCs. Patient preferred not to take medications.     Echo   The visual ejection fraction is estimated at 60-65%. LVEF 62% based on biplane 2D tracing.  The left atrium is moderately dilated. The right atrium is mildly dilated  No regional wall motion abnormalities noted.  Trace MR and AR     No f/u orders, Please advise.     Keyana SOUSA, BSN    -- Message from Mario Jarrett MD sent at 11/21/2019  3:35 PM CST --    No further evaluation, f/u prn  ----------------------------------------------------------------------------------------------------    Results and recommendations were reviewed with patient over the phone, Patient had no questions.     Keyana SOUSA, BSN

## 2019-12-04 ENCOUNTER — OFFICE VISIT (OUTPATIENT)
Dept: ORTHOPEDICS | Facility: CLINIC | Age: 64
End: 2019-12-04
Payer: COMMERCIAL

## 2019-12-04 VITALS — BODY MASS INDEX: 25.92 KG/M2 | HEIGHT: 69 IN | WEIGHT: 175 LBS

## 2019-12-04 DIAGNOSIS — M25.511 CHRONIC RIGHT SHOULDER PAIN: Primary | ICD-10-CM

## 2019-12-04 DIAGNOSIS — G89.29 CHRONIC RIGHT SHOULDER PAIN: Primary | ICD-10-CM

## 2019-12-04 PROCEDURE — 64418 NJX AA&/STRD SPRSCAP NRV: CPT | Mod: RT | Performed by: FAMILY MEDICINE

## 2019-12-04 PROCEDURE — 99207 ZZC DROP WITH A PROCEDURE: CPT | Performed by: FAMILY MEDICINE

## 2019-12-04 PROCEDURE — 76942 ECHO GUIDE FOR BIOPSY: CPT | Performed by: FAMILY MEDICINE

## 2019-12-04 RX ORDER — TRIAMCINOLONE ACETONIDE 40 MG/ML
40 INJECTION, SUSPENSION INTRA-ARTICULAR; INTRAMUSCULAR
Status: DISCONTINUED | OUTPATIENT
Start: 2019-12-04 | End: 2024-02-26

## 2019-12-04 RX ADMIN — TRIAMCINOLONE ACETONIDE 40 MG: 40 INJECTION, SUSPENSION INTRA-ARTICULAR; INTRAMUSCULAR at 11:16

## 2019-12-04 ASSESSMENT — MIFFLIN-ST. JEOR: SCORE: 1584.17

## 2019-12-04 ASSESSMENT — PAIN SCALES - GENERAL: PAINLEVEL: MILD PAIN (2)

## 2019-12-04 NOTE — LETTER
"    12/4/2019         RE: Deven Smith  [id number] [address] [place] [id number]        Dear Colleague,    Thank you for referring your patient, Deven Smith, to the [facility]. Please see a copy of my visit note below.          [facility] FOLLOW-UP VISIT 12/4/2019    Deven Smith's chief complaint for this visit includes:  Chief Complaint   Patient presents with     Procedure     right suprascapular nerve cortisone injection, last injection was 5/15 doing great until recently.      PCP: Marc Pinedo    Referring Provider:  No referring provider defined for this encounter.    Ht 1.753 m (5' 9\")   Wt 79.4 kg (175 lb)   BMI 25.84 kg/m     Mild Pain (2)       Interval History:     Follow up reason: right suprascapular nerve cortisone injection     Medical History:    Any recent changes to your medical history? No    Any new medication prescribed since last visit? No    Review of Systems:    Do you have fever, chills, weight loss? No    Do you have any vision problems? No    Do you have any chest pain or edema? No    Do you have any shortness of breath or wheezing?  No    Do you have stomach problems? No    Do you have any numbness or focal weakness? No    Do you have diabetes? No    Do you have problems with bleeding or clotting? No    Do you have an rashes or other skin lesions? No    Hand / Upper Extremity Injection/Arthrocentesis  Date/Time: 12/4/2019 11:16 AM  Performed by: Taz Maurice DO  Authorized by: Taz Maurice DO     Indications:  Pain  Needle Size:  25 G  Guidance: ultrasound     Condition comment:  Suprascapular nerve block     Medications:  40 mg triamcinolone 40 MG/ML  Outcome:  Tolerated well, no immediate complications  Procedure discussed: discussed risks, benefits, and alternatives    Consent Given by:  Patient  Timeout: timeout called immediately prior to procedure    Prep: patient was prepped and draped in usual sterile fashion  " "    Homero has chronic right shoulder pain.  He's here for a suprascapular nerve block.  The last suprascapular nerve block he had was in May, this helped his pain for about 6 months.    Vitals:    12/04/19 1106   Weight: 79.4 kg (175 lb)   Height: 1.753 m (5' 9\")     Suprascapular Nerve Injection - Ultrasound Guided  The patient was informed of the risks and the benefits of the procedure and a written consent was signed.  The patient s right scapula was prepped with chlorhexidine in sterile fashion.   40 mg of triamcinolone suspension was drawn up into a 5 mL syringe with 4 mL of 1% lidocaine.  Injection was performed using sterile technique.  Under ultrasound guidance a 3.5-inch 22-gauge needle was used to visualize the suprascapular nerve at the spinoglenoid notch.  Injection performed in long axis to the probe with a medial to lateral approach.  There were no complications. The patient tolerated the procedure well. There was negligible bleeding.   The patient was instructed to ice the shoulder upon leaving clinic and refrain from overuse over the next 3 days.   The patient was instructed to call or go to the emergency room with any unusual pain, swelling, redness, or if otherwise concerned.          Again, thank you for allowing me to participate in the care of your patient.        Sincerely,        Taz Maurice DO    "

## 2019-12-04 NOTE — PROGRESS NOTES
"      [facility] FOLLOW-UP VISIT 12/4/2019    Deven Smith's chief complaint for this visit includes:  Chief Complaint   Patient presents with     Procedure     right suprascapular nerve cortisone injection, last injection was 5/15 doing great until recently.      PCP: Marc Pinedo    Referring Provider:  No referring provider defined for this encounter.    Ht 1.753 m (5' 9\")   Wt 79.4 kg (175 lb)   BMI 25.84 kg/m    Mild Pain (2)       Interval History:     Follow up reason: right suprascapular nerve cortisone injection     Medical History:    Any recent changes to your medical history? No    Any new medication prescribed since last visit? No    Review of Systems:    Do you have fever, chills, weight loss? No    Do you have any vision problems? No    Do you have any chest pain or edema? No    Do you have any shortness of breath or wheezing?  No    Do you have stomach problems? No    Do you have any numbness or focal weakness? No    Do you have diabetes? No    Do you have problems with bleeding or clotting? No    Do you have an rashes or other skin lesions? No    Hand / Upper Extremity Injection/Arthrocentesis  Date/Time: 12/4/2019 11:16 AM  Performed by: Taz Maurice DO  Authorized by: Taz Maurice DO     Indications:  Pain  Needle Size:  25 G  Guidance: ultrasound     Condition comment:  Suprascapular nerve block     Medications:  40 mg triamcinolone 40 MG/ML  Outcome:  Tolerated well, no immediate complications  Procedure discussed: discussed risks, benefits, and alternatives    Consent Given by:  Patient  Timeout: timeout called immediately prior to procedure    Prep: patient was prepped and draped in usual sterile fashion      Homero has chronic right shoulder pain.  He's here for a suprascapular nerve block.  The last suprascapular nerve block he had was in May, this helped his pain for about 6 months.    Vitals:    12/04/19 1106   Weight: 79.4 kg (175 lb)   Height: 1.753 m " "(5' 9\")     Suprascapular Nerve Injection - Ultrasound Guided  The patient was informed of the risks and the benefits of the procedure and a written consent was signed.  The patient s right scapula was prepped with chlorhexidine in sterile fashion.   40 mg of triamcinolone suspension was drawn up into a 5 mL syringe with 4 mL of 1% lidocaine.  Injection was performed using sterile technique.  Under ultrasound guidance a 3.5-inch 22-gauge needle was used to visualize the suprascapular nerve at the spinoglenoid notch.  Injection performed in long axis to the probe with a medial to lateral approach.  There were no complications. The patient tolerated the procedure well. There was negligible bleeding.   The patient was instructed to ice the shoulder upon leaving clinic and refrain from overuse over the next 3 days.   The patient was instructed to call or go to the emergency room with any unusual pain, swelling, redness, or if otherwise concerned.        "

## 2019-12-11 ENCOUNTER — TELEPHONE (OUTPATIENT)
Dept: FAMILY MEDICINE | Facility: CLINIC | Age: 64
End: 2019-12-11

## 2019-12-11 ENCOUNTER — OFFICE VISIT (OUTPATIENT)
Dept: FAMILY MEDICINE | Facility: CLINIC | Age: 64
End: 2019-12-11
Payer: COMMERCIAL

## 2019-12-11 VITALS
RESPIRATION RATE: 14 BRPM | WEIGHT: 184 LBS | SYSTOLIC BLOOD PRESSURE: 136 MMHG | DIASTOLIC BLOOD PRESSURE: 74 MMHG | HEART RATE: 71 BPM | HEIGHT: 69 IN | BODY MASS INDEX: 27.25 KG/M2 | TEMPERATURE: 97.5 F | OXYGEN SATURATION: 99 %

## 2019-12-11 DIAGNOSIS — Z23 NEED FOR PROPHYLACTIC VACCINATION AND INOCULATION AGAINST INFLUENZA: Primary | ICD-10-CM

## 2019-12-11 DIAGNOSIS — D17.30 LIPOMA OF SKIN AND SUBCUTANEOUS TISSUE: ICD-10-CM

## 2019-12-11 DIAGNOSIS — E03.9 HYPOTHYROIDISM, UNSPECIFIED TYPE: ICD-10-CM

## 2019-12-11 LAB — TSH SERPL DL<=0.005 MIU/L-ACNC: 1.23 MU/L (ref 0.4–4)

## 2019-12-11 PROCEDURE — 99214 OFFICE O/P EST MOD 30 MIN: CPT | Mod: 25 | Performed by: FAMILY MEDICINE

## 2019-12-11 PROCEDURE — 90471 IMMUNIZATION ADMIN: CPT | Performed by: FAMILY MEDICINE

## 2019-12-11 PROCEDURE — 90682 RIV4 VACC RECOMBINANT DNA IM: CPT | Performed by: FAMILY MEDICINE

## 2019-12-11 PROCEDURE — 84443 ASSAY THYROID STIM HORMONE: CPT | Performed by: FAMILY MEDICINE

## 2019-12-11 PROCEDURE — 36415 COLL VENOUS BLD VENIPUNCTURE: CPT | Performed by: FAMILY MEDICINE

## 2019-12-11 ASSESSMENT — PAIN SCALES - GENERAL: PAINLEVEL: NO PAIN (0)

## 2019-12-11 ASSESSMENT — MIFFLIN-ST. JEOR: SCORE: 1625

## 2019-12-11 NOTE — TELEPHONE ENCOUNTER
----- Message from Marc Pinedo MD sent at 12/11/2019  2:20 PM CST -----  Thyroid test is now entirely normal.

## 2019-12-11 NOTE — PROGRESS NOTES
Subjective     Deven Smith is a 64 year old male who presents to clinic today for the following health issues:    HPI   Hypothyroidism Follow-up      Since last visit, patient describes the following symptoms: fatigue and hair loss      How many servings of fruits and vegetables do you eat daily?  2-3    On average, how many sweetened beverages do you drink each day (Examples: soda, juice, sweet tea, etc.  Do NOT count diet or artificially sweetened beverages)?   0    How many days per week do you miss taking your medication? 0        SUBJECTIVE:  Deven  is a 64 year old male who presents for: Several issues today.  One he needs recheck on his TSH which was just slightly elevated 2 months ago.  He has no known history of hypothyroidism.  He is been seen by cardiology because of PVCs.  Fairly frequent ones.  He can feel it.  Not as much of a problem anymore right now very athletic and it does not seem to interfere with his activities.  Another issue is he is got a lipoma on his right posterior right thigh is growing.  He wants consideration for removal of this.      Past Medical History:   Diagnosis Date     DDD (degenerative disc disease), cervical      Hyperlipidemia LDL goal < 130      Past Surgical History:   Procedure Laterality Date     COLONOSCOPY  2007    Repeat  for screening purposes in 10 yrs.     COLONOSCOPY  2013    Procedure: COLONOSCOPY;  Colonoscopy;  Surgeon: Emmett San MD;  Location:  GI     COLONOSCOPY N/A 2018    Procedure: COLONOSCOPY;  colonoscopy;  Surgeon: Israel Moreland MD;  Location:  GI     HC SHLDR ARTHROSCOP,SURG,W/ROTAT CUFF REPR Left      HC VASECTOMY UNILAT/BILAT W POSTOP SEMEN  1998    Vasectomy     Social History     Tobacco Use     Smoking status: Former Smoker     Packs/day: 2.00     Years: 15.00     Pack years: 30.00     Last attempt to quit: 1987     Years since quittin.1     Smokeless tobacco: Never Used   Substance Use  "Topics     Alcohol use: Yes     Alcohol/week: 60.0 standard drinks     Comment: rare      Current Outpatient Medications   Medication Sig Dispense Refill     fluticasone (FLONASE) 50 MCG/ACT nasal spray USE TWO SPRAYS IN EACH NOSTRIL EVERY DAY 16 g 4     latanoprost (XALATAN) 0.005 % ophthalmic solution Place 0.005 drops into both eyes At Bedtime       Misc Natural Products (GLUCOSAMINE CHONDROITIN ADV PO) Take 6 tablets by mouth daily.       MULTI-DAY VITAMINS OR TABS 1 tablet daily 0 0       REVIEW OF SYSTEMS:   5 point ROS negative except as noted above in HPI, including Gen., Resp, CV, GI &  system review.     OBJECTIVE:  Vitals: /74 (BP Location: Left arm, Patient Position: Sitting, Cuff Size: Adult Regular)   Pulse 71   Temp 97.5  F (36.4  C) (Temporal)   Resp 14   Ht 1.753 m (5' 9\")   Wt 83.5 kg (184 lb)   SpO2 99%   BMI 27.17 kg/m    BMI= Body mass index is 27.17 kg/m .  He is alert appears well.  Eyes PERRLA.  Throat clear.  Neck supple no thyromegaly.  Heart with a regular rhythm no skipped beats heard.  Lungs are clear.  Extremities show a somewhat deep feeling lipomatous type structure in the posterior thigh and the right about 3 cm in size.    ASSESSMENT:  1 PVCs #2 history of elevated TSH #3 lipoma    PLAN:  Did repeat TSH on him and it came back entirely normal.  Will notify him of this result.  He was reassured by cardiology we will send him to general surgery for consideration of removal of this lipoma.        Marc Pinedo MD  [facility]              "

## 2019-12-17 ENCOUNTER — OFFICE VISIT (OUTPATIENT)
Dept: SURGERY | Facility: CLINIC | Age: 64
End: 2019-12-17
Payer: COMMERCIAL

## 2019-12-17 VITALS
DIASTOLIC BLOOD PRESSURE: 86 MMHG | TEMPERATURE: 97.8 F | HEIGHT: 67 IN | BODY MASS INDEX: 29.21 KG/M2 | SYSTOLIC BLOOD PRESSURE: 144 MMHG | WEIGHT: 186.1 LBS

## 2019-12-17 VITALS
WEIGHT: 186 LBS | SYSTOLIC BLOOD PRESSURE: 146 MMHG | HEIGHT: 67 IN | TEMPERATURE: 97.8 F | DIASTOLIC BLOOD PRESSURE: 82 MMHG | BODY MASS INDEX: 29.19 KG/M2

## 2019-12-17 DIAGNOSIS — D17.23 LIPOMA OF RIGHT LOWER EXTREMITY: Primary | ICD-10-CM

## 2019-12-17 PROCEDURE — 27337 EXC THIGH/KNEE LES SC 3 CM/>: CPT | Mod: RT | Performed by: SURGERY

## 2019-12-17 PROCEDURE — 88304 TISSUE EXAM BY PATHOLOGIST: CPT | Mod: TC | Performed by: SURGERY

## 2019-12-17 PROCEDURE — 99203 OFFICE O/P NEW LOW 30 MIN: CPT | Mod: 25 | Performed by: SURGERY

## 2019-12-17 ASSESSMENT — MIFFLIN-ST. JEOR
SCORE: 1602.77
SCORE: 1602.32

## 2019-12-17 NOTE — PROGRESS NOTES
The History and Physical has been reviewed, the patient has been examined and no changes have occurred in the patient's condition since the H & P was completed.       Homero to follow up with Primary Care provider regarding elevated blood pressure.    [facility], DO

## 2019-12-17 NOTE — PROGRESS NOTES
Homero to follow up with Primary Care provider regarding elevated blood pressure.    146/86    Ciara MAY CMA

## 2019-12-17 NOTE — PROGRESS NOTES
General Surgery Consultation    Deven Smith MRN# [id number]   Age: 64 year old YOB: 1955     Reason for consult: Right posterior lipoma                        Assessment and Plan:   I was asked to see this patient at the request of Dr. Marc Pinedo for evaluation of right posterior thigh lipoma.  Deven Smith is a 64 year old male who presented with history, exam, laboratory and imaging most consistent with:        ICD-10-CM    1. Lipoma of right lower extremity D17.23        Reassured the patient that this mass is a lipoma.  We can remove this in the office under local anesthesia.  Patient denies any similar lesion elsewhere.  Thus patient will come back later this afternoon to have this be removed under local.  Risks, benefits, alternatives were explained to the patient; He or she showed understanding and wished to proceed with the above.  Risks includes: Bleeding, infection, seroma, hematoma, possible second surgery depending on final pathology.       I thank Dr. Marc Pinedo for the opportunity to participate in the patient's care.           Chief Complaint:   Right posterior thigh lipoma     History is obtained from the patient         History of Present Illness:   This patient is a 64 year old  male with a significant past medical history of HLD, hypothyroidism who presents with RLE lipoma.     Right posterior thigh - >20 years; still same size but getting gmore uncomfortable; especially when he drives.  No numbness; no tingling.  No similar lesion elsewhere.  No family history of sarcoma.  No history of skin cancer.  Not on any anticoagulation.  No longer a smoker.  Would like to have this be removed.          Past Medical History:    has a past medical history of DDD (degenerative disc disease), cervical and Hyperlipidemia LDL goal < 130.          Past Surgical History:     Past Surgical History:   Procedure Laterality Date     COLONOSCOPY  03/21/2007    Repeat  for  screening purposes in 10 yrs.     COLONOSCOPY  1/2/2013    Procedure: COLONOSCOPY;  Colonoscopy;  Surgeon: Emmett San MD;  Location:  GI     COLONOSCOPY N/A 8/6/2018    Procedure: COLONOSCOPY;  colonoscopy;  Surgeon: Israel Moreland MD;  Location:  GI     HC SHLDR ARTHROSCOP,SURG,W/ROTAT CUFF REPR Left 2010     HC VASECTOMY UNILAT/BILAT W POSTOP SEMEN  1998    Vasectomy           Medications:   fluticasone (FLONASE) 50 MCG/ACT nasal spray, USE TWO SPRAYS IN EACH NOSTRIL EVERY DAY  latanoprost (XALATAN) 0.005 % ophthalmic solution, Place 0.005 drops into both eyes At Bedtime  Misc Natural Products (GLUCOSAMINE CHONDROITIN ADV PO), Take 6 tablets by mouth daily.  MULTI-DAY VITAMINS OR TABS, 1 tablet daily    triamcinolone (KENALOG-40) injection 40 mg  triamcinolone (KENALOG-40) injection 40 mg  triamcinolone (KENALOG-40) injection 40 mg          Allergies:      Allergies   Allergen Reactions     Seasonal Allergies      Tramadol      Muscle spasms,headache            Social History:   Deven Smith  reports that he quit smoking about 32 years ago. He has a 30.00 pack-year smoking history. He has never used smokeless tobacco. He reports current alcohol use of about 60.0 standard drinks of alcohol per week. He reports that he does not use drugs.          Family History:   The patient has no family history of any bleeding, clotting or anesthesia problems.          Review of Systems:     Constitutional: Denies fever or chills   Eyes: Denies change in visual acuity   HENT: Denies nasal congestion or sore throat   Respiratory: Denies cough or shortness of breath   Cardiovascular: Denies chest pain or edema   GI: Denies abdominal pain, nausea, vomiting, bloody stools or diarrhea   : Denies dysuria   Musculoskeletal: Denies back pain or joint pain   Integument: Denies rash   Neurologic: Denies headache, focal weakness or sensory changes   Endocrine: Denies polyuria or polydipsia   Lymphatic: Denies  "swollen glands   Psychiatric: Denies depression or anxiety          Physical Exam:     Vitals: BP (!) 144/86   Temp 97.8  F (36.6  C) (Temporal)   Ht 1.702 m (5' 7\")   Wt 84.4 kg (186 lb 1.6 oz)   BMI 29.15 kg/m    BMI= Body mass index is 29.15 kg/m .  Constitutional: Awake, alert, no acute distress.  Eyes:  No scleral icterus.  Conjunctiva are without injection.  ENMT: Mucous membranes moist, dentition and gums are intact.   Neck: Soft, supple, trachea midline.    Endocrine: n/a  Lymphatic: There is no cervical, submandibular, or supraclavicular adenopathy.  Respiratory: No audible wheezes, no acute distress  Cardiovascular: Regular; S1, S2    Abdomen: Non-distended, non-tender, normoactive bowel sounds present, No masses, neg hernias, or flank tenderness. No hepatosplenomegaly.   Musculoskeletal: No spinal or CVA tenderness. Full range of motion in the upper and lower extremities.    Skin: No skin rashes or lesions to inspection.  No petechia.  Posterior right thigh noted ~2-3cm mobile mass - likely lipoma  Neurologic: Cranial nerves II through XII are grossly intact and symmetric.  Psychiatric: The patient is alert and oriented times 3.  The patient's affect is not blunted and mood is appropriate.          Data:   Vital Signs:  BP (!) 144/86   Temp 97.8  F (36.6  C) (Temporal)   Ht 1.702 m (5' 7\")   Wt 84.4 kg (186 lb 1.6 oz)   BMI 29.15 kg/m       WBC -   WBC   Date Value Ref Range Status   10/10/2019 5.6 4.0 - 11.0 10e9/L Final   ], HgB -   Hemoglobin   Date Value Ref Range Status   10/10/2019 14.2 13.3 - 17.7 g/dL Final   ]   Liver Function Studies -   Recent Labs   Lab Test 06/12/19  1141   PROTTOTAL 6.7*   ALBUMIN 3.8   BILITOTAL 0.6   ALKPHOS 64   AST 26   ALT 37     Recent Results (from the past 744 hour(s))   Echocardiogram Complete    Narrative    [id number]  [id number]  [id number]  [id number]^MEGAN^SHARONA^[facility]  Echocardiography Laboratory  [address] [place] [address]        Name: VALENTÍN TINEO  MRN: [id number]  : 1955  Study Date: 2019 01:55 PM  Age: 64 yrs  Gender: Male  Patient Location: [facility]  Reason For Study: Palpitations, PVC's (premature ventricular contractions)  History: Hyperlipid  Ordering Physician: SHARONA GUZMAN  Referring Physician: Marc Pinedo  Performed By: Lorna Bocanegra     BSA: 2.0 m2  Height: 67 in  Weight: 184 lb  HR: 57  BP: 134/76 mmHg  _____________________________________________________________________________  __        Procedure  Complete Echo Adult.  _____________________________________________________________________________  __        Interpretation Summary     There is mild to moderate concentric left ventricular hypertrophy.  Left ventricular systolic function is normal.  The visual ejection fraction is estimated at 60-65%.  No regional wall motion abnormalities noted.  There is no comparison study available.  _____________________________________________________________________________  __        Left Ventricle  The left ventricle is normal in size. There is mild to moderate concentric  left ventricular hypertrophy. Left ventricular systolic function is normal.  The visual ejection fraction is estimated at 60-65%. Diastolic Doppler  findings (E/E' ratio and/or other parameters) suggest left ventricular filling  pressures are indeterminate. LVEF 62% based on biplane 2D tracing. No regional  wall motion abnormalities noted.     Right Ventricle  The right ventricle is grossly normal size. The right ventricular systolic  function is normal.     Atria  The left atrium is moderately dilated. The right atrium is mildly dilated.  There is no color Doppler evidence of an atrial shunt.     Mitral Valve  The mitral valve leaflets are mildly thickened. There is trace mitral  regurgitation. There is no mitral valve stenosis.        Tricuspid Valve  The tricuspid valve is not well visualized, but is  grossly normal. The right  ventricular systolic pressure is approximated at 21.9 mmHg plus the right  atrial pressure.     Aortic Valve  Normal tricuspid aortic valve. There is trace aortic regurgitation. No aortic  stenosis is present.     Pulmonic Valve  The pulmonic valve is not well visualized. Normal pulmonic valve velocity.     Vessels  Normal size aorta. Dilation of the inferior vena cava is present with normal  respiratory variation in diameter.     Pericardium  There is no pericardial effusion.     _____________________________________________________________________________  __  MMode/2D Measurements & Calculations  IVSd: 1.6 cm  LVIDd: 4.4 cm  LVIDs: 3.1 cm  LVPWd: 1.4 cm  FS: 29.8 %  LV mass(C)d: 255.4 grams  LV mass(C)dI: 130.9 grams/m2     Ao root diam: 3.3 cm  LA dimension: 5.2 cm  asc Aorta Diam: 3.5 cm  LA/Ao: 1.6  LA Volume (BP): 88.4 ml  LA Volume Index (BP): 45.3 ml/m2  RWT: 0.62        Doppler Measurements & Calculations  MV E max chuck: 94.6 cm/sec  MV A max chuck: 66.5 cm/sec  MV E/A: 1.4  MV dec time: 0.19 sec     Ao V2 max: 189.1 cm/sec  Ao max PG: 15.0 mmHg  AI P1/2t: 585.6 msec  LV V1 max P.3 mmHg  LV V1 max: 144.4 cm/sec  PA acc time: 0.09 sec  TR max chuck: 233.8 cm/sec  TR max P.9 mmHg  AV Chuck Ratio (DI): 0.76  E/E' av.8  Lateral E/e': 8.1  Medial E/e': 11.4           _____________________________________________________________________________  __           Report approved by: Dick Lewis 2019 04:27 PM           Antoinette Lagunas DO 2019 9:45 AM

## 2019-12-17 NOTE — PROCEDURES
EXCISION PROCEDURE NOTE    Name: Deven JOAQUIN [facility]: [unfilled]   : 1955, 64 year old Room/Bed: Room/bed info not found   CSN: [id number] Admission: No admission date for patient encounter.   MRN: [id number] Today: 2019,     PCP: Marc Pinedo Attending: No att. providers found       PROCEDURE:   1. Excision of right posterior thigh lipoma 4x2.5cm  2. Intermediate skin closure    INDICATION: lipoma    PRE-PROCEDURE     : Antoinette Lagunas MD  CONSENT: This procedure has been fully reviewed with the patient and written informed consent has been obtained.    PROCEDURE     The right mid posterior thigh area was prepped and appropriately anesthetized with 1% lidocaine with epinephrine. Using the usual technique, with layered closure was performed.  Utilizing a #15 blade scalpel, an incision was made over the area of the palpable mass.  Adsons forceps were used to elevate the skin edge and blunt dissection utilizing a curved hemostat was done to separate the lipoma from the deep dermis.  All of the adherent tissue was completely bluntly dissected from the lipoma, the area was completely excised.  I measured this lipoma to be 4 x 2.5 x0.5 cm.  Hemostasis was achieved by holding pressure and injecting more local anesthetic with epinephrine, copious irrigation was then used to clean the wound. Hemostasis was achieved.  T Intermediate closure was accomplished with interrupted 3-0 monocryl for the deep subcutaneous layer and running subcuticular 4-0 monocryl for the skin.  Incision was covered with exofin.  The procedure was well tolerated and without complications. Specimen was sent to Pathology      POST-PROCEDURE     The patient tolerated the procedure well    COMPLICATIONS: none    Plan:  1. Instructed to keep the wound dry and covered for 24-48h and clean thereafter.  2. Warning signs of infection were reviewed.    3. Recommended that the patient use OTC acetaminophen, OTC ibuprofen as  needed for pain.         Electronically signed by: Antoinette Lagunas MD; 12/17/2019

## 2019-12-17 NOTE — LETTER
12/17/2019         RE: Deven Smith  [address] [facility] [address]        Dear Colleague,    Thank you for referring your patient, Deven Smith, to the [facility]. Please see a copy of my visit note below.    Homero to follow up with Primary Care provider regarding elevated blood pressure.    146/86    Ciara MAY CMA    General Surgery Consultation    Deven Smith MRN# [id number]   Age: 64 year old YOB: 1955     Reason for consult: Right posterior lipoma                        Assessment and Plan:   I was asked to see this patient at the request of Dr. Marc Pinedo for evaluation of right posterior thigh lipoma.  Deven Smith is a 64 year old male who presented with history, exam, laboratory and imaging most consistent with:        ICD-10-CM    1. Lipoma of right lower extremity D17.23        Reassured the patient that this mass is a lipoma.  We can remove this in the office under local anesthesia.  Patient denies any similar lesion elsewhere.  Thus patient will come back later this afternoon to have this be removed under local.  Risks, benefits, alternatives were explained to the patient; He or she showed understanding and wished to proceed with the above.  Risks includes: Bleeding, infection, seroma, hematoma, possible second surgery depending on final pathology.       I thank Dr. Marc Pinedo for the opportunity to participate in the patient's care.           Chief Complaint:   Right posterior thigh lipoma     History is obtained from the patient         History of Present Illness:   This patient is a 64 year old  male with a significant past medical history of HLD, hypothyroidism who presents with RLE lipoma.     Right posterior thigh - >20 years; still same size but getting gmore uncomfortable; especially when he drives.  No numbness; no tingling.  No similar lesion elsewhere.  No family history of sarcoma.  No history of skin cancer.  Not  on any anticoagulation.  No longer a smoker.  Would like to have this be removed.          Past Medical History:    has a past medical history of DDD (degenerative disc disease), cervical and Hyperlipidemia LDL goal < 130.          Past Surgical History:     Past Surgical History:   Procedure Laterality Date     COLONOSCOPY  03/21/2007    Repeat  for screening purposes in 10 yrs.     COLONOSCOPY  1/2/2013    Procedure: COLONOSCOPY;  Colonoscopy;  Surgeon: Emmett San MD;  Location:  GI     COLONOSCOPY N/A 8/6/2018    Procedure: COLONOSCOPY;  colonoscopy;  Surgeon: Israel Moreland MD;  Location:  GI     HC SHLDR ARTHROSCOP,SURG,W/ROTAT CUFF REPR Left 2010     HC VASECTOMY UNILAT/BILAT W POSTOP SEMEN  1998    Vasectomy           Medications:   fluticasone (FLONASE) 50 MCG/ACT nasal spray, USE TWO SPRAYS IN EACH NOSTRIL EVERY DAY  latanoprost (XALATAN) 0.005 % ophthalmic solution, Place 0.005 drops into both eyes At Bedtime  Misc Natural Products (GLUCOSAMINE CHONDROITIN ADV PO), Take 6 tablets by mouth daily.  MULTI-DAY VITAMINS OR TABS, 1 tablet daily    triamcinolone (KENALOG-40) injection 40 mg  triamcinolone (KENALOG-40) injection 40 mg  triamcinolone (KENALOG-40) injection 40 mg          Allergies:      Allergies   Allergen Reactions     Seasonal Allergies      Tramadol      Muscle spasms,headache            Social History:   Deven Smith  reports that he quit smoking about 32 years ago. He has a 30.00 pack-year smoking history. He has never used smokeless tobacco. He reports current alcohol use of about 60.0 standard drinks of alcohol per week. He reports that he does not use drugs.          Family History:   The patient has no family history of any bleeding, clotting or anesthesia problems.          Review of Systems:     Constitutional: Denies fever or chills   Eyes: Denies change in visual acuity   HENT: Denies nasal congestion or sore throat   Respiratory: Denies cough or shortness  "of breath   Cardiovascular: Denies chest pain or edema   GI: Denies abdominal pain, nausea, vomiting, bloody stools or diarrhea   : Denies dysuria   Musculoskeletal: Denies back pain or joint pain   Integument: Denies rash   Neurologic: Denies headache, focal weakness or sensory changes   Endocrine: Denies polyuria or polydipsia   Lymphatic: Denies swollen glands   Psychiatric: Denies depression or anxiety          Physical Exam:     Vitals: BP (!) 144/86   Temp 97.8  F (36.6  C) (Temporal)   Ht 1.702 m (5' 7\")   Wt 84.4 kg (186 lb 1.6 oz)   BMI 29.15 kg/m     BMI= Body mass index is 29.15 kg/m .  Constitutional: Awake, alert, no acute distress.  Eyes:  No scleral icterus.  Conjunctiva are without injection.  ENMT: Mucous membranes moist, dentition and gums are intact.   Neck: Soft, supple, trachea midline.    Endocrine: n/a  Lymphatic: There is no cervical, submandibular, or supraclavicular adenopathy.  Respiratory: No audible wheezes, no acute distress  Cardiovascular: Regular; S1, S2    Abdomen: Non-distended, non-tender, normoactive bowel sounds present, No masses, neg hernias, or flank tenderness. No hepatosplenomegaly.   Musculoskeletal: No spinal or CVA tenderness. Full range of motion in the upper and lower extremities.    Skin: No skin rashes or lesions to inspection.  No petechia.  Posterior right thigh noted ~2-3cm mobile mass - likely lipoma  Neurologic: Cranial nerves II through XII are grossly intact and symmetric.  Psychiatric: The patient is alert and oriented times 3.  The patient's affect is not blunted and mood is appropriate.          Data:   Vital Signs:  BP (!) 144/86   Temp 97.8  F (36.6  C) (Temporal)   Ht 1.702 m (5' 7\")   Wt 84.4 kg (186 lb 1.6 oz)   BMI 29.15 kg/m        WBC -   WBC   Date Value Ref Range Status   10/10/2019 5.6 4.0 - 11.0 10e9/L Final   ], HgB -   Hemoglobin   Date Value Ref Range Status   10/10/2019 14.2 13.3 - 17.7 g/dL Final   ]   Liver Function Studies - "   Recent Labs   Lab Test 19  1141   PROTTOTAL 6.7*   ALBUMIN 3.8   BILITOTAL 0.6   ALKPHOS 64   AST 26   ALT 37     Recent Results (from the past 744 hour(s))   Echocardiogram Complete    Narrative    [id number]  [id number]  [id number]  [id number]^MEGAN^SHARONA^CHERYL           [facility]  Echocardiography Laboratory  [address] [place] [address]        Name: VALENTÍN TINEO  MRN: [id number]  : 1955  Study Date: 2019 01:55 PM  Age: 64 yrs  Gender: Male  Patient [facility]: [facility]  Reason For Study: Palpitations, PVC's (premature ventricular contractions)  History: Hyperlipid  Ordering Physician: SHARONA GUZMAN  Referring Physician: Marc Pinedo  Performed By: Lorna Bocanegra     BSA: 2.0 m2  Height: 67 in  Weight: 184 lb  HR: 57  BP: 134/76 mmHg  _____________________________________________________________________________  __        Procedure  Complete Echo Adult.  _____________________________________________________________________________  __        Interpretation Summary     There is mild to moderate concentric left ventricular hypertrophy.  Left ventricular systolic function is normal.  The visual ejection fraction is estimated at 60-65%.  No regional wall motion abnormalities noted.  There is no comparison study available.  _____________________________________________________________________________  __        Left Ventricle  The left ventricle is normal in size. There is mild to moderate concentric  left ventricular hypertrophy. Left ventricular systolic function is normal.  The visual ejection fraction is estimated at 60-65%. Diastolic Doppler  findings (E/E' ratio and/or other parameters) suggest left ventricular filling  pressures are indeterminate. LVEF 62% based on biplane 2D tracing. No regional  wall motion abnormalities noted.     Right Ventricle  The right ventricle is grossly normal size. The right ventricular systolic  function is normal.      Atria  The left atrium is moderately dilated. The right atrium is mildly dilated.  There is no color Doppler evidence of an atrial shunt.     Mitral Valve  The mitral valve leaflets are mildly thickened. There is trace mitral  regurgitation. There is no mitral valve stenosis.        Tricuspid Valve  The tricuspid valve is not well visualized, but is grossly normal. The right  ventricular systolic pressure is approximated at 21.9 mmHg plus the right  atrial pressure.     Aortic Valve  Normal tricuspid aortic valve. There is trace aortic regurgitation. No aortic  stenosis is present.     Pulmonic Valve  The pulmonic valve is not well visualized. Normal pulmonic valve velocity.     Vessels  Normal size aorta. Dilation of the inferior vena cava is present with normal  respiratory variation in diameter.     Pericardium  There is no pericardial effusion.     _____________________________________________________________________________  __  MMode/2D Measurements & Calculations  IVSd: 1.6 cm  LVIDd: 4.4 cm  LVIDs: 3.1 cm  LVPWd: 1.4 cm  FS: 29.8 %  LV mass(C)d: 255.4 grams  LV mass(C)dI: 130.9 grams/m2     Ao root diam: 3.3 cm  LA dimension: 5.2 cm  asc Aorta Diam: 3.5 cm  LA/Ao: 1.6  LA Volume (BP): 88.4 ml  LA Volume Index (BP): 45.3 ml/m2  RWT: 0.62        Doppler Measurements & Calculations  MV E max chuck: 94.6 cm/sec  MV A max chuck: 66.5 cm/sec  MV E/A: 1.4  MV dec time: 0.19 sec     Ao V2 max: 189.1 cm/sec  Ao max PG: 15.0 mmHg  AI P1/2t: 585.6 msec  LV V1 max P.3 mmHg  LV V1 max: 144.4 cm/sec  PA acc time: 0.09 sec  TR max chuck: 233.8 cm/sec  TR max P.9 mmHg  AV Chuck Ratio (DI): 0.76  E/E' av.8  Lateral E/e': 8.1  Medial E/e': 11.4           _____________________________________________________________________________  __           Report approved by: Dick Lewis 2019 04:27 PM           Antoinette Lagunas DO 2019 9:45 AM           Again, thank you for allowing me to participate in the  care of your patient.        Sincerely,        Critical access hospitalCarmen MD Janneth

## 2019-12-17 NOTE — LETTER
12/17/2019         RE: Deven Smith  [address] [facility] [address]        Dear Colleague,    Thank you for referring your patient, Deven Smtih, to the [facility]. Please see a copy of my visit note below.    Homero to follow up with Primary Care provider regarding elevated blood pressure.    146/82    The History and Physical has been reviewed, the patient has been examined and no changes have occurred in the patient's condition since the H & P was completed.       Homero to follow up with Primary Care provider regarding elevated blood pressure.    Antoinette Lagunas, DO      Again, thank you for allowing me to participate in the care of your patient.        Sincerely,        Antoinette Lagunas MD

## 2019-12-17 NOTE — PATIENT INSTRUCTIONS
[facility]    Home Care Following AMBULATORY SURGERY    Dr. Lagunas    Care of the Incision:    Dermabond dressing is in place.  No other dressing is needed.    On post-op day 1 you may shower, but don t soak in a tub or swim for at least 1 week.  Gently pat your incision dry with a freshly laundered towel.    Do not pick at your incision.    Leave your incision open to air.  Cover it only if clothing rubs or irritates it.    ______________________________________________________    Gauze dressing is in place.  Remove the dressing in 24 hours (replace it earlier if it is heavily soiled).  At 24 hours you may wash it in the shower with soap and water, but do not soak in a tub or swim for at least 1 week.  Gently pat your incision dry with a freshly laundered towel.  Either replace the dressing or leave it open to air according to your doctor's instructions.    ______________________________________________________  Activity:    Gradually increase your activity.  Walk short distances several times each day and increase the distance as your strength allows.    To promote circulation, do not cross your legs while sitting.    No strenuous lifting (20 pounds) or straining for 6 weeks (2 weeks for laparoscopic surgery).      Return to work will be determined by the type of work you do and should be discussed with your physician.    By 2 weeks after surgery, you may do any non-strenuous activity you feel like doing as long as you STOP IF IT HURTS.    Do not drive or operate equipment while taking prescription pain medicines.  You may drive 1 week after surgery if you have stopped taking prescription pain medicines and are pain-free enough to make an emergency stop if necessary.    Diet:    Return to the diet you were on before surgery.    Drink plenty of  water and fruit juices.    Avoid foods that cause constipation.      REMEMBER--most prescription pain pills cause constipation.  Walking, extra fluids, and  increased fiber (fresh fruits and vegetables, etc.) are natural remedies for constipation.  Ask your doctor about a stool softener such as Colace or Metamucil.    Call Your Physician if You Have:    Redness, increased swelling or cloudy drainage from your incision.    A temperature of more than 101 degrees F.    Worsening pain in your incision not relieved by your prescription pain pills and/or a short rest.  Persistent nausea/vomiting. Jaundice. Worsening abdominal pain. Shortness of breath or difficulty swallowing.    Any questions or concerns about your recovery, please call [phone].    Follow-up Care:    Make an appointment 1 week after your surgery.  Call [phone].    Appt follow up:  12/31/19 [facility] 2:45 pm

## 2019-12-23 LAB — COPATH REPORT: NORMAL

## 2020-01-03 ENCOUNTER — OFFICE VISIT (OUTPATIENT)
Dept: SURGERY | Facility: CLINIC | Age: 65
End: 2020-01-03
Payer: COMMERCIAL

## 2020-01-03 VITALS
BODY MASS INDEX: 28.63 KG/M2 | WEIGHT: 182.4 LBS | TEMPERATURE: 98.7 F | SYSTOLIC BLOOD PRESSURE: 140 MMHG | HEIGHT: 67 IN | DIASTOLIC BLOOD PRESSURE: 80 MMHG

## 2020-01-03 DIAGNOSIS — Z98.890 S/P EXCISION OF LIPOMA: Primary | ICD-10-CM

## 2020-01-03 DIAGNOSIS — Z86.018 S/P EXCISION OF LIPOMA: Primary | ICD-10-CM

## 2020-01-03 PROCEDURE — 99024 POSTOP FOLLOW-UP VISIT: CPT | Performed by: SURGERY

## 2020-01-03 ASSESSMENT — MIFFLIN-ST. JEOR: SCORE: 1585.99

## 2020-01-03 NOTE — PROGRESS NOTES
"[place] [facility] FOLLOW-UP NOTE  GENERAL SURGERY    PCP: Marc Pinedo         Assessment and Plan:   Assessment:   Deven Smith is a 64 year old male who presented post operatively from Excision of right thigh lipoma 12/1/2019 and is doing well.       ICD-10-CM    1. S/P excision of lipoma Z98.890     Z86.018        I reviewed the pathology report today with the patient and answered all questions.  SPECIMEN(S):   Lipoma, right posterior thigh     FINAL DIAGNOSIS:   Lipoma, right posterior thigh:   - Lipoma - (see description)     Plan:  -did have post op seroma that drainaed via small portion of incision - mild erythema around incision but not infected.   -Exam pathology as above  -no restrictions  -all questions answered.           Subjective:     Deven Smith is a 64 year old male who presents post operatively from Excision of right thigh lipoma 12/1/2019. Pain has been controled. Currently is not using pain medications. Eating a Regular and having regular bladder/bowel function. Overall doing well.           Objective:         BP (!) 140/80   Temp 98.7  F (37.1  C) (Temporal)   Ht 1.702 m (5' 7\")   Wt 82.7 kg (182 lb 6.4 oz)   BMI 28.57 kg/m     Constitutional: Awake, alert, in no acute distress.  Respiratory: Non-labored.   Cardiovascular: Regular rate and rhythm.   Abdomen: soft. Incision is Healing well.    [facility], DO  [place] [facility]            "

## 2020-01-03 NOTE — LETTER
"    1/3/2020         RE: Deven Smith  [address] [facility] [address]        Dear Colleague,    Thank you for referring your patient, Deven Smith, to the [facility]. Please see a copy of my visit note below.    Homero to follow up with Primary Care provider regarding elevated blood pressure.    140/80    Ciara MAY CMA    [facility] FOLLOW-UP NOTE  GENERAL SURGERY    PCP: Marc Pinedo         Assessment and Plan:   Assessment:   Deven Smith is a 64 year old male who presented post operatively from Excision of right thigh lipoma 12/1/2019 and is doing well.       ICD-10-CM    1. S/P excision of lipoma Z98.890     Z86.018        I reviewed the pathology report today with the patient and answered all questions.  SPECIMEN(S):   Lipoma, right posterior thigh     FINAL DIAGNOSIS:   Lipoma, right posterior thigh:   - Lipoma - (see description)     Plan:  -did have post op seroma that drainaed via small portion of incision - mild erythema around incision but not infected.   -Exam pathology as above  -no restrictions  -all questions answered.           Subjective:     Deven Smith is a 64 year old male who presents post operatively from Excision of right thigh lipoma 12/1/2019. Pain has been controled. Currently is not using pain medications. Eating a Regular and having regular bladder/bowel function. Overall doing well.           Objective:         BP (!) 140/80   Temp 98.7  F (37.1  C) (Temporal)   Ht 1.702 m (5' 7\")   Wt 82.7 kg (182 lb 6.4 oz)   BMI 28.57 kg/m      Constitutional: Awake, alert, in no acute distress.  Respiratory: Non-labored.   Cardiovascular: Regular rate and rhythm.   Abdomen: soft. Incision is Healing well.    PavelMilagro Lagunas DO  [facility]              Again, thank you for allowing me to participate in the care of your patient.        Sincerely,        PavelMilagro Lagunas MD    "

## 2020-01-03 NOTE — PROGRESS NOTES
Homero to follow up with Primary Care provider regarding elevated blood pressure.    140/80    Ciara MAY CMA

## 2020-02-12 DIAGNOSIS — M25.512 CHRONIC PAIN OF BOTH SHOULDERS: Primary | ICD-10-CM

## 2020-02-12 DIAGNOSIS — M25.511 CHRONIC PAIN OF BOTH SHOULDERS: Primary | ICD-10-CM

## 2020-02-12 DIAGNOSIS — G89.29 CHRONIC PAIN OF BOTH SHOULDERS: Primary | ICD-10-CM

## 2020-02-13 ENCOUNTER — OFFICE VISIT (OUTPATIENT)
Dept: ORTHOPEDICS | Facility: CLINIC | Age: 65
End: 2020-02-13
Payer: COMMERCIAL

## 2020-02-13 ENCOUNTER — ANCILLARY PROCEDURE (OUTPATIENT)
Dept: GENERAL RADIOLOGY | Facility: CLINIC | Age: 65
End: 2020-02-13
Attending: ORTHOPAEDIC SURGERY
Payer: COMMERCIAL

## 2020-02-13 VITALS
HEART RATE: 52 BPM | HEIGHT: 68 IN | BODY MASS INDEX: 27.96 KG/M2 | OXYGEN SATURATION: 97 % | SYSTOLIC BLOOD PRESSURE: 159 MMHG | WEIGHT: 184.5 LBS | DIASTOLIC BLOOD PRESSURE: 70 MMHG

## 2020-02-13 DIAGNOSIS — M19.012 ARTHRITIS OF SHOULDER REGION, LEFT: Primary | ICD-10-CM

## 2020-02-13 DIAGNOSIS — M25.512 CHRONIC PAIN OF BOTH SHOULDERS: ICD-10-CM

## 2020-02-13 DIAGNOSIS — G89.29 CHRONIC PAIN OF BOTH SHOULDERS: ICD-10-CM

## 2020-02-13 DIAGNOSIS — M25.511 CHRONIC PAIN OF BOTH SHOULDERS: ICD-10-CM

## 2020-02-13 DIAGNOSIS — M19.011 ARTHRITIS OF SHOULDER REGION, RIGHT: ICD-10-CM

## 2020-02-13 PROCEDURE — 99214 OFFICE O/P EST MOD 30 MIN: CPT | Performed by: ORTHOPAEDIC SURGERY

## 2020-02-13 PROCEDURE — 73030 X-RAY EXAM OF SHOULDER: CPT | Mod: LT | Performed by: RADIOLOGY

## 2020-02-13 ASSESSMENT — PAIN SCALES - GENERAL
PAINLEVEL: MILD PAIN (2)
PAINLEVEL: MODERATE PAIN (4)

## 2020-02-13 ASSESSMENT — MIFFLIN-ST. JEOR: SCORE: 1603.45

## 2020-02-13 NOTE — NURSING NOTE
"Deven Smith's chief complaint for this visit includes:  Chief Complaint   Patient presents with     Right Shoulder - Pain     Right is more bothersome than the left.     Left Shoulder - Pain     PCP: Marc Pinedo    Referring Provider:  No referring provider defined for this encounter.    BP (!) 159/70 (BP Location: Left arm, Patient Position: Sitting, Cuff Size: Adult Regular)   Pulse 52   Ht 1.715 m (5' 7.5\")   Wt 83.7 kg (184 lb 8 oz)   SpO2 97%   BMI 28.47 kg/m    Mild Pain (2)     Do you need any medication refills at today's visit? No    Right hand dominant    Salome Joshua CMA        "

## 2020-02-13 NOTE — LETTER
2/13/2020         RE: Deven Smith  [address] [facility] [address]        Dear Colleague,    Thank you for referring your patient, Deven Smith, to the [facility]. Please see a copy of my visit note below.    CHIEF CONCERN:  Bilateral shoulder pain, R>>L    HISTORY OF PRESENT ILLNESS:  Mr. Smith is a 64 year old male who returns for his right shoulder today. He has B CTA (L radiographically worse than R). He has been very active in [employer] and last week even was deadlifting 400#. This past weekend when shoveling snow he developed a flare of shoulder pain which has not resolved. He has since has trouble brushing his teeth and it throbs at night, limiting sleep.  Has had right suprascapular nerve blocks done, last 12/4/19.   Has not had a GH injection for some time (last was prior to Dr. Maurice and was at [facility]).    PHYSICAL EXAM:    Adult male in no acute distress. Articulates and communicates with normal affect.  Respirations even and unlabored  Focused upper extremity exam: Skin intact. No erythema. Sensation intact all dermatomes into the hand to light touch. EPL, FPL, and Intrinsics intact. Right shoulder active motion is FE to 175, ER at side to 75, and IR to L5. Left shoulder active motion is FE to 170, ER to 15, and IR to T12.   No pain on palpation over the AC joint. No pain on palpation over the long head of the biceps.     IMAGING:  Bilateral shoulder XRs today demonstrate narrowing of the joint space on the left with significant superior humeral migration and a humeral osteophyte consistent with cuff tear arthropathy. Right shoulder images demonstrate more rounding off of the greater tuberosity compared to XR in 2018. Small humeral osteophyte and slight joint space narrowing.    ASSESSMENT:    1. Left shoulder cuff tear arthropathy  2. Right glenohumeral arthritis, probable cuff tear arthropathy    PLAN:  We reviewed the images and exam findings. I discussed his  activities over the past few months and his symptoms with those. He has recognized that he will continue to scale back the intensity of his athletic pursuits but even with that he would be hampered by a reverse TSA. I encouraged him to move slowly with any surgical decision making in light of a recent acute flare. If symptoms do not settle down in this week he may consider an US guided GH injection on the right. If we are unable to control pain with nonop measures we can discuss whether his pain or limitations could warrant reverse TSA (current motion would lead me to discourage that surgery now).    TT:25 minutes, over half dedicated to education, counseling and coordination of treatment plan.      Kell Carpenter MD      Again, thank you for allowing me to participate in the care of your patient.        Sincerely,        Kell Carpenter MD

## 2020-02-13 NOTE — PROGRESS NOTES
CHIEF CONCERN:  Bilateral shoulder pain, R>>L    HISTORY OF PRESENT ILLNESS:  Mr. Smith is a 64 year old male who returns for his right shoulder today. He has B CTA (L radiographically worse than R). He has been very active in [employer] and last week even was deadlifting 400#. This past weekend when shoveling snow he developed a flare of shoulder pain which has not resolved. He has since has trouble brushing his teeth and it throbs at night, limiting sleep.  Has had right suprascapular nerve blocks done, last 12/4/19.   Has not had a GH injection for some time (last was prior to Dr. Maurice and was at [facility]).    PHYSICAL EXAM:    Adult male in no acute distress. Articulates and communicates with normal affect.  Respirations even and unlabored  Focused upper extremity exam: Skin intact. No erythema. Sensation intact all dermatomes into the hand to light touch. EPL, FPL, and Intrinsics intact. Right shoulder active motion is FE to 175, ER at side to 75, and IR to L5. Left shoulder active motion is FE to 170, ER to 15, and IR to T12.   No pain on palpation over the AC joint. No pain on palpation over the long head of the biceps.     IMAGING:  Bilateral shoulder XRs today demonstrate narrowing of the joint space on the left with significant superior humeral migration and a humeral osteophyte consistent with cuff tear arthropathy. Right shoulder images demonstrate more rounding off of the greater tuberosity compared to XR in 2018. Small humeral osteophyte and slight joint space narrowing.    ASSESSMENT:    1. Left shoulder cuff tear arthropathy  2. Right glenohumeral arthritis, probable cuff tear arthropathy    PLAN:  We reviewed the images and exam findings. I discussed his activities over the past few months and his symptoms with those. He has recognized that he will continue to scale back the intensity of his athletic pursuits but even with that he would be hampered by a reverse TSA. I encouraged him to move slowly  with any surgical decision making in light of a recent acute flare. If symptoms do not settle down in this week he may consider an US guided GH injection on the right. If we are unable to control pain with nonop measures we can discuss whether his pain or limitations could warrant reverse TSA (current motion would lead me to discourage that surgery now).    TT:25 minutes, over half dedicated to education, counseling and coordination of treatment plan.      Kell Carpenter MD

## 2020-03-03 ENCOUNTER — OFFICE VISIT (OUTPATIENT)
Dept: ORTHOPEDICS | Facility: CLINIC | Age: 65
End: 2020-03-03
Payer: COMMERCIAL

## 2020-03-03 VITALS — WEIGHT: 184 LBS | HEIGHT: 67 IN | BODY MASS INDEX: 28.88 KG/M2

## 2020-03-03 DIAGNOSIS — M19.011 OSTEOARTHRITIS OF RIGHT GLENOHUMERAL JOINT: Primary | ICD-10-CM

## 2020-03-03 PROCEDURE — 20611 DRAIN/INJ JOINT/BURSA W/US: CPT | Mod: RT | Performed by: FAMILY MEDICINE

## 2020-03-03 PROCEDURE — 99207 ZZC DROP WITH A PROCEDURE: CPT | Performed by: FAMILY MEDICINE

## 2020-03-03 RX ORDER — METHYLPREDNISOLONE ACETATE 40 MG/ML
40 INJECTION, SUSPENSION INTRA-ARTICULAR; INTRALESIONAL; INTRAMUSCULAR; SOFT TISSUE
Status: DISCONTINUED | OUTPATIENT
Start: 2020-03-03 | End: 2024-02-26

## 2020-03-03 RX ADMIN — METHYLPREDNISOLONE ACETATE 40 MG: 40 INJECTION, SUSPENSION INTRA-ARTICULAR; INTRALESIONAL; INTRAMUSCULAR; SOFT TISSUE at 07:28

## 2020-03-03 ASSESSMENT — MIFFLIN-ST. JEOR: SCORE: 1593.25

## 2020-03-03 ASSESSMENT — PAIN SCALES - GENERAL: PAINLEVEL: MODERATE PAIN (4)

## 2020-03-03 NOTE — PROGRESS NOTES
"      [facility] FOLLOW-UP VISIT 3/3/2020    Deven Salazarericanmol's chief complaint for this visit includes:  No chief complaint on file.    PCP: Marc Pinedo    Referring Provider:  No referring provider defined for this encounter.    Ht 1.702 m (5' 7\")   Wt 83.5 kg (184 lb)   BMI 28.82 kg/m    Moderate Pain (4)       Interval History:     Follow up reason: right shoulder cortisone injection     Following Therapeutic Plan: Yes     Pain: Worsening    Function: Worsening      Medical History:    Any recent changes to your medical history? No    Any new medication prescribed since last visit? No    Review of Systems:    Do you have fever, chills, weight loss? No    Do you have any vision problems? No    Do you have any chest pain or edema? No    Do you have any shortness of breath or wheezing?  No    Do you have stomach problems? No    Do you have any urinary track issues? No    Do you have any numbness or focal weakness? No    Do you have diabetes? No    Do you have problems with bleeding or clotting? No    Do you have an rashes or other skin lesions? No    Large Joint Injection/Arthocentesis: R glenohumeral joint  Date/Time: 3/3/2020 7:28 AM  Performed by: Taz Maurice DO  Authorized by: Taz Maurice DO     Indications:  Pain  Needle Size:  22 G  Guidance: fluoroscopy    Location:  Shoulder      Site:  R glenohumeral joint  Medications:  40 mg methylPREDNISolone 40 MG/ML  Outcome:  Tolerated well, no immediate complications  Procedure discussed: discussed risks, benefits, and alternatives    Consent Given by:  Patient  Timeout: timeout called immediately prior to procedure    Prep: patient was prepped and draped in usual sterile fashion          "

## 2020-03-03 NOTE — LETTER
"    3/3/2020         RE: Deven Smith  [address] [place] [id number]        Dear Colleague,    Thank you for referring your patient, Deven Smith, to the [facility]. Please see a copy of my visit note below.    Homero is here for a right GH injection at the request of Dr. Carpenter.  He was seen by Dr. Carpenter on 2/13.      Glenohumeral Injection - Ultrasound Guided  The patient was informed of the risks and the benefits of the procedure and a written consent was signed.  The patient s right shoulder was prepped with chlorhexidine in sterile fashion.   40 mg of methylprednisolone suspension was drawn up into a 5 mL syringe with 4 mL of 1% lidocaine w/o Epi.  Injection was performed using sterile technique.  Under ultrasound guidance a 3.5-inch 22-gauge needle was used to enter the right glenohumeral joint.  Posterior approach was used with the patient in lateral recumbent position, arm in neutral position at the side.  Needle placement was visualized and documented with ultrasound.  Ultrasound visualization was necessary due to the small joint space entered.  Injection performed long axis to the probe.  Injection solution visualized within the joint space.  Images were permanently stored for the patient's record.  There were no complications. The patient tolerated the procedure well. There was negligible bleeding.   The patient was instructed to call or go to the emergency room with any unusual pain, swelling, redness, or if otherwise concerned.    Marek Maurice DO [facility] FOLLOW-UP VISIT 3/3/2020    Deven Smith's chief complaint for this visit includes:  No chief complaint on file.    PCP: Marc Pinedo    Referring Provider:  No referring provider defined for this encounter.    Ht 1.702 m (5' 7\")   Wt 83.5 kg (184 lb)   BMI 28.82 kg/m     Moderate Pain (4)       Interval History:     Follow up reason: right shoulder cortisone " injection     Following Therapeutic Plan: Yes     Pain: Worsening    Function: Worsening      Medical History:    Any recent changes to your medical history? No    Any new medication prescribed since last visit? No    Review of Systems:    Do you have fever, chills, weight loss? No    Do you have any vision problems? No    Do you have any chest pain or edema? No    Do you have any shortness of breath or wheezing?  No    Do you have stomach problems? No    Do you have any urinary track issues? No    Do you have any numbness or focal weakness? No    Do you have diabetes? No    Do you have problems with bleeding or clotting? No    Do you have an rashes or other skin lesions? No    Large Joint Injection/Arthocentesis: R glenohumeral joint  Date/Time: 3/3/2020 7:28 AM  Performed by: Taz Maurice DO  Authorized by: Taz Maurice DO     Indications:  Pain  Needle Size:  22 G  Guidance: fluoroscopy    Location:  Shoulder      Site:  R glenohumeral joint  Medications:  40 mg methylPREDNISolone 40 MG/ML  Outcome:  Tolerated well, no immediate complications  Procedure discussed: discussed risks, benefits, and alternatives    Consent Given by:  Patient  Timeout: timeout called immediately prior to procedure    Prep: patient was prepped and draped in usual sterile fashion            Again, thank you for allowing me to participate in the care of your patient.        Sincerely,        Taz Maurice DO

## 2020-03-03 NOTE — PROGRESS NOTES
Homero is here for a right GH injection at the request of Dr. Carpenter.  He was seen by Dr. Carpenter on 2/13.      Glenohumeral Injection - Ultrasound Guided  The patient was informed of the risks and the benefits of the procedure and a written consent was signed.  The patient s right shoulder was prepped with chlorhexidine in sterile fashion.   40 mg of methylprednisolone suspension was drawn up into a 5 mL syringe with 4 mL of 1% lidocaine w/o Epi.  Injection was performed using sterile technique.  Under ultrasound guidance a 3.5-inch 22-gauge needle was used to enter the right glenohumeral joint.  Posterior approach was used with the patient in lateral recumbent position, arm in neutral position at the side.  Needle placement was visualized and documented with ultrasound.  Ultrasound visualization was necessary due to the small joint space entered.  Injection performed long axis to the probe.  Injection solution visualized within the joint space.  Images were permanently stored for the patient's record.  There were no complications. The patient tolerated the procedure well. There was negligible bleeding.   The patient was instructed to call or go to the emergency room with any unusual pain, swelling, redness, or if otherwise concerned.    DO LAURA Galeano

## 2020-04-02 ENCOUNTER — VIRTUAL VISIT (OUTPATIENT)
Dept: ORTHOPEDICS | Facility: CLINIC | Age: 65
End: 2020-04-02
Payer: COMMERCIAL

## 2020-04-02 DIAGNOSIS — M54.12 CERVICAL RADICULITIS: Primary | ICD-10-CM

## 2020-04-02 PROCEDURE — 99214 OFFICE O/P EST MOD 30 MIN: CPT | Mod: GT | Performed by: FAMILY MEDICINE

## 2020-04-02 RX ORDER — TRAMADOL HYDROCHLORIDE 50 MG/1
50 TABLET ORAL EVERY 6 HOURS PRN
Qty: 10 TABLET | Refills: 0 | Status: SHIPPED | OUTPATIENT
Start: 2020-04-02 | End: 2020-06-04

## 2020-04-02 RX ORDER — PREDNISONE 20 MG/1
40 TABLET ORAL DAILY
Qty: 10 TABLET | Refills: 0 | Status: SHIPPED | OUTPATIENT
Start: 2020-04-02 | End: 2020-06-04

## 2020-04-02 NOTE — PROGRESS NOTES
"Deven Smith is a 64 year old male who is being evaluated via a billable video visit.      The patient has been notified of following:     \"This video visit will be conducted via a call between you and your physician/provider. We have found that certain health care needs can be provided without the need for an in-person physical exam.  This service lets us provide the care you need with a video conversation.  If a prescription is necessary we can send it directly to your pharmacy.  If lab work is needed we can place an order for that and you can then stop by our lab to have the test done at a later time.    If during the course of the call the physician/provider feels a video visit is not appropriate, you will not be charged for this service.\"     Patient has given verbal consent for Video visit? Yes    Patient would like the video invitation sent by: Text to cell phone: [phone]    Video Start Time: 1123    Deven Smith complains of    Chief Complaint   Patient presents with     Follow Up     video visit - follow up numbness and tingling        I have reviewed and updated the patient's Past Medical History, Social History, Family History and Medication List.    ALLERGIES  Seasonal allergies and Tramadol    CHIEF COMPLAINT:  Follow Up (video visit - follow up numbness and tingling )       HISTORY OF PRESENT ILLNESS  Mr. Smith is a pleasant 64 year old male seen today via video visit with neck and right arm pain.  Homero has a long history of right shoulder pain, I have seen her many times in the past for this.  This pain feels different.  This originates from his neck and shoulder area and radiates down to his fingers, specifically to his pointer finger.  This is a shooting pain, burning at times, present at all times through the day but definitely worse when he lies on that side.  This is causing him quite a bit of pain when he sleeps, this wakes him up multiple times at night.  He has tried " over-the-counter analgesics, these have been mildly helpful.  Homero has had a cervical disc bulge in the past, this was treated with corticosteroids with some degree of success.      Additional history: as documented    MEDICAL HISTORY  Patient Active Problem List   Diagnosis     Other specified glaucoma     Allergic rhinitis     Family history of other cardiovascular diseases     Labyrinthitis     Hyperlipidemia with target LDL less than 130     Advanced directives, counseling/discussion     DDD (degenerative disc disease), cervical     Supraspinatus tendon tear, left, subsequent encounter       Current Outpatient Medications   Medication Sig Dispense Refill     predniSONE (DELTASONE) 20 MG tablet Take 2 tablets (40 mg) by mouth daily for 5 days 10 tablet 0     traMADol (ULTRAM) 50 MG tablet Take 1 tablet (50 mg) by mouth every 6 hours as needed for severe pain 10 tablet 0     fluticasone (FLONASE) 50 MCG/ACT nasal spray USE TWO SPRAYS IN EACH NOSTRIL EVERY DAY 16 g 4     latanoprost (XALATAN) 0.005 % ophthalmic solution Place 0.005 drops into both eyes At Bedtime       Misc Natural Products (GLUCOSAMINE CHONDROITIN ADV PO) Take 6 tablets by mouth daily.       MULTI-DAY VITAMINS OR TABS 1 tablet daily 0 0       Allergies   Allergen Reactions     Seasonal Allergies      Tramadol      Muscle spasms,headache       Family History   Problem Relation Age of Onset     Allergies Brother         on meds     Arthritis Mother      Depression Mother         on meds. is in nursing home     Hypertension Mother      Osteoporosis Mother      Heart Disease Father          at age 57  ? MI--no autopsy     Cancer Father         jaw--smoked [place]     Hypertension Brother      Osteoporosis Sister         on meds     Family History Negative Other         No breast or colon cancer hx in family     Hypertension Sister      Osteoporosis Brother        Additional medical/Social/Surgical histories reviewed in EPIC and updated as  appropriate.        PHYSICAL EXAM    General  - normal appearance, in no obvious distress  HEENT  - conjunctivae not injected, moist mucous membranes  Pulm  - normal respiratory pattern, non-labored  Musculoskeletal - cervical spine  - inspection: normal bone and joint alignment, no obvious kyphosis  - ROM: limited rotation bilaterally   Neuro  - grossly normal coordination, normal muscle tone  Skin  - no ecchymosis, no obvious rash  Psych  - interactive, appropriate, normal mood and affect         ASSESSMENT & PLAN  Mr. Smith is a 64 year old male seen today via video visit with right shoulder, neck and arm pain.    Homero's symptoms do seem to be likely secondary to an acute cervical radiculitis.  I discussed with him management from a conservative perspective, as well as treatment options should this fail or progress.  At this point I do think it is reasonable to prescribe him prednisone, he is going to start this today and take it for 5 days, consecutively.  I am also prescribing him tramadol for nighttime pain.    Homero and I should follow-up on Monday via telephone, if he is not improving I would consider referring him for an MRI.    It was a pleasure seeing Homero today.    Taz Maurice DO, [facility]  Primary Care Sports Medicine      This note was constructed using Dragon dictation software, please excuse any minor errors in spelling, grammar, or syntax.           Video-Visit Details    Type of service:  Video Visit    Video End Time (time video stopped): 1138    Originating Location (pt. Location): Home    Distant Location (provider location):  [facility]     Mode of Communication:  Video Conference via [place]      Taz Maurice DO

## 2020-04-06 ENCOUNTER — VIRTUAL VISIT (OUTPATIENT)
Dept: ORTHOPEDICS | Facility: CLINIC | Age: 65
End: 2020-04-06
Payer: COMMERCIAL

## 2020-04-06 DIAGNOSIS — M54.12 CERVICAL RADICULITIS: Primary | ICD-10-CM

## 2020-04-06 PROCEDURE — 99212 OFFICE O/P EST SF 10 MIN: CPT | Mod: TEL | Performed by: FAMILY MEDICINE

## 2020-04-06 NOTE — PROGRESS NOTES
"Subjective     Deven Smith is a 64 year old male who is being evaluated via a billable telephone visit.      After review of patient's medical issues this visit was conducted over the phone, as opposed to in person, in effort to reduce risk of COVID-19 exposure.    The patient has been notified of following:     \"This telephone visit will be conducted via a call between you and your physician/provider. We have found that certain health care needs can be provided without the need for a physical exam.  This service lets us provide the care you need with a short phone conversation.  If a prescription is necessary we can send it directly to your pharmacy.  If lab work is needed we can place an order for that and you can then stop by our lab to have the test done at a later time.    If during the course of the call the physician/provider feels a telephone visit is not appropriate, you will not be charged for this service.\"     Patient has given verbal consent for Telephone visit?  Yes    Deven Smith complains of   Chief Complaint   Patient presents with     Follow Up     follow up after medrol dose pack,        Unfortunately Homero is not doing much better today.  The prednisone has not helped his tingling and pain in his arm.  He is now experiencing weakness down his arm and tingling and pain into his thumb in addition to his pointer finger.  This is shooting down from his shoulder.  The tramadol is helping him sleep.         Review of Systems   ROS COMP: CONSTITUTIONAL: NEGATIVE for fever, chills, change in weight  ENT/MOUTH: NEGATIVE for ear, mouth and throat problems  RESP: NEGATIVE for significant cough or SOB  CV: NEGATIVE for chest pain, palpitations or peripheral edema       Objective   Reported vitals:  There were no vitals taken for this visit.   healthy, alert and no distress  Psych:coherent speech, normal   rate and volume      Assessment/Plan:  1. Cervical radiculitis  Given his ongoing pain, " numbness, and tingling, as well as his new development of weakness, I am ordering an MRI.  I will get in touch with him with results.    Phone call duration:  8 minutes (0905 --> 0913)      DO NIKOLAI PalmM

## 2020-04-08 ENCOUNTER — MYC MEDICAL ADVICE (OUTPATIENT)
Dept: ORTHOPEDICS | Facility: CLINIC | Age: 65
End: 2020-04-08

## 2020-04-08 ENCOUNTER — ANCILLARY PROCEDURE (OUTPATIENT)
Dept: MRI IMAGING | Facility: CLINIC | Age: 65
End: 2020-04-08
Attending: FAMILY MEDICINE
Payer: COMMERCIAL

## 2020-04-08 DIAGNOSIS — M50.10 CERVICAL DISC DISORDER WITH RADICULOPATHY OF CERVICAL REGION: ICD-10-CM

## 2020-04-08 DIAGNOSIS — M54.12 CERVICAL RADICULITIS: ICD-10-CM

## 2020-04-08 DIAGNOSIS — M54.12 CERVICAL RADICULITIS: Primary | ICD-10-CM

## 2020-04-08 PROCEDURE — 72141 MRI NECK SPINE W/O DYE: CPT | Performed by: RADIOLOGY

## 2020-04-08 RX ORDER — GABAPENTIN 300 MG/1
300 CAPSULE ORAL 3 TIMES DAILY
Qty: 90 CAPSULE | Refills: 1 | Status: SHIPPED | OUTPATIENT
Start: 2020-04-08 | End: 2020-06-04

## 2020-04-14 ENCOUNTER — VIRTUAL VISIT (OUTPATIENT)
Dept: PHYSICAL THERAPY | Facility: CLINIC | Age: 65
End: 2020-04-14
Attending: FAMILY MEDICINE
Payer: COMMERCIAL

## 2020-04-14 DIAGNOSIS — M50.10 CERVICAL DISC DISORDER WITH RADICULOPATHY OF CERVICAL REGION: Primary | ICD-10-CM

## 2020-04-14 DIAGNOSIS — M54.12 CERVICAL RADICULITIS: ICD-10-CM

## 2020-04-14 PROCEDURE — 97161 PT EVAL LOW COMPLEX 20 MIN: CPT | Mod: TEL | Performed by: PHYSICAL THERAPIST

## 2020-04-14 PROCEDURE — 97110 THERAPEUTIC EXERCISES: CPT | Mod: TEL | Performed by: PHYSICAL THERAPIST
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
## 2020-04-15 PROBLEM — M50.10 CERVICAL DISC DISORDER WITH RADICULOPATHY OF CERVICAL REGION: Status: ACTIVE | Noted: 2020-04-15

## 2020-04-28 ENCOUNTER — VIRTUAL VISIT (OUTPATIENT)
Dept: PHYSICAL THERAPY | Facility: CLINIC | Age: 65
End: 2020-04-28
Payer: COMMERCIAL

## 2020-04-28 DIAGNOSIS — M54.12 CERVICAL RADICULITIS: Primary | ICD-10-CM

## 2020-04-28 NOTE — PROGRESS NOTES
This encounter is eroneus. He meant to Cx because he injured his shd and would like to have MD rowe that before continuing with his neck.

## 2020-04-28 NOTE — TELEPHONE ENCOUNTER
Pt meant to Cx today because he injured his shoulder and feels he needs to address that first. He resigned from his job because of it as it has been a chronic problem. He will call back when he is ready to do PT on neck and arm.

## 2020-05-07 ENCOUNTER — TRANSFERRED RECORDS (OUTPATIENT)
Dept: HEALTH INFORMATION MANAGEMENT | Facility: CLINIC | Age: 65
End: 2020-05-07

## 2020-05-26 ENCOUNTER — MYC MEDICAL ADVICE (OUTPATIENT)
Dept: FAMILY MEDICINE | Facility: CLINIC | Age: 65
End: 2020-05-26

## 2020-05-26 NOTE — TELEPHONE ENCOUNTER
": 1955  PHONE #'s: [phone] (home) [phone] (work)    PRESENTING PROBLEM:  C/O intermittent dizziness that started while working outside on Saturday, 20.     NURSING ASSESSMENT  Description:  \" IT was bad when I laid down that night and when I get up in the AM. Things spin around. Just lasts a few seconds and then I am fine. \"   Onset/duration:   20  Precip. factors:  Etiology unknown.   Assoc. Sx:  Denies chest pain, NO SOB, NO headache, had some blurry vision on , but good today.   Improves/worsens Sx:   Improved some today.   Pain scale (1-10)   0/10  Sx specific meds: \" I took a Zyrtec and Dramamine today. \"  Last exam/Tx:  Has NOT been seen for this.   Recommendation: Home care instruction for dizziness:  *  During a dizzy spell, lie flat or sit with head down on your lap for a few minutes and take slow deep breaths.   *  If dizziness is accompanied by anxiety, rapid breathing, and numbness in the face or fingers, breathe into a paper bag for 5 to 10 minutes, making sure the mouth and nose are covered by the bag.   *  Sit up or stand up slowly, Avoid sudden changes in posture.  *  If vomits, wait one hour before eating or drinking . Take small frequent sips every 10 minutes (1 TBS ) until knows can drink without vomiting. Then Ok to drink freely. Keep self hydrated.     * Seek emergency care if sudden onset of chest pain, decreased level of consciousness, weakness or difficulty speaking, slow ( < 50) or rapid ( > 130) heart rate.    Will comply with recommendation: YES, He will call back in a few days. if no improvement with Home care instructions.  If further questions/concerns or if Sx do not improve, worsen or new Sx develop, call your PCP or [facility] as soon as possible.     NOTES:  Disposition was determined by the first positive assessment question, therefore all previous assessment questions were negative.  Informed to check provider manual or call insurance " company to assure coverage.    Guideline used: Dizziness  Telephone Triage Protocols for Nurses, Fifth Edition, Eliana Penaloza RN  May 26, 2020

## 2020-06-04 ENCOUNTER — OFFICE VISIT (OUTPATIENT)
Dept: ORTHOPEDICS | Facility: CLINIC | Age: 65
End: 2020-06-04
Payer: COMMERCIAL

## 2020-06-04 ENCOUNTER — OFFICE VISIT (OUTPATIENT)
Dept: FAMILY MEDICINE | Facility: CLINIC | Age: 65
End: 2020-06-04
Payer: COMMERCIAL

## 2020-06-04 VITALS
OXYGEN SATURATION: 97 % | TEMPERATURE: 98.2 F | SYSTOLIC BLOOD PRESSURE: 138 MMHG | BODY MASS INDEX: 29.29 KG/M2 | DIASTOLIC BLOOD PRESSURE: 84 MMHG | RESPIRATION RATE: 16 BRPM | WEIGHT: 187 LBS | HEART RATE: 80 BPM

## 2020-06-04 VITALS — DIASTOLIC BLOOD PRESSURE: 86 MMHG | SYSTOLIC BLOOD PRESSURE: 138 MMHG | BODY MASS INDEX: 28.82 KG/M2 | WEIGHT: 184 LBS

## 2020-06-04 DIAGNOSIS — J30.2 SEASONAL ALLERGIC RHINITIS, UNSPECIFIED TRIGGER: ICD-10-CM

## 2020-06-04 DIAGNOSIS — R42 VERTIGO: Primary | ICD-10-CM

## 2020-06-04 DIAGNOSIS — M19.011 OSTEOARTHRITIS OF RIGHT GLENOHUMERAL JOINT: Primary | ICD-10-CM

## 2020-06-04 PROCEDURE — 99213 OFFICE O/P EST LOW 20 MIN: CPT | Performed by: FAMILY MEDICINE

## 2020-06-04 PROCEDURE — 99207 ZZC DROP WITH A PROCEDURE: CPT | Performed by: FAMILY MEDICINE

## 2020-06-04 PROCEDURE — 20611 DRAIN/INJ JOINT/BURSA W/US: CPT | Mod: RT | Performed by: FAMILY MEDICINE

## 2020-06-04 RX ORDER — TRIAMCINOLONE ACETONIDE 40 MG/ML
40 INJECTION, SUSPENSION INTRA-ARTICULAR; INTRAMUSCULAR
Status: DISCONTINUED | OUTPATIENT
Start: 2020-06-04 | End: 2024-02-26

## 2020-06-04 RX ORDER — DIAZEPAM 5 MG
5 TABLET ORAL
Qty: 10 TABLET | Refills: 0 | Status: SHIPPED | OUTPATIENT
Start: 2020-06-04 | End: 2020-09-17

## 2020-06-04 RX ORDER — PREDNISONE 20 MG/1
TABLET ORAL
Qty: 18 TABLET | Refills: 1 | Status: SHIPPED | OUTPATIENT
Start: 2020-06-04 | End: 2020-09-17

## 2020-06-04 RX ORDER — AMOXICILLIN 500 MG
1200 CAPSULE ORAL EVERY MORNING
COMMUNITY

## 2020-06-04 RX ORDER — FLUTICASONE PROPIONATE 50 MCG
SPRAY, SUSPENSION (ML) NASAL
Qty: 16 G | Refills: 11 | Status: SHIPPED | OUTPATIENT
Start: 2020-06-04 | End: 2021-07-22

## 2020-06-04 RX ADMIN — TRIAMCINOLONE ACETONIDE 40 MG: 40 INJECTION, SUSPENSION INTRA-ARTICULAR; INTRAMUSCULAR at 14:21

## 2020-06-04 ASSESSMENT — PAIN SCALES - GENERAL: PAINLEVEL: MODERATE PAIN (5)

## 2020-06-04 NOTE — LETTER
6/4/2020         RE: Deven Smith  [address] [address] [place] [id number]        Dear Colleague,    Thank you for referring your patient, Deven Smith, to the [facility]. Please see a copy of my visit note below.    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  He's here for a repeat injection.    Homero is very interested in surgery.  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    He knows that the injection would mandate a period of months or so until surgery can be considered.    Scribed by Anuja Frazier ATC for Dr. Maurice on 6/4/20 at 2:00 PM, based on the providers statements to me.           [facility] FOLLOW-UP VISIT 6/4/2020    Deven Smith's chief complaint for this visit includes:  Chief Complaint   Patient presents with     RECHECK     right shoulder injection      PCP: Marc Pinedo    Referring Provider:  No referring provider defined for this encounter.    /86   Wt 83.5 kg (184 lb)   BMI 28.82 kg/m    Data Unavailable       Interval History:     Follow up reason: right shoulder      Pain: Worsening    Function: Worsening    Medical History:    Any recent changes to your medical history? No    Any new medication prescribed since last visit? No    Review of Systems:    Do you have fever, chills, weight loss? No    Do you have any vision problems? No    Do you have any chest pain or edema? No    Do you have any shortness of breath or wheezing?  No    Do you have stomach problems? No    Do you have any urinary track issues? No    Do you have any numbness or focal weakness? No    Do you have diabetes? No    Do you have problems with bleeding or clotting? No    Do you have an rashes or other skin lesions? No    Large Joint Injection/Arthocentesis: R glenohumeral joint    Date/Time: 6/4/2020 2:21 PM  Performed by: Taz Maurice DO  Authorized by: Taz Maurcie DO     Indications:  Pain  Needle  Size:  22 G  Guidance: ultrasound    Location:  Shoulder      Site:  R glenohumeral joint  Medications:  40 mg triamcinolone 40 MG/ML  Outcome:  Tolerated well, no immediate complications  Procedure discussed: discussed risks, benefits, and alternatives    Consent Given by:  Patient  Timeout: timeout called immediately prior to procedure    Prep: patient was prepped and draped in usual sterile fashion       Glenohumeral Injection - Ultrasound Guided  The patient was informed of the risks and the benefits of the procedure and a written consent was signed.  The patient s right shoulder was prepped with chlorhexidine in sterile fashion.   40 mg of kenalog suspension was drawn up into a 5 mL syringe with 4 mL of 1% lidocaine w/o Epi.  Injection was performed using sterile technique.  Under ultrasound guidance a 3.5-inch 22-gauge needle was used to enter the right glenohumeral joint.  Posterior approach was used with the patient in lateral recumbent position, arm in neutral position at the side.  Needle placement was visualized and documented with ultrasound.  Ultrasound visualization was necessary due to the small joint space entered.  Injection performed long axis to the probe.  Injection solution visualized within the joint space.  Images were permanently stored for the patient's record.  There were no complications. The patient tolerated the procedure well. There was negligible bleeding.     The patient was instructed to call or go to the emergency room with any unusual pain, swelling, redness, or if otherwise concerned.                  Again, thank you for allowing me to participate in the care of your patient.        Sincerely,        Taz Maurice DO

## 2020-06-04 NOTE — PROGRESS NOTES
Subjective     Deven Smith is a 64 year old male who presents to clinic today for the following health issues:    HPI   Chief Complaint   Patient presents with     Dizziness     dizzy spells with certain movements, started 1 1/2 weeks ago       SUBJECTIVE:  Deven  is a 64 year old male who presents for: Vertigo.  Been going on for over a week.  Really bad when he lays down and turns to the right.  No recent cold symptoms.  Denies any tinnitus.  No headache.  Trauma.  He had this once before years ago.  He has tried meclizine.  He has tried Zyrtec.  Neither helped.  States that when he had a years ago he was put on prednisone.    Past Medical History:   Diagnosis Date     DDD (degenerative disc disease), cervical      Hyperlipidemia LDL goal < 130      Past Surgical History:   Procedure Laterality Date     COLONOSCOPY  2007    Repeat  for screening purposes in 10 yrs.     COLONOSCOPY  2013    Procedure: COLONOSCOPY;  Colonoscopy;  Surgeon: Emmett San MD;  Location:  GI     COLONOSCOPY N/A 2018    Procedure: COLONOSCOPY;  colonoscopy;  Surgeon: Israel Moreland MD;  Location:  GI     HC SHLDR ARTHROSCOP,SURG,W/ROTAT CUFF REPR Left      HC VASECTOMY UNILAT/BILAT W POSTOP SEMEN  1998    Vasectomy     Social History     Tobacco Use     Smoking status: Former Smoker     Packs/day: 2.00     Years: 15.00     Pack years: 30.00     Last attempt to quit: 1987     Years since quittin.5     Smokeless tobacco: Never Used   Substance Use Topics     Alcohol use: Yes     Alcohol/week: 60.0 standard drinks     Comment: rare      Current Outpatient Medications   Medication Sig Dispense Refill     diazepam (VALIUM) 5 MG tablet Take 1 tablet (5 mg) by mouth nightly as needed for anxiety 10 tablet 0     fluticasone (FLONASE) 50 MCG/ACT nasal spray USE TWO SPRAYS IN EACH NOSTRIL EVERY DAY 16 g 11     latanoprost (XALATAN) 0.005 % ophthalmic solution Place 0.005 drops into both eyes  At Bedtime       Misc Natural Products (GLUCOSAMINE CHONDROITIN ADV PO) Take 6 tablets by mouth daily.       MULTI-DAY VITAMINS OR TABS 1 tablet daily 0 0     Omega-3 Fatty Acids (FISH OIL) 1200 MG capsule Take 1,200 mg by mouth daily as needed       predniSONE (DELTASONE) 20 MG tablet Take 3 tabs daily for 3 days, then 2 tabs daily for 3 days, then 1 tab daily for 3 days 18 tablet 1       REVIEW OF SYSTEMS:   5 point ROS negative except as noted above in HPI, including Gen., Resp, CV, GI &  system review.     OBJECTIVE:  Vitals: /84   Pulse 80   Temp 98.2  F (36.8  C) (Temporal)   Resp 16   Wt 84.8 kg (187 lb)   SpO2 97%   BMI 29.29 kg/m    BMI= Body mass index is 29.29 kg/m .  Alert appears well.  Head is normocephalic.  Eyes PERRLA really no nystagmus.  Neck supple no carotid bruits.  Lungs clear.  Heart regular rhythm no murmur.  Gait is regular.  Movement of head left to right does not cause symptoms.    ASSESSMENT:  Benign positional vertigo    PLAN:  We will try him with some prednisone tapering down.  Some Valium at night.  If not improved I recommended he contact either physical therapy or chiropractor to learn some maneuvers and he agrees with this.        Marc Pinedo MD  [facility]

## 2020-06-04 NOTE — PROGRESS NOTES
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  He's here for a repeat injection.    Homero is very interested in surgery.  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    He knows that the injection would mandate a period of months or so until surgery can be considered.    Scribed by Anuja Frazier ATC for Dr. Maurice on 6/4/20 at 2:00 PM, based on the providers statements to me.           [facility] FOLLOW-UP VISIT 6/4/2020    Deven Smith's chief complaint for this visit includes:  Chief Complaint   Patient presents with     RECHECK     right shoulder injection      PCP: Marc Pinedo    Referring Provider:  No referring provider defined for this encounter.    /86   Wt 83.5 kg (184 lb)   BMI 28.82 kg/m    Data Unavailable       Interval History:     Follow up reason: right shoulder      Pain: Worsening    Function: Worsening    Medical History:    Any recent changes to your medical history? No    Any new medication prescribed since last visit? No    Review of Systems:    Do you have fever, chills, weight loss? No    Do you have any vision problems? No    Do you have any chest pain or edema? No    Do you have any shortness of breath or wheezing?  No    Do you have stomach problems? No    Do you have any urinary track issues? No    Do you have any numbness or focal weakness? No    Do you have diabetes? No    Do you have problems with bleeding or clotting? No    Do you have an rashes or other skin lesions? No    Large Joint Injection/Arthocentesis: R glenohumeral joint    Date/Time: 6/4/2020 2:21 PM  Performed by: Taz Maurice DO  Authorized by: Taz Maurice DO     Indications:  Pain  Needle Size:  22 G  Guidance: ultrasound    Location:  Shoulder      Site:  R glenohumeral joint  Medications:  40 mg triamcinolone 40 MG/ML  Outcome:  Tolerated well, no immediate complications  Procedure discussed: discussed risks, benefits, and  alternatives    Consent Given by:  Patient  Timeout: timeout called immediately prior to procedure    Prep: patient was prepped and draped in usual sterile fashion       Glenohumeral Injection - Ultrasound Guided  The patient was informed of the risks and the benefits of the procedure and a written consent was signed.  The patient s right shoulder was prepped with chlorhexidine in sterile fashion.   40 mg of kenalog suspension was drawn up into a 5 mL syringe with 4 mL of 1% lidocaine w/o Epi.  Injection was performed using sterile technique.  Under ultrasound guidance a 3.5-inch 22-gauge needle was used to enter the right glenohumeral joint.  Posterior approach was used with the patient in lateral recumbent position, arm in neutral position at the side.  Needle placement was visualized and documented with ultrasound.  Ultrasound visualization was necessary due to the small joint space entered.  Injection performed long axis to the probe.  Injection solution visualized within the joint space.  Images were permanently stored for the patient's record.  There were no complications. The patient tolerated the procedure well. There was negligible bleeding.     The patient was instructed to call or go to the emergency room with any unusual pain, swelling, redness, or if otherwise concerned.

## 2020-06-05 ENCOUNTER — MYC MEDICAL ADVICE (OUTPATIENT)
Dept: ORTHOPEDICS | Facility: CLINIC | Age: 65
End: 2020-06-05

## 2020-06-10 NOTE — PROGRESS NOTES
"Deven Smith is a 65 year old male who is being evaluated via a billable video visit.      The patient has been notified of following:     \"This video visit will be conducted via a call between you and your physician/provider. We have found that certain health care needs can be provided without the need for an in-person physical exam.  This service lets us provide the care you need with a video conversation.  If a prescription is necessary we can send it directly to your pharmacy.  If lab work is needed we can place an order for that and you can then stop by our lab to have the test done at a later time.    Video visits are billed at different rates depending on your insurance coverage.  Please reach out to your insurance provider with any questions.    If during the course of the call the physician/provider feels a video visit is not appropriate, you will not be charged for this service.\"    Patient has given verbal consent for Video visit? Yes    How would you like to obtain your AVS? Alyce    Patient would like the video invitation sent by: Text to cell phone: [phone]    Will anyone else be joining your video visit? No      CHIEF CONCERN:  Bilateral shoulder pain, R>>L    HISTORY OF PRESENT ILLNESS:  Mr. Smith is a 64 year old male who returns for his right shoulder today. He has heretofore been extremely active (previously very dedicated to CrossFit workouts and heavy weightlifting). He has had suprascapular nerve blocks as well as GH injections but none have provided adequate relief. He has tried to avoid surgery but feels now that nonop treatment has not provided enough pain relief.     PHYSICAL EXAM:    Adult male in no acute distress. Articulates and communicates with normal affect.  Respirations even and unlabored  Focused upper extremity exam:  EPL, FPL, and Intrinsics intact. Right shoulder active motion is FE to 160, ER at side to 60, and IR to L5. Left shoulder active motion is FE to " 170, ER to 15, and IR to T12.      IMAGING:  None new    ASSESSMENT:    1. Left shoulder cuff tear arthropathy  2. Right glenohumeral arthritis (moderate) with cuff tear    PLAN:  We reviewed the images and exam findings. We reviewed the non surgical treatment options for his right shoulder. We also discussed surgical options. I would like to obtain an updated right shoulder MRI to determine his best surgical options right now for the right side. He previously had rather significant atrophy of his subscap with moderate atrophy of the supra. His chondral loss will guide our options but I may consider a possible SCR (?anterior and superior) depending on that chondral loss. His age and desired activity lead me to have concerns about reverse TSA on the right.       Video-Visit Details    Type of service:  Video Visit    Video Start Time: 11:01am  Video End Time: 11:11 AM    Originating Location (pt. Location): Patient's car    Distant Location (provider location):  [facility]     Platform used for Video Visit: Dinh Carpenter MD

## 2020-06-11 ENCOUNTER — VIRTUAL VISIT (OUTPATIENT)
Dept: ORTHOPEDICS | Facility: CLINIC | Age: 65
End: 2020-06-11
Payer: COMMERCIAL

## 2020-06-11 DIAGNOSIS — M75.121 COMPLETE TEAR OF RIGHT ROTATOR CUFF, UNSPECIFIED WHETHER TRAUMATIC: Primary | ICD-10-CM

## 2020-06-11 PROCEDURE — 99213 OFFICE O/P EST LOW 20 MIN: CPT | Mod: GT | Performed by: ORTHOPAEDIC SURGERY

## 2020-06-11 NOTE — LETTER
"    6/11/2020         RE: Deven Smith  [address] [address] [place] [id number]        Dear Colleague,    Thank you for referring your patient, Deven Smith, to the [facility]. Please see a copy of my visit note below.    Deven Smith is a 65 year old male who is being evaluated via a billable video visit.      The patient has been notified of following:     \"This video visit will be conducted via a call between you and your physician/provider. We have found that certain health care needs can be provided without the need for an in-person physical exam.  This service lets us provide the care you need with a video conversation.  If a prescription is necessary we can send it directly to your pharmacy.  If lab work is needed we can place an order for that and you can then stop by our lab to have the test done at a later time.    Video visits are billed at different rates depending on your insurance coverage.  Please reach out to your insurance provider with any questions.    If during the course of the call the physician/provider feels a video visit is not appropriate, you will not be charged for this service.\"    Patient has given verbal consent for Video visit? Yes    How would you like to obtain your AVS? [facility]    Patient would like the video invitation sent by: Text to cell phone: [phone]    Will anyone else be joining your video visit? No      CHIEF CONCERN:  Bilateral shoulder pain, R>>L    HISTORY OF PRESENT ILLNESS:  Mr. Smith is a 64 year old male who returns for his right shoulder today. He has heretofore been extremely active (previously very dedicated to CrossFit workouts and heavy weightlifting). He has had suprascapular nerve blocks as well as GH injections but none have provided adequate relief. He has tried to avoid surgery but feels now that nonop treatment has not provided enough pain relief.     PHYSICAL EXAM:    Adult male in no acute distress. Articulates " and communicates with normal affect.  Respirations even and unlabored  Focused upper extremity exam:  EPL, FPL, and Intrinsics intact. Right shoulder active motion is FE to 160, ER at side to 60, and IR to L5. Left shoulder active motion is FE to 170, ER to 15, and IR to T12.      IMAGING:  None new    ASSESSMENT:    1. Left shoulder cuff tear arthropathy  2. Right glenohumeral arthritis (moderate) with cuff tear    PLAN:  We reviewed the images and exam findings. We reviewed the non surgical treatment options for his right shoulder. We also discussed surgical options. I would like to obtain an updated right shoulder MRI to determine his best surgical options right now for the right side. He previously had rather significant atrophy of his subscap with moderate atrophy of the supra. His chondral loss will guide our options but I may consider a possible SCR (?anterior and superior) depending on that chondral loss. His age and desired activity lead me to have concerns about reverse TSA on the right.       Video-Visit Details    Type of service:  Video Visit    Video Start Time: 11:01am  Video End Time: 11:11 AM    Originating Location (pt. Location): Patient's car    Distant Location (provider location):  [facility]     Platform [facility] for Video Visit: [facility]    Kell Carpenter MD          Again, thank you for allowing me to participate in the care of your patient.        Sincerely,        Kell Carpenter MD

## 2020-06-23 ENCOUNTER — TELEPHONE (OUTPATIENT)
Dept: ORTHOPEDICS | Facility: CLINIC | Age: 65
End: 2020-06-23
Payer: COMMERCIAL

## 2020-06-23 NOTE — TELEPHONE ENCOUNTER
6/23 Called and spoke to patient. He is currently scheduled 7/2.     Mali Falcon   Procedure    Ortho/Sports Med/Ent/Eye   [facility]   [phone]

## 2020-07-02 ENCOUNTER — HOSPITAL ENCOUNTER (OUTPATIENT)
Dept: MRI IMAGING | Facility: CLINIC | Age: 65
Discharge: HOME | End: 2020-07-02
Attending: ORTHOPAEDIC SURGERY | Admitting: ORTHOPAEDIC SURGERY
Payer: COMMERCIAL

## 2020-07-02 DIAGNOSIS — M75.121 COMPLETE TEAR OF RIGHT ROTATOR CUFF, UNSPECIFIED WHETHER TRAUMATIC: ICD-10-CM

## 2020-07-02 PROCEDURE — 73221 MRI JOINT UPR EXTREM W/O DYE: CPT | Mod: RT

## 2020-09-16 ASSESSMENT — ENCOUNTER SYMPTOMS
HEMATURIA: 0
FEVER: 0
HEMATOCHEZIA: 0
JOINT SWELLING: 0
SHORTNESS OF BREATH: 0
COUGH: 0
HEADACHES: 0
NERVOUS/ANXIOUS: 0
EYE PAIN: 0
CHILLS: 0
FREQUENCY: 0
NAUSEA: 0
DIARRHEA: 0
CONSTIPATION: 0
SORE THROAT: 0
ABDOMINAL PAIN: 0
MYALGIAS: 0
HEARTBURN: 0
PARESTHESIAS: 0
WEAKNESS: 0
DYSURIA: 0
PALPITATIONS: 0
DIZZINESS: 0
ARTHRALGIAS: 1

## 2020-09-17 ENCOUNTER — OFFICE VISIT (OUTPATIENT)
Dept: FAMILY MEDICINE | Facility: CLINIC | Age: 65
End: 2020-09-17
Payer: COMMERCIAL

## 2020-09-17 VITALS
SYSTOLIC BLOOD PRESSURE: 120 MMHG | HEART RATE: 56 BPM | DIASTOLIC BLOOD PRESSURE: 60 MMHG | OXYGEN SATURATION: 99 % | TEMPERATURE: 97.4 F | WEIGHT: 189 LBS | BODY MASS INDEX: 29.6 KG/M2

## 2020-09-17 DIAGNOSIS — Z12.5 SCREENING FOR PROSTATE CANCER: ICD-10-CM

## 2020-09-17 DIAGNOSIS — T14.8XXA SUPERFICIAL FOREIGN BODY (SLIVER): ICD-10-CM

## 2020-09-17 DIAGNOSIS — Z23 NEED FOR PROPHYLACTIC VACCINATION AND INOCULATION AGAINST INFLUENZA: Primary | ICD-10-CM

## 2020-09-17 DIAGNOSIS — Z13.6 SCREENING FOR CARDIOVASCULAR CONDITION: ICD-10-CM

## 2020-09-17 DIAGNOSIS — Z00.01 ENCOUNTER FOR GENERAL ADULT MEDICAL EXAMINATION WITH ABNORMAL FINDINGS: ICD-10-CM

## 2020-09-17 LAB
ALBUMIN SERPL-MCNC: 3.6 G/DL (ref 3.4–5)
ALP SERPL-CCNC: 70 U/L (ref 40–150)
ALT SERPL W P-5'-P-CCNC: 32 U/L (ref 0–70)
ANION GAP SERPL CALCULATED.3IONS-SCNC: 4 MMOL/L (ref 3–14)
AST SERPL W P-5'-P-CCNC: 27 U/L (ref 0–45)
BILIRUB SERPL-MCNC: 0.4 MG/DL (ref 0.2–1.3)
BUN SERPL-MCNC: 20 MG/DL (ref 7–30)
CALCIUM SERPL-MCNC: 8.8 MG/DL (ref 8.5–10.1)
CHLORIDE SERPL-SCNC: 108 MMOL/L (ref 94–109)
CHOLEST SERPL-MCNC: 192 MG/DL
CO2 SERPL-SCNC: 27 MMOL/L (ref 20–32)
CREAT SERPL-MCNC: 0.97 MG/DL (ref 0.66–1.25)
GFR SERPL CREATININE-BSD FRML MDRD: 82 ML/MIN/{1.73_M2}
GLUCOSE SERPL-MCNC: 103 MG/DL (ref 70–99)
HDLC SERPL-MCNC: 71 MG/DL
LDLC SERPL CALC-MCNC: 110 MG/DL
NONHDLC SERPL-MCNC: 121 MG/DL
POTASSIUM SERPL-SCNC: 4.2 MMOL/L (ref 3.4–5.3)
PROT SERPL-MCNC: 6.6 G/DL (ref 6.8–8.8)
PSA SERPL-ACNC: 1.78 UG/L (ref 0–4)
SODIUM SERPL-SCNC: 139 MMOL/L (ref 133–144)
TRIGL SERPL-MCNC: 53 MG/DL

## 2020-09-17 PROCEDURE — 80061 LIPID PANEL: CPT | Performed by: FAMILY MEDICINE

## 2020-09-17 PROCEDURE — 90472 IMMUNIZATION ADMIN EACH ADD: CPT | Performed by: FAMILY MEDICINE

## 2020-09-17 PROCEDURE — G0103 PSA SCREENING: HCPCS | Performed by: FAMILY MEDICINE

## 2020-09-17 PROCEDURE — 80053 COMPREHEN METABOLIC PANEL: CPT | Performed by: FAMILY MEDICINE

## 2020-09-17 PROCEDURE — 99396 PREV VISIT EST AGE 40-64: CPT | Mod: 25 | Performed by: FAMILY MEDICINE

## 2020-09-17 PROCEDURE — 36415 COLL VENOUS BLD VENIPUNCTURE: CPT | Performed by: FAMILY MEDICINE

## 2020-09-17 PROCEDURE — 90682 RIV4 VACC RECOMBINANT DNA IM: CPT | Performed by: FAMILY MEDICINE

## 2020-09-17 PROCEDURE — 10120 INC&RMVL FB SUBQ TISS SMPL: CPT | Performed by: FAMILY MEDICINE

## 2020-09-17 PROCEDURE — 90471 IMMUNIZATION ADMIN: CPT | Performed by: FAMILY MEDICINE

## 2020-09-17 PROCEDURE — 90750 HZV VACC RECOMBINANT IM: CPT | Performed by: FAMILY MEDICINE

## 2020-09-17 ASSESSMENT — ENCOUNTER SYMPTOMS
SHORTNESS OF BREATH: 0
COUGH: 0
HEARTBURN: 0
SORE THROAT: 0
HEMATOCHEZIA: 0
WEAKNESS: 0
DIZZINESS: 0
JOINT SWELLING: 0
PALPITATIONS: 0
DIARRHEA: 0
CHILLS: 0
ARTHRALGIAS: 1
CONSTIPATION: 0
EYE PAIN: 0
FEVER: 0
HEADACHES: 0
ABDOMINAL PAIN: 0
FREQUENCY: 0
NERVOUS/ANXIOUS: 0
NAUSEA: 0
MYALGIAS: 0
DYSURIA: 0
HEMATURIA: 0
PARESTHESIAS: 0

## 2020-09-17 NOTE — PROGRESS NOTES
SUBJECTIVE:   CC: Deven Smith is an 65 year old male who presents for preventative health visit.       Patient has been advised of split billing requirements and indicates understanding: Yes  Healthy Habits:     Getting at least 3 servings of Calcium per day:  NO    Bi-annual eye exam:  Yes    Dental care twice a year:  NO    Sleep apnea or symptoms of sleep apnea:  None    Diet:  Regular (no restrictions)    Frequency of exercise:  4-5 days/week    Taking medications regularly:  Yes    Medication side effects:  None    PHQ-2 Total Score: 0    Additional concerns today:  No          Chief Complaint   Patient presents with     Physical     Would like shingles and influenza shots today     Foreign Body in Skin     on left hand for about 2 weeks         Today's PHQ-2 Score:   PHQ-2 (  [employer]) 2020   Q1: Little interest or pleasure in doing things 0   Q2: Feeling down, depressed or hopeless 0   PHQ-2 Score 0   Q1: Little interest or pleasure in doing things Not at all   Q2: Feeling down, depressed or hopeless Not at all   PHQ-2 Score 0       Abuse: Current or Past(Physical, Sexual or Emotional)- NO  Do you feel safe in your environment? Yes        Social History     Tobacco Use     Smoking status: Former Smoker     Packs/day: 2.00     Years: 15.00     Pack years: 30.00     Last attempt to quit: 1987     Years since quittin.8     Smokeless tobacco: Never Used   Substance Use Topics     Alcohol use: Yes     Alcohol/week: 60.0 standard drinks     Comment: rare      If you drink alcohol do you typically have >3 drinks per day or >7 drinks per week? No    Alcohol Use 2020   Prescreen: >3 drinks/day or >7 drinks/week? No   Prescreen: >3 drinks/day or >7 drinks/week? -   No flowsheet data found.    Last PSA:   PSA   Date Value Ref Range Status   2019 2.33 0 - 4 ug/L Final     Comment:     Assay Method:  Chemiluminescence using Siemens Vista analyzer       Reviewed orders with patient.  Reviewed health maintenance and updated orders accordingly - Yes      Reviewed and updated as needed this visit by clinical staff  Tobacco  Allergies  Meds         Reviewed and updated as needed this visit by Provider        Past Medical History:   Diagnosis Date     DDD (degenerative disc disease), cervical      Hyperlipidemia LDL goal < 130       Past Surgical History:   Procedure Laterality Date     COLONOSCOPY  03/21/2007    Repeat  for screening purposes in 10 yrs.     COLONOSCOPY  1/2/2013    Procedure: COLONOSCOPY;  Colonoscopy;  Surgeon: Emmett San MD;  Location:  GI     COLONOSCOPY N/A 8/6/2018    Procedure: COLONOSCOPY;  colonoscopy;  Surgeon: Israel Moreland MD;  Location:  GI     HC SHLDR ARTHROSCOP,SURG,W/ROTAT CUFF REPR Left 2010     HC VASECTOMY UNILAT/BILAT W POSTOP SEMEN  1998    Vasectomy       Review of Systems   Constitutional: Negative for chills and fever.   HENT: Negative for congestion, ear pain, hearing loss and sore throat.    Eyes: Negative for pain and visual disturbance.   Respiratory: Negative for cough and shortness of breath.    Cardiovascular: Negative for chest pain, palpitations and peripheral edema.   Gastrointestinal: Negative for abdominal pain, constipation, diarrhea, heartburn, hematochezia and nausea.   Genitourinary: Negative for discharge, dysuria, frequency, genital sores, hematuria, impotence and urgency.   Musculoskeletal: Positive for arthralgias. Negative for joint swelling and myalgias.   Skin: Negative for rash.   Neurological: Negative for dizziness, weakness, headaches and paresthesias.   Psychiatric/Behavioral: Negative for mood changes. The patient is not nervous/anxious.          OBJECTIVE:   /60   Pulse 56   Temp 97.4  F (36.3  C) (Temporal)   Wt 85.7 kg (189 lb)   SpO2 99%   BMI 29.60 kg/m      Physical Exam  GENERAL: healthy, alert and no distress  EYES: Eyes grossly normal to inspection, PERRL and conjunctivae and sclerae  normal  HENT: ear canals and TM's normal, nose and mouth without ulcers or lesions  NECK: no adenopathy, no asymmetry, masses, or scars and thyroid normal to palpation  RESP: lungs clear to auscultation - no rales, rhonchi or wheezes  CV: regular rate and rhythm, normal S1 S2, no S3 or S4, no murmur, click or rub, no peripheral edema and peripheral pulses strong  ABDOMEN: soft, nontender, no hepatosplenomegaly, no masses and bowel sounds normal  MS: no gross musculoskeletal defects noted, no edema  SKIN: Lateral palm of right hand shows a 1 cm area induration to center crusted area.  Slightly fluctuant.  NEURO: Normal strength and tone, mentation intact and speech normal  PSYCH: mentation appears normal, affect normal/bright    Procedural note: After consent the area on the palm of the hand was cleansed with Betadine.  Infiltrated with about 1-1/2 to 2 cc of Xylocaine with epi.  The cap on this papular area was removed.  Some pus came out.  Probing little deeper we are able to locate a foreign object which appeared to be a thorn about a centimeter long.  This was easily removed.  Minimal bleeding.  Band-Aid was applied.    Diagnostic Test Results:  Labs reviewed in Epic  Results for orders placed or performed in visit on 09/17/20 (from the past 24 hour(s))   PSA, screen   Result Value Ref Range    PSA 1.78 0 - 4 ug/L   Lipid panel reflex to direct LDL Fasting   Result Value Ref Range    Cholesterol 192 <200 mg/dL    Triglycerides 53 <150 mg/dL    HDL Cholesterol 71 >39 mg/dL    LDL Cholesterol Calculated 110 (H) <100 mg/dL    Non HDL Cholesterol 121 <130 mg/dL   Comprehensive metabolic panel   Result Value Ref Range    Sodium 139 133 - 144 mmol/L    Potassium 4.2 3.4 - 5.3 mmol/L    Chloride 108 94 - 109 mmol/L    Carbon Dioxide 27 20 - 32 mmol/L    Anion Gap 4 3 - 14 mmol/L    Glucose 103 (H) 70 - 99 mg/dL    Urea Nitrogen 20 7 - 30 mg/dL    Creatinine 0.97 0.66 - 1.25 mg/dL    GFR Estimate 82 >60 mL/min/[1.73_m2]  "   GFR Estimate If Black >90 >60 mL/min/[1.73_m2]    Calcium 8.8 8.5 - 10.1 mg/dL    Bilirubin Total 0.4 0.2 - 1.3 mg/dL    Albumin 3.6 3.4 - 5.0 g/dL    Protein Total 6.6 (L) 6.8 - 8.8 g/dL    Alkaline Phosphatase 70 40 - 150 U/L    ALT 32 0 - 70 U/L    AST 27 0 - 45 U/L       ASSESSMENT/PLAN:   1. Encounter for general adult medical examination with abnormal findings  Except for below otherwise healthy.    2. Superficial foreign body (sliver)  He will watch for any signs of infection it should be a little bit of draining.  We will call in an antibiotic.  - REMOVE FOREIGN BODY SIMPLE    3. Screening for cardiovascular condition  Notify with results.  - Lipid panel reflex to direct LDL Fasting  - Comprehensive metabolic panel    4. Screening for prostate cancer  Notify with results.  - PSA, screen    5. Need for prophylactic vaccination and inoculation against influenza    - INFLUENZA QUAD, RECOMBINANT, P-FREE (RIV4) (FLUBLOCK) [31544]  - Vaccine Administration, Initial [69892]    Patient has been advised of split billing requirements and indicates understanding: Yes  COUNSELING:   Reviewed preventive health counseling, as reflected in patient instructions       Regular exercise       Healthy diet/nutrition       Vision screening    Estimated body mass index is 29.6 kg/m  as calculated from the following:    Height as of 3/3/20: 1.702 m (5' 7\").    Weight as of this encounter: 85.7 kg (189 lb).     Weight management plan: Discussed healthy diet and exercise guidelines    He reports that he quit smoking about 32 years ago. He has a 30.00 pack-year smoking history. He has never used smokeless tobacco.      Counseling Resources:  ATP IV Guidelines  Pooled Cohorts Equation Calculator  FRAX Risk Assessment  ICSI Preventive Guidelines  Dietary Guidelines for Americans, 2010  USDA's MyPlate  ASA Prophylaxis  Lung CA Screening    Marc Pinedo MD  [facility]  "

## 2020-10-20 ENCOUNTER — OFFICE VISIT (OUTPATIENT)
Dept: ORTHOPEDICS | Facility: CLINIC | Age: 65
End: 2020-10-20
Payer: COMMERCIAL

## 2020-10-20 VITALS — DIASTOLIC BLOOD PRESSURE: 68 MMHG | SYSTOLIC BLOOD PRESSURE: 120 MMHG | WEIGHT: 189 LBS | BODY MASS INDEX: 29.6 KG/M2

## 2020-10-20 DIAGNOSIS — M17.0 BILATERAL PRIMARY OSTEOARTHRITIS OF KNEE: Primary | ICD-10-CM

## 2020-10-20 PROCEDURE — 20610 DRAIN/INJ JOINT/BURSA W/O US: CPT | Mod: 50 | Performed by: FAMILY MEDICINE

## 2020-10-20 PROCEDURE — 99207 PR DROP WITH A PROCEDURE: CPT | Performed by: FAMILY MEDICINE

## 2020-10-20 RX ORDER — TRIAMCINOLONE ACETONIDE 40 MG/ML
40 INJECTION, SUSPENSION INTRA-ARTICULAR; INTRAMUSCULAR
Status: DISCONTINUED | OUTPATIENT
Start: 2020-10-20 | End: 2024-02-26

## 2020-10-20 RX ADMIN — TRIAMCINOLONE ACETONIDE 40 MG: 40 INJECTION, SUSPENSION INTRA-ARTICULAR; INTRAMUSCULAR at 09:32

## 2020-10-20 NOTE — PROGRESS NOTES
Homero is here for bilateral knee injections.  He has bilateral knee osteoarthritis.    Scribed by Anuja Frazier ATC for Dr. Maurice on 10/20/20 at 9:30 AM, based on the providers statements to me.       [facility] FOLLOW-UP VISIT 10/20/2020    Deven Smith's chief complaint for this visit includes:  Chief Complaint   Patient presents with     RECHECK     bilateral knee injection, last injections were 8/2019      PCP: Marc Pinedo    Referring Provider:  No referring provider defined for this encounter.    /68   Wt 85.7 kg (189 lb)   BMI 29.60 kg/m    Data Unavailable       Interval History:     Follow up reason: bilateral knee cortisone injection     Following Therapeutic Plan: Yes     Pain: Worsening    Function: Worsening    Medical History:    Any recent changes to your medical history? No    Any new medication prescribed since last visit? No    Review of Systems:    Do you have fever, chills, weight loss? No    Do you have any vision problems? No    Do you have any chest pain or edema? No    Do you have any shortness of breath or wheezing?  No    Do you have stomach problems? No    Do you have any urinary track issues? No    Do you have any numbness or focal weakness? No    Do you have diabetes? No    Do you have problems with bleeding or clotting? No    Do you have an rashes or other skin lesions? No    Large Joint Injection/Arthocentesis: bilateral knee    Date/Time: 10/20/2020 9:32 AM  Performed by: Taz Maurice DO  Authorized by: Taz Maurice DO     Indications:  Osteoarthritis  Needle Size:  22 G  Guidance: landmark guided    Approach:  Lateral  Location:  Knee  Laterality:  Bilateral      Medications (Right):  40 mg triamcinolone 40 MG/ML  Medications (Left):  40 mg triamcinolone 40 MG/ML  Outcome:  Tolerated well, no immediate complications  Procedure discussed: discussed risks, benefits, and alternatives    Consent Given by:  Patient  Timeout: timeout called  immediately prior to procedure    Prep: patient was prepped and draped in usual sterile fashion       PROCEDURE    Knee Injections - Intraarticular    The patient was informed of the risks and the benefits of the procedure and a written consent was signed.    The patient s left knee was prepped with chlorhexidine in sterile fashion.   40 mg of triamcinolone suspension was drawn up into a 5 mL syringe with 4 mL of 1% lidocaine.  Injection was performed using substerile technique.  A 1.5-inch 22-gauge needle was used to enter the lateral aspect of the left knee.  Injection performed successfully without difficulty.  There were no complications. The patient tolerated the procedure well. There was negligible bleeding.     The patient's right knee was prepped with chlorhexidine in sterile fashion.   40 mg of triamcinolone suspension was drawn up into a 5 mL syringe with 4 mL of 1% lidocaine.  Injection was performed using substerile technique.  A 1.5-inch 22-gauge needle was used to enter the lateral aspect of the right knee.  Injection performed successfully without difficulty.  There were no complications. The patient tolerated the procedure well. There was negligible bleeding.     The patient was instructed to ice the knee upon leaving clinic and refrain from overuse over the next 3 days.   The patient was instructed to call or go to the emergency room with any unusual pain, swelling, redness, or if otherwise concerned.

## 2020-10-20 NOTE — LETTER
10/20/2020         RE: Deven Smith  [address] [address] [place] [id number]        Dear Colleague,    Thank you for referring your patient, Deven Smith, to the [facility] [place]. Please see a copy of my visit note below.    Homero is here for bilateral knee injections.  He has bilateral knee osteoarthritis.    Scribed by Anuja Frazier ATC for Dr. Maurice on 10/20/20 at 9:30 AM, based on the providers statements to me.       [facility] FOLLOW-UP VISIT 10/20/2020    Deven Smith's chief complaint for this visit includes:  Chief Complaint   Patient presents with     RECHECK     bilateral knee injection, last injections were 8/2019      PCP: Marc Pinedo    Referring Provider:  No referring provider defined for this encounter.    /68   Wt 85.7 kg (189 lb)   BMI 29.60 kg/m    Data Unavailable       Interval History:     Follow up reason: bilateral knee cortisone injection     Following Therapeutic Plan: Yes     Pain: Worsening    Function: Worsening    Medical History:    Any recent changes to your medical history? No    Any new medication prescribed since last visit? No    Review of Systems:    Do you have fever, chills, weight loss? No    Do you have any vision problems? No    Do you have any chest pain or edema? No    Do you have any shortness of breath or wheezing?  No    Do you have stomach problems? No    Do you have any urinary track issues? No    Do you have any numbness or focal weakness? No    Do you have diabetes? No    Do you have problems with bleeding or clotting? No    Do you have an rashes or other skin lesions? No    Large Joint Injection/Arthocentesis: bilateral knee    Date/Time: 10/20/2020 9:32 AM  Performed by: Taz Maurice DO  Authorized by: Taz Maurice DO     Indications:  Osteoarthritis  Needle Size:  22 G  Guidance: landmark guided    Approach:  Lateral  Location:  Knee  Laterality:   Bilateral      Medications (Right):  40 mg triamcinolone 40 MG/ML  Medications (Left):  40 mg triamcinolone 40 MG/ML  Outcome:  Tolerated well, no immediate complications  Procedure discussed: discussed risks, benefits, and alternatives    Consent Given by:  Patient  Timeout: timeout called immediately prior to procedure    Prep: patient was prepped and draped in usual sterile fashion       PROCEDURE    Knee Injections - Intraarticular    The patient was informed of the risks and the benefits of the procedure and a written consent was signed.    The patient s left knee was prepped with chlorhexidine in sterile fashion.   40 mg of triamcinolone suspension was drawn up into a 5 mL syringe with 4 mL of 1% lidocaine.  Injection was performed using substerile technique.  A 1.5-inch 22-gauge needle was used to enter the lateral aspect of the left knee.  Injection performed successfully without difficulty.  There were no complications. The patient tolerated the procedure well. There was negligible bleeding.     The patient's right knee was prepped with chlorhexidine in sterile fashion.   40 mg of triamcinolone suspension was drawn up into a 5 mL syringe with 4 mL of 1% lidocaine.  Injection was performed using substerile technique.  A 1.5-inch 22-gauge needle was used to enter the lateral aspect of the right knee.  Injection performed successfully without difficulty.  There were no complications. The patient tolerated the procedure well. There was negligible bleeding.     The patient was instructed to ice the knee upon leaving clinic and refrain from overuse over the next 3 days.   The patient was instructed to call or go to the emergency room with any unusual pain, swelling, redness, or if otherwise concerned.                  Again, thank you for allowing me to participate in the care of your patient.        Sincerely,        Taz Maurice DO

## 2021-03-24 ENCOUNTER — APPOINTMENT (OUTPATIENT)
Dept: URGENT CARE | Facility: CLINIC | Age: 66
End: 2021-03-24
Payer: COMMERCIAL

## 2021-04-21 ENCOUNTER — IMMUNIZATION (OUTPATIENT)
Dept: FAMILY MEDICINE | Facility: CLINIC | Age: 66
End: 2021-04-21
Payer: COMMERCIAL

## 2021-04-21 PROCEDURE — 91301 PR COVID VAC MODERNA 100 MCG/0.5 ML IM: CPT

## 2021-04-21 PROCEDURE — 0011A PR COVID VAC MODERNA 100 MCG/0.5 ML IM: CPT

## 2021-05-17 ENCOUNTER — IMMUNIZATION (OUTPATIENT)
Dept: FAMILY MEDICINE | Facility: CLINIC | Age: 66
End: 2021-05-17
Attending: FAMILY MEDICINE
Payer: COMMERCIAL

## 2021-05-17 PROCEDURE — 91301 PR COVID VAC MODERNA 100 MCG/0.5 ML IM: CPT

## 2021-05-17 PROCEDURE — 0012A PR COVID VAC MODERNA 100 MCG/0.5 ML IM: CPT

## 2021-06-03 ENCOUNTER — OFFICE VISIT (OUTPATIENT)
Dept: ORTHOPEDICS | Facility: CLINIC | Age: 66
End: 2021-06-03
Payer: COMMERCIAL

## 2021-06-03 VITALS — WEIGHT: 184 LBS | BODY MASS INDEX: 28.82 KG/M2

## 2021-06-03 DIAGNOSIS — M17.0 BILATERAL PRIMARY OSTEOARTHRITIS OF KNEE: Primary | ICD-10-CM

## 2021-06-03 PROCEDURE — 99207 PR DROP WITH A PROCEDURE: CPT | Performed by: FAMILY MEDICINE

## 2021-06-03 PROCEDURE — 20610 DRAIN/INJ JOINT/BURSA W/O US: CPT | Mod: 50 | Performed by: FAMILY MEDICINE

## 2021-06-03 RX ORDER — TRIAMCINOLONE ACETONIDE 40 MG/ML
40 INJECTION, SUSPENSION INTRA-ARTICULAR; INTRAMUSCULAR
Status: DISCONTINUED | OUTPATIENT
Start: 2021-06-03 | End: 2024-02-26

## 2021-06-03 RX ADMIN — TRIAMCINOLONE ACETONIDE 40 MG: 40 INJECTION, SUSPENSION INTRA-ARTICULAR; INTRAMUSCULAR at 08:24

## 2021-06-03 NOTE — PROGRESS NOTES
Homero is here for bilateral knee injections.  He has bilateral knee OA.        [facility] FOLLOW-UP VISIT 6/3/2021    Deven Smith's chief complaint for this visit includes:  Chief Complaint   Patient presents with     Procedure     bilateral knee injections      PCP: Marc Pinedo    Referring Provider:  Marc Pinedo MD  [address] [place] [phone]    Wt 83.5 kg (184 lb)   BMI 28.82 kg/m    Data Unavailable       Interval History:     Follow up reason: bilateral knee pain     Pain: Worsening    Function: Worsening    Medical History:    Any recent changes to your medical history? No    Any new medication prescribed since last visit? No    Review of Systems:    Do you have fever, chills, weight loss? No    Do you have any vision problems? No    Do you have any chest pain or edema? No    Do you have any shortness of breath or wheezing?  No    Do you have stomach problems? No    Do you have any urinary track issues? No    Do you have any numbness or focal weakness? No    Do you have diabetes? No    Do you have problems with bleeding or clotting? No    Do you have an rashes or other skin lesions? No    Large Joint Injection/Arthocentesis: bilateral knee    Date/Time: 6/3/2021 8:24 AM  Performed by: Taz Maurice DO  Authorized by: Taz Maurice DO     Indications:  Pain  Needle Size:  25 G  Approach:  Lateral  Location:  Knee  Laterality:  Bilateral      Medications (Right):  40 mg triamcinolone 40 MG/ML  Medications (Left):  40 mg triamcinolone 40 MG/ML  Outcome:  Tolerated well, no immediate complications  Procedure discussed: discussed risks, benefits, and alternatives    Consent Given by:  Patient  Timeout: timeout called immediately prior to procedure    Prep: patient was prepped and draped in usual sterile fashion         PROCEDURE    Knee Injections - Intraarticular    The patient was informed of the risks and the benefits of the procedure and a written  consent was signed.    The patient s left knee was prepped with chlorhexidine in sterile fashion.   40 mg of triamcinolone suspension was drawn up into a 5 mL syringe with 4 mL of 1% lidocaine.  Injection was performed using substerile technique.  A 1.5-inch 22-gauge needle was used to enter the lateral aspect of the left knee.  Injection performed successfully without difficulty.  There were no complications. The patient tolerated the procedure well. There was negligible bleeding.     The patient's right knee was prepped with chlorhexidine in sterile fashion.   40 mg of triamcinolone suspension was drawn up into a 5 mL syringe with 4 mL of 1% lidocaine.  Injection was performed using substerile technique.  A 1.5-inch 22-gauge needle was used to enter the lateral aspect of the right knee.  Injection performed successfully without difficulty.  There were no complications. The patient tolerated the procedure well. There was negligible bleeding.

## 2021-06-03 NOTE — LETTER
6/3/2021         RE: Deven Smith  [address] [address] [id number]        Dear Colleague,    Thank you for referring your patient, Deven Smith, to the [facility] [place]. Please see a copy of my visit note below.    Homero is here for bilateral knee injections.  He has bilateral knee OA.        [facility] FOLLOW-UP VISIT 6/3/2021    Deven Smith's chief complaint for this visit includes:  Chief Complaint   Patient presents with     Procedure     bilateral knee injections      PCP: Marc Pinedo    Referring Provider:  Marc Pinedo MD  [address] [place] [phone]    Wt 83.5 kg (184 lb)   BMI 28.82 kg/m    Data Unavailable       Interval History:     Follow up reason: bilateral knee pain     Pain: Worsening    Function: Worsening    Medical History:    Any recent changes to your medical history? No    Any new medication prescribed since last visit? No    Review of Systems:    Do you have fever, chills, weight loss? No    Do you have any vision problems? No    Do you have any chest pain or edema? No    Do you have any shortness of breath or wheezing?  No    Do you have stomach problems? No    Do you have any urinary track issues? No    Do you have any numbness or focal weakness? No    Do you have diabetes? No    Do you have problems with bleeding or clotting? No    Do you have an rashes or other skin lesions? No    Large Joint Injection/Arthocentesis: bilateral knee    Date/Time: 6/3/2021 8:24 AM  Performed by: Taz Maurice DO  Authorized by: Taz Maurice DO     Indications:  Pain  Needle Size:  25 G  Approach:  Lateral  Location:  Knee  Laterality:  Bilateral      Medications (Right):  40 mg triamcinolone 40 MG/ML  Medications (Left):  40 mg triamcinolone 40 MG/ML  Outcome:  Tolerated well, no immediate complications  Procedure discussed: discussed risks, benefits, and alternatives    Consent Given by:   Patient  Timeout: timeout called immediately prior to procedure    Prep: patient was prepped and draped in usual sterile fashion         PROCEDURE    Knee Injections - Intraarticular    The patient was informed of the risks and the benefits of the procedure and a written consent was signed.    The patient s left knee was prepped with chlorhexidine in sterile fashion.   40 mg of triamcinolone suspension was drawn up into a 5 mL syringe with 4 mL of 1% lidocaine.  Injection was performed using substerile technique.  A 1.5-inch 22-gauge needle was used to enter the lateral aspect of the left knee.  Injection performed successfully without difficulty.  There were no complications. The patient tolerated the procedure well. There was negligible bleeding.     The patient's right knee was prepped with chlorhexidine in sterile fashion.   40 mg of triamcinolone suspension was drawn up into a 5 mL syringe with 4 mL of 1% lidocaine.  Injection was performed using substerile technique.  A 1.5-inch 22-gauge needle was used to enter the lateral aspect of the right knee.  Injection performed successfully without difficulty.  There were no complications. The patient tolerated the procedure well. There was negligible bleeding.                 Again, thank you for allowing me to participate in the care of your patient.        Sincerely,        Taz Maurice DO

## 2021-06-07 NOTE — PROGRESS NOTES
Assessment & Plan     Nail fungal infection  - ALT  - AST  - terbinafine (LAMISIL) 250 MG tablet; Take 1 tablet (250 mg) by mouth daily  Follow up in 6 weeks for reevaluation if symptoms have not resolved.    Alcohol dependence in remission (H)  This diagnosis was taken into consideration when assessing and treating this patient today.    20 minutes spent on the date of the encounter doing chart review, patient visit and documentation     Return in about 6 years (around 6/8/2027) for Recheck with primary care provider if not resolved.    Chanel Argueta PA-C  [facility]    Jarod Valentin is a 65 year old who presents for the following health issues     HPI     Concern - nail fungus  Onset: 3 months  Description: left thumb and right pointer finger  Intensity: moderate  Progression of Symptoms:  same  Accompanying Signs & Symptoms: clear fluid comes out of right pointer finger when he squeezes it and had an odor to it intermittently. Both thumb and finger are both discolored and dark in color  Previous history of similar problem: no  Precipitating factors:        Worsened by: none  Alleviating factors:        Improved by: none  Therapies tried and outcome: None    Homero presents to clinic today for evaluation of presumed fungal infection of his fingernails for approximately 3 months.  He states his left thumb and right index fingernails have become distorted.  He has not used any topical treatments for these.  He is not interested in a topical treatment but would prefer a pill.  He does have a history of alcohol dependence but notes that in the last year he has had approximately 2 drinks.  He notes that he has been working out and doing [employer] more often and has less of a desire to drink.    Review of Systems   ROS negative except as stated above.      Objective    BP (!) 163/76 (BP Location: Left arm)   Pulse 60   Temp 97.8  F (36.6  C) (Temporal)   Resp 14   Wt 82.1 kg (180 lb  14.4 oz)   SpO2 96%   BMI 28.33 kg/m    Body mass index is 28.33 kg/m .  Physical Exam   GENERAL: healthy, alert and no distress  SKIN: Distorted finger nails on left thumb and right index finger. See photo below.

## 2021-06-08 ENCOUNTER — OFFICE VISIT (OUTPATIENT)
Dept: FAMILY MEDICINE | Facility: CLINIC | Age: 66
End: 2021-06-08
Payer: COMMERCIAL

## 2021-06-08 VITALS
DIASTOLIC BLOOD PRESSURE: 76 MMHG | TEMPERATURE: 97.8 F | RESPIRATION RATE: 14 BRPM | WEIGHT: 180.9 LBS | SYSTOLIC BLOOD PRESSURE: 163 MMHG | HEART RATE: 60 BPM | OXYGEN SATURATION: 96 % | BODY MASS INDEX: 28.33 KG/M2

## 2021-06-08 DIAGNOSIS — B35.1 NAIL FUNGAL INFECTION: Primary | ICD-10-CM

## 2021-06-08 DIAGNOSIS — F10.21 ALCOHOL DEPENDENCE IN REMISSION (H): ICD-10-CM

## 2021-06-08 PROBLEM — F10.20 ALCOHOL DEPENDENCE (H): Status: ACTIVE | Noted: 2021-06-08

## 2021-06-08 LAB
ALT SERPL W P-5'-P-CCNC: 33 U/L (ref 0–70)
AST SERPL W P-5'-P-CCNC: 21 U/L (ref 0–45)

## 2021-06-08 PROCEDURE — 36415 COLL VENOUS BLD VENIPUNCTURE: CPT | Performed by: PHYSICIAN ASSISTANT

## 2021-06-08 PROCEDURE — 84460 ALANINE AMINO (ALT) (SGPT): CPT | Performed by: PHYSICIAN ASSISTANT

## 2021-06-08 PROCEDURE — 99213 OFFICE O/P EST LOW 20 MIN: CPT | Performed by: PHYSICIAN ASSISTANT

## 2021-06-08 PROCEDURE — 84450 TRANSFERASE (AST) (SGOT): CPT | Performed by: PHYSICIAN ASSISTANT

## 2021-06-08 RX ORDER — TERBINAFINE HYDROCHLORIDE 250 MG/1
250 TABLET ORAL DAILY
Qty: 42 TABLET | Refills: 0 | Status: SHIPPED | OUTPATIENT
Start: 2021-06-08 | End: 2021-07-20

## 2021-06-08 NOTE — PATIENT INSTRUCTIONS
Take terbinafine daily for 6 weeks.   Follow up in clinic if symptoms have not cleared for reevaluation

## 2021-07-15 ENCOUNTER — ANCILLARY PROCEDURE (OUTPATIENT)
Dept: GENERAL RADIOLOGY | Facility: CLINIC | Age: 66
End: 2021-07-15
Attending: FAMILY MEDICINE
Payer: COMMERCIAL

## 2021-07-15 ENCOUNTER — OFFICE VISIT (OUTPATIENT)
Dept: ORTHOPEDICS | Facility: CLINIC | Age: 66
End: 2021-07-15
Payer: COMMERCIAL

## 2021-07-15 VITALS — BODY MASS INDEX: 28.19 KG/M2 | WEIGHT: 180 LBS

## 2021-07-15 DIAGNOSIS — M25.511 ACUTE PAIN OF RIGHT SHOULDER: Primary | ICD-10-CM

## 2021-07-15 DIAGNOSIS — M25.511 ACUTE PAIN OF RIGHT SHOULDER: ICD-10-CM

## 2021-07-15 DIAGNOSIS — M19.011 OSTEOARTHRITIS OF RIGHT GLENOHUMERAL JOINT: ICD-10-CM

## 2021-07-15 DIAGNOSIS — M19.012 OSTEOARTHRITIS OF LEFT GLENOHUMERAL JOINT: ICD-10-CM

## 2021-07-15 PROCEDURE — 99207 PR DROP WITH A PROCEDURE: CPT | Performed by: FAMILY MEDICINE

## 2021-07-15 PROCEDURE — 73030 X-RAY EXAM OF SHOULDER: CPT | Mod: RT | Performed by: RADIOLOGY

## 2021-07-15 PROCEDURE — 20611 DRAIN/INJ JOINT/BURSA W/US: CPT | Mod: 50 | Performed by: FAMILY MEDICINE

## 2021-07-15 RX ORDER — TRIAMCINOLONE ACETONIDE 40 MG/ML
40 INJECTION, SUSPENSION INTRA-ARTICULAR; INTRAMUSCULAR
Status: DISCONTINUED | OUTPATIENT
Start: 2021-07-15 | End: 2024-02-26

## 2021-07-15 RX ADMIN — TRIAMCINOLONE ACETONIDE 40 MG: 40 INJECTION, SUSPENSION INTRA-ARTICULAR; INTRAMUSCULAR at 11:57

## 2021-07-15 NOTE — PROGRESS NOTES
Large Joint Injection/Arthocentesis: bilateral glenohumeral    Date/Time: 7/15/2021 11:57 AM  Performed by: Taz Maurice DO  Authorized by: Taz Maurice DO     Indications:  Pain  Needle Size:  22 G  Guidance: ultrasound    Location:  Shoulder  Laterality:  Bilateral      Site:  Bilateral glenohumeral  Medications (Right):  40 mg triamcinolone 40 MG/ML  Medications (Left):  40 mg triamcinolone 40 MG/ML  Outcome:  Tolerated well, no immediate complications  Procedure discussed: discussed risks, benefits, and alternatives    Consent Given by:  Patient  Timeout: timeout called immediately prior to procedure    Prep: patient was prepped and draped in usual sterile fashion         Glenohumeral Injections - Ultrasound Guided    The patient was informed of the risks and the benefits of the procedure and a written consent was signed.    The patient s right shoulder was prepped with chlorhexidine in sterile fashion.   40 mg of kenalog suspension was drawn up into a 5 mL syringe with 3 mL of 1% lidocaine w/o Epi.  Injection was performed using sterile technique.  Under ultrasound guidance a 3.5-inch 22-gauge needle was used to enter the glenohumeral joint.  Posterior approach was used with the patient in lateral recumbent position, arm in neutral position at the side.  Needle placement was visualized and documented with ultrasound.  Ultrasound visualization was necessary due to the small joint space entered.  Injection performed long axis to the probe.  Injection solution visualized within the joint space.  Images were permanently stored for the patient's record.  There were no complications. The patient tolerated the procedure well. There was negligible bleeding.     The patient was instructed to call or go to the emergency room with any unusual pain, swelling, redness, or if otherwise concerned.      The patient's left shoulder was prepped with chlorhexidine in sterile fashion.   40 mg of kenalog suspension was  drawn up into a 5 mL syringe with 3 mL of 1% lidocaine w/o Epi.  Injection was performed using sterile technique.  Under ultrasound guidance a 3.5-inch 22-gauge needle was used to enter the glenohumeral joint.  Posterior approach was used with the patient in lateral recumbent position, arm in neutral position at the side.  Needle placement was visualized and documented with ultrasound.  Ultrasound visualization was necessary due to the small joint space entered.  Injection performed long axis to the probe.  Injection solution visualized within the joint space.  Images were permanently stored for the patient's record.  There were no complications. The patient tolerated the procedure well. There was negligible bleeding.

## 2021-07-15 NOTE — LETTER
7/15/2021         RE: Deven Smith  [address] [facility] [address]        Dear Colleague,    Thank you for referring your patient, Deven Smith, to the [facility] [place]. Please see a copy of my visit note below.    HISTORY OF PRESENT ILLNESS  Mr. Smith is a pleasant 66 year old male following up with bilateral shoulder pain.  Homero was lifting weights last week at his gym when he felt his right shoulder pop out of place during a snatch squat.  He was able to reduce this immediately, although felt instantaneous pain down his right arm.  This shot down to his wrist.  Fortunately this pain has resolved, although he does still have achiness and clicking in his right shoulder.  He also continues to feel pain in his left shoulder, although with no new incident.     PHYSICAL EXAM  Vitals:    07/15/21 1125   Weight: 81.6 kg (180 lb)   General  - normal appearance, in no obvious distress  HEENT  - conjunctivae not injected, moist mucous membranes  CV  - normal radial pulse  Pulm  - normal respiratory pattern, non-labored  Musculoskeletal - right shoulder  - inspection: normal bone and joint alignment, no obvious deformity, no scapular winging, no AC step-off  - ROM: painful and limited flexion and ER at end range, limited IR  - strength: 5/5  strength, 5/5 in all shoulder planes  - special tests:  (-) Speed's  (+) Neer  (+) Hawkin's  (+) Ave's  Neuro  - no sensory or motor deficit, grossly normal coordination, normal muscle tone  Skin  - no ecchymosis, erythema, warmth, or induration, no obvious rash  Psych  - interactive, appropriate, normal mood and affect          ASSESSMENT & PLAN  Mr. Smith is a 66 year old male following up with bilateral shoulder pain.    I ordered & independently reviewed an x-ray of his right shoulder.  My read shows mildly worsening osteoarthritis with no obvious acute fracture.    Through shared decision making we did decide to  repeat his shoulder injections today (see procedure note).    Homero and I also discussed a plan regarding return to sport, we also discussed that in the future he may need to reconsider his choice of activity, if he continues to be painful and starts to lose function.  Time is going to engage in protected strengthening over the next few weeks.    It was a pleasure seeing Homero.        Taz Maurice DO, CAQSM      This note was constructed using Dragon dictation software, please excuse any minor errors in spelling, grammar, or syntax.        [facility] FOLLOW-UP VISIT 7/15/2021    Deven Smith's chief complaint for this visit includes:  Chief Complaint   Patient presents with     RECHECK     bilateral shoulder pain      PCP: Marc Pinedo    Referring Provider:  No referring provider defined for this encounter.    Wt 81.6 kg (180 lb)   BMI 28.19 kg/m    Data Unavailable       Interval History:     Follow up reason: bilateral shoulder pain     Pain: Worsening    Function: Worsening    Medical History:    Any recent changes to your medical history? No    Any new medication prescribed since last visit? No    Review of Systems:    Do you have fever, chills, weight loss? No    Do you have any vision problems? No    Do you have any chest pain or edema? No    Do you have any shortness of breath or wheezing?  No    Do you have stomach problems? No    Do you have any urinary track issues? No    Do you have any numbness or focal weakness? No    Do you have diabetes? No    Do you have problems with bleeding or clotting? No    Do you have an rashes or other skin lesions? No      Large Joint Injection/Arthocentesis: bilateral glenohumeral    Date/Time: 7/15/2021 11:57 AM  Performed by: Taz Maurice DO  Authorized by: Taz Maurice DO     Indications:  Pain  Needle Size:  22 G  Guidance: ultrasound    Location:  Shoulder  Laterality:  Bilateral      Site:  Bilateral glenohumeral  Medications  (Right):  40 mg triamcinolone 40 MG/ML  Medications (Left):  40 mg triamcinolone 40 MG/ML  Outcome:  Tolerated well, no immediate complications  Procedure discussed: discussed risks, benefits, and alternatives    Consent Given by:  Patient  Timeout: timeout called immediately prior to procedure    Prep: patient was prepped and draped in usual sterile fashion         Glenohumeral Injections - Ultrasound Guided    The patient was informed of the risks and the benefits of the procedure and a written consent was signed.    The patient s right shoulder was prepped with chlorhexidine in sterile fashion.   40 mg of kenalog suspension was drawn up into a 5 mL syringe with 3 mL of 1% lidocaine w/o Epi.  Injection was performed using sterile technique.  Under ultrasound guidance a 3.5-inch 22-gauge needle was used to enter the glenohumeral joint.  Posterior approach was used with the patient in lateral recumbent position, arm in neutral position at the side.  Needle placement was visualized and documented with ultrasound.  Ultrasound visualization was necessary due to the small joint space entered.  Injection performed long axis to the probe.  Injection solution visualized within the joint space.  Images were permanently stored for the patient's record.  There were no complications. The patient tolerated the procedure well. There was negligible bleeding.     The patient was instructed to call or go to the emergency room with any unusual pain, swelling, redness, or if otherwise concerned.      The patient's left shoulder was prepped with chlorhexidine in sterile fashion.   40 mg of kenalog suspension was drawn up into a 5 mL syringe with 3 mL of 1% lidocaine w/o Epi.  Injection was performed using sterile technique.  Under ultrasound guidance a 3.5-inch 22-gauge needle was used to enter the glenohumeral joint.  Posterior approach was used with the patient in lateral recumbent position, arm in neutral position at the side.   Needle placement was visualized and documented with ultrasound.  Ultrasound visualization was necessary due to the small joint space entered.  Injection performed long axis to the probe.  Injection solution visualized within the joint space.  Images were permanently stored for the patient's record.  There were no complications. The patient tolerated the procedure well. There was negligible bleeding.                     Again, thank you for allowing me to participate in the care of your patient.        Sincerely,        Taz Maurice DO

## 2021-07-15 NOTE — PROGRESS NOTES
HISTORY OF PRESENT ILLNESS  Mr. Smith is a pleasant 66 year old male following up with bilateral shoulder pain.  Homero was lifting weights last week at his gym when he felt his right shoulder pop out of place during a snatch squat.  He was able to reduce this immediately, although felt instantaneous pain down his right arm.  This shot down to his wrist.  Fortunately this pain has resolved, although he does still have achiness and clicking in his right shoulder.  He also continues to feel pain in his left shoulder, although with no new incident.     PHYSICAL EXAM  Vitals:    07/15/21 1125   Weight: 81.6 kg (180 lb)   General  - normal appearance, in no obvious distress  HEENT  - conjunctivae not injected, moist mucous membranes  CV  - normal radial pulse  Pulm  - normal respiratory pattern, non-labored  Musculoskeletal - right shoulder  - inspection: normal bone and joint alignment, no obvious deformity, no scapular winging, no AC step-off  - ROM: painful and limited flexion and ER at end range, limited IR  - strength: 5/5  strength, 5/5 in all shoulder planes  - special tests:  (-) Speed's  (+) Neer  (+) Hawkin's  (+) Ave's  Neuro  - no sensory or motor deficit, grossly normal coordination, normal muscle tone  Skin  - no ecchymosis, erythema, warmth, or induration, no obvious rash  Psych  - interactive, appropriate, normal mood and affect          ASSESSMENT & PLAN  Mr. Smith is a 66 year old male following up with bilateral shoulder pain.    I ordered & independently reviewed an x-ray of his right shoulder.  My read shows mildly worsening osteoarthritis with no obvious acute fracture.    Through shared decision making we did decide to repeat his shoulder injections today (see procedure note).    Homero and I also discussed a plan regarding return to sport, we also discussed that in the future he may need to reconsider his choice of activity, if he continues to be painful and starts to lose function.  Time  is going to engage in protected strengthening over the next few weeks.    It was a pleasure seeing Homero.        Taz Maurice, , CAQSM      This note was constructed using Dragon dictation software, please excuse any minor errors in spelling, grammar, or syntax.        [facility] FOLLOW-UP VISIT 7/15/2021    Deven Smith's chief complaint for this visit includes:  Chief Complaint   Patient presents with     RECHECK     bilateral shoulder pain      PCP: Marc Pinedo    Referring Provider:  No referring provider defined for this encounter.    Wt 81.6 kg (180 lb)   BMI 28.19 kg/m    Data Unavailable       Interval History:     Follow up reason: bilateral shoulder pain     Pain: Worsening    Function: Worsening    Medical History:    Any recent changes to your medical history? No    Any new medication prescribed since last visit? No    Review of Systems:    Do you have fever, chills, weight loss? No    Do you have any vision problems? No    Do you have any chest pain or edema? No    Do you have any shortness of breath or wheezing?  No    Do you have stomach problems? No    Do you have any urinary track issues? No    Do you have any numbness or focal weakness? No    Do you have diabetes? No    Do you have problems with bleeding or clotting? No    Do you have an rashes or other skin lesions? No

## 2021-07-20 ENCOUNTER — OFFICE VISIT (OUTPATIENT)
Dept: FAMILY MEDICINE | Facility: CLINIC | Age: 66
End: 2021-07-20
Payer: COMMERCIAL

## 2021-07-20 VITALS
OXYGEN SATURATION: 96 % | SYSTOLIC BLOOD PRESSURE: 133 MMHG | RESPIRATION RATE: 16 BRPM | HEIGHT: 68 IN | HEART RATE: 67 BPM | WEIGHT: 180.5 LBS | BODY MASS INDEX: 27.35 KG/M2 | DIASTOLIC BLOOD PRESSURE: 77 MMHG | TEMPERATURE: 97.8 F

## 2021-07-20 DIAGNOSIS — B35.1 NAIL FUNGAL INFECTION: ICD-10-CM

## 2021-07-20 DIAGNOSIS — B35.1 NAIL FUNGAL INFECTION: Primary | ICD-10-CM

## 2021-07-20 LAB
ALT SERPL W P-5'-P-CCNC: 28 U/L (ref 0–70)
AST SERPL W P-5'-P-CCNC: 16 U/L (ref 0–45)

## 2021-07-20 PROCEDURE — 84450 TRANSFERASE (AST) (SGOT): CPT

## 2021-07-20 PROCEDURE — 99213 OFFICE O/P EST LOW 20 MIN: CPT | Performed by: PHYSICIAN ASSISTANT

## 2021-07-20 PROCEDURE — 36415 COLL VENOUS BLD VENIPUNCTURE: CPT

## 2021-07-20 PROCEDURE — 84460 ALANINE AMINO (ALT) (SGPT): CPT

## 2021-07-20 RX ORDER — TERBINAFINE HYDROCHLORIDE 250 MG/1
250 TABLET ORAL DAILY
Qty: 42 TABLET | Refills: 0 | Status: SHIPPED | OUTPATIENT
Start: 2021-07-20 | End: 2021-08-31

## 2021-07-20 ASSESSMENT — MIFFLIN-ST. JEOR: SCORE: 1583.24

## 2021-07-20 ASSESSMENT — PAIN SCALES - GENERAL: PAINLEVEL: NO PAIN (0)

## 2021-07-20 NOTE — PROGRESS NOTES
"    Assessment & Plan     Nail fungal infection  - ALT; Future  - AST; Future  - terbinafine (LAMISIL) 250 MG tablet; Take 1 tablet (250 mg) by mouth daily    ALT and AST are normal.  Resume terbinafine daily for 6 more weeks.  Avoid picking at the cuticle to help nail grow back normally.    15 minutes spent on the date of the encounter doing chart review, patient visit and documentation       No follow-ups on file.    Chanel Argueta PA-C  [facility]    Jarod Valentin is a 66 year old who presents for the following health issues     HPI     Chief Complaint   Patient presents with     RECHECK     fingernail       Homero presents to clinic for follow-up of a nail fungal infection.  He has been on terbinafine for the last 6 weeks.  He notes that his nails do seem to be improving over that time.  He has not noted any side effects.    Review of Systems   ROS negative except as noted above      Objective    /77   Pulse 67   Temp 97.8  F (36.6  C) (Temporal)   Resp 16   Ht 1.727 m (5' 8\")   Wt 81.9 kg (180 lb 8 oz)   SpO2 96%   BMI 27.44 kg/m    Body mass index is 27.44 kg/m .  Physical Exam   GENERAL: healthy, alert and no distress  SKIN: Left thumb and right index finger infected.  Left thumb is nearly fully healed compared with 6 weeks ago.  Right index finger is improved from last visit.  He notes that there is no more drainage or discharge from this area.              "

## 2021-07-20 NOTE — NURSING NOTE
Health Maintenance Due   Topic Date Due     ANNUAL REVIEW OF HM ORDERS  Never done     Pneumococcal Vaccine: Pediatrics (0 to 5 Years) and At-Risk Patients (6 to 64 Years) (1 of 2 - PPSV23) Never done     HIV SCREENING  Never done     Luisa Tirado, [facility]

## 2021-07-21 DIAGNOSIS — J30.2 SEASONAL ALLERGIC RHINITIS, UNSPECIFIED TRIGGER: ICD-10-CM

## 2021-07-22 RX ORDER — FLUTICASONE PROPIONATE 50 MCG
SPRAY, SUSPENSION (ML) NASAL
Qty: 16 G | Refills: 7 | Status: SHIPPED | OUTPATIENT
Start: 2021-07-22 | End: 2022-07-26

## 2021-08-10 ENCOUNTER — MYC MEDICAL ADVICE (OUTPATIENT)
Dept: FAMILY MEDICINE | Facility: CLINIC | Age: 66
End: 2021-08-10

## 2021-08-11 ENCOUNTER — NURSE TRIAGE (OUTPATIENT)
Dept: NURSING | Facility: CLINIC | Age: 66
End: 2021-08-11

## 2021-08-11 NOTE — TELEPHONE ENCOUNTER
Triage Call:      -2 days ago patient started having dizziness.  -Patient states he has had this happen about 4 times in the past.   -Patient states that he went to  years ago to help with his dizziness.   -Patient is currently sitting up and not feeling dizzy right now, but minute patient lays down or if he tilts his head to the side that is when he feels lightheaded/dizziness which has been present today.   -No fevers currently.   -Patient states in the past he has been prescribed a steroid medication to help with his dizziness from PCP.   -Per protocol, recommendations are for patient to be seen in office today. RN advised if no openings patient is to be see at [facility]. RN advised for patient to call back if he develops any new or worsening sx. Patient verbalized understanding and agrees with plan.     Yadira Lindsey RN, BSN Nurse Triage Advisor 9:57 AM 8/11/2021     Reason for Disposition    Lightheadedness (dizziness) present now, after 2 hours of rest and fluids    Additional Information    Negative: Shock suspected (e.g., cold/pale/clammy skin, too weak to stand, low BP, rapid pulse)    Negative: Difficult to awaken or acting confused (e.g., disoriented, slurred speech)    Negative: Fainted, and still feels dizzy afterwards    Negative: Severe difficulty breathing (e.g., struggling for each breath, speaks in single words)    Negative: Overdose (accidental or intentional) of medications    Negative: New neurologic deficit that is present now: * Weakness of the face, arm, or leg on one side of the body * Numbness of the face, arm, or leg on one side of the body * Loss of speech or garbled speech    Negative: Heart beating < 50 beats per minute OR > 140 beats per minute    Negative: Sounds like a life-threatening emergency to the triager    Negative: Chest pain    Negative: Rectal bleeding, bloody stool, or tarry-black stool    Negative: Vomiting is the main symptom    Negative: Diarrhea is the main  symptom    Negative: Headache is the main symptom    Negative: Heat exhaustion suspected (i.e., dehydration from heat exposure)    Negative: Patient states that he/she is having an anxiety/panic attack    Negative: SEVERE dizziness (e.g., unable to stand, requires support to walk, feels like passing out now)    Negative: SEVERE headache or neck pain    Negative: Spinning or tilting sensation (vertigo) present now and one or more stroke risk factors (i.e., hypertension, diabetes, prior stroke/TIA, heart attack, age over 60) (Exception: prior physician evaluation for this AND no different/worse than usual)    Negative: Loss of vision or double vision    Negative: Extra heart beats OR irregular heart beating (i.e., 'palpitations')    Negative: Difficulty breathing    Negative: Drinking very little and has signs of dehydration (e.g., no urine > 12 hours, very dry mouth, very lightheaded)    Negative: Follows bleeding (e.g., stomach, rectum, vagina) (Exception: became dizzy from sight of small amount blood)    Negative: Patient sounds very sick or weak to the triager    Protocols used: DIZZINESS-A-OH    COVID 19 Nurse Triage Plan/Patient Instructions    Please be aware that novel coronavirus (COVID-19) may be circulating in the community. If you develop symptoms such as fever, cough, or SOB or if you have concerns about the presence of another infection including coronavirus (COVID-19), please contact your health care provider or visit [email or web address].     Disposition/Instructions    In-Person Visit with provider recommended. Reference Visit Selection Guide.    Thank you for taking steps to prevent the spread of this virus.  o Limit your contact with others.  o Wear a simple mask to cover your cough.  o Wash your hands well and often.    Resources     [facility]: About COVID-19: [email or web address]    CDC: What to Do If You're Sick:  www.cdc.gov/coronavirus/2019-ncov/about/steps-when-sick.html    CDC: Ending Home Isolation: www.cdc.gov/coronavirus/2019-ncov/hcp/disposition-in-home-patients.html     CDC: Caring for Someone: www.cdc.gov/coronavirus/2019-ncov/if-you-are-sick/care-for-someone.html     [facility]: Interim Guidance for Hospital Discharge to Home: www.[email or web address]/coronavirus/hcp/hospdischarge.pdf    [facility] clinical trials (COVID-19 research studies): [email or web address].[facility]-clinical-trials     Below are the COVID-19 hotlines at the [place] Department of Health ([facility]). Interpreters are available.   o For health questions: Call 157-430-2083 or 1-927.895.1298 (7 a.m. to 7 p.m.)  o For questions about schools and childcare: Call 376-120-8522 or 1-250.320.6211 (7 a.m. to 7 p.m.)

## 2021-09-20 ENCOUNTER — OFFICE VISIT (OUTPATIENT)
Dept: ORTHOPEDICS | Facility: CLINIC | Age: 66
End: 2021-09-20
Payer: COMMERCIAL

## 2021-09-20 ENCOUNTER — ANCILLARY PROCEDURE (OUTPATIENT)
Dept: GENERAL RADIOLOGY | Facility: CLINIC | Age: 66
End: 2021-09-20
Attending: FAMILY MEDICINE
Payer: COMMERCIAL

## 2021-09-20 DIAGNOSIS — M25.561 CHRONIC PAIN OF RIGHT KNEE: ICD-10-CM

## 2021-09-20 DIAGNOSIS — M25.561 CHRONIC PAIN OF RIGHT KNEE: Primary | ICD-10-CM

## 2021-09-20 DIAGNOSIS — G89.29 CHRONIC PAIN OF RIGHT KNEE: ICD-10-CM

## 2021-09-20 DIAGNOSIS — G89.29 CHRONIC PAIN OF RIGHT KNEE: Primary | ICD-10-CM

## 2021-09-20 PROCEDURE — 73564 X-RAY EXAM KNEE 4 OR MORE: CPT | Mod: RT | Performed by: RADIOLOGY

## 2021-09-20 PROCEDURE — 99214 OFFICE O/P EST MOD 30 MIN: CPT | Performed by: FAMILY MEDICINE

## 2021-09-20 NOTE — PATIENT INSTRUCTIONS
Thanks for coming today.  Ortho/Sports Medicine Clinic  [address] [place] [address]    To schedule future appointments in Ortho Clinic, you may call [phone].    To schedule ordered imaging by your Provider: Call [facility] at [phone]    [facility] available online at:   [email or web address]    Please call if any further questions or concerns [phone] and ask for the Orthopedic Department. Clinic hours 8 am to 5 pm.    Return to clinic if symptoms worsen.

## 2021-09-20 NOTE — PROGRESS NOTES
HISTORY OF PRESENT ILLNESS  Mr. Smith is a pleasant 66 year old male following up with right knee pain.  Homero last saw me for his knees in June of this year.  At that visit I injected both of his knees with corticosteroid.  Unfortunately his right knee has not really improved after the injection.  He feels swelling in his right knee, his pain used to be anterior but now has shifted to be more posterior pain and fullness.  He feels as if his knee may give out on occasion, this happens frequently and without warning.     PHYSICAL EXAM  General  - normal appearance, in no obvious distress  HEENT  - conjunctivae not injected, moist mucous membranes  CV  - normal popliteal pulse  Pulm  - normal respiratory pattern, non-labored  Musculoskeletal - right knee  - stance: normal gait without limp  - inspection: trace effusion, no ecchymosis, normal muscle tone, normal bone and joint alignment, no obvious deformity  - palpation: posterior knee tenderness, patella and patellar tendon non-tender, normal popliteal pulse  - ROM: 135 degrees flexion, -5 degrees extension, pain at terminal extension passively  - special tests:  (-) Lachman  (-) Brenden  (-) varus at 0 and 30 degrees flexion  (-) valgus at 0 and 30 degrees flexion  Neuro  - no sensory or motor deficit, grossly normal coordination, normal muscle tone  Skin  - no ecchymosis, erythema, warmth, or induration, no obvious rash  Psych  - interactive, appropriate, normal mood and affect          ASSESSMENT & PLAN  Mr. Smith is a 66 year old male following up with right knee pain.    I ordered & independently reviewed x-rays of his right knee, this reveals mild degenerative change.    His pain is persistent despite a reassuring x-ray and an injection, as well as extensive exercise.  I am ordering an MRI.  I will get in touch with him with the results.    Homero has a knee sleeve which he should wear for comfort and to provide compression.    It was a pleasure seeing  Geraldo Maurice DO, CAQSM      This note was constructed using Dragon dictation software, please excuse any minor errors in spelling, grammar, or syntax.          [facility] FOLLOW-UP VISIT 9/20/2021    Deven Smith's chief complaint for this visit includes:  Chief Complaint   Patient presents with     RECHECK     right knee      PCP: Marc Pinedo    Interval History:     Follow up reason: right knee     Following Therapeutic Plan: Yes     Pain: Worsening    Function: Worsening    Medical History:    Any recent changes to your medical history? No    Any new medication prescribed since last visit? No    Review of Systems:    Do you have fever, chills, weight loss? No    Do you have any vision problems? No    Do you have any chest pain or edema? No    Do you have any shortness of breath or wheezing?  No    Do you have stomach problems? No    Do you have any urinary track issues? No    Do you have any numbness or focal weakness? No    Do you have diabetes? No    Do you have problems with bleeding or clotting? No    Do you have an rashes or other skin lesions? No

## 2021-09-20 NOTE — LETTER
9/20/2021         RE: Deven Smith  [address] [address] [place] [id number]        Dear Colleague,    Thank you for referring your patient, Deven Smith, to the [facility] [place]. Please see a copy of my visit note below.    HISTORY OF PRESENT ILLNESS  Mr. Smith is a pleasant 66 year old male following up with right knee pain.  Homero last saw me for his knees in June of this year.  At that visit I injected both of his knees with corticosteroid.  Unfortunately his right knee has not really improved after the injection.  He feels swelling in his right knee, his pain used to be anterior but now has shifted to be more posterior pain and fullness.  He feels as if his knee may give out on occasion, this happens frequently and without warning.     PHYSICAL EXAM  General  - normal appearance, in no obvious distress  HEENT  - conjunctivae not injected, moist mucous membranes  CV  - normal popliteal pulse  Pulm  - normal respiratory pattern, non-labored  Musculoskeletal - right knee  - stance: normal gait without limp  - inspection: trace effusion, no ecchymosis, normal muscle tone, normal bone and joint alignment, no obvious deformity  - palpation: posterior knee tenderness, patella and patellar tendon non-tender, normal popliteal pulse  - ROM: 135 degrees flexion, -5 degrees extension, pain at terminal extension passively  - special tests:  (-) Lachman  (-) Brenden  (-) varus at 0 and 30 degrees flexion  (-) valgus at 0 and 30 degrees flexion  Neuro  - no sensory or motor deficit, grossly normal coordination, normal muscle tone  Skin  - no ecchymosis, erythema, warmth, or induration, no obvious rash  Psych  - interactive, appropriate, normal mood and affect          ASSESSMENT & PLAN  Mr. Smith is a 66 year old male following up with right knee pain.    I ordered & independently reviewed x-rays of his right knee, this reveals mild degenerative change.    His pain is  persistent despite a reassuring x-ray and an injection, as well as extensive exercise.  I am ordering an MRI.  I will get in touch with him with the results.    Homero has a knee sleeve which he should wear for comfort and to provide compression.    It was a pleasure seeing Homero.        Taz Maurice, DO, CAQSM      This note was constructed using Dragon dictation software, please excuse any minor errors in spelling, grammar, or syntax.          [facility] FOLLOW-UP VISIT 9/20/2021    Deven Smith's chief complaint for this visit includes:  Chief Complaint   Patient presents with     RECHECK     right knee      PCP: Marc Pinedo    Interval History:     Follow up reason: right knee     Following Therapeutic Plan: Yes     Pain: Worsening    Function: Worsening    Medical History:    Any recent changes to your medical history? No    Any new medication prescribed since last visit? No    Review of Systems:    Do you have fever, chills, weight loss? No    Do you have any vision problems? No    Do you have any chest pain or edema? No    Do you have any shortness of breath or wheezing?  No    Do you have stomach problems? No    Do you have any urinary track issues? No    Do you have any numbness or focal weakness? No    Do you have diabetes? No    Do you have problems with bleeding or clotting? No    Do you have an rashes or other skin lesions? No        Again, thank you for allowing me to participate in the care of your patient.        Sincerely,        Taz Maurice, DO

## 2021-09-23 ENCOUNTER — HOSPITAL ENCOUNTER (OUTPATIENT)
Dept: MRI IMAGING | Facility: CLINIC | Age: 66
Discharge: HOME | End: 2021-09-23
Attending: FAMILY MEDICINE | Admitting: FAMILY MEDICINE
Payer: COMMERCIAL

## 2021-09-23 DIAGNOSIS — M25.561 CHRONIC PAIN OF RIGHT KNEE: ICD-10-CM

## 2021-09-23 DIAGNOSIS — G89.29 CHRONIC PAIN OF RIGHT KNEE: ICD-10-CM

## 2021-09-23 PROCEDURE — 73721 MRI JNT OF LWR EXTRE W/O DYE: CPT | Mod: 26 | Performed by: RADIOLOGY

## 2021-09-23 PROCEDURE — 73721 MRI JNT OF LWR EXTRE W/O DYE: CPT | Mod: RT

## 2021-09-27 ENCOUNTER — APPOINTMENT (OUTPATIENT)
Dept: NURSING | Facility: CLINIC | Age: 66
End: 2021-09-27
Payer: COMMERCIAL

## 2021-10-23 ENCOUNTER — HEALTH MAINTENANCE LETTER (OUTPATIENT)
Age: 66
End: 2021-10-23

## 2021-11-01 ENCOUNTER — OFFICE VISIT (OUTPATIENT)
Dept: FAMILY MEDICINE | Facility: CLINIC | Age: 66
End: 2021-11-01
Payer: COMMERCIAL

## 2021-11-01 VITALS
WEIGHT: 183 LBS | SYSTOLIC BLOOD PRESSURE: 110 MMHG | OXYGEN SATURATION: 98 % | DIASTOLIC BLOOD PRESSURE: 78 MMHG | TEMPERATURE: 97.7 F | HEART RATE: 74 BPM | RESPIRATION RATE: 16 BRPM | BODY MASS INDEX: 27.83 KG/M2

## 2021-11-01 DIAGNOSIS — Z13.6 SCREENING FOR CARDIOVASCULAR CONDITION: ICD-10-CM

## 2021-11-01 DIAGNOSIS — Z00.00 ROUTINE GENERAL MEDICAL EXAMINATION AT A HEALTH CARE FACILITY: Primary | ICD-10-CM

## 2021-11-01 DIAGNOSIS — Z12.5 SCREENING FOR PROSTATE CANCER: ICD-10-CM

## 2021-11-01 DIAGNOSIS — Z23 NEED FOR PROPHYLACTIC VACCINATION AND INOCULATION AGAINST INFLUENZA: ICD-10-CM

## 2021-11-01 LAB
ALBUMIN SERPL-MCNC: 3.8 G/DL (ref 3.4–5)
ALP SERPL-CCNC: 58 U/L (ref 40–150)
ALT SERPL W P-5'-P-CCNC: 32 U/L (ref 0–70)
ANION GAP SERPL CALCULATED.3IONS-SCNC: 2 MMOL/L (ref 3–14)
AST SERPL W P-5'-P-CCNC: 22 U/L (ref 0–45)
BILIRUB SERPL-MCNC: 0.4 MG/DL (ref 0.2–1.3)
BUN SERPL-MCNC: 19 MG/DL (ref 7–30)
CALCIUM SERPL-MCNC: 8.6 MG/DL (ref 8.5–10.1)
CHLORIDE BLD-SCNC: 109 MMOL/L (ref 94–109)
CHOLEST SERPL-MCNC: 181 MG/DL
CO2 SERPL-SCNC: 30 MMOL/L (ref 20–32)
CREAT SERPL-MCNC: 0.85 MG/DL (ref 0.66–1.25)
FASTING STATUS PATIENT QL REPORTED: YES
GFR SERPL CREATININE-BSD FRML MDRD: >90 ML/MIN/1.73M2
GLUCOSE BLD-MCNC: 100 MG/DL (ref 70–99)
HDLC SERPL-MCNC: 70 MG/DL
LDLC SERPL CALC-MCNC: 104 MG/DL
NONHDLC SERPL-MCNC: 111 MG/DL
POTASSIUM BLD-SCNC: 4.1 MMOL/L (ref 3.4–5.3)
PROT SERPL-MCNC: 6.7 G/DL (ref 6.8–8.8)
PSA SERPL-MCNC: 2.46 UG/L (ref 0–4)
SODIUM SERPL-SCNC: 141 MMOL/L (ref 133–144)
TRIGL SERPL-MCNC: 33 MG/DL

## 2021-11-01 PROCEDURE — 90471 IMMUNIZATION ADMIN: CPT | Performed by: FAMILY MEDICINE

## 2021-11-01 PROCEDURE — 99396 PREV VISIT EST AGE 40-64: CPT | Mod: 25 | Performed by: FAMILY MEDICINE

## 2021-11-01 PROCEDURE — 80061 LIPID PANEL: CPT | Performed by: FAMILY MEDICINE

## 2021-11-01 PROCEDURE — 36415 COLL VENOUS BLD VENIPUNCTURE: CPT | Performed by: FAMILY MEDICINE

## 2021-11-01 PROCEDURE — G0103 PSA SCREENING: HCPCS | Performed by: FAMILY MEDICINE

## 2021-11-01 PROCEDURE — 90682 RIV4 VACC RECOMBINANT DNA IM: CPT | Performed by: FAMILY MEDICINE

## 2021-11-01 PROCEDURE — 80053 COMPREHEN METABOLIC PANEL: CPT | Performed by: FAMILY MEDICINE

## 2021-11-01 ASSESSMENT — ENCOUNTER SYMPTOMS
HEMATOCHEZIA: 0
FREQUENCY: 0
CHILLS: 0
CONSTIPATION: 0
DIARRHEA: 0
NAUSEA: 0
FEVER: 0
SORE THROAT: 0
WEAKNESS: 0
JOINT SWELLING: 0
MYALGIAS: 1
HEMATURIA: 0
DYSURIA: 0
EYE PAIN: 0
COUGH: 0
ARTHRALGIAS: 1
SHORTNESS OF BREATH: 0
PARESTHESIAS: 1
NERVOUS/ANXIOUS: 0
PALPITATIONS: 0
ABDOMINAL PAIN: 0
HEARTBURN: 1
DIZZINESS: 0
HEADACHES: 1

## 2021-11-01 ASSESSMENT — PAIN SCALES - GENERAL: PAINLEVEL: NO PAIN (0)

## 2021-11-01 NOTE — PROGRESS NOTES
SUBJECTIVE:   CC: Deven Smith is an 66 year old male who presents for preventative health visit.       Patient has been advised of split billing requirements and indicates understanding: Yes  Healthy Habits:     Getting at least 3 servings of Calcium per day:  NO    Bi-annual eye exam:  Yes    Dental care twice a year:  Yes    Sleep apnea or symptoms of sleep apnea:  None    Diet:  Regular (no restrictions)    Frequency of exercise:  6-7 days/week    Duration of exercise:  Greater than 60 minutes    Taking medications regularly:  Yes    Medication side effects:  None    PHQ-2 Total Score: 0    Additional concerns today:  No              Today's PHQ-2 Score:   PHQ-2 (  [employer]) 2021   Q1: Little interest or pleasure in doing things 0   Q2: Feeling down, depressed or hopeless 0   PHQ-2 Score 0   Q1: Little interest or pleasure in doing things Not at all   Q2: Feeling down, depressed or hopeless Not at all   PHQ-2 Score 0       Abuse: Current or Past(Physical, Sexual or Emotional)- No  Do you feel safe in your environment? Yes        Social History     Tobacco Use     Smoking status: Former Smoker     Packs/day: 2.00     Years: 15.00     Pack years: 30.00     Quit date: 1987     Years since quittin.0     Smokeless tobacco: Never Used   Substance Use Topics     Alcohol use: Yes     Alcohol/week: 60.0 standard drinks     Comment: rare      If you drink alcohol do you typically have >3 drinks per day or >7 drinks per week? No    Alcohol Use 2021   Prescreen: >3 drinks/day or >7 drinks/week? No   Prescreen: >3 drinks/day or >7 drinks/week? -       Last PSA:   PSA   Date Value Ref Range Status   2020 1.78 0 - 4 ug/L Final     Comment:     Assay Method:  Chemiluminescence using Siemens Vista analyzer       Reviewed orders with patient. Reviewed health maintenance and updated orders accordingly - Yes  Lab work is in process    Reviewed and updated as needed this visit by clinical  staff  Tobacco  Allergies  Meds              Reviewed and updated as needed this visit by Provider                Past Medical History:   Diagnosis Date     DDD (degenerative disc disease), cervical      Hyperlipidemia LDL goal < 130       Past Surgical History:   Procedure Laterality Date     COLONOSCOPY  03/21/2007    Repeat  for screening purposes in 10 yrs.     COLONOSCOPY  1/2/2013    Procedure: COLONOSCOPY;  Colonoscopy;  Surgeon: Emmett San MD;  Location:  GI     COLONOSCOPY N/A 8/6/2018    Procedure: COLONOSCOPY;  colonoscopy;  Surgeon: Israel Moreland MD;  Location:  GI     HC SHLDR ARTHROSCOP,SURG,W/ROTAT CUFF REPR Left 2010     HC VASECTOMY UNILAT/BILAT W POSTOP SEMEN  1998    Vasectomy       Review of Systems   Constitutional: Negative for chills and fever.   HENT: Negative for congestion, ear pain, hearing loss and sore throat.    Eyes: Negative for pain and visual disturbance.   Respiratory: Negative for cough and shortness of breath.    Cardiovascular: Negative for chest pain, palpitations and peripheral edema.   Gastrointestinal: Positive for heartburn. Negative for abdominal pain, constipation, diarrhea, hematochezia and nausea.   Genitourinary: Negative for discharge, dysuria, frequency, genital sores, hematuria, impotence and urgency.   Musculoskeletal: Positive for arthralgias and myalgias. Negative for joint swelling.   Skin: Positive for rash.   Neurological: Positive for headaches and paresthesias. Negative for dizziness and weakness.   Psychiatric/Behavioral: Negative for mood changes. The patient is not nervous/anxious.          OBJECTIVE:   /78   Pulse 74   Temp 97.7  F (36.5  C) (Temporal)   Resp 16   Wt 83 kg (183 lb)   SpO2 98%   BMI 27.83 kg/m      Physical Exam  GENERAL: healthy, alert and no distress  EYES: Eyes grossly normal to inspection, PERRL and conjunctivae and sclerae normal  HENT: ear canals and TM's normal, nose and mouth without ulcers  or lesions  NECK: no adenopathy, no asymmetry, masses, or scars and thyroid normal to palpation  RESP: lungs clear to auscultation - no rales, rhonchi or wheezes  CV: regular rate and rhythm, normal S1 S2, no S3 or S4, no murmur, click or rub, no peripheral edema and peripheral pulses strong  ABDOMEN: soft, nontender, no hepatosplenomegaly, no masses and bowel sounds normal  MS: no gross musculoskeletal defects noted, no edema  SKIN: Minimal amount of onychmycosis is right thumb nail  NEURO: Normal strength and tone, mentation intact and speech normal  PSYCH: mentation appears normal, affect normal/bright    Diagnostic Test Results:  Labs reviewed in Epic  Results for orders placed or performed in visit on 11/01/21 (from the past 24 hour(s))   Lipid panel reflex to direct LDL Fasting   Result Value Ref Range    Cholesterol 181 <200 mg/dL    Triglycerides 33 <150 mg/dL    Direct Measure HDL 70 >=40 mg/dL    LDL Cholesterol Calculated 104 (H) <=100 mg/dL    Non HDL Cholesterol 111 <130 mg/dL    Patient Fasting > 8hrs? Yes     Narrative    Cholesterol  Desirable:  <200 mg/dL    Triglycerides  Normal:  Less than 150 mg/dL  Borderline High:  150-199 mg/dL  High:  200-499 mg/dL  Very High:  Greater than or equal to 500 mg/dL    Direct Measure HDL  Female:  Greater than or equal to 50 mg/dL   Male:  Greater than or equal to 40 mg/dL    LDL Cholesterol  Desirable:  <100mg/dL  Above Desirable:  100-129 mg/dL   Borderline High:  130-159 mg/dL   High:  160-189 mg/dL   Very High:  >= 190 mg/dL    Non HDL Cholesterol  Desirable:  130 mg/dL  Above Desirable:  130-159 mg/dL  Borderline High:  160-189 mg/dL  High:  190-219 mg/dL  Very High:  Greater than or equal to 220 mg/dL   Comprehensive metabolic panel (BMP + Alb, Alk Phos, ALT, AST, Total. Bili, TP)   Result Value Ref Range    Sodium 141 133 - 144 mmol/L    Potassium 4.1 3.4 - 5.3 mmol/L    Chloride 109 94 - 109 mmol/L    Carbon Dioxide (CO2) 30 20 - 32 mmol/L    Anion Gap 2  "(L) 3 - 14 mmol/L    Urea Nitrogen 19 7 - 30 mg/dL    Creatinine 0.85 0.66 - 1.25 mg/dL    Calcium 8.6 8.5 - 10.1 mg/dL    Glucose 100 (H) 70 - 99 mg/dL    Alkaline Phosphatase 58 40 - 150 U/L    AST 22 0 - 45 U/L    ALT 32 0 - 70 U/L    Protein Total 6.7 (L) 6.8 - 8.8 g/dL    Albumin 3.8 3.4 - 5.0 g/dL    Bilirubin Total 0.4 0.2 - 1.3 mg/dL    GFR Estimate >90 >60 mL/min/1.73m2   PSA, screen   Result Value Ref Range    Prostate Specific Antigen Screen 2.46 0.00 - 4.00 ug/L    Narrative    Assay Method:  Chemiluminescence using Siemens   Vista analyzer.       ASSESSMENT/PLAN:   (Z00.00) Routine general medical examination at a health care facility  (primary encounter diagnosis)  Comment: Really healthy no real concerns he was treated for fingernail onychomycosis the summer and really quite successful just a little bit at the base of his thumb which we decided just to watch for now.  He is being treated by orthopedics for a knee sprain and Baker's cyst.  Has a brace doing quite well with this MRI shows no surgical issues.  Plan:     (Z13.6) Screening for cardiovascular condition  Comment:   Plan: Lipid panel reflex to direct LDL Fasting,         Comprehensive metabolic panel (BMP + Alb, Alk         Phos, ALT, AST, Total. Bili, TP)            (Z12.5) Screening for prostate cancer  Comment:   Plan: PSA, screen            (Z23) Need for prophylactic vaccination and inoculation against influenza  Comment:   Plan: INFLUENZA QUAD, RECOMBINANT, P-FREE (RIV4)         (FLUBLOK)              Patient has been advised of split billing requirements and indicates understanding: Yes  COUNSELING:   Reviewed preventive health counseling, as reflected in patient instructions       Regular exercise       Healthy diet/nutrition       Vision screening    Estimated body mass index is 27.83 kg/m  as calculated from the following:    Height as of 7/20/21: 1.727 m (5' 8\").    Weight as of this encounter: 83 kg (183 lb).         He reports " that he quit smoking about 34 years ago. He has a 30.00 pack-year smoking history. He has never used smokeless tobacco.      Counseling Resources:  ATP IV Guidelines  Pooled Cohorts Equation Calculator  FRAX Risk Assessment  ICSI Preventive Guidelines  Dietary Guidelines for Americans, 2010  USDA's MyPlate  ASA Prophylaxis  Lung CA Screening    Marc Pinedo MD  [facility]

## 2021-12-16 ENCOUNTER — TELEPHONE (OUTPATIENT)
Dept: FAMILY MEDICINE | Facility: CLINIC | Age: 66
End: 2021-12-16
Payer: COMMERCIAL

## 2021-12-16 DIAGNOSIS — B35.1 ONYCHOMYCOSIS: Primary | ICD-10-CM

## 2021-12-16 RX ORDER — TERBINAFINE HYDROCHLORIDE 250 MG/1
250 TABLET ORAL DAILY
Qty: 42 TABLET | Refills: 0 | Status: SHIPPED | OUTPATIENT
Start: 2021-12-16 | End: 2022-01-27

## 2021-12-16 NOTE — TELEPHONE ENCOUNTER
Reason for Call:  Other appointment    Detailed comments: Pt was seen last year for fingernail fungus, it is back again. Wondering if anyone fit him in to be seen or send a rx to his pharm?    Phone Number Patient can be reached at: Home number on file [phone] (home)    Best Time: anytime    Can we leave a detailed message on this number? YES    Call taken on 12/16/2021 at 11:33 AM by Agueda Murcia

## 2021-12-24 ENCOUNTER — IMMUNIZATION (OUTPATIENT)
Dept: FAMILY MEDICINE | Facility: CLINIC | Age: 66
End: 2021-12-24
Payer: COMMERCIAL

## 2021-12-24 DIAGNOSIS — Z23 HIGH PRIORITY FOR 2019-NCOV VACCINE: Primary | ICD-10-CM

## 2021-12-24 PROCEDURE — 0064A COVID-19,PF,MODERNA (18+ YRS BOOSTER .25ML): CPT

## 2021-12-24 PROCEDURE — 91306 COVID-19,PF,MODERNA (18+ YRS BOOSTER .25ML): CPT

## 2022-03-01 ENCOUNTER — OFFICE VISIT (OUTPATIENT)
Dept: FAMILY MEDICINE | Facility: OTHER | Age: 67
End: 2022-03-01
Payer: COMMERCIAL

## 2022-03-01 VITALS
HEART RATE: 66 BPM | SYSTOLIC BLOOD PRESSURE: 138 MMHG | TEMPERATURE: 97.1 F | DIASTOLIC BLOOD PRESSURE: 72 MMHG | RESPIRATION RATE: 16 BRPM | BODY MASS INDEX: 27.98 KG/M2 | OXYGEN SATURATION: 95 % | WEIGHT: 184 LBS

## 2022-03-01 DIAGNOSIS — F10.21 ALCOHOL DEPENDENCE IN REMISSION (H): ICD-10-CM

## 2022-03-01 DIAGNOSIS — R10.13 POSTPRANDIAL EPIGASTRIC PAIN: Primary | ICD-10-CM

## 2022-03-01 DIAGNOSIS — R10.12 LEFT UPPER QUADRANT PAIN: ICD-10-CM

## 2022-03-01 PROBLEM — F10.20 ALCOHOL DEPENDENCE (H): Status: RESOLVED | Noted: 2022-03-01 | Resolved: 2022-03-01

## 2022-03-01 PROBLEM — F10.20 ALCOHOL DEPENDENCE (H): Status: ACTIVE | Noted: 2022-03-01

## 2022-03-01 PROBLEM — F10.20 ALCOHOL DEPENDENCE (H): Status: RESOLVED | Noted: 2021-06-08 | Resolved: 2022-03-01

## 2022-03-01 LAB
ALBUMIN SERPL-MCNC: 3.7 G/DL (ref 3.4–5)
ALBUMIN UR-MCNC: NEGATIVE MG/DL
ALP SERPL-CCNC: 63 U/L (ref 40–150)
ALT SERPL W P-5'-P-CCNC: 33 U/L (ref 0–70)
ANION GAP SERPL CALCULATED.3IONS-SCNC: 4 MMOL/L (ref 3–14)
APPEARANCE UR: CLEAR
AST SERPL W P-5'-P-CCNC: 28 U/L (ref 0–45)
BACTERIA #/AREA URNS HPF: ABNORMAL /HPF
BASOPHILS # BLD AUTO: 0 10E3/UL (ref 0–0.2)
BASOPHILS NFR BLD AUTO: 1 %
BILIRUB SERPL-MCNC: 0.4 MG/DL (ref 0.2–1.3)
BILIRUB UR QL STRIP: NEGATIVE
BUN SERPL-MCNC: 22 MG/DL (ref 7–30)
CALCIUM SERPL-MCNC: 8.8 MG/DL (ref 8.5–10.1)
CHLORIDE BLD-SCNC: 104 MMOL/L (ref 94–109)
CO2 SERPL-SCNC: 30 MMOL/L (ref 20–32)
COLOR UR AUTO: YELLOW
CREAT SERPL-MCNC: 0.95 MG/DL (ref 0.66–1.25)
CRP SERPL-MCNC: <2.9 MG/L (ref 0–8)
EOSINOPHIL # BLD AUTO: 0.2 10E3/UL (ref 0–0.7)
EOSINOPHIL NFR BLD AUTO: 3 %
ERYTHROCYTE [DISTWIDTH] IN BLOOD BY AUTOMATED COUNT: 14.1 % (ref 10–15)
ERYTHROCYTE [SEDIMENTATION RATE] IN BLOOD BY WESTERGREN METHOD: 5 MM/HR (ref 0–20)
GFR SERPL CREATININE-BSD FRML MDRD: 89 ML/MIN/1.73M2
GLUCOSE BLD-MCNC: 96 MG/DL (ref 70–99)
GLUCOSE UR STRIP-MCNC: NEGATIVE MG/DL
HCT VFR BLD AUTO: 40.1 % (ref 40–53)
HGB BLD-MCNC: 13.4 G/DL (ref 13.3–17.7)
HGB UR QL STRIP: NEGATIVE
KETONES UR STRIP-MCNC: NEGATIVE MG/DL
LEUKOCYTE ESTERASE UR QL STRIP: NEGATIVE
LIPASE SERPL-CCNC: 64 U/L (ref 73–393)
LYMPHOCYTES # BLD AUTO: 1.7 10E3/UL (ref 0.8–5.3)
LYMPHOCYTES NFR BLD AUTO: 36 %
MCH RBC QN AUTO: 30.2 PG (ref 26.5–33)
MCHC RBC AUTO-ENTMCNC: 33.4 G/DL (ref 31.5–36.5)
MCV RBC AUTO: 91 FL (ref 78–100)
MONOCYTES # BLD AUTO: 0.4 10E3/UL (ref 0–1.3)
MONOCYTES NFR BLD AUTO: 8 %
NEUTROPHILS # BLD AUTO: 2.4 10E3/UL (ref 1.6–8.3)
NEUTROPHILS NFR BLD AUTO: 52 %
NITRATE UR QL: NEGATIVE
PH UR STRIP: 5.5 [PH] (ref 5–7)
PLATELET # BLD AUTO: 167 10E3/UL (ref 150–450)
POTASSIUM BLD-SCNC: 4 MMOL/L (ref 3.4–5.3)
PROT SERPL-MCNC: 6.7 G/DL (ref 6.8–8.8)
RBC # BLD AUTO: 4.43 10E6/UL (ref 4.4–5.9)
RBC #/AREA URNS AUTO: ABNORMAL /HPF
SODIUM SERPL-SCNC: 138 MMOL/L (ref 133–144)
SP GR UR STRIP: >=1.03 (ref 1–1.03)
SQUAMOUS #/AREA URNS AUTO: ABNORMAL /LPF
UROBILINOGEN UR STRIP-ACNC: 0.2 E.U./DL
WBC # BLD AUTO: 4.7 10E3/UL (ref 4–11)
WBC #/AREA URNS AUTO: ABNORMAL /HPF

## 2022-03-01 PROCEDURE — 83690 ASSAY OF LIPASE: CPT | Performed by: FAMILY MEDICINE

## 2022-03-01 PROCEDURE — 80053 COMPREHEN METABOLIC PANEL: CPT | Performed by: FAMILY MEDICINE

## 2022-03-01 PROCEDURE — 36415 COLL VENOUS BLD VENIPUNCTURE: CPT | Performed by: FAMILY MEDICINE

## 2022-03-01 PROCEDURE — 81001 URINALYSIS AUTO W/SCOPE: CPT | Performed by: FAMILY MEDICINE

## 2022-03-01 PROCEDURE — 99214 OFFICE O/P EST MOD 30 MIN: CPT | Performed by: FAMILY MEDICINE

## 2022-03-01 PROCEDURE — 85652 RBC SED RATE AUTOMATED: CPT | Performed by: FAMILY MEDICINE

## 2022-03-01 PROCEDURE — 86140 C-REACTIVE PROTEIN: CPT | Performed by: FAMILY MEDICINE

## 2022-03-01 PROCEDURE — 85025 COMPLETE CBC W/AUTO DIFF WBC: CPT | Performed by: FAMILY MEDICINE

## 2022-03-01 RX ORDER — OMEPRAZOLE 40 MG/1
40 CAPSULE, DELAYED RELEASE ORAL DAILY
Qty: 30 CAPSULE | Refills: 2 | Status: SHIPPED | OUTPATIENT
Start: 2022-03-01 | End: 2023-12-29

## 2022-03-01 ASSESSMENT — PAIN SCALES - GENERAL: PAINLEVEL: NO PAIN (0)

## 2022-03-01 NOTE — PROGRESS NOTES
Assessment & Plan   Problem List Items Addressed This Visit     Postprandial epigastric pain - Primary     66-year-old otherwise healthy male presented to the clinic with 7 weeks of constant generalized abdominal pain.  Symptomatology and exam concerning for upper GI pathology with the differential not limited to gastritis versus peptic ulcer disease versus gallbladder pathology versus infectious causes versus pancreatitis versus mass.  Discussed labs and imaging as ordered to rule out above differential.  Advised trial of Prilosec in the interim for symptomatic treatment.  Discussed diet and lifestyle modifications.  Recheck in 2 to 4 weeks for close follow-up.  Reviewed last colonoscopy completed in 2018 which was essentially normal.  There is a note of alcohol dependence on his problem list however he admits to not doing excessive amounts of alcohol.  Former smoker quit in the 1980s.  Await results before further recommendations.  Patient agreeable to this plan         Relevant Medications    omeprazole (PRILOSEC) 40 MG DR capsule    Other Relevant Orders    CBC with platelets and differential (Completed)    Comprehensive metabolic panel (BMP + Alb, Alk Phos, ALT, AST, Total. Bili, TP)    CRP, inflammation    ESR: Erythrocyte sedimentation rate    Lipase    UA with Microscopic reflex to Culture - lab collect (Completed)    Helicobacter pylori Antigen Stool    US Abdomen Complete    Urine Microscopic    RESOLVED: Alcohol dependence (H)      Other Visit Diagnoses     Left upper quadrant pain        Relevant Orders    CBC with platelets and differential (Completed)    Comprehensive metabolic panel (BMP + Alb, Alk Phos, ALT, AST, Total. Bili, TP)    CRP, inflammation    ESR: Erythrocyte sedimentation rate    Lipase    UA with Microscopic reflex to Culture - lab collect (Completed)    Helicobacter pylori Antigen Stool    Urine Microscopic             Return in about 4 weeks (around 3/29/2022).    Krystle Rg,  MD [facility] HAI Valentin is a 66 year old who presents for the following health issues    History of Present Illness     Reason for visit:  Pain in lower extremity and lower back  Symptom onset:  More than a month  Symptom intensity:  Moderate  Symptom progression:  Staying the same  Had these symptoms before:  No  What makes it worse:  Food and liquid makes it worse. even on a empty stomach  What makes it better:  No it stays the sameHe exercises with enough effort to increase his heart rate 60 or more minutes per day.  He exercises with enough effort to increase his heart rate 6 days per week.   He is taking medications regularly.  Felt normal until 7 weeks ago and noticed some pain in the abdomen  Getting worse with food intake and even drinking water. Also feels very gassy and has heart burn. Pain has been constant in the last 7 weeks, 3/10 constant and gets to 5/10 when worse. Feels relieved with bowel movement. Not constipated. Has appetite but not able to as it hurts to eat. Weights have been stable. Felt nauseous but never threw up. Normal bowel movement. Has history of alcohol dependence but has not been doing any alcohol for 7 months atleast.        Review of Systems   Constitutional, HEENT, cardiovascular, pulmonary, gi and gu systems are negative, except as otherwise noted.      Objective    /72   Pulse 66   Temp 97.1  F (36.2  C) (Temporal)   Resp 16   Wt 83.5 kg (184 lb)   SpO2 95%   BMI 27.98 kg/m    Body mass index is 27.98 kg/m .  Physical Exam   GENERAL: healthy, alert and no distress  NECK: no adenopathy, no asymmetry, masses, or scars and thyroid normal to palpation  RESP: lungs clear to auscultation - no rales, rhonchi or wheezes  CV: regular rate and rhythm, normal S1 S2, no S3 or S4, no murmur, click or rub, no peripheral edema and peripheral pulses strong  ABDOMEN: tenderness epigastric and LUQ, bowel sounds normal and   MS: no gross  musculoskeletal defects noted, no edema

## 2022-03-01 NOTE — ASSESSMENT & PLAN NOTE
66-year-old otherwise healthy male presented to the clinic with 7 weeks of constant generalized abdominal pain.  Symptomatology and exam concerning for upper GI pathology with the differential not limited to gastritis versus peptic ulcer disease versus gallbladder pathology versus infectious causes versus pancreatitis versus mass.  Discussed labs and imaging as ordered to rule out above differential.  Advised trial of Prilosec in the interim for symptomatic treatment.  Discussed diet and lifestyle modifications.  Recheck in 2 to 4 weeks for close follow-up.  Reviewed last colonoscopy completed in 2018 which was essentially normal.  There is a note of alcohol dependence on his problem list however he admits to not doing excessive amounts of alcohol.  Former smoker quit in the 1980s.  Await results before further recommendations.  Patient agreeable to this plan

## 2022-03-02 PROCEDURE — 87338 HPYLORI STOOL AG IA: CPT | Performed by: FAMILY MEDICINE

## 2022-03-03 LAB — H PYLORI AG STL QL IA: NEGATIVE

## 2022-03-03 NOTE — RESULT ENCOUNTER NOTE
Mr. Smith    Your recent test results are attached.  Essentially normal labs.  Will await ultrasound results before further recommendations.  Continue antacids as prescribed in the interim.    If you have any questions or concerns please contact me via My Chart or call the clinic at [phone]     Thank You  Krystle Rg MD.

## 2022-03-07 ENCOUNTER — HOSPITAL ENCOUNTER (OUTPATIENT)
Dept: ULTRASOUND IMAGING | Facility: CLINIC | Age: 67
Discharge: HOME | End: 2022-03-07
Attending: FAMILY MEDICINE | Admitting: FAMILY MEDICINE
Payer: COMMERCIAL

## 2022-03-07 DIAGNOSIS — R10.13 POSTPRANDIAL EPIGASTRIC PAIN: ICD-10-CM

## 2022-03-07 PROCEDURE — 76700 US EXAM ABDOM COMPLETE: CPT

## 2022-03-08 ENCOUNTER — TELEPHONE (OUTPATIENT)
Dept: GASTROENTEROLOGY | Facility: CLINIC | Age: 67
End: 2022-03-08
Payer: COMMERCIAL

## 2022-03-08 DIAGNOSIS — Z11.59 ENCOUNTER FOR SCREENING FOR OTHER VIRAL DISEASES: Primary | ICD-10-CM

## 2022-03-08 NOTE — RESULT ENCOUNTER NOTE
Called and discussed results with the patient with ultrasound showing gallbladder sludge indicating his symptoms could be from biliary colic.  I gave him options of seeing a surgeon to discuss cholecystectomy versus completing an EGD due to persistent symptoms.  He prefers to get the EGD completed first.  I placed orders for

## 2022-03-08 NOTE — TELEPHONE ENCOUNTER
Screening Questions  BlueKIND OF PREP RedLOCATION [review exclusion criteria] GreenSEDATION TYPE    1. Have you had a positive covid test in the last 90 days? NO     2. Are you active on mychart? YES    3. What insurance is in the chart? PREF[employer]EDONE     3.  Ordering/Referring Provider:     4. BMI 27.7 [BMI OVER 40-EXTENDED PREP]  If greater than 40 review exclusion criteria [PAC APPT IF @ [facility]]    5.  Respiratory Screening :  [If yes to any of the following HOSPITAL setting only]     Do you use daily home oxygen? NO    Do you have mod to severe Obstructive Sleep Apnea? NO  [OKAY @ [facility] SH PH RI]   Do you have Pulmonary Hypertension? NO     Do you have UNCONTROLLED asthma? NO      6. Have you had a heart or lung transplant? NO      7. Are you currently on dialysis? NO [ If yes, G-PREP & HOSPITAL setting only]     8. Do you have chronic kidney disease? NO [ If yes, G-PREP ]    9. Have you had a stroke or Transient ischemic attack (TIA) within 6 months? NO (If yes, do not schedule at [facility])    10. In the past 6 months, have you had any heart related issues including cardiomyopathy or heart attack? NO        If yes, did it require cardiac stenting or other implantable device?       11. Do you have any implantable devices in your body (pacemaker, defib, LVAD)? NO (If yes, schedule at [facility])    12. Do you take nitroglycerin? NO   If yes, how often?   (if yes, HOSPITAL setting ONLY)    13. Are you currently taking any blood thinners? NO   [IF YES, INFORM PATIENT TO FOLLOW UP W/ ORDERING PROVIDER FOR BRIDGING INSTRUCTIONS]     14. Are you a diabetic? NO   [ If yes, G-PREP ]    15. [FEMALES] Are you currently pregnant?    If yes, how many weeks?     16. Are you taking any prescription pain medications on a routine schedule?  NO  [ If yes, EXTENDED PREP.] [If yes, MAC]    17. Do you have any chemical dependencies such as alcohol, street drugs, or methadone?  NO [If yes, MAC]    18. Do you have any history of  post-traumatic stress syndrome, severe anxiety or history of psychosis?  NO  [If yes, MAC]    19. Do you transfer independently?  YES    20.  Do you have any issues with constipation?    [ If yes, EXTENDED PREP.]    21. Preferred LOCAL Pharmacy for Pre Prescription     [facility], [place] - [address] [place] [address] [facility]ARMACY [facility], [place] - [address] [facility]   Scheduling Details      Caller : MATTHEW  (Please ask for phone number if not scheduled by patient)    Type of Procedure Scheduled: EGD  Which Colonoscopy Prep was Sent?:   KAMILLA CF PATIENTS & GROEN'S PATIENTS NEEDS EXTENDED PREP  Surgeon: ROSA  Date of Procedure: 04/13/2022  Location: [facility]      Sedation Type: MAC  Conscious Sedation- Needs  for 6 hours after the procedure  MAC/General-Needs  for 24 hours after procedure    Pre-op Required at [facility] [place], [place] and OR for MAC sedation: NO  (advise patient they will need a pre-op prior to procedure -)      Informed patient they will need an adult  YES  Cannot take any type of public or medical transportation alone    Pre-Procedure [facility] or Externally: YES    Confirmed Nurse will call to complete assessment YES    Additional comments: NA

## 2022-04-09 ENCOUNTER — LAB (OUTPATIENT)
Dept: LAB | Facility: CLINIC | Age: 67
End: 2022-04-09
Payer: COMMERCIAL

## 2022-04-09 DIAGNOSIS — Z11.59 ENCOUNTER FOR SCREENING FOR OTHER VIRAL DISEASES: ICD-10-CM

## 2022-04-09 LAB — SARS-COV-2 RNA RESP QL NAA+PROBE: NEGATIVE

## 2022-04-09 PROCEDURE — U0005 INFEC AGEN DETEC AMPLI PROBE: HCPCS

## 2022-04-09 PROCEDURE — U0003 INFECTIOUS AGENT DETECTION BY NUCLEIC ACID (DNA OR RNA); SEVERE ACUTE RESPIRATORY SYNDROME CORONAVIRUS 2 (SARS-COV-2) (CORONAVIRUS DISEASE [COVID-19]), AMPLIFIED PROBE TECHNIQUE, MAKING USE OF HIGH THROUGHPUT TECHNOLOGIES AS DESCRIBED BY CMS-2020-01-R: HCPCS

## 2022-04-12 ENCOUNTER — ANESTHESIA EVENT (OUTPATIENT)
Dept: GASTROENTEROLOGY | Facility: CLINIC | Age: 67
End: 2022-04-12
Payer: COMMERCIAL

## 2022-04-12 ASSESSMENT — LIFESTYLE VARIABLES: TOBACCO_USE: 1

## 2022-04-12 NOTE — ANESTHESIA PREPROCEDURE EVALUATION
Anesthesia Pre-Procedure Evaluation    Patient: Deven Smith   MRN: [id number] : 1955        Procedure : Procedure(s):  ESOPHAGOGASTRODUODENOSCOPY (EGD)          Past Medical History:   Diagnosis Date     DDD (degenerative disc disease), cervical      Hyperlipidemia LDL goal < 130       Past Surgical History:   Procedure Laterality Date     COLONOSCOPY  2007    Repeat  for screening purposes in 10 yrs.     COLONOSCOPY  2013    Procedure: COLONOSCOPY;  Colonoscopy;  Surgeon: Emmett San MD;  Location: [facility]     COLONOSCOPY N/A 2018    Procedure: COLONOSCOPY;  colonoscopy;  Surgeon: Israel Moreland MD;  Location:  GI     HC SHLDR ARTHROSCOP,SURG,W/ROTAT CUFF REPR Left      HC VASECTOMY UNILAT/BILAT W POSTOP SEMEN  1998    Vasectomy      Allergies   Allergen Reactions     Seasonal Allergies       Social History     Tobacco Use     Smoking status: Former Smoker     Packs/day: 2.00     Years: 15.00     Pack years: 30.00     Quit date: 1987     Years since quittin.4     Smokeless tobacco: Never Used   Substance Use Topics     Alcohol use: Yes     Alcohol/week: 60.0 standard drinks     Comment: rare       Wt Readings from Last 1 Encounters:   22 83.5 kg (184 lb)        Anesthesia Evaluation   Pt has had prior anesthetic. Type: MAC and General.    No history of anesthetic complications       ROS/MED HX  ENT/Pulmonary:     (+) tobacco use, Past use,     Neurologic:  - neg neurologic ROS     Cardiovascular:     (+) Dyslipidemia -----Previous cardiac testing   Echo: Date:  Results:     There is mild to moderate concentric left ventricular hypertrophy.  Left ventricular systolic function is normal.  The visual ejection fraction is estimated at 60-65%.  No regional wall motion abnormalities noted.  There is no comparison study available.  _______________________________  Stress Test: Date: Results:    ECG Reviewed: Date:  Results:  SB  Cath: Date:  Results:      METS/Exercise Tolerance:     Hematologic:  - neg hematologic  ROS     Musculoskeletal: Comment: Cervical DDD  (+) arthritis,     GI/Hepatic:     (+) GERD, Asymptomatic on medication,     Renal/Genitourinary:  - neg Renal ROS     Endo:  - neg endo ROS     Psychiatric/Substance Use:     (+) alcohol abuse     Infectious Disease:  - neg infectious disease ROS     Malignancy:  - neg malignancy ROS     Other:  - neg other ROS          Physical Exam    Airway  airway exam normal      Mallampati: II   TM distance: > 3 FB   Neck ROM: full   Mouth opening: > 3 cm    Respiratory Devices and Support         Dental       (+) caps      Cardiovascular   cardiovascular exam normal       Rhythm and rate: regular and normal   (+) murmur       Pulmonary   pulmonary exam normal        breath sounds clear to auscultation           OUTSIDE LABS:  CBC:   Lab Results   Component Value Date    WBC 4.7 03/01/2022    WBC 5.6 10/10/2019    HGB 13.4 03/01/2022    HGB 14.2 10/10/2019    HCT 40.1 03/01/2022    HCT 41.1 10/10/2019     03/01/2022     10/10/2019     BMP:   Lab Results   Component Value Date     03/01/2022     11/01/2021    POTASSIUM 4.0 03/01/2022    POTASSIUM 4.1 11/01/2021    CHLORIDE 104 03/01/2022    CHLORIDE 109 11/01/2021    CO2 30 03/01/2022    CO2 30 11/01/2021    BUN 22 03/01/2022    BUN 19 11/01/2021    CR 0.95 03/01/2022    CR 0.85 11/01/2021    GLC 96 03/01/2022     (H) 11/01/2021     COAGS: No results found for: PTT, INR, FIBR  POC: No results found for: BGM, HCG, HCGS  HEPATIC:   Lab Results   Component Value Date    ALBUMIN 3.7 03/01/2022    PROTTOTAL 6.7 (L) 03/01/2022    ALT 33 03/01/2022    AST 28 03/01/2022    ALKPHOS 63 03/01/2022    BILITOTAL 0.4 03/01/2022     OTHER:   Lab Results   Component Value Date    ROSIE 8.8 03/01/2022    LIPASE 64 (L) 03/01/2022    TSH 1.23 12/11/2019    T4 0.82 10/10/2019    CRP <2.9 03/01/2022    SED 5 03/01/2022       Anesthesia  Plan    ASA Status:  2   NPO Status:  NPO Appropriate    Anesthesia Type: MAC.      Maintenance: TIVA.        Consents    Anesthesia Plan(s) and associated risks, benefits, and realistic alternatives discussed. Questions answered and patient/representative(s) expressed understanding.    - Discussed:     - Discussed with:  Patient    Use of blood products discussed: No .     Postoperative Care            Comments:    Other Comments: The risks and benefits of anesthesia, and the alternatives where applicable, have been discussed with the patient, and they wish to proceed.            Taz Knott APRN CRNA

## 2022-04-13 ENCOUNTER — HOSPITAL ENCOUNTER (OUTPATIENT)
Facility: CLINIC | Age: 67
Discharge: HOME | End: 2022-04-13
Attending: SURGERY | Admitting: SURGERY
Payer: COMMERCIAL

## 2022-04-13 ENCOUNTER — ANESTHESIA (OUTPATIENT)
Dept: GASTROENTEROLOGY | Facility: CLINIC | Age: 67
End: 2022-04-13
Payer: COMMERCIAL

## 2022-04-13 VITALS
WEIGHT: 181 LBS | SYSTOLIC BLOOD PRESSURE: 108 MMHG | DIASTOLIC BLOOD PRESSURE: 75 MMHG | BODY MASS INDEX: 28.41 KG/M2 | TEMPERATURE: 97.7 F | RESPIRATION RATE: 16 BRPM | HEART RATE: 45 BPM | HEIGHT: 67 IN | OXYGEN SATURATION: 97 %

## 2022-04-13 LAB — UPPER GI ENDOSCOPY: NORMAL

## 2022-04-13 PROCEDURE — 250N000011 HC RX IP 250 OP 636: Performed by: NURSE ANESTHETIST, CERTIFIED REGISTERED

## 2022-04-13 PROCEDURE — 258N000003 HC RX IP 258 OP 636: Performed by: NURSE ANESTHETIST, CERTIFIED REGISTERED

## 2022-04-13 PROCEDURE — 370N000017 HC ANESTHESIA TECHNICAL FEE, PER MIN: Performed by: SURGERY

## 2022-04-13 PROCEDURE — 43235 EGD DIAGNOSTIC BRUSH WASH: CPT | Performed by: SURGERY

## 2022-04-13 PROCEDURE — 250N000009 HC RX 250: Performed by: NURSE ANESTHETIST, CERTIFIED REGISTERED

## 2022-04-13 RX ORDER — GLYCOPYRROLATE 0.2 MG/ML
INJECTION, SOLUTION INTRAMUSCULAR; INTRAVENOUS PRN
Status: DISCONTINUED | OUTPATIENT
Start: 2022-04-13 | End: 2022-04-13

## 2022-04-13 RX ORDER — SODIUM CHLORIDE, SODIUM LACTATE, POTASSIUM CHLORIDE, CALCIUM CHLORIDE 600; 310; 30; 20 MG/100ML; MG/100ML; MG/100ML; MG/100ML
INJECTION, SOLUTION INTRAVENOUS CONTINUOUS
Status: DISCONTINUED | OUTPATIENT
Start: 2022-04-13 | End: 2022-04-13 | Stop reason: HOSPADM

## 2022-04-13 RX ORDER — LIDOCAINE 40 MG/G
CREAM TOPICAL
Status: DISCONTINUED | OUTPATIENT
Start: 2022-04-13 | End: 2022-04-13 | Stop reason: HOSPADM

## 2022-04-13 RX ORDER — PROPOFOL 10 MG/ML
INJECTION, EMULSION INTRAVENOUS PRN
Status: DISCONTINUED | OUTPATIENT
Start: 2022-04-13 | End: 2022-04-13

## 2022-04-13 RX ORDER — LIDOCAINE HYDROCHLORIDE 20 MG/ML
INJECTION, SOLUTION INFILTRATION; PERINEURAL PRN
Status: DISCONTINUED | OUTPATIENT
Start: 2022-04-13 | End: 2022-04-13

## 2022-04-13 RX ADMIN — PROPOFOL 20 MG: 10 INJECTION, EMULSION INTRAVENOUS at 07:44

## 2022-04-13 RX ADMIN — PROPOFOL 50 MG: 10 INJECTION, EMULSION INTRAVENOUS at 08:30

## 2022-04-13 RX ADMIN — SODIUM CHLORIDE, POTASSIUM CHLORIDE, SODIUM LACTATE AND CALCIUM CHLORIDE: 600; 310; 30; 20 INJECTION, SOLUTION INTRAVENOUS at 07:31

## 2022-04-13 RX ADMIN — GLYCOPYRROLATE 0.2 MG: 0.2 INJECTION, SOLUTION INTRAMUSCULAR; INTRAVENOUS at 08:21

## 2022-04-13 RX ADMIN — PROPOFOL 50 MG: 10 INJECTION, EMULSION INTRAVENOUS at 08:27

## 2022-04-13 RX ADMIN — PROPOFOL 50 MG: 10 INJECTION, EMULSION INTRAVENOUS at 08:29

## 2022-04-13 RX ADMIN — PROPOFOL 50 MG: 10 INJECTION, EMULSION INTRAVENOUS at 08:28

## 2022-04-13 RX ADMIN — LIDOCAINE HYDROCHLORIDE 100 MG: 20 INJECTION, SOLUTION INFILTRATION; PERINEURAL at 08:27

## 2022-04-13 NOTE — ANESTHESIA POSTPROCEDURE EVALUATION
Patient: Deven Smith    Procedure: Procedure(s):  ESOPHAGOGASTRODUODENOSCOPY (EGD)       Anesthesia Type:  MAC    Note:  Disposition: Outpatient   Postop Pain Control: Uneventful            Sign Out: Well controlled pain   PONV: No   Neuro/Psych: Uneventful            Sign Out: Acceptable/Baseline neuro status   Airway/Respiratory: Uneventful            Sign Out: Acceptable/Baseline resp. status   CV/Hemodynamics: Uneventful            Sign Out: Acceptable CV status   Other NRE: NONE   DID A NON-ROUTINE EVENT OCCUR? No    Event details/Postop Comments:  Pt was happy with anesthesia care.  No complications.  I will follow up with the pt if needed.           Last vitals:  Vitals Value Taken Time   /60 04/13/22 0850   Temp     Pulse 50 04/13/22 0850   Resp     SpO2 96 % 04/13/22 0858   Vitals shown include unvalidated device data.    Electronically Signed By: SCOTT Enrique CRNA  April 13, 2022  8:59 AM

## 2022-04-13 NOTE — DISCHARGE INSTRUCTIONS
[facility]    Home Care Following Endoscopy          Activity:  You have just undergone an endoscopic procedure usually performed with conscious sedation.  Do not work or operate machinery (including a car) for at least 12 hours.        Diet:  Return to the diet you were on before your procedure but eat lightly for the first 12-24 hours.  Drink plenty of water.  Resume any regular medications unless otherwise advised by your physician.  Please begin any new medication prescribed as a result of your procedure as directed by your physician.   Pain:  You may take Tylenol as needed for pain.  Expected Recovery:   It is also normal to have a mild sore throat after upper endoscopy.    Call Your Physician if You Have:  After Upper Endoscopy:  Shoulder, back or chest pain.  Difficulty breathing or swallowing.  Vomiting blood.    Any questions or concerns about your recovery, please call [phone] or after hours [phone] Nurse Advice Line.

## 2022-04-13 NOTE — ANESTHESIA CARE TRANSFER NOTE
Patient: Deven Smith    Procedure: Procedure(s):  ESOPHAGOGASTRODUODENOSCOPY (EGD)       Diagnosis: Postprandial epigastric pain [R10.13]  Diagnosis Additional Information: No value filed.    Anesthesia Type:   MAC     Note:    Oropharynx: oropharynx clear of all foreign objects and spontaneously breathing  Level of Consciousness: drowsy  Oxygen Supplementation: room air    Independent Airway: airway patency satisfactory and stable  Dentition: dentition unchanged  Vital Signs Stable: post-procedure vital signs reviewed and stable  Report to RN Given: handoff report given  Patient transferred to: Phase II    Handoff Report: Identifed the Patient, Identified the Reponsible Provider, Reviewed the pertinent medical history, Discussed the surgical course, Reviewed Intra-OP anesthesia mangement and issues during anesthesia, Set expectations for post-procedure period and Allowed opportunity for questions and acknowledgement of understanding      Vitals:  Vitals Value Taken Time   BP     Temp     Pulse     Resp     SpO2 97 % 04/13/22 0841   Vitals shown include unvalidated device data.    Electronically Signed By: SCOTT Enrique CRNA  April 13, 2022  8:41 AM

## 2022-04-13 NOTE — H&P
[facility]    Pre-Endoscopy History and Physical     Deven Smith MRN# [id number]   YOB: 1955 Age: 66 year old     Date of Procedure: 4/13/2022  Primary care provider: Marc Pinedo  Type of Endoscopy: Esophagogastroduodenoscopy with possible biopsy, possible dilation, possible foreign body removal  Reason for Procedure: epigastric pain   Type of Anesthesia Anticipated: MAC    HPI:    Deven is a 66 year old male who will be undergoing the above procedure.  epigastric pain, postprainal pain; on omeprazole; GBUS ?sludge; no blood thinner    A history and physical has been performed. The patient's medications and allergies have been reviewed. The risks and benefits of the procedure and the sedation options and risks were discussed with the patient.  All questions were answered and informed consent was obtained.      He denies a personal or family history of anesthesia complications or bleeding disorders.     Patient Active Problem List   Diagnosis     Other specified glaucoma     Allergic rhinitis     Family history of other cardiovascular diseases     Labyrinthitis     Hyperlipidemia with target LDL less than 130     Advanced directives, counseling/discussion     DDD (degenerative disc disease), cervical     Supraspinatus tendon tear, left, subsequent encounter     Cervical disc disorder with radiculopathy of cervical region     Alcohol dependence (H)     Postprandial epigastric pain        Past Medical History:   Diagnosis Date     DDD (degenerative disc disease), cervical      Hyperlipidemia LDL goal < 130         Past Surgical History:   Procedure Laterality Date     COLONOSCOPY  03/21/2007    Repeat  for screening purposes in 10 yrs.     COLONOSCOPY  1/2/2013    Procedure: COLONOSCOPY;  Colonoscopy;  Surgeon: Emmett San MD;  Location:  GI     COLONOSCOPY N/A 8/6/2018    Procedure: COLONOSCOPY;  colonoscopy;  Surgeon: Israel Moreland MD;  Location:  [facility]     HC SHLDR ARTHROSCOP,SURG,W/ROTAT CUFF REPR Left      HC VASECTOMY UNILAT/BILAT W POSTOP SEMEN  1998    Vasectomy       Social History     Tobacco Use     Smoking status: Former Smoker     Packs/day: 2.00     Years: 15.00     Pack years: 30.00     Quit date: 1987     Years since quittin.4     Smokeless tobacco: Never Used   Substance Use Topics     Alcohol use: Yes     Alcohol/week: 60.0 standard drinks     Comment: rare        Family History   Problem Relation Age of Onset     Allergies Brother         on meds     Arthritis Mother      Depression Mother         on meds. is in nursing home     Hypertension Mother      Osteoporosis Mother      Heart Disease Father          at age 57  ? MI--no autopsy     Cancer Father         jaw--smoked [place]     Hypertension Brother      Osteoporosis Sister         on meds     Family History Negative Other         No breast or colon cancer hx in family     Hypertension Sister      Osteoporosis Brother        Prior to Admission medications    Medication Sig Start Date End Date Taking? Authorizing Provider   fluticasone (FLONASE) 50 MCG/ACT nasal spray USE TWO SPRAYS IN EACH NOSTRIL ONCE DAILY 21  Yes Marc Pinedo MD   latanoprost (XALATAN) 0.005 % ophthalmic solution Place 0.005 drops into both eyes At Bedtime 18  Yes Reported, Patient   Omega-3 Fatty Acids (FISH OIL) 1200 MG capsule Take 1,200 mg by mouth daily as needed   Yes Reported, Patient   omeprazole (PRILOSEC) 40 MG DR capsule Take 1 capsule (40 mg) by mouth daily 30 min before your biggest meal. 3/1/22  Yes Krystle Rg MD   Misc Natural Products (GLUCOSAMINE CHONDROITIN ADV PO) Take 6 tablets by mouth daily.    Reported, Patient   MULTI-DAY VITAMINS OR TABS 1 tablet daily 05   Murphy Ribera MD       Allergies   Allergen Reactions     Seasonal Allergies         REVIEW OF SYSTEMS:   5 point ROS negative except as noted above in HPI, including Gen., Resp., CV,  "GI &  system review.    PHYSICAL EXAM:   BP (!) 143/75   Pulse 60   Temp 97.7  F (36.5  C) (Oral)   Resp 16   Ht 1.702 m (5' 7\")   Wt 82.1 kg (181 lb)   SpO2 97%   BMI 28.35 kg/m   Estimated body mass index is 28.35 kg/m  as calculated from the following:    Height as of this encounter: 1.702 m (5' 7\").    Weight as of this encounter: 82.1 kg (181 lb).   Constitutional: Awake, alert, no acute distress.  Eyes: No scleral icterus.  Conjunctiva are without injection.  ENMT: Mucous membranes moist, dentition and gums are intact.   Neck: Soft, supple, trachea midline.    Endocrine: n/a   Lymphatic: There is no cervical, submandibularadenopathy.  Respiratory: normal effortgs   Cardiovascular: S1, S2  Abdomen: Non-distended, non-tender,  No masses,  Musculoskeletal: No spinal or CVA tenderness. Full range of motion in the upper and lower extremities.    Skin: No skin rashes or lesions to inspection.  No petechia.    Neurologic: alerted and oriented 3x  Psychiatric: The patient's affect is not blunted and mood is appropriate.  DIAGNOSTICS:    Not indicated    IMPRESSION   ASA Class 2 - Mild systemic disease    PLAN:   Plan for Esophagogastroduodenoscopy with possible biopsy, possible dilation, possible foreign body removal. We discussed the risks, benefits and alternatives and the patient wished to proceed.  Patient is cleared for the above procedure.    The above has been forwarded to the consulting provider.    [facility],   [facility]        "

## 2022-04-15 ENCOUNTER — MYC MEDICAL ADVICE (OUTPATIENT)
Dept: FAMILY MEDICINE | Facility: OTHER | Age: 67
End: 2022-04-15
Payer: COMMERCIAL

## 2022-04-15 DIAGNOSIS — R10.11 POSTPRANDIAL ABDOMINAL PAIN IN RIGHT UPPER QUADRANT: Primary | ICD-10-CM

## 2022-04-27 ENCOUNTER — HOSPITAL ENCOUNTER (OUTPATIENT)
Dept: NUCLEAR MEDICINE | Facility: CLINIC | Age: 67
Setting detail: NUCLEAR MEDICINE
Discharge: HOME | End: 2022-04-27
Attending: FAMILY MEDICINE | Admitting: FAMILY MEDICINE
Payer: COMMERCIAL

## 2022-04-27 DIAGNOSIS — R10.11 POSTPRANDIAL ABDOMINAL PAIN IN RIGHT UPPER QUADRANT: ICD-10-CM

## 2022-04-27 PROCEDURE — A9537 TC99M MEBROFENIN: HCPCS | Performed by: FAMILY MEDICINE

## 2022-04-27 PROCEDURE — 250N000011 HC RX IP 250 OP 636: Performed by: FAMILY MEDICINE

## 2022-04-27 PROCEDURE — 78227 HEPATOBIL SYST IMAGE W/DRUG: CPT

## 2022-04-27 PROCEDURE — 343N000001 HC RX 343: Performed by: FAMILY MEDICINE

## 2022-04-27 RX ORDER — KIT FOR THE PREPARATION OF TECHNETIUM TC 99M MEBROFENIN 45 MG/10ML
5.2 INJECTION, POWDER, LYOPHILIZED, FOR SOLUTION INTRAVENOUS ONCE
Status: COMPLETED | OUTPATIENT
Start: 2022-04-27 | End: 2022-04-27

## 2022-04-27 RX ADMIN — SINCALIDE 1.6 MCG: 5 INJECTION, POWDER, LYOPHILIZED, FOR SOLUTION INTRAVENOUS at 09:45

## 2022-04-27 RX ADMIN — MEBROFENIN 5.2 MILLICURIE: 45 INJECTION, POWDER, LYOPHILIZED, FOR SOLUTION INTRAVENOUS at 08:40

## 2022-04-28 ENCOUNTER — MYC MEDICAL ADVICE (OUTPATIENT)
Dept: FAMILY MEDICINE | Facility: OTHER | Age: 67
End: 2022-04-28
Payer: COMMERCIAL

## 2022-04-28 NOTE — TELEPHONE ENCOUNTER
This [facility] msg is in response to the message you had sent regarding his NM Hepatobiliary Scan.  Flor Zendejas RN

## 2022-04-28 NOTE — RESULT ENCOUNTER NOTE
Mr. Smith    Your recent test results are attached. Hepatobiliary scan showed normal gall bladder function. I would recommend keeping your appointment with surgery to discuss next step in evaluation. I see that you are scheduled with a different surgeon that the one who did your endoscopy. You could plan to see him as scheduled or if you prefer to see the same surgeon as the last one , I could have them switch it      If you have any questions or concerns please contact me via My Chart or call the clinic at [phone]     Thank You  Krystle Rg MD.

## 2022-05-02 ENCOUNTER — OFFICE VISIT (OUTPATIENT)
Dept: SURGERY | Facility: CLINIC | Age: 67
End: 2022-05-02
Payer: COMMERCIAL

## 2022-05-02 VITALS
DIASTOLIC BLOOD PRESSURE: 80 MMHG | TEMPERATURE: 96 F | BODY MASS INDEX: 28.88 KG/M2 | SYSTOLIC BLOOD PRESSURE: 138 MMHG | WEIGHT: 184 LBS | HEIGHT: 67 IN

## 2022-05-02 DIAGNOSIS — R10.84 ABDOMINAL PAIN, GENERALIZED: Primary | ICD-10-CM

## 2022-05-02 DIAGNOSIS — K82.8 SLUDGE IN GALLBLADDER: ICD-10-CM

## 2022-05-02 DIAGNOSIS — K90.49 FATTY FOOD INTOLERANCE: ICD-10-CM

## 2022-05-02 PROCEDURE — 99204 OFFICE O/P NEW MOD 45 MIN: CPT | Performed by: SPECIALIST

## 2022-05-02 ASSESSMENT — LIFESTYLE VARIABLES
AUDIT-C TOTAL SCORE: 1
SKIP TO QUESTIONS 9-10: 0
HOW MANY STANDARD DRINKS CONTAINING ALCOHOL DO YOU HAVE ON A TYPICAL DAY: PATIENT DOES NOT DRINK
HOW OFTEN DO YOU HAVE SIX OR MORE DRINKS ON ONE OCCASION: LESS THAN MONTHLY
HOW OFTEN DO YOU HAVE A DRINK CONTAINING ALCOHOL: NEVER

## 2022-05-02 ASSESSMENT — PAIN SCALES - GENERAL: PAINLEVEL: MILD PAIN (3)

## 2022-05-02 NOTE — PROGRESS NOTES
Consult requested by Dr. Rg    Reason for consultation: Abdominal pain    HPI:  Patient is a 66-year-old white male who about 10 months ago he began developing generalized abdominal pain.  It is associated with almost every meal but denies any nausea vomiting fevers or chills.  It usually resolves on its own.  He reports a lot of gas as well as some diarrhea after eating fatty foods.  He had an EGD earlier this year that was essentially normal.  His last colonoscopy was also normal in 2018.  He had a HIDA scan that was normal but an ultrasound that revealed possible gallbladder sludge.  He denies any true right upper quadrant pain associate with fatty meals.  He does do a lot of bodybuilding and takes a lot of supplements.  He tried supplements cessation for a week without relief of the symptoms.  He feels Prilosec did help a little with the symptoms.  The injection of CCK did reproduce his symptoms.  He now presents to me for evaluation of his abdominal pain.        Past Medical History:   Diagnosis Date     DDD (degenerative disc disease), cervical      Hyperlipidemia LDL goal < 130      Past Surgical History:   Procedure Laterality Date     COLONOSCOPY  03/21/2007    Repeat  for screening purposes in 10 yrs.     COLONOSCOPY  1/2/2013    Procedure: COLONOSCOPY;  Colonoscopy;  Surgeon: Emmett San MD;  Location:  GI     COLONOSCOPY N/A 8/6/2018    Procedure: COLONOSCOPY;  colonoscopy;  Surgeon: Israel Moreland MD;  Location:  GI     ESOPHAGOSCOPY, GASTROSCOPY, DUODENOSCOPY (EGD), COMBINED N/A 4/13/2022    Procedure: ESOPHAGOGASTRODUODENOSCOPY (EGD);  Surgeon: Antoinette Lagunas MD;  Location:  GI     HC SHLDR ARTHROSCOP,SURG,W/ROTAT CUFF REPR Left 2010     HC VASECTOMY UNILAT/BILAT W POSTOP SEMEN  1998    Vasectomy     Current Outpatient Medications   Medication     fluticasone (FLONASE) 50 MCG/ACT nasal spray     latanoprost (XALATAN) 0.005 % ophthalmic solution     Misc Natural Products  (GLUCOSAMINE CHONDROITIN ADV PO)     MULTI-DAY VITAMINS OR TABS     Omega-3 Fatty Acids (FISH OIL) 1200 MG capsule     omeprazole (PRILOSEC) 40 MG DR capsule     Current Facility-Administered Medications   Medication     methylPREDNISolone (DEPO-MEDROL) injection 40 mg     triamcinolone (KENALOG-40) injection 40 mg     triamcinolone (KENALOG-40) injection 40 mg     triamcinolone (KENALOG-40) injection 40 mg     triamcinolone (KENALOG-40) injection 40 mg     triamcinolone (KENALOG-40) injection 40 mg     triamcinolone (KENALOG-40) injection 40 mg     triamcinolone (KENALOG-40) injection 40 mg     triamcinolone (KENALOG-40) injection 40 mg     triamcinolone (KENALOG-40) injection 40 mg     triamcinolone (KENALOG-40) injection 40 mg        Allergies   Allergen Reactions     Seasonal Allergies      Social History     Socioeconomic History     Marital status:      Spouse name: koko     Number of children: 2     Years of education: 13     Highest education level: Not on file   Occupational History     Occupation:    Tobacco Use     Smoking status: Former Smoker     Packs/day: 2.00     Years: 15.00     Pack years: 30.00     Quit date: 1987     Years since quittin.5     Smokeless tobacco: Never Used   Vaping Use     Vaping Use: Not on file   Substance and Sexual Activity     Alcohol use: Not Currently     Comment: rare      Drug use: No     Sexual activity: Yes     Partners: Female     Birth control/protection: Surgical     Comment: Vasectomy   Other Topics Concern      Service No     Blood Transfusions No     Caffeine Concern No     Comment: 3 cans soda/day     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     Hobby Hazards Yes     Comment: fishing-[employer]     Sleep Concern Yes     Comment: is awake after 6 hrs of sleep-     Stress Concern No     Weight Concern No     Special Diet No     Back Care No     Exercise Yes     Comment: walks, does physical labor,      Bike Helmet No     Seat Belt Yes     Self-Exams No     Parent/sibling w/ CABG, MI or angioplasty before 65F 55M? Not Asked   Social History Narrative     Not on file     Social Determinants of Health     Financial Resource Strain: Not on file   Food Insecurity: Not on file   Transportation Needs: Not on file   Physical Activity: Not on file   Stress: Not on file   Social Connections: Not on file   Intimate Partner Violence: Not on file   Housing Stability: Not on file     Family History   Problem Relation Age of Onset     Allergies Brother         on meds     Arthritis Mother      Depression Mother         on meds. is in nursing home     Hypertension Mother      Osteoporosis Mother      Heart Disease Father          at age 57  ? MI--no autopsy     Cancer Father         jaw--smoked [place]     Hypertension Brother      Osteoporosis Sister         on meds     Family History Negative Other         No breast or colon cancer hx in family     Hypertension Sister      Osteoporosis Brother       ROS: 10 point ROS neg other than the symptoms noted above in the HPI.    PE:  B/P: 138/80, T: 96, P: Data Unavailable, R: Data Unavailable  General: well developed, well nourished WM who appears their stated age  HEENT: NC/AT, EOMI, (-)icterus, (-)injection  Neck: Supple, No JVD  Chest: CTA  Heart: S1, S2, (-)m/r/g  Abd: Soft, non tender, non distended, non tender, no masses  Rectal: No masses  Ext; Warm, no edema  Psych: AAOx3  Neuro: No focal deficits    Lab Results   Component Value Date    WBC 4.7 2022    WBC 5.6 10/10/2019     Lab Results   Component Value Date    RBC 4.43 2022    RBC 4.44 10/10/2019     Lab Results   Component Value Date    HGB 13.4 2022    HGB 14.2 10/10/2019     Lab Results   Component Value Date    HCT 40.1 2022    HCT 41.1 10/10/2019     No components found for: MCT  Lab Results   Component Value Date    MCV 91 2022    MCV 93 10/10/2019     Lab Results   Component  Value Date    MCH 30.2 03/01/2022    MCH 32.0 10/10/2019     Lab Results   Component Value Date    MCHC 33.4 03/01/2022    MCHC 34.5 10/10/2019     Lab Results   Component Value Date    RDW 14.1 03/01/2022    RDW 12.9 10/10/2019     Lab Results   Component Value Date     03/01/2022     10/10/2019     Liver Function Studies - Recent Labs   Lab Test 03/01/22  1708   PROTTOTAL 6.7*   ALBUMIN 3.7   BILITOTAL 0.4   ALKPHOS 63   AST 28   ALT 33     US -   ULTRASOUND ABDOMEN COMPLETE 3/7/2022 10:03 AM      HISTORY: Postprandial epigastric pain     COMPARISON: 9/16/2015     FINDINGS:  Liver is unremarkable in echogenicity without focal solid  lesions. Gallbladder demonstrates no cholelithiasis or cholecystitis.   Possible small amount of gallbladder sludge in the neck of the  gallbladder. Extrahepatic bile duct is normal in diameter. Pancreas is  normal where visualized. Spleen is normal. Kidneys are normal in size.  There is no hydronephrosis. Visualized abdominal aorta and IVC are  nonaneurysmal.                                                                      IMPRESSION:    1.. Possible small amount of sludge in the neck of the gallbladder. No  current evidence of cholecystitis.  2. Normal common bile duct.     ROMMEL MANRIQUE MD             HIDA-   Examination:  NM HEPATOBILIARY SCAN WITH GB EF       Date: 4/27/2022 10:26 AM.     Indication:   Biliary colic, recurrent, gallbladder dyskinesia  suspected; Postprandial abdominal pain in right upper quadrant      Additional Information: none     Technique:     The patient received 5.2 mCi of Tc-99m Choletec intravenously. Images  were obtained out through 60 minutes.   At 60 minutes the patient  received 1.6] mcg of CCK. An additional 45 minutes of images were  obtained after the gall bladder contraction intervention.     Findings:     There is prompt clearance of the radionuclide from the blood pool into  the liver. By 10 minutes there is clear  visualization of the  intrahepatic ducts as well as the upper common bile duct. By 15  minutes there is visualization of the gallbladder. At 60 minutes there  is emptying from the common bile duct into the small bowel.     After CCK administration the Gallbladder ejection fraction was  measured at 91%.  The normal gallbladder EF based on a 45 minute  infusion is >40%.     Enterogastric reflux was not present.                                                                      Impression:     Normal hepatobiliary scan without evidence for acute or chronic  cholecystitis.     =======================     The normal Gall Bladder ejection fraction for a 45 minute infusion is  >40%     TIFFANY SHARMA MD       According to patient-injection of CCK did reproduce his symptoms        Impression/plan:  This is a 66-year-old gentleman presenting with abdominal pain of unclear etiology.  His ultrasound revealed possible sludge.  However, his presentation is very atypical for gallbladder disease other than the fatty food intolerance.  His EGD was also normal.  I discussed all these findings with the patient and after discussion with the patient plan at this time is for a CAT scan of the abdomen pelvis.  Should that be normal then consideration be given to a laparoscopic cholecystectomy with the understanding that may not relieve his symptoms.  The other option would be to have him see gastroenterology should the CT be normal.  He will call me once the CAT scan is completed.    Julius Mc MD, FACS

## 2022-05-02 NOTE — LETTER
5/2/2022         RE: Deven Smith  [address] [facility] [address]        Dear Colleague,    Thank you for referring your patient, Deven Smith, to the [facility]. Please see a copy of my visit note below.    Consult requested by Dr. Rg    Reason for consultation: Abdominal pain    HPI:  Patient is a 66-year-old white male who about 10 months ago he began developing generalized abdominal pain.  It is associated with almost every meal but denies any nausea vomiting fevers or chills.  It usually resolves on its own.  He reports a lot of gas as well as some diarrhea after eating fatty foods.  He had an EGD earlier this year that was essentially normal.  His last colonoscopy was also normal in 2018.  He had a HIDA scan that was normal but an ultrasound that revealed possible gallbladder sludge.  He denies any true right upper quadrant pain associate with fatty meals.  He does do a lot of bodybuilding and takes a lot of supplements.  He tried supplements cessation for a week without relief of the symptoms.  He feels Prilosec did help a little with the symptoms.  The injection of CCK did reproduce his symptoms.  He now presents to me for evaluation of his abdominal pain.        Past Medical History:   Diagnosis Date     DDD (degenerative disc disease), cervical      Hyperlipidemia LDL goal < 130      Past Surgical History:   Procedure Laterality Date     COLONOSCOPY  03/21/2007    Repeat  for screening purposes in 10 yrs.     COLONOSCOPY  1/2/2013    Procedure: COLONOSCOPY;  Colonoscopy;  Surgeon: Emmett San MD;  Location:  GI     COLONOSCOPY N/A 8/6/2018    Procedure: COLONOSCOPY;  colonoscopy;  Surgeon: Israel Moreland MD;  Location:  GI     ESOPHAGOSCOPY, GASTROSCOPY, DUODENOSCOPY (EGD), COMBINED N/A 4/13/2022    Procedure: ESOPHAGOGASTRODUODENOSCOPY (EGD);  Surgeon: Antoinette Lagunas MD;  Location:  GI     HC SHLDR ARTHROSCOP,SURG,W/ROTAT CUFF  REPR Left 2010     HC VASECTOMY UNILAT/BILAT W POSTOP SEMEN  1998    Vasectomy     Current Outpatient Medications   Medication     fluticasone (FLONASE) 50 MCG/ACT nasal spray     latanoprost (XALATAN) 0.005 % ophthalmic solution     Misc Natural Products (GLUCOSAMINE CHONDROITIN ADV PO)     MULTI-DAY VITAMINS OR TABS     Omega-3 Fatty Acids (FISH OIL) 1200 MG capsule     omeprazole (PRILOSEC) 40 MG DR capsule     Current Facility-Administered Medications   Medication     methylPREDNISolone (DEPO-MEDROL) injection 40 mg     triamcinolone (KENALOG-40) injection 40 mg     triamcinolone (KENALOG-40) injection 40 mg     triamcinolone (KENALOG-40) injection 40 mg     triamcinolone (KENALOG-40) injection 40 mg     triamcinolone (KENALOG-40) injection 40 mg     triamcinolone (KENALOG-40) injection 40 mg     triamcinolone (KENALOG-40) injection 40 mg     triamcinolone (KENALOG-40) injection 40 mg     triamcinolone (KENALOG-40) injection 40 mg     triamcinolone (KENALOG-40) injection 40 mg        Allergies   Allergen Reactions     Seasonal Allergies      Social History     Socioeconomic History     Marital status:      Spouse name: koko     Number of children: 2     Years of education: 13     Highest education level: Not on file   Occupational History     Occupation:    Tobacco Use     Smoking status: Former Smoker     Packs/day: 2.00     Years: 15.00     Pack years: 30.00     Quit date: 1987     Years since quittin.5     Smokeless tobacco: Never Used   Vaping Use     Vaping Use: Not on file   Substance and Sexual Activity     Alcohol use: Not Currently     Comment: rare      Drug use: No     Sexual activity: Yes     Partners: Female     Birth control/protection: Surgical     Comment: Vasectomy   Other Topics Concern      Service No     Blood Transfusions No     Caffeine Concern No     Comment: 3 cans soda/day     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     Hobby  Hazards Yes     Comment: [employer]     Sleep Concern Yes     Comment: is awake after 6 hrs of sleep-     Stress Concern No     Weight Concern No     Special Diet No     Back Care No     Exercise Yes     Comment: walks, does physical labor,     Bike Helmet No     Seat Belt Yes     Self-Exams No     Parent/sibling w/ CABG, MI or angioplasty before 65F 55M? Not Asked   Social History Narrative     Not on file     Social Determinants of Health     Financial Resource Strain: Not on file   Food Insecurity: Not on file   Transportation Needs: Not on file   Physical Activity: Not on file   Stress: Not on file   Social Connections: Not on file   Intimate Partner Violence: Not on file   Housing Stability: Not on file     Family History   Problem Relation Age of Onset     Allergies Brother         on meds     Arthritis Mother      Depression Mother         on meds. is in nursing home     Hypertension Mother      Osteoporosis Mother      Heart Disease Father          at age 57  ? MI--no autopsy     Cancer Father         jaw--smoked [place]     Hypertension Brother      Osteoporosis Sister         on meds     Family History Negative Other         No breast or colon cancer hx in family     Hypertension Sister      Osteoporosis Brother       ROS: 10 point ROS neg other than the symptoms noted above in the HPI.    PE:  B/P: 138/80, T: 96, P: Data Unavailable, R: Data Unavailable  General: well developed, well nourished WM who appears their stated age  HEENT: NC/AT, EOMI, (-)icterus, (-)injection  Neck: Supple, No JVD  Chest: CTA  Heart: S1, S2, (-)m/r/g  Abd: Soft, non tender, non distended, non tender, no masses  Rectal: No masses  Ext; Warm, no edema  Psych: AAOx3  Neuro: No focal deficits    Lab Results   Component Value Date    WBC 4.7 2022    WBC 5.6 10/10/2019     Lab Results   Component Value Date    RBC 4.43 2022    RBC 4.44 10/10/2019     Lab Results   Component Value Date    HGB 13.4  03/01/2022    HGB 14.2 10/10/2019     Lab Results   Component Value Date    HCT 40.1 03/01/2022    HCT 41.1 10/10/2019     No components found for: MCT  Lab Results   Component Value Date    MCV 91 03/01/2022    MCV 93 10/10/2019     Lab Results   Component Value Date    MCH 30.2 03/01/2022    MCH 32.0 10/10/2019     Lab Results   Component Value Date    MCHC 33.4 03/01/2022    MCHC 34.5 10/10/2019     Lab Results   Component Value Date    RDW 14.1 03/01/2022    RDW 12.9 10/10/2019     Lab Results   Component Value Date     03/01/2022     10/10/2019     Liver Function Studies - Recent Labs   Lab Test 03/01/22  1708   PROTTOTAL 6.7*   ALBUMIN 3.7   BILITOTAL 0.4   ALKPHOS 63   AST 28   ALT 33     US -   ULTRASOUND ABDOMEN COMPLETE 3/7/2022 10:03 AM      HISTORY: Postprandial epigastric pain     COMPARISON: 9/16/2015     FINDINGS:  Liver is unremarkable in echogenicity without focal solid  lesions. Gallbladder demonstrates no cholelithiasis or cholecystitis.   Possible small amount of gallbladder sludge in the neck of the  gallbladder. Extrahepatic bile duct is normal in diameter. Pancreas is  normal where visualized. Spleen is normal. Kidneys are normal in size.  There is no hydronephrosis. Visualized abdominal aorta and IVC are  nonaneurysmal.                                                                      IMPRESSION:    1.. Possible small amount of sludge in the neck of the gallbladder. No  current evidence of cholecystitis.  2. Normal common bile duct.     ROMMEL MANRIQUE MD             HIDA-   Examination:  NM HEPATOBILIARY SCAN WITH GB EF       Date: 4/27/2022 10:26 AM.     Indication:   Biliary colic, recurrent, gallbladder dyskinesia  suspected; Postprandial abdominal pain in right upper quadrant      Additional Information: none     Technique:     The patient received 5.2 mCi of Tc-99m Choletec intravenously. Images  were obtained out through 60 minutes.   At 60 minutes the  patient  received 1.6] mcg of CCK. An additional 45 minutes of images were  obtained after the gall bladder contraction intervention.     Findings:     There is prompt clearance of the radionuclide from the blood pool into  the liver. By 10 minutes there is clear visualization of the  intrahepatic ducts as well as the upper common bile duct. By 15  minutes there is visualization of the gallbladder. At 60 minutes there  is emptying from the common bile duct into the small bowel.     After CCK administration the Gallbladder ejection fraction was  measured at 91%.  The normal gallbladder EF based on a 45 minute  infusion is >40%.     Enterogastric reflux was not present.                                                                      Impression:     Normal hepatobiliary scan without evidence for acute or chronic  cholecystitis.     =======================     The normal Gall Bladder ejection fraction for a 45 minute infusion is  >40%     TIFFANY SHARMA MD       According to patient-injection of CCK did reproduce his symptoms        Impression/plan:  This is a 66-year-old gentleman presenting with abdominal pain of unclear etiology.  His ultrasound revealed possible sludge.  However, his presentation is very atypical for gallbladder disease other than the fatty food intolerance.  His EGD was also normal.  I discussed all these findings with the patient and after discussion with the patient plan at this time is for a CAT scan of the abdomen pelvis.  Should that be normal then consideration be given to a laparoscopic cholecystectomy with the understanding that may not relieve his symptoms.  The other option would be to have him see gastroenterology should the CT be normal.  He will call me once the CAT scan is completed.    Julius Mc MD, FACS      Again, thank you for allowing me to participate in the care of your patient.        Sincerely,        Julius Mc MD

## 2022-05-04 ENCOUNTER — HOSPITAL ENCOUNTER (OUTPATIENT)
Dept: CT IMAGING | Facility: CLINIC | Age: 67
Discharge: HOME | End: 2022-05-04
Attending: SPECIALIST | Admitting: SPECIALIST
Payer: COMMERCIAL

## 2022-05-04 ENCOUNTER — TELEPHONE (OUTPATIENT)
Dept: SURGERY | Facility: OTHER | Age: 67
End: 2022-05-04
Payer: COMMERCIAL

## 2022-05-04 DIAGNOSIS — R10.84 ABDOMINAL PAIN, GENERALIZED: ICD-10-CM

## 2022-05-04 PROCEDURE — 250N000011 HC RX IP 250 OP 636: Performed by: SPECIALIST

## 2022-05-04 PROCEDURE — 74177 CT ABD & PELVIS W/CONTRAST: CPT

## 2022-05-04 PROCEDURE — 250N000009 HC RX 250: Performed by: SPECIALIST

## 2022-05-04 RX ORDER — IOPAMIDOL 755 MG/ML
500 INJECTION, SOLUTION INTRAVASCULAR ONCE
Status: COMPLETED | OUTPATIENT
Start: 2022-05-04 | End: 2022-05-04

## 2022-05-04 RX ADMIN — SODIUM CHLORIDE 60 ML: 9 INJECTION, SOLUTION INTRAVENOUS at 07:40

## 2022-05-04 RX ADMIN — IOPAMIDOL 90 ML: 755 INJECTION, SOLUTION INTRAVENOUS at 07:40

## 2022-05-04 NOTE — TELEPHONE ENCOUNTER
Reviewed CT with patient.  Essentially normal with no obvious explanation for his pain.  He has stopped his supplements and is just doing Prilosec.  I discussed the options of laparoscopic cholecystectomy versus a GI evaluation.  He wishes to just watch it for now and he will contact me in 2 weeks to see how he is doing.

## 2022-05-04 NOTE — TELEPHONE ENCOUNTER
Patient notified that Dr. Mc will call patient this afternoon.    Isela Castaneda RN on 5/4/2022 at 10:57 AM

## 2022-05-04 NOTE — TELEPHONE ENCOUNTER
Reason for Call:  Other call back    Detailed comments: Patient states he received his CT results and was to call Dr. Mc to go over the results.  Please call    Phone Number Patient can be reached at: Home number on file [phone] (home) or Cell number on file:    Telephone Information:   Mobile [phone]       Best Time: any    Can we leave a detailed message on this number? YES    Call taken on 5/4/2022 at 9:56 AM by Flor Scott

## 2022-06-13 ENCOUNTER — OFFICE VISIT (OUTPATIENT)
Dept: ORTHOPEDICS | Facility: CLINIC | Age: 67
End: 2022-06-13
Payer: MEDICARE

## 2022-06-13 DIAGNOSIS — M17.0 BILATERAL PRIMARY OSTEOARTHRITIS OF KNEE: Primary | ICD-10-CM

## 2022-06-13 PROCEDURE — 20610 DRAIN/INJ JOINT/BURSA W/O US: CPT | Mod: 50 | Performed by: FAMILY MEDICINE

## 2022-06-13 RX ORDER — TRIAMCINOLONE ACETONIDE 40 MG/ML
40 INJECTION, SUSPENSION INTRA-ARTICULAR; INTRAMUSCULAR
Status: DISCONTINUED | OUTPATIENT
Start: 2022-06-13 | End: 2024-02-26

## 2022-06-13 RX ADMIN — TRIAMCINOLONE ACETONIDE 40 MG: 40 INJECTION, SUSPENSION INTRA-ARTICULAR; INTRAMUSCULAR at 11:31

## 2022-06-13 NOTE — LETTER
6/13/2022         RE: Deven Smith  [address] [address] [place] [id number]        Dear Colleague,    Thank you for referring your patient, Deven Smith, to the [facility] [place]. Please see a copy of my visit note below.    Homero has bilateral knee osteoarthritis, he is here for bilateral knee injections.  His last injections were one year ago.                                      Large Joint Injection/Arthocentesis: bilateral knee    Date/Time: 6/13/2022 11:31 AM  Performed by: Taz Maurice DO  Authorized by: Taz Maurice DO     Needle Size:  22 G  Guidance: landmark guided    Location:  Knee  Laterality:  Bilateral      Medications (Right):  40 mg triamcinolone 40 MG/ML  Medications (Left):  40 mg triamcinolone 40 MG/ML  Outcome:  Tolerated well, no immediate complications  Procedure discussed: discussed risks, benefits, and alternatives    Timeout: timeout called immediately prior to procedure    Prep: patient was prepped and draped in usual sterile fashion         PROCEDURE    Knee Injections - Intraarticular    The patient was informed of the risks and the benefits of the procedure and a written consent was signed.    The patient s left knee was prepped with chlorhexidine in sterile fashion.   40 mg of triamcinolone suspension was drawn up into a 5 mL syringe with 4 mL of 1% lidocaine.  Injection was performed using substerile technique.  A 1.5-inch 22-gauge needle was used to enter the lateral aspect of the left knee.  Injection performed successfully without difficulty.  There were no complications. The patient tolerated the procedure well. There was negligible bleeding.     The patient's right knee was prepped with chlorhexidine in sterile fashion.   40 mg of triamcinolone suspension was drawn up into a 5 mL syringe with 4 mL of 1% lidocaine.  Injection was performed using substerile technique.  A 1.5-inch 22-gauge needle was used to enter the lateral  aspect of the right knee.  Injection performed successfully without difficulty.  There were no complications. The patient tolerated the procedure well. There was negligible bleeding.                 Again, thank you for allowing me to participate in the care of your patient.        Sincerely,        Taz Maurice, DO

## 2022-06-13 NOTE — PROGRESS NOTES
Homero has bilateral knee osteoarthritis, he is here for bilateral knee injections.  His last injections were one year ago.                                      Large Joint Injection/Arthocentesis: bilateral knee    Date/Time: 6/13/2022 11:31 AM  Performed by: Taz Maurice DO  Authorized by: Taz Maurice DO     Needle Size:  22 G  Guidance: landmark guided    Location:  Knee  Laterality:  Bilateral      Medications (Right):  40 mg triamcinolone 40 MG/ML  Medications (Left):  40 mg triamcinolone 40 MG/ML  Outcome:  Tolerated well, no immediate complications  Procedure discussed: discussed risks, benefits, and alternatives    Timeout: timeout called immediately prior to procedure    Prep: patient was prepped and draped in usual sterile fashion         PROCEDURE    Knee Injections - Intraarticular    The patient was informed of the risks and the benefits of the procedure and a written consent was signed.    The patient s left knee was prepped with chlorhexidine in sterile fashion.   40 mg of triamcinolone suspension was drawn up into a 5 mL syringe with 4 mL of 1% lidocaine.  Injection was performed using substerile technique.  A 1.5-inch 22-gauge needle was used to enter the lateral aspect of the left knee.  Injection performed successfully without difficulty.  There were no complications. The patient tolerated the procedure well. There was negligible bleeding.     The patient's right knee was prepped with chlorhexidine in sterile fashion.   40 mg of triamcinolone suspension was drawn up into a 5 mL syringe with 4 mL of 1% lidocaine.  Injection was performed using substerile technique.  A 1.5-inch 22-gauge needle was used to enter the lateral aspect of the right knee.  Injection performed successfully without difficulty.  There were no complications. The patient tolerated the procedure well. There was negligible bleeding.

## 2022-06-13 NOTE — NURSING NOTE
[facility]   ORTHOPEDICS & SPORTS MEDICINE  [address] [place] [address]  Dept: [phone]  ______________________________________________________________________________    Patient: Deven Smith   : 1955   MRN: [id number]   2022    INVASIVE PROCEDURE SAFETY CHECKLIST    Date: 22   Procedure:Bialeral knee pain  Patient Name: Deven Smith  MRN: [id number]  YOB: 1955    Action: Complete sections as appropriate. Any discrepancy results in a HARD COPY until resolved.     PRE PROCEDURE:  Patient ID verified with 2 identifiers (name and  or MRN): Yes  Procedure and site verified with patient/designee (when able): Yes  Accurate consent documentation in medical record: Yes  H&P (or appropriate assessment) documented in medical record: Yes  H&P must be up to 20 days prior to procedure and updates within 24 hours of procedure as applicable: Yes  Relevant diagnostic and radiology test results appropriately labeled and displayed as applicable: Yes  Procedure site(s) marked with provider initials: Yes    TIMEOUT:  Time-Out performed immediately prior to starting procedure, including verbal and active participation of all team members addressing the following:Yes  * Correct patient identify  * Confirmed that the correct side and site are marked  * An accurate procedure consent form  * Agreement on the procedure to be done  * Correct patient position  * Relevant images and results are properly labeled and appropriately displayed  * The need to administer antibiotics or fluids for irrigation purposes during the procedure as applicable   * Safety precautions based on patient history or medication use    DURING PROCEDURE: Verification of correct person, site, and procedures any time the responsibility for care of the patient is transferred to another member of the care team.       Prior to injection, verified patient identity using  patient's name and date of birth.  Due to injection administration, patient instructed to remain in clinic for 15 minutes  afterwards, and to report any adverse reaction to me immediately.    Joint injection was performed.      Drug Amount Wasted:  None.  Vial/Syringe: Single dose vial  Expiration Date:  [id number]      Ayla Zhu, EMT  June 13, 2022

## 2022-07-08 ENCOUNTER — OFFICE VISIT (OUTPATIENT)
Dept: SURGERY | Facility: CLINIC | Age: 67
End: 2022-07-08
Payer: MEDICARE

## 2022-07-08 VITALS
BODY MASS INDEX: 29.03 KG/M2 | HEIGHT: 67 IN | SYSTOLIC BLOOD PRESSURE: 140 MMHG | TEMPERATURE: 96.9 F | DIASTOLIC BLOOD PRESSURE: 70 MMHG | WEIGHT: 185 LBS

## 2022-07-08 DIAGNOSIS — M79.18 GLUTEAL PAIN: Primary | ICD-10-CM

## 2022-07-08 PROCEDURE — 99213 OFFICE O/P EST LOW 20 MIN: CPT | Performed by: SURGERY

## 2022-07-08 NOTE — PROGRESS NOTES
Patient seen in consultation for left gluteal painful lump    HPI:  Patient is a 67 year old male with left gluteal painful lump near the ischial tuberosity for several months.  He states that recently it seems to be getting more tender and even difficult to drive at times.  He thinks he may have strained a muscle in the area exercising at the onset.  He had a CT scan of the abdomen and pelvis but I am not sure this area was adequately imaged.  He denies any problems urinating or defecating.  He has had prior lipoma excision on contralateral extremity.     Review Of Systems    Skin: negative  Ears/Nose/Throat: negative  Respiratory: No shortness of breath, dyspnea on exertion, cough, or hemoptysis  Cardiovascular: negative  Gastrointestinal: negative  Genitourinary: negative  Musculoskeletal: as above  Neurologic: negative  Hematologic/Lymphatic/Immunologic: negative  Endocrine: negative      Past Medical History:   Diagnosis Date     DDD (degenerative disc disease), cervical      Hyperlipidemia LDL goal < 130        Past Surgical History:   Procedure Laterality Date     COLONOSCOPY  2007    Repeat  for screening purposes in 10 yrs.     COLONOSCOPY  2013    Procedure: COLONOSCOPY;  Colonoscopy;  Surgeon: Emmett San MD;  Location:  GI     COLONOSCOPY N/A 2018    Procedure: COLONOSCOPY;  colonoscopy;  Surgeon: Israel Moreland MD;  Location:  GI     ESOPHAGOSCOPY, GASTROSCOPY, DUODENOSCOPY (EGD), COMBINED N/A 2022    Procedure: ESOPHAGOGASTRODUODENOSCOPY (EGD);  Surgeon: Antoinette Lagunas MD;  Location:  GI      SHLDR ARTHROSCOP,SURG,W/ROTAT CUFF REPR Left      HC VASECTOMY UNILAT/BILAT W POSTOP SEMEN  1998    Vasectomy       Family History   Problem Relation Age of Onset     Allergies Brother         on meds     Arthritis Mother      Depression Mother         on meds. is in nursing home     Hypertension Mother      Osteoporosis Mother      Heart Disease Father           at age 57  ? MI--no autopsy     Cancer Father         jaw--smoked [place]     Hypertension Brother      Osteoporosis Sister         on meds     Family History Negative Other         No breast or colon cancer hx in family     Hypertension Sister      Osteoporosis Brother        Social History     Socioeconomic History     Marital status:      Spouse name: koko     Number of children: 2     Years of education: 13     Highest education level: Not on file   Occupational History     Occupation:    Tobacco Use     Smoking status: Former Smoker     Packs/day: 2.00     Years: 15.00     Pack years: 30.00     Quit date: 1987     Years since quittin.6     Smokeless tobacco: Never Used   Vaping Use     Vaping Use: Not on file   Substance and Sexual Activity     Alcohol use: Not Currently     Comment: rare      Drug use: No     Sexual activity: Yes     Partners: Female     Birth control/protection: Surgical     Comment: Vasectomy   Other Topics Concern      Service No     Blood Transfusions No     Caffeine Concern No     Comment: 3 cans soda/day     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     Hobby Hazards Yes     Comment: fishing[employer]     Sleep Concern Yes     Comment: is awake after 6 hrs of sleep-     Stress Concern No     Weight Concern No     Special Diet No     Back Care No     Exercise Yes     Comment: walks, does physical labor,     Bike Helmet No     Seat Belt Yes     Self-Exams No     Parent/sibling w/ CABG, MI or angioplasty before 65F 55M? Not Asked   Social History Narrative     Not on file     Social Determinants of Health     Financial Resource Strain: Not on file   Food Insecurity: Not on file   Transportation Needs: Not on file   Physical Activity: Not on file   Stress: Not on file   Social Connections: Not on file   Intimate Partner Violence: Not on file   Housing Stability: Not on file       Current Outpatient Medications   Medication  "Sig Dispense Refill     fluticasone (FLONASE) 50 MCG/ACT nasal spray USE TWO SPRAYS IN EACH NOSTRIL ONCE DAILY 16 g 7     latanoprost (XALATAN) 0.005 % ophthalmic solution Place 0.005 drops into both eyes At Bedtime       Misc Natural Products (GLUCOSAMINE CHONDROITIN ADV PO) Take 6 tablets by mouth daily.       MULTI-DAY VITAMINS OR TABS 1 tablet daily 0 0     Omega-3 Fatty Acids (FISH OIL) 1200 MG capsule Take 1,200 mg by mouth daily as needed       omeprazole (PRILOSEC) 40 MG DR capsule Take 1 capsule (40 mg) by mouth daily 30 min before your biggest meal. 30 capsule 2       Medications and history reviewed    Physical exam:  Vitals: BP (!) 140/70   Temp 96.9  F (36.1  C) (Temporal)   Ht 1.702 m (5' 7\")   Wt 83.9 kg (185 lb)   BMI 28.98 kg/m    BMI= Body mass index is 28.98 kg/m .    Constitutional: Healthy, alert, non-distressed   Head: Normo-cephalic, atraumatic, no lesions, masses or tenderness   Cardiovascular: RRR, no new murmurs, +S1, +S2 heart sounds, no clicks, rubs or gallops   Respiratory: CTAB, no rales, rhonchi or wheezing, equal chest rise, good respiratory effort   Gastrointestinal: Soft, non-tender, non distended, no rebound rigidity or guarding, no masses or hernias palpated   : Deferred  Musculoskeletal: Moves all extremities, normal  strength, no deformities noted   Skin: Small tender lump near the ischial tuberosity.  It does feel intramuscular like a knot.  Psychiatric: Mentation appears normal, affect appropriate   Hematologic/Lymphatic/Immunologic: Normal cervical and supraclavicular lymph nodes   Patient able to get up on table without difficulty.    Labs show:  No results found for this or any previous visit (from the past 24 hour(s)).    Imaging shows:  No results found for this or any previous visit (from the past 744 hour(s)).     Assessment:     ICD-10-CM    1. Gluteal pain  M79.18 MR Pelvis Musclular Tissue wo & w Contrast     Plan: This could be musculoskeletal injury " versus intramuscular lipoma versus hematoma versus other.  Will further investigate with MR soft tissue.  I will call the discuss results once the imaging study has been completed.    30 minutes spent on the date of the encounter doing chart review, history and exam, documentation and further activities per the note    Sanya Vieyra, DO

## 2022-07-08 NOTE — LETTER
7/8/2022         RE: Deven Smith  [id number] [facility] [address]        Dear Colleague,    Thank you for referring your patient, Deven Smith, to the [facility]. Please see a copy of my visit note below.    Patient seen in consultation for left gluteal painful lump    HPI:  Patient is a 67 year old male with left gluteal painful lump near the ischial tuberosity for several months.  He states that recently it seems to be getting more tender and even difficult to drive at times.  He thinks he may have strained a muscle in the area exercising at the onset.  He had a CT scan of the abdomen and pelvis but I am not sure this area was adequately imaged.  He denies any problems urinating or defecating.  He has had prior lipoma excision on contralateral extremity.     Review Of Systems    Skin: negative  Ears/Nose/Throat: negative  Respiratory: No shortness of breath, dyspnea on exertion, cough, or hemoptysis  Cardiovascular: negative  Gastrointestinal: negative  Genitourinary: negative  Musculoskeletal: as above  Neurologic: negative  Hematologic/Lymphatic/Immunologic: negative  Endocrine: negative      Past Medical History:   Diagnosis Date     DDD (degenerative disc disease), cervical      Hyperlipidemia LDL goal < 130        Past Surgical History:   Procedure Laterality Date     COLONOSCOPY  03/21/2007    Repeat  for screening purposes in 10 yrs.     COLONOSCOPY  1/2/2013    Procedure: COLONOSCOPY;  Colonoscopy;  Surgeon: Emmett San MD;  Location:  GI     COLONOSCOPY N/A 8/6/2018    Procedure: COLONOSCOPY;  colonoscopy;  Surgeon: Israel Moreland MD;  Location:  GI     ESOPHAGOSCOPY, GASTROSCOPY, DUODENOSCOPY (EGD), COMBINED N/A 4/13/2022    Procedure: ESOPHAGOGASTRODUODENOSCOPY (EGD);  Surgeon: Antoinette Lagunas MD;  Location:  GI     HC SHLDR ARTHROSCOP,SURG,W/ROTAT CUFF REPR Left 2010     HC VASECTOMY UNILAT/BILAT W POSTOP SEMEN  1998    Vasectomy        Family History   Problem Relation Age of Onset     Allergies Brother         on meds     Arthritis Mother      Depression Mother         on meds. is in nursing home     Hypertension Mother      Osteoporosis Mother      Heart Disease Father          at age 57  ? MI--no autopsy     Cancer Father         jaw--smoked [place]     Hypertension Brother      Osteoporosis Sister         on meds     Family History Negative Other         No breast or colon cancer hx in family     Hypertension Sister      Osteoporosis Brother        Social History     Socioeconomic History     Marital status:      Spouse name: koko     Number of children: 2     Years of education: 13     Highest education level: Not on file   Occupational History     Occupation:    Tobacco Use     Smoking status: Former Smoker     Packs/day: 2.00     Years: 15.00     Pack years: 30.00     Quit date: 1987     Years since quittin.6     Smokeless tobacco: Never Used   Vaping Use     Vaping Use: Not on file   Substance and Sexual Activity     Alcohol use: Not Currently     Comment: rare      Drug use: No     Sexual activity: Yes     Partners: Female     Birth control/protection: Surgical     Comment: Vasectomy   Other Topics Concern      Service No     Blood Transfusions No     Caffeine Concern No     Comment: 3 cans soda/day     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     Hobby Hazards Yes     Comment: [employer]     Sleep Concern Yes     Comment: is awake after 6 hrs of sleep-     Stress Concern No     Weight Concern No     Special Diet No     Back Care No     Exercise Yes     Comment: walks, does physical labor,     Bike Helmet No     Seat Belt Yes     Self-Exams No     Parent/sibling w/ CABG, MI or angioplasty before 65F 55M? Not Asked   Social History Narrative     Not on file     Social Determinants of Health     Financial Resource Strain: Not on file   Food Insecurity: Not  "on file   Transportation Needs: Not on file   Physical Activity: Not on file   Stress: Not on file   Social Connections: Not on file   Intimate Partner Violence: Not on file   Housing Stability: Not on file       Current Outpatient Medications   Medication Sig Dispense Refill     fluticasone (FLONASE) 50 MCG/ACT nasal spray USE TWO SPRAYS IN EACH NOSTRIL ONCE DAILY 16 g 7     latanoprost (XALATAN) 0.005 % ophthalmic solution Place 0.005 drops into both eyes At Bedtime       Misc Natural Products (GLUCOSAMINE CHONDROITIN ADV PO) Take 6 tablets by mouth daily.       MULTI-DAY VITAMINS OR TABS 1 tablet daily 0 0     Omega-3 Fatty Acids (FISH OIL) 1200 MG capsule Take 1,200 mg by mouth daily as needed       omeprazole (PRILOSEC) 40 MG DR capsule Take 1 capsule (40 mg) by mouth daily 30 min before your biggest meal. 30 capsule 2       Medications and history reviewed    Physical exam:  Vitals: BP (!) 140/70   Temp 96.9  F (36.1  C) (Temporal)   Ht 1.702 m (5' 7\")   Wt 83.9 kg (185 lb)   BMI 28.98 kg/m    BMI= Body mass index is 28.98 kg/m .    Constitutional: Healthy, alert, non-distressed   Head: Normo-cephalic, atraumatic, no lesions, masses or tenderness   Cardiovascular: RRR, no new murmurs, +S1, +S2 heart sounds, no clicks, rubs or gallops   Respiratory: CTAB, no rales, rhonchi or wheezing, equal chest rise, good respiratory effort   Gastrointestinal: Soft, non-tender, non distended, no rebound rigidity or guarding, no masses or hernias palpated   : Deferred  Musculoskeletal: Moves all extremities, normal  strength, no deformities noted   Skin: Small tender lump near the ischial tuberosity.  It does feel intramuscular like a knot.  Psychiatric: Mentation appears normal, affect appropriate   Hematologic/Lymphatic/Immunologic: Normal cervical and supraclavicular lymph nodes   Patient able to get up on table without difficulty.    Labs show:  No results found for this or any previous visit (from the past 24 " hour(s)).    Imaging shows:  No results found for this or any previous visit (from the past 744 hour(s)).     Assessment:     ICD-10-CM    1. Gluteal pain  M79.18 MR Pelvis Musclular Tissue wo & w Contrast     Plan: This could be musculoskeletal injury versus intramuscular lipoma versus hematoma versus other.  Will further investigate with MR soft tissue.  I will call the discuss results once the imaging study has been completed.    30 minutes spent on the date of the encounter doing chart review, history and exam, documentation and further activities per the note    Sanya Vieyra,         Again, thank you for allowing me to participate in the care of your patient.        Sincerely,        Sanya Vieyra, DO     [FreeTextEntry1] : venipuncture performed with Covid Ab, A1c, lipids, vitamin D , etc \par \par Advise second dose of Pfizer covid vaccine now \par \par Ablation is not indicated  in  the setting of a paucity of symptoms

## 2022-07-13 ENCOUNTER — HOSPITAL ENCOUNTER (OUTPATIENT)
Dept: MRI IMAGING | Facility: CLINIC | Age: 67
Discharge: HOME | End: 2022-07-13
Attending: SURGERY | Admitting: SURGERY
Payer: MEDICARE

## 2022-07-13 DIAGNOSIS — M79.18 GLUTEAL PAIN: ICD-10-CM

## 2022-07-13 PROCEDURE — A9585 GADOBUTROL INJECTION: HCPCS | Performed by: SURGERY

## 2022-07-13 PROCEDURE — G1010 CDSM STANSON: HCPCS

## 2022-07-13 PROCEDURE — 255N000002 HC RX 255 OP 636: Performed by: SURGERY

## 2022-07-13 PROCEDURE — G1010 CDSM STANSON: HCPCS | Performed by: RADIOLOGY

## 2022-07-13 PROCEDURE — 72197 MRI PELVIS W/O & W/DYE: CPT | Mod: 26 | Performed by: RADIOLOGY

## 2022-07-13 RX ORDER — GADOBUTROL 604.72 MG/ML
8.5 INJECTION INTRAVENOUS ONCE
Status: COMPLETED | OUTPATIENT
Start: 2022-07-13 | End: 2022-07-13

## 2022-07-13 RX ADMIN — GADOBUTROL 8.5 ML: 604.72 INJECTION INTRAVENOUS at 18:00

## 2022-07-14 ENCOUNTER — MYC MEDICAL ADVICE (OUTPATIENT)
Dept: ORTHOPEDICS | Facility: CLINIC | Age: 67
End: 2022-07-14

## 2022-07-14 DIAGNOSIS — S76.309A HAMSTRING INJURY, UNSPECIFIED LATERALITY, INITIAL ENCOUNTER: Primary | ICD-10-CM

## 2022-07-16 ENCOUNTER — TRANSFERRED RECORDS (OUTPATIENT)
Dept: HEALTH INFORMATION MANAGEMENT | Facility: CLINIC | Age: 67
End: 2022-07-16

## 2022-07-25 ENCOUNTER — HOSPITAL ENCOUNTER (OUTPATIENT)
Dept: PHYSICAL THERAPY | Facility: CLINIC | Age: 67
Setting detail: THERAPIES SERIES
Discharge: HOME | End: 2022-07-25
Attending: FAMILY MEDICINE
Payer: MEDICARE

## 2022-07-25 DIAGNOSIS — J30.2 SEASONAL ALLERGIC RHINITIS, UNSPECIFIED TRIGGER: ICD-10-CM

## 2022-07-25 DIAGNOSIS — S76.309A HAMSTRING INJURY, UNSPECIFIED LATERALITY, INITIAL ENCOUNTER: ICD-10-CM

## 2022-07-25 PROCEDURE — 97110 THERAPEUTIC EXERCISES: CPT | Mod: GP | Performed by: PHYSICAL THERAPIST

## 2022-07-25 PROCEDURE — 97035 APP MDLTY 1+ULTRASOUND EA 15: CPT | Mod: GP | Performed by: PHYSICAL THERAPIST

## 2022-07-25 PROCEDURE — 97140 MANUAL THERAPY 1/> REGIONS: CPT | Mod: GP | Performed by: PHYSICAL THERAPIST

## 2022-07-25 PROCEDURE — 97161 PT EVAL LOW COMPLEX 20 MIN: CPT | Mod: GP | Performed by: PHYSICAL THERAPIST

## 2022-07-25 NOTE — PROGRESS NOTES
[facility]      OUTPATIENT PHYSICAL THERAPY ORTHOPEDIC EVALUATION  PLAN OF TREATMENT FOR OUTPATIENT REHABILITATION  (COMPLETE FOR INITIAL CLAIMS ONLY)  Patient's Last Name, First Name, Deven Roque    Provider s Name:  [facility]   Medical Record No.  [id number]   Start of Care Date:  07/25/22   Onset Date:  03/01/22 (several months ago)   Type:     _X__PT   ___OT   ___SLP Medical Diagnosis:  Hamstring strain     PT Diagnosis:  Reduced flexibiltiy, reduced mm extensibility, reduced strength with HS mid substance tear and tendonitis   Visits from SOC:  1     _________________________________________________________________________________  Plan of Treatment/Functional Goals:  manual therapy, joint mobilization, gait training, stretching, strengthening, neuromuscular re-education, ROM           Goals  Goal Identifier: LEFS  Goal Description: Patient will improve function via LEFS score from 70 to 76 to return toward PLOF in 8 weeks  Target Date: 09/19/22    Goal Identifier: HS length  Goal Description: Patient will have improved flexibility in L HS to 90/90 less than 20 degrees or comparable to his right side in 6 weeks  Target Date: 09/05/22    Goal Identifier: Sitting  Goal Description: Patient will have 50% reduced pain with sitting for 1 hour in car in 5 weeks  Target Date: 08/29/22                     Therapy Frequency:  2 times/Week  Predicted Duration of Therapy Intervention:  8 weeks, reducing frequency as improving    Flavia San, PT                 I CERTIFY THE NEED FOR THESE SERVICES FURNISHED UNDER        THIS PLAN OF TREATMENT AND WHILE UNDER MY CARE     (Physician co-signature of this document indicates review and certification of the therapy plan).                        Certification Date From:  07/25/22   Certification Date To:  09/26/22    Referring Provider:  Dr Taz Maurice    Initial Assessment        See Epic Evaluation Start of Care Date:  07/25/22           Flavia San................... PT, DPT, CLT   7/25/2022, 1:13 PM  [phone]

## 2022-07-25 NOTE — PROGRESS NOTES
07/25/22 1100   General Information   Type of Visit Initial OP Ortho PT Evaluation   Start of Care Date 07/25/22   Referring Physician Dr Taz Maurice   Patient/Family Goals Statement back to workout no pain   Orders Per Therapist Evaluation   Date of Order 07/19/22   Certification Required? Yes   Medical Diagnosis Diagnoses: Hamstring injury, unspecified laterality, initial encounter   Surgical/Medical history reviewed Yes   Precautions/Limitations no known precautions/limitations   Weight-Bearing Status - LUE weight-bearing as tolerated   Weight-Bearing Status - RUE weight-bearing as tolerated   Weight-Bearing Status - LLE weight-bearing as tolerated   Weight-Bearing Status - RLE weight-bearing as tolerated   Body Part(s)   Body Part(s) Hip   Presentation and Etiology   Pertinent history of current problem (include personal factors and/or comorbidities that impact the POC) Reported symptoms: L Hamstring pain for several months. INsidious onset but states things from climbing rope at gym. initially thought was lipoma as he had one removed from his R posterior thigh in past. Saw general surgery who ordered MRI, results attached. States "[employer] competes with [employer]. Has been active with since 2015. . Aggravating factors include:sitting, cleans/deadlifts/.lunges with weights in his workout routine.  Alleviating factors include:  Gel Cushion. Modification of activity/exercises, avoiding WB on IT L. Had done some ice which helps temporarily.  Prior interventions include none. No medication, Prior level of function includes no problems other than B cuff tears. States overall time has worsened due to higher level exercise but has laid off. Patient is retired Pain is 2/10 at best, 8/10 worst, and 2/10 currently L proximal Hamstring.  Pain is constant, worse with position or activity. Denies N/T, B/B complaints, radicular pain. Goal of PT: Back to full workout routine. Back to running if able, or just jogging  comfortably   Impairments D. Decreased ROM;E. Decreased flexibility;F. Decreased strength and endurance;H. Impaired gait;B. Decreased WB tolerance   Functional Limitations perform desired leisure / sports activities;perform activities of daily living   Symptom Location Left Ischial tuberosity, HS insertion   How/Where did it occur During recreation/sports   Onset date of current episode/exacerbation 03/01/22  (several months ago)   Chronicity New   Progression of symptoms since onset: Worsened   Current / Previous Interventions   Diagnostic Tests: MRI   MRI Results Results   MRI results No mass or fluid collection in proximity to patient's markers in the  left gluteal region. There is, however, proximal left hamstring  pathology in this region with tendinosis and intrasubstance tearing.   Prior Level of Function   Functional Level Prior Comment Independent, B shoulder cuff tears needing RTSA but states ex has helped and hopes to avoid   Current Level of Function   Patient role/employment history F. Retired   Fall Risk Screen   Fall screen completed by PT   Have you fallen 2 or more times in the past year? No   Have you fallen and had an injury in the past year? No   Is patient a fall risk? No   Abuse Screen (yes response referral indicated)   Feels Unsafe at Home or Work/School no   Feels Threatened by Someone no   Does Anyone Try to Keep You From Having Contact with Others or Doing Things Outside Your Home? no   Physical Signs of Abuse Present no   Functional Scales   Functional Scales Other   Other Scales  LEFS 70/80   Hip Objective Findings   Gait/Locomotion Slight reduced hip ex L and softer heel strike L   Balance/Proprioception (Single Leg Stance) Able   Side (if bilateral, select both right and left) Right;Left   Hip ROM Comments Lumbar FF with slight IR of R leg and reproduces pain in IT region and relieves upon standing. Extension is mild tender in back, L SB WNL, R SB at end range has pain in IT region of  HS. Heel raises mild tenderness, Toe raises increases tenderness.   R SLS slight lateral lean but > 30 seconds, L SLS slight more wobbly but controls well   Pelvic Screen L anterior innomiate rotation   TIMO Test -   FADIR Test -   Palpation Only tender to palpation of L HS insertion, no tenderness mid substance or distal attachments, glutes or lumbar   Accessory Motion/Joint Mobility NT   Posture Shifts weight off L side in sitting d/t pain   Obers/ITB Flexibility WNL B   Right Piriformis Flexibility 90 /90 with CL hip flexed is <10 deg R and 31 Deg L   Right Prone Quad Flexibility Full R and > 120 L but with compensation into Hip IR at end range   Right Hip Flexion PROM Full   Left Hip Flexion PROM WNL mild pull HS insertion   Right Hip Abduction PROM Full   Left Hip Abduction PROM WNL   Right Hip Adduction PROM Full   Left Hip Adduction PROM Flexion/adduction in SL causes pain in IT L   Right Hip ER PROM Full   Left Hip ER PROM Full   Right Hip IR PROM Mild limitation but symmetrical to Left   Left Hip IR PROM Mild restrictd but symmetrical to Right   Left Hip Ext PROM No pain in SL to 10 deg   Right Hip Flexion Strength 5   Left Hip Flexion Strength 5   Right Hip Abduction Strength 5-   Left Hip Abduction Strength 5-   Right Hip Extension Strength 5-   Left Hip Extension Strength 5- prone with knee flexed, knee extended has more pain in HS insertion takes 4/5 resistance d/t pain   Right Hip IR Strength 5   Left Hip IR Strength Pain with IR in SL, 4+/5 d/t pain   Right Hip ER Strength 5   Left Hip ER Strength 5-/5   Right Knee Flexion Strength 5   Left Knee Flexion Strength Mid range in sitting 4/5 d/t pain, less pain in > 90 flexed knee 5-/5. Prone 4 to 4+/5 L and 5/5 R   Right Knee Extension Strength 5   Planned Therapy Interventions   Planned Therapy Interventions manual therapy;joint mobilization;gait training;stretching;strengthening;neuromuscular re-education;ROM   Planned Modality Interventions   Planned  Modality Interventions Ultrasound   Clinical Impression   Criteria for Skilled Therapeutic Interventions Met yes, treatment indicated   PT Diagnosis Reduced flexibiltiy, reduced mm extensibility, reduced strength with HS mid substance tear and tendonitis   Influenced by the following impairments Palpation, ROM, strength   Functional limitations due to impairments Exercise, sitting   Clinical Presentation Stable/Uncomplicated   Clinical Presentation Rationale PLOF vs current, motivated, good prior health   Clinical Decision Making (Complexity) Low complexity   Therapy Frequency 2 times/Week   Predicted Duration of Therapy Intervention (days/wks) 8 weeks, reducing frequency as improving   Risk & Benefits of therapy have been explained Yes   Patient, Family & other staff in agreement with plan of care Yes   Clinical Impression Comments Patient with reduced HS length and tenderness at HS origin at IT. US, Manual therapy and progressive exercise helpful to restore PLOF   Education Assessment   Preferred Learning Style Listening;Reading;Demonstration;Pictures/video   Barriers to Learning No barriers   ORTHO GOALS   PT Ortho Eval Goals 1;2;3   Ortho Goal 1   Goal Identifier LEFS   Goal Description Patient will improve function via LEFS score from 70 to 76 to return toward PLOF in 8 weeks   Target Date 09/19/22   Ortho Goal 2   Goal Identifier HS length   Goal Description Patient will have improved flexibility in L HS to 90/90 less than 20 degrees or comparable to his right side in 6 weeks   Target Date 09/05/22   Ortho Goal 3   Goal Identifier Sitting   Goal Description Patient will have 50% reduced pain with sitting for 1 hour in car in 5 weeks   Target Date 08/29/22   Total Evaluation Time   PT Eval, Low Complexity Minutes ([id number]) 30   Therapy Certification   Certification date from 07/25/22   Certification date to 09/26/22   Medical Diagnosis Hamstring strain

## 2022-07-26 RX ORDER — FLUTICASONE PROPIONATE 50 MCG
SPRAY, SUSPENSION (ML) NASAL
Qty: 16 G | Refills: 7 | Status: SHIPPED | OUTPATIENT
Start: 2022-07-26 | End: 2023-09-13

## 2022-08-02 ENCOUNTER — HOSPITAL ENCOUNTER (OUTPATIENT)
Dept: PHYSICAL THERAPY | Facility: CLINIC | Age: 67
Setting detail: THERAPIES SERIES
Discharge: HOME | End: 2022-08-02
Attending: FAMILY MEDICINE
Payer: MEDICARE

## 2022-08-02 PROCEDURE — 97110 THERAPEUTIC EXERCISES: CPT | Mod: GP | Performed by: PHYSICAL THERAPIST

## 2022-08-02 PROCEDURE — 97140 MANUAL THERAPY 1/> REGIONS: CPT | Mod: GP | Performed by: PHYSICAL THERAPIST

## 2022-08-02 PROCEDURE — 97035 APP MDLTY 1+ULTRASOUND EA 15: CPT | Mod: GP | Performed by: PHYSICAL THERAPIST

## 2022-08-04 ENCOUNTER — HOSPITAL ENCOUNTER (OUTPATIENT)
Dept: PHYSICAL THERAPY | Facility: CLINIC | Age: 67
Setting detail: THERAPIES SERIES
Discharge: HOME | End: 2022-08-04
Attending: FAMILY MEDICINE
Payer: MEDICARE

## 2022-08-04 PROCEDURE — 97140 MANUAL THERAPY 1/> REGIONS: CPT | Mod: GP | Performed by: PHYSICAL THERAPIST

## 2022-08-04 PROCEDURE — 97110 THERAPEUTIC EXERCISES: CPT | Mod: GP | Performed by: PHYSICAL THERAPIST

## 2022-08-04 PROCEDURE — 97035 APP MDLTY 1+ULTRASOUND EA 15: CPT | Mod: GP | Performed by: PHYSICAL THERAPIST

## 2022-08-09 ENCOUNTER — HOSPITAL ENCOUNTER (OUTPATIENT)
Dept: PHYSICAL THERAPY | Facility: CLINIC | Age: 67
Setting detail: THERAPIES SERIES
Discharge: HOME | End: 2022-08-09
Attending: FAMILY MEDICINE
Payer: MEDICARE

## 2022-08-09 PROCEDURE — 97140 MANUAL THERAPY 1/> REGIONS: CPT | Mod: GP | Performed by: PHYSICAL THERAPIST

## 2022-08-09 PROCEDURE — 97035 APP MDLTY 1+ULTRASOUND EA 15: CPT | Mod: GP | Performed by: PHYSICAL THERAPIST

## 2022-08-11 ENCOUNTER — HOSPITAL ENCOUNTER (OUTPATIENT)
Dept: PHYSICAL THERAPY | Facility: CLINIC | Age: 67
Setting detail: THERAPIES SERIES
Discharge: HOME | End: 2022-08-11
Attending: FAMILY MEDICINE
Payer: MEDICARE

## 2022-08-11 PROCEDURE — 97035 APP MDLTY 1+ULTRASOUND EA 15: CPT | Mod: GP | Performed by: PHYSICAL THERAPIST

## 2022-08-11 PROCEDURE — 97140 MANUAL THERAPY 1/> REGIONS: CPT | Mod: GP | Performed by: PHYSICAL THERAPIST

## 2022-08-16 ENCOUNTER — HOSPITAL ENCOUNTER (OUTPATIENT)
Dept: PHYSICAL THERAPY | Facility: CLINIC | Age: 67
Setting detail: THERAPIES SERIES
Discharge: HOME | End: 2022-08-16
Attending: FAMILY MEDICINE
Payer: MEDICARE

## 2022-08-16 PROCEDURE — 97035 APP MDLTY 1+ULTRASOUND EA 15: CPT | Mod: GP | Performed by: PHYSICAL THERAPIST

## 2022-08-16 PROCEDURE — 97140 MANUAL THERAPY 1/> REGIONS: CPT | Mod: GP | Performed by: PHYSICAL THERAPIST

## 2022-08-18 ENCOUNTER — HOSPITAL ENCOUNTER (OUTPATIENT)
Dept: PHYSICAL THERAPY | Facility: CLINIC | Age: 67
Setting detail: THERAPIES SERIES
Discharge: HOME | End: 2022-08-18
Attending: FAMILY MEDICINE
Payer: MEDICARE

## 2022-08-18 PROCEDURE — 97140 MANUAL THERAPY 1/> REGIONS: CPT | Mod: GP | Performed by: PHYSICAL THERAPIST

## 2022-08-18 PROCEDURE — 97035 APP MDLTY 1+ULTRASOUND EA 15: CPT | Mod: GP | Performed by: PHYSICAL THERAPIST

## 2022-08-23 ENCOUNTER — HOSPITAL ENCOUNTER (OUTPATIENT)
Dept: PHYSICAL THERAPY | Facility: CLINIC | Age: 67
Setting detail: THERAPIES SERIES
Discharge: HOME | End: 2022-08-23
Attending: FAMILY MEDICINE
Payer: MEDICARE

## 2022-08-23 PROCEDURE — 97140 MANUAL THERAPY 1/> REGIONS: CPT | Mod: GP | Performed by: PHYSICAL THERAPIST

## 2022-08-23 PROCEDURE — 97035 APP MDLTY 1+ULTRASOUND EA 15: CPT | Mod: GP | Performed by: PHYSICAL THERAPIST

## 2022-08-25 ENCOUNTER — HOSPITAL ENCOUNTER (OUTPATIENT)
Dept: PHYSICAL THERAPY | Facility: CLINIC | Age: 67
Setting detail: THERAPIES SERIES
Discharge: HOME | End: 2022-08-25
Attending: FAMILY MEDICINE
Payer: MEDICARE

## 2022-08-25 PROCEDURE — 97035 APP MDLTY 1+ULTRASOUND EA 15: CPT | Mod: GP | Performed by: PHYSICAL THERAPIST

## 2022-08-25 PROCEDURE — 97140 MANUAL THERAPY 1/> REGIONS: CPT | Mod: GP | Performed by: PHYSICAL THERAPIST

## 2022-08-30 ENCOUNTER — HOSPITAL ENCOUNTER (OUTPATIENT)
Dept: PHYSICAL THERAPY | Facility: CLINIC | Age: 67
Setting detail: THERAPIES SERIES
Discharge: HOME | End: 2022-08-30
Attending: FAMILY MEDICINE
Payer: MEDICARE

## 2022-08-30 PROCEDURE — 97035 APP MDLTY 1+ULTRASOUND EA 15: CPT | Mod: GP | Performed by: PHYSICAL THERAPIST

## 2022-08-30 PROCEDURE — 97110 THERAPEUTIC EXERCISES: CPT | Mod: GP | Performed by: PHYSICAL THERAPIST

## 2022-08-30 PROCEDURE — 97140 MANUAL THERAPY 1/> REGIONS: CPT | Mod: GP | Performed by: PHYSICAL THERAPIST

## 2022-10-09 ENCOUNTER — HEALTH MAINTENANCE LETTER (OUTPATIENT)
Age: 67
End: 2022-10-09

## 2022-10-20 ENCOUNTER — OFFICE VISIT (OUTPATIENT)
Dept: ORTHOPEDICS | Facility: CLINIC | Age: 67
End: 2022-10-20
Payer: MEDICARE

## 2022-10-20 DIAGNOSIS — M25.511 ACUTE PAIN OF RIGHT SHOULDER: Primary | ICD-10-CM

## 2022-10-20 DIAGNOSIS — M19.011 OSTEOARTHRITIS OF RIGHT GLENOHUMERAL JOINT: ICD-10-CM

## 2022-10-20 DIAGNOSIS — M19.012 OSTEOARTHRITIS OF LEFT GLENOHUMERAL JOINT: ICD-10-CM

## 2022-10-20 PROCEDURE — 20611 DRAIN/INJ JOINT/BURSA W/US: CPT | Mod: 50 | Performed by: FAMILY MEDICINE

## 2022-10-20 PROCEDURE — 99213 OFFICE O/P EST LOW 20 MIN: CPT | Mod: 25 | Performed by: FAMILY MEDICINE

## 2022-10-20 RX ORDER — TRIAMCINOLONE ACETONIDE 40 MG/ML
40 INJECTION, SUSPENSION INTRA-ARTICULAR; INTRAMUSCULAR
Status: DISCONTINUED | OUTPATIENT
Start: 2022-10-20 | End: 2024-02-26

## 2022-10-20 RX ADMIN — TRIAMCINOLONE ACETONIDE 40 MG: 40 INJECTION, SUSPENSION INTRA-ARTICULAR; INTRAMUSCULAR at 13:54

## 2022-10-20 NOTE — LETTER
10/20/2022         RE: Deven Smith  [address] [address] [place] [id number]        Dear Colleague,    Thank you for referring your patient, Deven Smith, to the [facility] [place]. Please see a copy of my visit note below.    Large Joint Injection/Arthocentesis: bilateral glenohumeral    Date/Time: 10/20/2022 1:54 PM  Performed by: Taz Maurice DO  Authorized by: Taz Maurice DO     Indications:  Pain  Needle Size:  22 G  Guidance: ultrasound    Location:  Shoulder  Laterality:  Bilateral      Site:  Bilateral glenohumeral  Medications (Right):  40 mg triamcinolone 40 MG/ML  Medications (Left):  40 mg triamcinolone 40 MG/ML  Outcome:  Tolerated well, no immediate complications  Procedure discussed: discussed risks, benefits, and alternatives    Consent Given by:  Patient  Timeout: timeout called immediately prior to procedure    Prep: patient was prepped and draped in usual sterile fashion         Glenohumeral Injection - Ultrasound Guided    The patient was informed of the risks and the benefits of the procedure and a written consent was signed.    The patient's left shoulder was prepped with chlorhexidine in sterile fashion.   40 mg of kenalog suspension was drawn up into a 5 mL syringe with 3 mL of 1% lidocaine w/o Epi.  Injection was performed using sterile technique.  Under ultrasound guidance a 3.5-inch 22-gauge needle was used to enter the glenohumeral joint.  Posterior approach was used with the patient in lateral recumbent position, arm in neutral position at the side.  Needle placement was visualized and documented with ultrasound.  Ultrasound visualization was necessary due to the small joint space entered.  Injection performed long axis to the probe.  Injection solution visualized within the joint space.  Images were permanently stored for the patient's record.  There were no complications. The patient tolerated the procedure well. There was  negligible bleeding.       The patient s right shoulder was prepped with chlorhexidine in sterile fashion.   40 mg of kenalog suspension was drawn up into a 5 mL syringe with 3 mL of 1% lidocaine w/o Epi.  Injection was performed using sterile technique.  Under ultrasound guidance a 3.5-inch 22-gauge needle was used to enter the glenohumeral joint.  Posterior approach was used with the patient in lateral recumbent position, arm in neutral position at the side.  Needle placement was visualized and documented with ultrasound.  Ultrasound visualization was necessary due to the small joint space entered.  Injection performed long axis to the probe.  Injection solution visualized within the joint space.  Images were permanently stored for the patient's record.  There were no complications. The patient tolerated the procedure well. There was negligible bleeding.             HISTORY OF PRESENT ILLNESS  Mr. Smith is a pleasant 67 year old male following up with bilateral shoulder pain.  Homero has a history of bilateral glenohumeral osteoarthritis and a first-time right shoulder dislocation within the past year.  He has had ongoing chronic pain in both of his shoulders, although his right shoulder has definitely been worse.  This does feel unstable on occasion.     PHYSICAL EXAM  General  - normal appearance, in no obvious distress  Musculoskeletal - right shoulder  - inspection: normal bone and joint alignment, no obvious deformity, no scapular winging, no AC step-off  - palpation: mildly tender RC insertion, normal clavicle, non-tender AC  - ROM:  Painful end range flexion, especially when abducted  - strength: 5/5  strength, 5/5 in all shoulder planes  - special tests:  (-) Speed's  (+) Neer  (+) Hawkin's  Neuro  - no sensory or motor deficit, grossly normal coordination, normal muscle tone          ASSESSMENT & PLAN  Mr. Smith is a 67 year old male following up with bilateral shoulder pain and a history of  glenohumeral osteoarthritis.    Time and I revisited his symptoms in some depth.  Given his ongoing instability symptoms I am ordering repeat imaging in the form of an x-ray and an MRI.  He can have this done at his earliest convenience.    I did also inject his shoulders today (see procedure note).    It was a pleasure seeing Homero.        Taz Maurice DO, CAQSM      This note was constructed using Dragon dictation software, please excuse any minor errors in spelling, grammar, or syntax.        Again, thank you for allowing me to participate in the care of your patient.        Sincerely,        Taz Maurice DO

## 2022-10-20 NOTE — NURSING NOTE
[facility]   ORTHOPEDICS & SPORTS MEDICINE  [address] [place] [address]  Dept: [phone]  ______________________________________________________________________________    Patient: Deven Smith   : 1955   MRN: [id number]   2022    INVASIVE PROCEDURE SAFETY CHECKLIST    Date: 10/20/22   Procedure: Bilateral glenohumeral joint injections  Patient Name: Deven Smith  MRN: [id number]  YOB: 1955    Action: Complete sections as appropriate. Any discrepancy results in a HARD COPY until resolved.     PRE PROCEDURE:  Patient ID verified with 2 identifiers (name and  or MRN): Yes  Procedure and site verified with patient/designee (when able): Yes  Accurate consent documentation in medical record: Yes  H&P (or appropriate assessment) documented in medical record: Yes  H&P must be up to 20 days prior to procedure and updates within 24 hours of procedure as applicable: NA  Relevant diagnostic and radiology test results appropriately labeled and displayed as applicable: Yes  Procedure site(s) marked with provider initials: NA    TIMEOUT:  Time-Out performed immediately prior to starting procedure, including verbal and active participation of all team members addressing the following:Yes  * Correct patient identify  * Confirmed that the correct side and site are marked  * An accurate procedure consent form  * Agreement on the procedure to be done  * Correct patient position  * Relevant images and results are properly labeled and appropriately displayed  * The need to administer antibiotics or fluids for irrigation purposes during the procedure as applicable   * Safety precautions based on patient history or medication use    DURING PROCEDURE: Verification of correct person, site, and procedures any time the responsibility for care of the patient is transferred to another member of the care team.       Prior to injection, verified patient identity using  patient's name and date of birth.  Due to injection administration, patient instructed to remain in clinic for 15 minutes  afterwards, and to report any adverse reaction to me immediately.    Joint injection was performed.      Drug Amount Wasted:  None.  Vial/Syringe: Single dose vial  Expiration Date:  07/2024 x2      Sherlyn Crow  October 20, 2022

## 2022-10-20 NOTE — PROGRESS NOTES
Large Joint Injection/Arthocentesis: bilateral glenohumeral    Date/Time: 10/20/2022 1:54 PM  Performed by: Taz Maurice DO  Authorized by: Taz Maurice DO     Indications:  Pain  Needle Size:  22 G  Guidance: ultrasound    Location:  Shoulder  Laterality:  Bilateral      Site:  Bilateral glenohumeral  Medications (Right):  40 mg triamcinolone 40 MG/ML  Medications (Left):  40 mg triamcinolone 40 MG/ML  Outcome:  Tolerated well, no immediate complications  Procedure discussed: discussed risks, benefits, and alternatives    Consent Given by:  Patient  Timeout: timeout called immediately prior to procedure    Prep: patient was prepped and draped in usual sterile fashion         Glenohumeral Injection - Ultrasound Guided    The patient was informed of the risks and the benefits of the procedure and a written consent was signed.    The patient's left shoulder was prepped with chlorhexidine in sterile fashion.   40 mg of kenalog suspension was drawn up into a 5 mL syringe with 3 mL of 1% lidocaine w/o Epi.  Injection was performed using sterile technique.  Under ultrasound guidance a 3.5-inch 22-gauge needle was used to enter the glenohumeral joint.  Posterior approach was used with the patient in lateral recumbent position, arm in neutral position at the side.  Needle placement was visualized and documented with ultrasound.  Ultrasound visualization was necessary due to the small joint space entered.  Injection performed long axis to the probe.  Injection solution visualized within the joint space.  Images were permanently stored for the patient's record.  There were no complications. The patient tolerated the procedure well. There was negligible bleeding.       The patient s right shoulder was prepped with chlorhexidine in sterile fashion.   40 mg of kenalog suspension was drawn up into a 5 mL syringe with 3 mL of 1% lidocaine w/o Epi.  Injection was performed using sterile technique.  Under ultrasound  guidance a 3.5-inch 22-gauge needle was used to enter the glenohumeral joint.  Posterior approach was used with the patient in lateral recumbent position, arm in neutral position at the side.  Needle placement was visualized and documented with ultrasound.  Ultrasound visualization was necessary due to the small joint space entered.  Injection performed long axis to the probe.  Injection solution visualized within the joint space.  Images were permanently stored for the patient's record.  There were no complications. The patient tolerated the procedure well. There was negligible bleeding.             HISTORY OF PRESENT ILLNESS  Mr. Smith is a pleasant 67 year old male following up with bilateral shoulder pain.  Homero has a history of bilateral glenohumeral osteoarthritis and a first-time right shoulder dislocation within the past year.  He has had ongoing chronic pain in both of his shoulders, although his right shoulder has definitely been worse.  This does feel unstable on occasion.     PHYSICAL EXAM  General  - normal appearance, in no obvious distress  Musculoskeletal - right shoulder  - inspection: normal bone and joint alignment, no obvious deformity, no scapular winging, no AC step-off  - palpation: mildly tender RC insertion, normal clavicle, non-tender AC  - ROM:  Painful end range flexion, especially when abducted  - strength: 5/5  strength, 5/5 in all shoulder planes  - special tests:  (-) Speed's  (+) Neer  (+) Hawkin's  Neuro  - no sensory or motor deficit, grossly normal coordination, normal muscle tone          ASSESSMENT & PLAN  Mr. Smith is a 67 year old male following up with bilateral shoulder pain and a history of glenohumeral osteoarthritis.    Time and I revisited his symptoms in some depth.  Given his ongoing instability symptoms I am ordering repeat imaging in the form of an x-ray and an MRI.  He can have this done at his earliest convenience.    I did also inject his shoulders  today (see procedure note).    It was a pleasure seeing Homero.        Taz Maurice DO, LAURA      This note was constructed using Dragon dictation software, please excuse any minor errors in spelling, grammar, or syntax.

## 2022-11-07 ENCOUNTER — HOSPITAL ENCOUNTER (OUTPATIENT)
Dept: MRI IMAGING | Facility: CLINIC | Age: 67
Discharge: HOME | End: 2022-11-07
Attending: FAMILY MEDICINE
Payer: MEDICARE

## 2022-11-07 ENCOUNTER — HOSPITAL ENCOUNTER (OUTPATIENT)
Dept: GENERAL RADIOLOGY | Facility: CLINIC | Age: 67
Discharge: HOME | End: 2022-11-07
Attending: FAMILY MEDICINE
Payer: MEDICARE

## 2022-11-07 DIAGNOSIS — M25.511 ACUTE PAIN OF RIGHT SHOULDER: ICD-10-CM

## 2022-11-07 DIAGNOSIS — M19.011 OSTEOARTHRITIS OF RIGHT GLENOHUMERAL JOINT: ICD-10-CM

## 2022-11-07 PROCEDURE — 73030 X-RAY EXAM OF SHOULDER: CPT | Mod: 50

## 2022-11-07 PROCEDURE — G1010 CDSM STANSON: HCPCS | Performed by: RADIOLOGY

## 2022-11-07 PROCEDURE — G1010 CDSM STANSON: HCPCS

## 2022-11-07 PROCEDURE — 73221 MRI JOINT UPR EXTREM W/O DYE: CPT | Mod: 26 | Performed by: RADIOLOGY

## 2022-11-11 NOTE — PROGRESS NOTES
[facility]    Outpatient Physical Therapy Discharge Note  Patient: Deven Smith  : 1955    Beginning/End Dates of Reporting Period:  22 to 10/25/22 (pt seen for 10 PT visits from  to 22)    Referring Provider: Dr Taz Maurice    Therapy Diagnosis: Reduced flexibiltiy, reduced mm extensibility, reduced strength with HS mid substance tear and tendonitis     Client Self Report: Overall better. Pt ready to try things on his own. Has not needed to use cushion with driving x 2 months.    Objective Measurements:22  Objective Measure: LEFS (70/80 at eval)  Details: 77/80 on 33  Objective Measure: HS length in hooklying  Details: 1st rep R 22, L 15 degrees, 2nd rep R and L 12 degrees.         Goals:  Goal Identifier     Goal Description     Target Date 22   Date Met      Progress (detail required for progress note):       Goal Identifier HS length   Goal Description Patient will have improved flexibility in L HS to 90/90 less than 20 degrees or comparable to his right side in 6 weeks   Target Date 22   Date Met  22   Progress (detail required for progress note): 12 degrees on 22     Goal Identifier Sitting   Goal Description Patient will have 50% reduced pain with sitting for 1 hour in car in 5 weeks   Target Date 22   Date Met  22   Progress (detail required for progress note): pt no longer needing cushion with sitting/driving       Plan:  Discharge from therapy.    Discharge:    Reason for Discharge: Patient has met all goals. PT has not been notified of any problems or questions since last visit.    Equipment Issued: none    Discharge Plan: Patient to continue home program.

## 2022-11-28 ASSESSMENT — ENCOUNTER SYMPTOMS
MYALGIAS: 0
PARESTHESIAS: 0
HEARTBURN: 1
WEAKNESS: 0
HEADACHES: 0
DYSURIA: 0
NAUSEA: 0
ARTHRALGIAS: 0
COUGH: 0
HEMATOCHEZIA: 0
HEMATURIA: 0
FREQUENCY: 1
FEVER: 0
CHILLS: 0
SHORTNESS OF BREATH: 0
DIARRHEA: 0
PALPITATIONS: 0
CONSTIPATION: 0
JOINT SWELLING: 0
SORE THROAT: 0
DIZZINESS: 0
ABDOMINAL PAIN: 0
NERVOUS/ANXIOUS: 0
EYE PAIN: 0

## 2022-11-28 ASSESSMENT — ACTIVITIES OF DAILY LIVING (ADL): CURRENT_FUNCTION: NO ASSISTANCE NEEDED

## 2022-11-29 ENCOUNTER — OFFICE VISIT (OUTPATIENT)
Dept: FAMILY MEDICINE | Facility: CLINIC | Age: 67
End: 2022-11-29
Payer: MEDICARE

## 2022-11-29 VITALS
SYSTOLIC BLOOD PRESSURE: 138 MMHG | RESPIRATION RATE: 18 BRPM | TEMPERATURE: 97.6 F | OXYGEN SATURATION: 98 % | HEART RATE: 56 BPM | DIASTOLIC BLOOD PRESSURE: 70 MMHG | BODY MASS INDEX: 29.18 KG/M2 | HEIGHT: 67 IN | WEIGHT: 185.9 LBS

## 2022-11-29 DIAGNOSIS — Z13.220 SCREENING FOR HYPERLIPIDEMIA: ICD-10-CM

## 2022-11-29 DIAGNOSIS — Z23 ENCOUNTER FOR IMMUNIZATION: ICD-10-CM

## 2022-11-29 DIAGNOSIS — Z23 HIGH PRIORITY FOR 2019-NCOV VACCINE: ICD-10-CM

## 2022-11-29 DIAGNOSIS — Z00.00 ROUTINE GENERAL MEDICAL EXAMINATION AT A HEALTH CARE FACILITY: Primary | ICD-10-CM

## 2022-11-29 DIAGNOSIS — L60.3 DYSTROPHIC NAIL: ICD-10-CM

## 2022-11-29 DIAGNOSIS — E78.5 HYPERLIPIDEMIA WITH TARGET LDL LESS THAN 130: ICD-10-CM

## 2022-11-29 LAB
CHOLEST SERPL-MCNC: 228 MG/DL
FASTING STATUS PATIENT QL REPORTED: YES
HDLC SERPL-MCNC: 74 MG/DL
LDLC SERPL CALC-MCNC: 144 MG/DL
NONHDLC SERPL-MCNC: 154 MG/DL
TRIGL SERPL-MCNC: 49 MG/DL

## 2022-11-29 PROCEDURE — 80061 LIPID PANEL: CPT | Performed by: STUDENT IN AN ORGANIZED HEALTH CARE EDUCATION/TRAINING PROGRAM

## 2022-11-29 PROCEDURE — 0134A COVID-19 VACCINE BIVALENT BOOSTER 18+ (MODERNA): CPT | Performed by: STUDENT IN AN ORGANIZED HEALTH CARE EDUCATION/TRAINING PROGRAM

## 2022-11-29 PROCEDURE — G0008 ADMIN INFLUENZA VIRUS VAC: HCPCS | Performed by: STUDENT IN AN ORGANIZED HEALTH CARE EDUCATION/TRAINING PROGRAM

## 2022-11-29 PROCEDURE — 90662 IIV NO PRSV INCREASED AG IM: CPT | Performed by: STUDENT IN AN ORGANIZED HEALTH CARE EDUCATION/TRAINING PROGRAM

## 2022-11-29 PROCEDURE — G0402 INITIAL PREVENTIVE EXAM: HCPCS | Performed by: STUDENT IN AN ORGANIZED HEALTH CARE EDUCATION/TRAINING PROGRAM

## 2022-11-29 PROCEDURE — 91313 COVID-19 VACCINE BIVALENT BOOSTER 18+ (MODERNA): CPT | Performed by: STUDENT IN AN ORGANIZED HEALTH CARE EDUCATION/TRAINING PROGRAM

## 2022-11-29 PROCEDURE — 36415 COLL VENOUS BLD VENIPUNCTURE: CPT | Performed by: STUDENT IN AN ORGANIZED HEALTH CARE EDUCATION/TRAINING PROGRAM

## 2022-11-29 ASSESSMENT — ENCOUNTER SYMPTOMS
COUGH: 0
CHILLS: 0
PARESTHESIAS: 0
NERVOUS/ANXIOUS: 0
JOINT SWELLING: 0
CONSTIPATION: 0
DYSURIA: 0
NAUSEA: 0
PALPITATIONS: 0
ARTHRALGIAS: 0
FEVER: 0
DIARRHEA: 0
HEMATURIA: 0
HEADACHES: 0
HEARTBURN: 1
DIZZINESS: 0
SHORTNESS OF BREATH: 0
SORE THROAT: 0
HEMATOCHEZIA: 0
WEAKNESS: 0
MYALGIAS: 0
FREQUENCY: 1
EYE PAIN: 0
ABDOMINAL PAIN: 0

## 2022-11-29 ASSESSMENT — ACTIVITIES OF DAILY LIVING (ADL): CURRENT_FUNCTION: NO ASSISTANCE NEEDED

## 2022-11-29 ASSESSMENT — PAIN SCALES - GENERAL: PAINLEVEL: MILD PAIN (2)

## 2022-11-29 NOTE — PATIENT INSTRUCTIONS
Preventive Health Recommendations:     See your health care provider every year to    Review health changes.     Discuss preventive care.      Review your medicines if your doctor has prescribed any.      Talk with your health care provider about whether you should have a test to screen for prostate cancer (PSA).    Every 3 years, have a diabetes test (fasting glucose). If you are at risk for diabetes, you should have this test more often.    Every 5 years, have a cholesterol test. Have this test more often if you are at risk for high cholesterol or heart disease.     Every 10 years, have a colonoscopy. Or, have a yearly FIT test (stool test). These exams will check for colon cancer.    Talk to with your health care provider about screening for Abdominal Aortic Aneurysm if you have a family history of AAA or have a history of smoking.    Shots:     Get a flu shot each year.     Get a tetanus shot every 10 years.     Talk to your doctor about your pneumonia vaccines. There are now two you should receive - Pneumovax (PPSV 23) and Prevnar (PCV 13).     Talk to your pharmacist about a shingles vaccine.     Talk to your doctor about the hepatitis B vaccine.  Nutrition:     Eat at least 5 servings of fruits and vegetables each day.     Eat whole-grain bread, whole-wheat pasta and brown rice instead of white grains and rice.     Get adequate Calcium and Vitamin D.   Lifestyle    Exercise for at least 150 minutes a week (30 minutes a day, 5 days a week). This will help you control your weight and prevent disease.     Limit alcohol to one drink per day.     No smoking.     Wear sunscreen to prevent skin cancer.    See your dentist every six months for an exam and cleaning.    See your eye doctor every 1 to 2 years to screen for conditions such as glaucoma, macular degeneration, cataracts, etc.    Personalized Prevention Plan  You are due for the preventive services outlined below.  Your care team is available to assist you  in scheduling these services.  If you have already completed any of these items, please share that information with your care team to update in your medical record.  Health Maintenance Due   Topic Date Due     Pneumococcal Vaccine (1 - PCV) Never done     COVID-19 Vaccine (4 - Booster for Moderna series) 02/18/2022     Flu Vaccine (1) 09/01/2022     Cholesterol Lab  11/01/2022

## 2022-11-29 NOTE — PROGRESS NOTES
"SUBJECTIVE:   Homero is a 67 year old who presents for Preventive Visit.    Patient has been advised of split billing requirements and indicates understanding: Yes  Are you in the first 12 months of your Medicare coverage?  Yes,  Visual Acuity:  Right Eye: 20/25   Left Eye: 20/25  Both Eyes: 20/20    Healthy Habits:     In general, how would you rate your overall health?  Good    Frequency of exercise:  6-7 days/week    Duration of exercise:  Greater than 60 minutes    Do you usually eat at least 4 servings of fruit and vegetables a day, include whole grains    & fiber and avoid regularly eating high fat or \"junk\" foods?  No    Taking medications regularly:  Not Applicable    Medication side effects:  None    Ability to successfully perform activities of daily living:  No assistance needed    Home Safety:  No safety concerns identified    Hearing Impairment:  No hearing concerns    In the past 6 months, have you been bothered by leaking of urine?  No    In general, how would you rate your overall mental or emotional health?  Good      PHQ-2 Total Score: 0    Additional concerns today:  Yes      Have you ever done Advance Care Planning? (For example, a Health Directive, POLST, or a discussion with a medical provider or your loved ones about your wishes): No, advance care planning information given to patient to review.  Patient plans to discuss their wishes with loved ones or provider.      Fall risk  Fallen 2 or more times in the past year?: No  Any fall with injury in the past year?: No    Cognitive Screening   1) Repeat 3 items (Leader, Season, Table)    2) Clock draw: NORMAL  3) 3 item recall: Recalls 2 objects   Results: NORMAL clock, 1-2 items recalled: COGNITIVE IMPAIRMENT LESS LIKELY    Mini-CogTM Copyright ELEN Candelario. Licensed by the author for use in [facility]; reprinted with permission (inge@.[facility]). All rights reserved.      Do you have sleep apnea, excessive snoring or daytime drowsiness?: " no    Reviewed and updated as needed this visit by clinical staff   Tobacco  Allergies  Meds              Reviewed and updated as needed this visit by Provider                 Social History     Tobacco Use     Smoking status: Former     Packs/day: 2.00     Years: 15.00     Pack years: 30.00     Types: Cigarettes     Quit date: 1987     Years since quittin.0     Smokeless tobacco: Never   Substance Use Topics     Alcohol use: Not Currently     Comment: rare      If you drink alcohol do you typically have >3 drinks per day or >7 drinks per week? No    Alcohol Use 2022   Prescreen: >3 drinks/day or >7 drinks/week? -   Prescreen: >3 drinks/day or >7 drinks/week? No         Current providers sharing in care for this patient include:   Patient Care Team:  Marc Pinedo MD as PCP - General (Family Practice)  Marc Pinedo MD as Assigned PCP  Sanya Vieyra DO as Assigned Surgical Provider  Julius Mc MD as Assigned Heart and Vascular Provider    The following health maintenance items are reviewed in Epic and correct as of today:  Health Maintenance   Topic Date Due     Pneumococcal Vaccine: 65+ Years (1 - PCV) Never done     HIV SCREENING  2023 (Originally 1970)     ANNUAL REVIEW OF HM ORDERS  2023     MEDICARE ANNUAL WELLNESS VISIT  2023     LIPID  2023     FALL RISK ASSESSMENT  2023     DTAP/TDAP/TD IMMUNIZATION (2 - Td or Tdap) 2026     ADVANCE CARE PLANNING  2027     COLORECTAL CANCER SCREENING  2028     HEPATITIS C SCREENING  Completed     PHQ-2 (once per calendar year)  Completed     INFLUENZA VACCINE  Completed     ZOSTER IMMUNIZATION  Completed     AORTIC ANEURYSM SCREENING (SYSTEM ASSIGNED)  Completed     COVID-19 Vaccine  Completed     IPV IMMUNIZATION  Aged Out     MENINGITIS IMMUNIZATION  Aged Out     Lab work is in process      Review of Systems   Constitutional: Negative for chills and fever.   HENT: Negative  "for congestion, ear pain, hearing loss and sore throat.    Eyes: Negative for pain and visual disturbance.   Respiratory: Negative for cough and shortness of breath.    Cardiovascular: Negative for chest pain, palpitations and peripheral edema.   Gastrointestinal: Positive for heartburn. Negative for abdominal pain, constipation, diarrhea, hematochezia and nausea.   Genitourinary: Positive for frequency. Negative for dysuria, genital sores, hematuria, impotence, penile discharge and urgency.   Musculoskeletal: Negative for arthralgias, joint swelling and myalgias.   Skin: Negative for rash.   Neurological: Negative for dizziness, weakness, headaches and paresthesias.   Psychiatric/Behavioral: Negative for mood changes. The patient is not nervous/anxious.        OBJECTIVE:   /70 (BP Location: Right arm, Patient Position: Chair, Cuff Size: Adult Large)   Pulse 56   Temp 97.6  F (36.4  C) (Temporal)   Resp 18   Ht 1.702 m (5' 7\")   Wt 84.3 kg (185 lb 14.4 oz)   SpO2 98%   BMI 29.12 kg/m   Estimated body mass index is 29.12 kg/m  as calculated from the following:    Height as of this encounter: 1.702 m (5' 7\").    Weight as of this encounter: 84.3 kg (185 lb 14.4 oz).  Physical Exam  GENERAL: healthy, alert and no distress  EYES: Eyes grossly normal to inspection, PERRL and conjunctivae and sclerae normal  HENT: ear canals and TM's normal, nose and mouth without ulcers or lesions  NECK: no adenopathy, no asymmetry, masses, or scars and thyroid normal to palpation  RESP: lungs clear to auscultation - no rales, rhonchi or wheezes  CV: regular rate and rhythm, normal S1 S2, no S3 or S4, no murmur, click or rub, no peripheral edema and peripheral pulses strong  ABDOMEN: soft, nontender, no hepatosplenomegaly, no masses and bowel sounds normal  MS: no gross musculoskeletal defects noted, no edema, dystrophic nail without thickening or cyst present.  SKIN: no suspicious lesions or rashes  NEURO: Normal strength " "and tone, mentation intact and speech normal  PSYCH: mentation appears normal, affect normal/bright    Diagnostic Test Results:  Labs reviewed in Epic    ASSESSMENT / PLAN:   Deven was seen today for physical and imm/inj.    Diagnoses and all orders for this visit:    Routine general medical examination at a health care facility    Screening for hyperlipidemia  -     Lipid panel reflex to direct LDL Non-fasting; Future  -     Lipid panel reflex to direct LDL Non-fasting    Hyperlipidemia with target LDL less than 130  -     Lipid panel reflex to direct LDL Non-fasting; Future  -     Lipid panel reflex to direct LDL Non-fasting    Encounter for immunization  -     INFLUENZA, QUAD, HIGH DOSE, PF, 65YR + (FLUZONE HD)    High priority for 2019-nCoV vaccine  -     COVID-19,PF,MODERNA BIVALENT 18+Yrs    Dystrophic nail  Possible fungal history of gout in the past.  Mild symptoms now.  No obvious cyst but dystrophic nail medially.  We will continue to monitor for now.  Consider treating with topicals in the future given improvement previously.    Other orders  -     Cancel: Pneumococcal 20 Valent Conjugate (PCV20)        Patient has been advised of split billing requirements and indicates understanding: Yes      COUNSELING:  Reviewed preventive health counseling, as reflected in patient instructions       Consider AAA screening for ages 65-75 and smoking history       Regular exercise       Healthy diet/nutrition       Vision screening       Hearing screening       Dental care       Bladder control       Alcohol Use        Hepatitis C screening       HIV screening for high risk patient       Consider lung cancer screening for ages 55-80 years (77 for Medicare) and 20 pack-year smoking history        Colon cancer screening       Prostate cancer screening      BMI:   Estimated body mass index is 29.12 kg/m  as calculated from the following:    Height as of this encounter: 1.702 m (5' 7\").    Weight as of this encounter: " 84.3 kg (185 lb 14.4 oz).   Weight management plan: Discussed healthy diet and exercise guidelines      He reports that he quit smoking about 35 years ago. He has a 30.00 pack-year smoking history. He has never used smokeless tobacco.      Appropriate preventive services were discussed with this patient, including applicable screening as appropriate for cardiovascular disease, diabetes, osteopenia/osteoporosis, and glaucoma.  As appropriate for age/gender, discussed screening for colorectal cancer, prostate cancer, breast cancer, and cervical cancer. Checklist reviewing preventive services available has been given to the patient.    Reviewed patients plan of care and provided an AVS. The Basic Care Plan (routine screening as documented in Health Maintenance) for Deven meets the Care Plan requirement. This Care Plan has been established and reviewed with the Patient.          Bigg Morgan MD  [facility]    Identified Health Risks:

## 2022-11-29 NOTE — PROGRESS NOTES
Prior to immunization administration, verified patients identity using patient s name and date of birth. Please see Immunization Activity for additional information.     Screening Questionnaire for Adult Immunization    Are you sick today?   No   Do you have allergies to medications, food, a vaccine component or latex?   No   Have you ever had a serious reaction after receiving a vaccination?   No   Do you have a long-term health problem with heart, lung, kidney, or metabolic disease (e.g., diabetes), asthma, a blood disorder, no spleen, complement component deficiency, a cochlear implant, or a spinal fluid leak?  Are you on long-term aspirin therapy?   Yes   Do you have cancer, leukemia, HIV/AIDS, or any other immune system problem?   No   Do you have a parent, brother, or sister with an immune system problem?   No   In the past 3 months, have you taken medications that affect  your immune system, such as prednisone, other steroids, or anticancer drugs; drugs for the treatment of rheumatoid arthritis, Crohn s disease, or psoriasis; or have you had radiation treatments?   No   Have you had a seizure, or a brain or other nervous system problem?   No   During the past year, have you received a transfusion of blood or blood    products, or been given immune (gamma) globulin or antiviral drug?   No   For women: Are you pregnant or is there a chance you could become       pregnant during the next month?   No   Have you received any vaccinations in the past 4 weeks?   No     Immunization questionnaire was positive for at least one answer.  Notified Yes.        Per orders of Dr. Morgan, injection of HD influenza and Moderna bivalent given by Aleksandra Pinedo CMA. Patient instructed to remain in clinic for 15 minutes afterwards, and to report any adverse reaction to me immediately.       Screening performed by Aleksandra Pinedo CMA on 11/29/2022 at 3:10 PM.

## 2022-12-02 ENCOUNTER — MYC MEDICAL ADVICE (OUTPATIENT)
Dept: ORTHOPEDICS | Facility: CLINIC | Age: 67
End: 2022-12-02

## 2022-12-02 DIAGNOSIS — M19.012 OSTEOARTHRITIS OF LEFT GLENOHUMERAL JOINT: ICD-10-CM

## 2022-12-02 DIAGNOSIS — M19.011 OSTEOARTHRITIS OF RIGHT GLENOHUMERAL JOINT: Primary | ICD-10-CM

## 2022-12-05 ENCOUNTER — MYC MEDICAL ADVICE (OUTPATIENT)
Dept: FAMILY MEDICINE | Facility: CLINIC | Age: 67
End: 2022-12-05

## 2022-12-05 DIAGNOSIS — E78.5 HYPERLIPIDEMIA WITH TARGET LDL LESS THAN 130: Primary | ICD-10-CM

## 2022-12-06 RX ORDER — TRAMADOL HYDROCHLORIDE 50 MG/1
50 TABLET ORAL EVERY 6 HOURS PRN
Qty: 20 TABLET | Refills: 0 | Status: SHIPPED | OUTPATIENT
Start: 2022-12-06 | End: 2022-12-09

## 2022-12-09 RX ORDER — SIMVASTATIN 20 MG
20 TABLET ORAL AT BEDTIME
Qty: 90 TABLET | Refills: 3 | Status: SHIPPED | OUTPATIENT
Start: 2022-12-09 | End: 2024-05-08

## 2023-01-09 ENCOUNTER — OFFICE VISIT (OUTPATIENT)
Dept: ORTHOPEDICS | Facility: CLINIC | Age: 68
End: 2023-01-09
Payer: MEDICARE

## 2023-01-09 DIAGNOSIS — M19.011 OSTEOARTHRITIS OF RIGHT GLENOHUMERAL JOINT: Primary | ICD-10-CM

## 2023-01-09 PROCEDURE — 20610 DRAIN/INJ JOINT/BURSA W/O US: CPT | Mod: RT | Performed by: FAMILY MEDICINE

## 2023-01-09 RX ORDER — METHYLPREDNISOLONE ACETATE 40 MG/ML
40 INJECTION, SUSPENSION INTRA-ARTICULAR; INTRALESIONAL; INTRAMUSCULAR; SOFT TISSUE
Status: DISCONTINUED | OUTPATIENT
Start: 2023-01-09 | End: 2024-02-26

## 2023-01-09 RX ADMIN — METHYLPREDNISOLONE ACETATE 40 MG: 40 INJECTION, SUSPENSION INTRA-ARTICULAR; INTRALESIONAL; INTRAMUSCULAR; SOFT TISSUE at 09:35

## 2023-01-09 ASSESSMENT — PAIN SCALES - GENERAL: PAINLEVEL: MILD PAIN (2)

## 2023-01-09 NOTE — LETTER
1/9/2023         RE: Deven Smith  [address] [place] [id number]        Dear Colleague,    Thank you for referring your patient, Deven Smith, to the [facility] [place]. Please see a copy of my visit note below.      Homero has chronic right shoulder pain.  He is here for a right GH injection.        Large Joint Injection/Arthocentesis: R glenohumeral joint    Date/Time: 1/9/2023 9:35 AM  Performed by: Taz Maurice DO  Authorized by: Taz Maurice DO     Indications:  Pain  Needle Size:  22 G  Guidance: landmark guided    Location:  Shoulder      Site:  R glenohumeral joint  Medications:  40 mg methylPREDNISolone 40 MG/ML  Outcome:  Tolerated well, no immediate complications  Procedure discussed: discussed risks, benefits, and alternatives    Timeout: timeout called immediately prior to procedure    Prep: patient was prepped and draped in usual sterile fashion         Glenohumeral Injection - Ultrasound Guided    The patient was informed of the risks and the benefits of the procedure and a written consent was signed.  The patient s right shoulder was prepped with chlorhexidine in sterile fashion.   40 mg of depomedrol suspension was drawn up into a 5 mL syringe with 3 mL of 1% lidocaine w/o Epi.  Injection was performed using sterile technique.  Under ultrasound guidance a 3.5-inch 22-gauge needle was used to enter the glenohumeral joint.  Posterior approach was used with the patient in lateral recumbent position, arm in neutral position at the side.  Needle placement was visualized and documented with ultrasound.  Ultrasound visualization was necessary due to the small joint space entered.  Injection performed long axis to the probe.  Injection solution visualized within the joint space.  Images were permanently stored for the patient's record.  There were no complications. The patient tolerated the procedure well. There was negligible bleeding.                      Again, thank you for allowing me to participate in the care of your patient.        Sincerely,        Taz Maurice, DO

## 2023-01-09 NOTE — PROGRESS NOTES
Homero has chronic right shoulder pain.  He is here for a right GH injection.        Large Joint Injection/Arthocentesis: R glenohumeral joint    Date/Time: 1/9/2023 9:35 AM  Performed by: Taz Maurice DO  Authorized by: Taz Maurice DO     Indications:  Pain  Needle Size:  22 G  Guidance: landmark guided    Location:  Shoulder      Site:  R glenohumeral joint  Medications:  40 mg methylPREDNISolone 40 MG/ML  Outcome:  Tolerated well, no immediate complications  Procedure discussed: discussed risks, benefits, and alternatives    Timeout: timeout called immediately prior to procedure    Prep: patient was prepped and draped in usual sterile fashion         Glenohumeral Injection - Ultrasound Guided    The patient was informed of the risks and the benefits of the procedure and a written consent was signed.  The patient s right shoulder was prepped with chlorhexidine in sterile fashion.   40 mg of depomedrol suspension was drawn up into a 5 mL syringe with 3 mL of 1% lidocaine w/o Epi.  Injection was performed using sterile technique.  Under ultrasound guidance a 3.5-inch 22-gauge needle was used to enter the glenohumeral joint.  Posterior approach was used with the patient in lateral recumbent position, arm in neutral position at the side.  Needle placement was visualized and documented with ultrasound.  Ultrasound visualization was necessary due to the small joint space entered.  Injection performed long axis to the probe.  Injection solution visualized within the joint space.  Images were permanently stored for the patient's record.  There were no complications. The patient tolerated the procedure well. There was negligible bleeding.

## 2023-01-09 NOTE — NURSING NOTE
Chief Complaint   Patient presents with     Right Shoulder - Pain       There were no vitals filed for this visit.    There is no height or weight on file to calculate BMI.      JUSTICE Wyatt NREMT

## 2023-01-09 NOTE — NURSING NOTE
[facility]   ORTHOPEDICS & SPORTS MEDICINE  [address] [place] [address]  Dept: [phone]  ______________________________________________________________________________    Patient: Deven Smith   : 1955   MRN: [id number]   2023    INVASIVE PROCEDURE SAFETY CHECKLIST    Date: 2023   Procedure:R shoulder Joint injection  Patient Name: Deven Smith  MRN: [id number]  YOB: 1955    Action: Complete sections as appropriate. Any discrepancy results in a HARD COPY until resolved.     PRE PROCEDURE:  Patient ID verified with 2 identifiers (name and  or MRN): Yes  Procedure and site verified with patient/designee (when able): Yes  Accurate consent documentation in medical record: Yes  H&P (or appropriate assessment) documented in medical record: Yes  H&P must be up to 20 days prior to procedure and updates within 24 hours of procedure as applicable: NA  Relevant diagnostic and radiology test results appropriately labeled and displayed as applicable: NA  Procedure site(s) marked with provider initials: NA    TIMEOUT:  Time-Out performed immediately prior to starting procedure, including verbal and active participation of all team members addressing the following:Yes  * Correct patient identify  * Confirmed that the correct side and site are marked  * An accurate procedure consent form  * Agreement on the procedure to be done  * Correct patient position  * Relevant images and results are properly labeled and appropriately displayed  * The need to administer antibiotics or fluids for irrigation purposes during the procedure as applicable   * Safety precautions based on patient history or medication use    DURING PROCEDURE: Verification of correct person, site, and procedures any time the responsibility for care of the patient is transferred to another member of the care team.       Prior to injection, verified patient identity using patient's name and date  of birth.  Due to injection administration, patient instructed to remain in clinic for 15 minutes  afterwards, and to report any adverse reaction to me immediately.    Joint injection was performed.      Drug Amount Wasted:  None.  Vial/Syringe: Single dose vial  Expiration Date:  07/2024      Cong Lamas ATC  January 9, 2023

## 2023-03-06 ENCOUNTER — MYC REFILL (OUTPATIENT)
Dept: FAMILY MEDICINE | Facility: CLINIC | Age: 68
End: 2023-03-06
Payer: MEDICARE

## 2023-03-07 NOTE — TELEPHONE ENCOUNTER
Pending Prescriptions:                       Disp   Refills    latanoprost (XALATAN) 0.005 % ophthalmic s*                Sig: Place 1 drop into both eyes At Bedtime    Routing refill request to provider for review/approval because:  Medication is reported/historical

## 2023-03-09 RX ORDER — LATANOPROST 50 UG/ML
0.01 SOLUTION/ DROPS OPHTHALMIC AT BEDTIME
OUTPATIENT
Start: 2023-03-09

## 2023-03-24 ENCOUNTER — OFFICE VISIT (OUTPATIENT)
Dept: ORTHOPEDICS | Facility: CLINIC | Age: 68
End: 2023-03-24
Payer: MEDICARE

## 2023-03-24 ENCOUNTER — TELEPHONE (OUTPATIENT)
Dept: ORTHOPEDICS | Facility: CLINIC | Age: 68
End: 2023-03-24
Payer: MEDICARE

## 2023-03-24 DIAGNOSIS — M54.16 LUMBAR RADICULOPATHY: Primary | ICD-10-CM

## 2023-03-24 PROCEDURE — 99441 PR PHYSICIAN TELEPHONE EVALUATION 5-10 MIN: CPT | Mod: 95 | Performed by: FAMILY MEDICINE

## 2023-03-24 NOTE — LETTER
3/24/2023         RE: Deven Smith  [id number] [address] [place] [id number]        Dear Colleague,    Thank you for referring your patient, Deven Smith, to the [facility]. Please see a copy of my visit note below.    ASSESSMENT AND PLAN:    Lumbar radiculopathy  Homero's symptoms fairly severe, I do think it would be reasonable to prescribe prednisone and a muscle relaxer.  Also, his last MRI was in 2017 and did show multiple levels of arthritic change with nerve root impingement.  I am ordering an MRI as well, I will get in touch with him with the results.    - predniSONE (DELTASONE) 20 MG tablet; Take 2 tablets (40 mg) by mouth daily for 5 days  - MR Lumbar Spine w/o Contrast; Future  - tiZANidine (ZANAFLEX) 4 MG tablet; Take 1 tablet (4 mg) by mouth 3 times daily as needed for muscle spasm      Taz Maurice, DO  [facility]    SUBJECTIVE  Homero is a 67 year old gentleman who is on the phone today for a virtual visit.    Homero slipped on the ice this past Monday, he caught himself but felt an immediate jolt in his low back.  Since then he has been experiencing numbness and tingling that has traveled down his right leg.  He does have a history of this, he last saw me for this in 2017 and had an MRI that was followed by a corticosteroid injection.    OBJECTIVE  Homero is in no significant distress, he is speaking in full sentences and his mentation is unchanged.  He does report numbness and tingling that travels down his right leg to his right foot.      Call duration 12 minutes                Again, thank you for allowing me to participate in the care of your patient.        Sincerely,        Taz Maurice DO

## 2023-03-24 NOTE — PROGRESS NOTES
ASSESSMENT AND PLAN:    Lumbar radiculopathy  Homero's symptoms fairly severe, I do think it would be reasonable to prescribe prednisone and a muscle relaxer.  Also, his last MRI was in 2017 and did show multiple levels of arthritic change with nerve root impingement.  I am ordering an MRI as well, I will get in touch with him with the results.    - predniSONE (DELTASONE) 20 MG tablet; Take 2 tablets (40 mg) by mouth daily for 5 days  - MR Lumbar Spine w/o Contrast; Future  - tiZANidine (ZANAFLEX) 4 MG tablet; Take 1 tablet (4 mg) by mouth 3 times daily as needed for muscle spasm      Taz Maurice, [facility] [place]    LALO Valentin is a 67 year old gentleman who is on the phone today for a virtual visit.    Homero slipped on the ice this past Monday, he caught himself but felt an immediate jolt in his low back.  Since then he has been experiencing numbness and tingling that has traveled down his right leg.  He does have a history of this, he last saw me for this in 2017 and had an MRI that was followed by a corticosteroid injection.    OBJECTIVE  Homero is in no significant distress, he is speaking in full sentences and his mentation is unchanged.  He does report numbness and tingling that travels down his right leg to his right foot.      Call duration 12 minutes

## 2023-03-27 ENCOUNTER — HOSPITAL ENCOUNTER (OUTPATIENT)
Dept: MRI IMAGING | Facility: CLINIC | Age: 68
Discharge: HOME | End: 2023-03-27
Attending: FAMILY MEDICINE | Admitting: FAMILY MEDICINE
Payer: MEDICARE

## 2023-03-27 DIAGNOSIS — M54.16 LUMBAR RADICULOPATHY: ICD-10-CM

## 2023-03-27 PROCEDURE — G1010 CDSM STANSON: HCPCS

## 2023-04-12 ENCOUNTER — TELEPHONE (OUTPATIENT)
Dept: MEDSURG UNIT | Facility: CLINIC | Age: 68
End: 2023-04-12
Payer: MEDICARE

## 2023-04-13 ENCOUNTER — TELEPHONE (OUTPATIENT)
Dept: MEDSURG UNIT | Facility: CLINIC | Age: 68
End: 2023-04-13
Payer: MEDICARE

## 2023-04-20 ENCOUNTER — HOSPITAL ENCOUNTER (OUTPATIENT)
Facility: CLINIC | Age: 68
Discharge: HOME | End: 2023-04-20
Admitting: PHYSICIAN ASSISTANT
Payer: MEDICARE

## 2023-04-20 ENCOUNTER — HOSPITAL ENCOUNTER (OUTPATIENT)
Dept: GENERAL RADIOLOGY | Facility: CLINIC | Age: 68
Discharge: HOME | End: 2023-04-20
Attending: FAMILY MEDICINE
Payer: MEDICARE

## 2023-04-20 VITALS — DIASTOLIC BLOOD PRESSURE: 72 MMHG | HEART RATE: 56 BPM | SYSTOLIC BLOOD PRESSURE: 139 MMHG | OXYGEN SATURATION: 98 %

## 2023-04-20 VITALS
SYSTOLIC BLOOD PRESSURE: 132 MMHG | OXYGEN SATURATION: 99 % | DIASTOLIC BLOOD PRESSURE: 71 MMHG | HEART RATE: 59 BPM | RESPIRATION RATE: 16 BRPM

## 2023-04-20 DIAGNOSIS — M54.16 LUMBAR RADICULOPATHY: ICD-10-CM

## 2023-04-20 PROCEDURE — 999N000154 HC STATISTIC RADIOLOGY XRAY, US, CT, MAR, NM

## 2023-04-20 PROCEDURE — 250N000011 HC RX IP 250 OP 636: Performed by: FAMILY MEDICINE

## 2023-04-20 PROCEDURE — 255N000002 HC RX 255 OP 636: Performed by: FAMILY MEDICINE

## 2023-04-20 PROCEDURE — 250N000009 HC RX 250: Performed by: FAMILY MEDICINE

## 2023-04-20 PROCEDURE — 62323 NJX INTERLAMINAR LMBR/SAC: CPT

## 2023-04-20 RX ORDER — IOPAMIDOL 408 MG/ML
10 INJECTION, SOLUTION INTRATHECAL ONCE
Status: DISCONTINUED | OUTPATIENT
Start: 2023-04-20 | End: 2023-04-20

## 2023-04-20 RX ORDER — BETAMETHASONE SODIUM PHOSPHATE AND BETAMETHASONE ACETATE 3; 3 MG/ML; MG/ML
3 INJECTION, SUSPENSION INTRA-ARTICULAR; INTRALESIONAL; INTRAMUSCULAR; SOFT TISSUE ONCE
Status: COMPLETED | OUTPATIENT
Start: 2023-04-20 | End: 2023-04-20

## 2023-04-20 RX ORDER — DEXTROSE MONOHYDRATE 25 G/50ML
25-50 INJECTION, SOLUTION INTRAVENOUS
Status: DISCONTINUED | OUTPATIENT
Start: 2023-04-20 | End: 2023-04-21 | Stop reason: HOSPADM

## 2023-04-20 RX ORDER — IOPAMIDOL 408 MG/ML
10 INJECTION, SOLUTION INTRATHECAL ONCE
Status: COMPLETED | OUTPATIENT
Start: 2023-04-20 | End: 2023-04-20

## 2023-04-20 RX ORDER — DEXAMETHASONE SODIUM PHOSPHATE 10 MG/ML
20 INJECTION, SOLUTION INTRAMUSCULAR; INTRAVENOUS ONCE
Status: DISCONTINUED | OUTPATIENT
Start: 2023-04-20 | End: 2023-04-20

## 2023-04-20 RX ORDER — NICOTINE POLACRILEX 4 MG
15-30 LOZENGE BUCCAL
Status: DISCONTINUED | OUTPATIENT
Start: 2023-04-20 | End: 2023-04-21 | Stop reason: HOSPADM

## 2023-04-20 RX ADMIN — LIDOCAINE HYDROCHLORIDE 2.5 ML: 10 INJECTION, SOLUTION EPIDURAL; INFILTRATION; INTRACAUDAL; PERINEURAL at 10:11

## 2023-04-20 RX ADMIN — LIDOCAINE HYDROCHLORIDE 3 ML: 10 INJECTION, SOLUTION EPIDURAL; INFILTRATION; INTRACAUDAL; PERINEURAL at 10:17

## 2023-04-20 RX ADMIN — BETAMETHASONE SODIUM PHOSPHATE AND BETAMETHASONE ACETATE 3 ML: 3; 3 INJECTION, SUSPENSION INTRA-ARTICULAR; INTRALESIONAL; INTRAMUSCULAR at 10:17

## 2023-04-20 RX ADMIN — IOPAMIDOL 2 ML: 408 INJECTION, SOLUTION INTRATHECAL at 10:17

## 2023-04-20 ASSESSMENT — ACTIVITIES OF DAILY LIVING (ADL)
ADLS_ACUITY_SCORE: 35

## 2023-04-20 NOTE — DISCHARGE INSTRUCTIONS
Steroid Injection Discharge Instructions     After you go home:    You may resume your normal diet.    Care of Puncture Site:    If you have a bandaid on your puncture site, you may remove it the next morning  You may shower tomorrow  No bath tubs, whirlpools or swimming pool for at least 48 hours  Use ice packs as needed for discomfort     Activity:    Minimize your activity today. You may gradually resume your normal activity as tolerated  Avoid vigorous or strenuous activity until your symptoms improve or as directed by your doctor  Do NOT drive a vehicle for a few hours after the injection - or longer if you develop numbness in your arm or leg    Medicines:    You may resume all medications, including blood thinners  Resume your Warfarin/Coumadin at your regular dose today. Follow up with your provider to have your INR rechecked  Resume your Platelet Inhibitors and Aspirin tomorrow at your regular dose  For minor discomfort, you may take Acetaminophen (Tylenol) or Ibuprofen (Advil)    Pain:     You may experience increased or different pain over the next 24-48 hours  For the next 48 hrs - you may use ice packs for discomfort     Call your primary care doctor if:    You have severe pain that does not improve with pain medication  You have chills or a fever greater than 101 F (38 C)  The site is red, swollen, hot or tender  Increase in pain, weakness or numbness  New problems with your bowel or bladder  Any questions or concerns    What to watch for:    It can be normal to have some bruising or slight swelling at the puncture site.   After the procedure, you may have some new weakness or numbness down your arm/leg from the numbing medicine. This should resolve in a few hours.   You may feel some temporary relief from the numbing medicine, but that will wear off within a few hours.  Your symptoms may return to pre procedure level, or can even be worse for the first 1-2 days.  For many people, the steroid begins to  provide some relief within 2-3 days, but it can take up to 2 weeks to obtain the full results.  Some people will get lasting relief from a single injection. Others may require up to 3 injections to get results. If you have more than one steroid injection, they should be given 2 weeks apart.  If you have no improvement in your symptoms after two weeks, please contact the doctor who ordered this procedure to discuss the next steps.  Side effects of your steroid injection are mild and will go away in 2-3 days  Insomnia  Irritability  Flushed face  Water retention  Restlessness  Difficulty sleeping  Increased appetite  Increased blood sugar  If you are diabetic, monitor your blood sugar closely. Contact the provider who manages your diabetes to help you control your blood sugar if needed.    If you have questions or concerns call:                  [facility] Dept @ [phone]                                    between 8am-4:30pm Mon-Fri    If you have urgent questions outside of these normal business hours, please contact the [facility] on call doctor @ [phone]

## 2023-04-20 NOTE — OP NOTE
Reviewed previous injection done in 2017 which was a L4-L5 ILESI.    We will proceed with this same injection as the patient stated it was beneficial.    If minimal benefit, could attempt right L4-L5 transforaminal shahzad.    John

## 2023-04-20 NOTE — PROCEDURES
[facility]    Procedure: Right L4-L5 TLESI    Date/Time: 4/20/2023 10:18 AM    Performed by: John Hogan PA-C  Authorized by: John Hogan PA-C      UNIVERSAL PROTOCOL   Site Marked: Yes  Prior Images Obtained and Reviewed:  Yes  Required items: Required blood products, implants, devices and special equipment available    Patient identity confirmed:  Verbally with patient  Patient was reevaluated immediately before administering moderate or deep sedation or anesthesia  Confirmation Checklist:  Patient's identity using two indicators, relevant allergies, procedure was appropriate and matched the consent or emergent situation and correct equipment/implants were available  Time out: Immediately prior to the procedure a time out was called    Universal Protocol: the Joint Commission Universal Protocol was followed    Preparation: Patient was prepped and draped in usual sterile fashion       ANESTHESIA    Local Anesthetic: Lidocaine 1% without epinephrine      SEDATION    Patient Sedated: No    See dictated procedure note for full details.    PROCEDURE  Describe Procedure: May need right L4-l5 TFESI if this is not beneficial.  Severe right foraminal stenosis.  IL injection tolerated well.    John  Patient Tolerance:  Patient tolerated the procedure well with no immediate complications  Length of time physician/provider present for 1:1 monitoring during sedation: 0

## 2023-05-09 ENCOUNTER — TELEPHONE (OUTPATIENT)
Dept: MEDSURG UNIT | Facility: CLINIC | Age: 68
End: 2023-05-09
Payer: MEDICARE

## 2023-05-09 RX ORDER — DEXTROSE MONOHYDRATE 25 G/50ML
25-50 INJECTION, SOLUTION INTRAVENOUS
Status: DISCONTINUED | OUTPATIENT
Start: 2023-05-09 | End: 2023-05-12 | Stop reason: HOSPADM

## 2023-05-09 RX ORDER — NICOTINE POLACRILEX 4 MG
15-30 LOZENGE BUCCAL
Status: DISCONTINUED | OUTPATIENT
Start: 2023-05-09 | End: 2023-05-12 | Stop reason: HOSPADM

## 2023-05-11 ENCOUNTER — HOSPITAL ENCOUNTER (OUTPATIENT)
Facility: CLINIC | Age: 68
Discharge: HOME | End: 2023-05-11
Admitting: PHYSICIAN ASSISTANT
Payer: MEDICARE

## 2023-05-11 ENCOUNTER — HOSPITAL ENCOUNTER (OUTPATIENT)
Dept: GENERAL RADIOLOGY | Facility: CLINIC | Age: 68
Discharge: HOME | End: 2023-05-11
Attending: FAMILY MEDICINE
Payer: MEDICARE

## 2023-05-11 VITALS — OXYGEN SATURATION: 99 % | HEART RATE: 51 BPM | DIASTOLIC BLOOD PRESSURE: 81 MMHG | SYSTOLIC BLOOD PRESSURE: 147 MMHG

## 2023-05-11 VITALS
HEART RATE: 50 BPM | OXYGEN SATURATION: 98 % | DIASTOLIC BLOOD PRESSURE: 78 MMHG | SYSTOLIC BLOOD PRESSURE: 154 MMHG | RESPIRATION RATE: 16 BRPM

## 2023-05-11 DIAGNOSIS — M54.16 LUMBAR RADICULOPATHY: ICD-10-CM

## 2023-05-11 PROCEDURE — 250N000011 HC RX IP 250 OP 636: Performed by: FAMILY MEDICINE

## 2023-05-11 PROCEDURE — 255N000002 HC RX 255 OP 636: Performed by: FAMILY MEDICINE

## 2023-05-11 PROCEDURE — 250N000009 HC RX 250: Performed by: FAMILY MEDICINE

## 2023-05-11 PROCEDURE — 62323 NJX INTERLAMINAR LMBR/SAC: CPT

## 2023-05-11 PROCEDURE — 999N000154 HC STATISTIC RADIOLOGY XRAY, US, CT, MAR, NM

## 2023-05-11 RX ORDER — IOPAMIDOL 408 MG/ML
10 INJECTION, SOLUTION INTRATHECAL ONCE
Status: COMPLETED | OUTPATIENT
Start: 2023-05-11 | End: 2023-05-11

## 2023-05-11 RX ORDER — DEXAMETHASONE SODIUM PHOSPHATE 10 MG/ML
10 INJECTION, SOLUTION INTRAMUSCULAR; INTRAVENOUS ONCE
Status: COMPLETED | OUTPATIENT
Start: 2023-05-11 | End: 2023-05-11

## 2023-05-11 RX ADMIN — LIDOCAINE HYDROCHLORIDE 6.5 ML: 10 INJECTION, SOLUTION EPIDURAL; INFILTRATION; INTRACAUDAL; PERINEURAL at 09:13

## 2023-05-11 RX ADMIN — IOPAMIDOL 2 ML: 408 INJECTION, SOLUTION INTRATHECAL at 09:16

## 2023-05-11 RX ADMIN — DEXAMETHASONE SODIUM PHOSPHATE 20 MG: 10 INJECTION, SOLUTION INTRAMUSCULAR; INTRAVENOUS at 09:21

## 2023-05-11 ASSESSMENT — ACTIVITIES OF DAILY LIVING (ADL)
ADLS_ACUITY_SCORE: 35
ADLS_ACUITY_SCORE: 35

## 2023-05-11 NOTE — PROGRESS NOTES
Care Suites Discharge Nursing Note    Patient Information  Name: Deven Smith  Age: 67 year old    Discharge Education:  Discharge instructions reviewed: Yes  Additional education/resources provided: no  Patient/patient representative verbalizes understanding: Yes  Patient discharging on new medications: No  Medication education completed: N/A    Discharge Plans:   Discharge location: home  Discharge ride contacted: Yes  Approximate discharge time: 1000    Discharge Criteria:  Discharge criteria met and vital signs stable: Yes    Patient Belongs:  Patient belongings returned to patient: Yes    Sigifredo Shaver RN

## 2023-05-11 NOTE — PROCEDURES
[facility]    Procedure: Right L4-L5 TFESI    Date/Time: 5/11/2023 9:36 AM    Performed by: John Hogan PA-C  Authorized by: John Hogan PA-C      UNIVERSAL PROTOCOL   Site Marked: Yes  Prior Images Obtained and Reviewed:  Yes  Required items: Required blood products, implants, devices and special equipment available    Patient identity confirmed:  Verbally with patient  Patient was reevaluated immediately before administering moderate or deep sedation or anesthesia  Confirmation Checklist:  Patient's identity using two indicators, relevant allergies, procedure was appropriate and matched the consent or emergent situation and correct equipment/implants were available  Time out: Immediately prior to the procedure a time out was called    Universal Protocol: the Joint Commission Universal Protocol was followed    Preparation: Patient was prepped and draped in usual sterile fashion       ANESTHESIA    Local Anesthetic: Lidocaine 1% without epinephrine      SEDATION    Patient Sedated: No    See dictated procedure note for full details.    PROCEDURE  Describe Procedure: Infraneural appraoch used first.  Contrast distributed lateral with very little epidural flow.  Very severe stenosis. Injected half the steroid mixture. Elected to also try a supraneural approach as well. Advanced the needle until patient felt radiculopathy and in a perineural position.  Needle was pulled back..  A small amount of vascular flow seen.  Needle adjust.  Steroid and lidocaine then injected in the perineural space.  Due to severe stenosis, minimal epidural flow seen.  Pt developed numbness in the right leg after the case due to the lidocaine.  Patient Tolerance:  Patient tolerated the procedure well with no immediate complications  Length of time physician/provider present for 1:1 monitoring during sedation: 0

## 2023-05-11 NOTE — PROGRESS NOTES
Patient has tingling in right leg.  Ambulating without difficulty. Patient brought out in wheelchair to family and then discharge to home.

## 2023-05-11 NOTE — DISCHARGE INSTRUCTIONS
Steroid Injection Discharge Instructions     After you go home:    You may resume your normal diet.    Care of Puncture Site:    If you have a bandaid on your puncture site, you may remove it the next morning  You may shower tomorrow  No bath tubs, whirlpools or swimming pool for at least 48 hours  Use ice packs as needed for discomfort     Activity:    Minimize your activity today. You may gradually resume your normal activity as tolerated  Avoid vigorous or strenuous activity until your symptoms improve or as directed by your doctor  Do NOT drive a vehicle for a few hours after the injection - or longer if you develop numbness in your arm or leg    Medicines:    You may resume all medications, including blood thinners  Resume your Warfarin/Coumadin at your regular dose today. Follow up with your provider to have your INR rechecked  Resume your Platelet Inhibitors and Aspirin tomorrow at your regular dose  For minor discomfort, you may take Acetaminophen (Tylenol) or Ibuprofen (Advil)    Pain:     You may experience increased or different pain over the next 24-48 hours  For the next 48 hrs - you may use ice packs for discomfort     Call your primary care doctor if:    You have severe pain that does not improve with pain medication  You have chills or a fever greater than 101 F (38 C)  The site is red, swollen, hot or tender  Increase in pain, weakness or numbness  New problems with your bowel or bladder  Any questions or concerns    What to watch for:    It can be normal to have some bruising or slight swelling at the puncture site.   After the procedure, you may have some new weakness or numbness down your arm/leg from the numbing medicine. This should resolve in a few hours.   You may feel some temporary relief from the numbing medicine, but that will wear off within a few hours.  Your symptoms may return to pre procedure level, or can even be worse for the first 1-2 days.  For many people, the steroid begins to  provide some relief within 2-3 days, but it can take up to 2 weeks to obtain the full results.  Some people will get lasting relief from a single injection. Others may require up to 3 injections to get results. If you have more than one steroid injection, they should be given 2 weeks apart.  If you have no improvement in your symptoms after two weeks, please contact the doctor who ordered this procedure to discuss the next steps.  Side effects of your steroid injection are mild and will go away in 2-3 days  Insomnia  Irritability  Flushed face  Water retention  Restlessness  Difficulty sleeping  Increased appetite  Increased blood sugar  If you are diabetic, monitor your blood sugar closely. Contact the provider who manages your diabetes to help you control your blood sugar if needed.    If you have questions or concerns call:                  [facility] Radiology Dept @ [phone]                                    between 8am-4:30pm Mon-Fri    If you have urgent questions outside of these normal business hours, please contact the [facility] on call doctor @ [phone]      The provider who performed your procedure was _________________.

## 2023-08-01 ENCOUNTER — TELEPHONE (OUTPATIENT)
Dept: ORTHOPEDICS | Facility: CLINIC | Age: 68
End: 2023-08-01

## 2023-08-01 DIAGNOSIS — M54.16 LUMBAR RADICULOPATHY: Primary | ICD-10-CM

## 2023-08-01 NOTE — TELEPHONE ENCOUNTER
"[facility] Call Center    Phone Message    May a detailed message be left on voicemail: no     Reason for Call: Regarding the TE about scheduling w/Dr Polanco. We don't require the patient to have PT, but we do need a Spine  order/referral put in stating patient needs \"surgical eval w/Dr Polanco.\" We have to use the spine decision tree for scheduling, so in order for the patient see Dr Polanco directly that would need to be placed. After that is placed we can get him scheduled. Thank you!    Action Taken: Message routed to:  [facility] ([facility]  SPORTS    Travel Screening: Not Applicable                                                                    "

## 2023-08-03 DIAGNOSIS — M54.16 LUMBAR RADICULOPATHY: Primary | ICD-10-CM

## 2023-08-04 ENCOUNTER — OFFICE VISIT (OUTPATIENT)
Dept: NEUROSURGERY | Facility: CLINIC | Age: 68
End: 2023-08-04
Attending: FAMILY MEDICINE
Payer: MEDICARE

## 2023-08-04 ENCOUNTER — ANCILLARY PROCEDURE (OUTPATIENT)
Dept: GENERAL RADIOLOGY | Facility: CLINIC | Age: 68
End: 2023-08-04
Attending: ORTHOPAEDIC SURGERY
Payer: MEDICARE

## 2023-08-04 VITALS
DIASTOLIC BLOOD PRESSURE: 77 MMHG | BODY MASS INDEX: 29.19 KG/M2 | HEART RATE: 55 BPM | SYSTOLIC BLOOD PRESSURE: 166 MMHG | WEIGHT: 186 LBS | HEIGHT: 67 IN

## 2023-08-04 DIAGNOSIS — M54.16 LUMBAR RADICULOPATHY: ICD-10-CM

## 2023-08-04 PROCEDURE — 72110 X-RAY EXAM L-2 SPINE 4/>VWS: CPT | Performed by: RADIOLOGY

## 2023-08-04 PROCEDURE — 99204 OFFICE O/P NEW MOD 45 MIN: CPT | Mod: GC | Performed by: ORTHOPAEDIC SURGERY

## 2023-08-04 NOTE — LETTER
8/4/2023         RE: Deven Smith  [address] [facility] [address]        Dear Colleague,    Thank you for referring your patient, Deven Smith, to the [facility] [place]. Please see a copy of my visit note below.    Spine Surgery Consultation    REFERRING PHYSICIAN: Taz Maurice   PRIMARY CARE PHYSICIAN: Bigg Morgan           Chief Complaint:   New Patient      History of Present Illness:  Symptom Profile Including: location of symptoms, onset, severity, exacerbating/alleviating factors, previous treatments:        Deven Smith is a 68 year old male who presents to the orthopedic spine clinic due to ongoing right low back pain and associated right leg pain aggravated most recently after he slipped on the ice this past spring.  Patient notes that he has had this issue chronically since at least 2017.  He most recently underwent a right-sided L4-L5 epidural steroid injection in May which provided him significant relief of his right leg pain, but does not really help much with his right low back pain.  He states that the right leg pain primarily seems to be over his anterior thigh and anterior medial calf consistent with an L4 dermatomal distribution.  He denies any weakness in his right leg or to his left lower extremity as well.  States that overall his pain is not too limiting for him.  He is able to participate in CrossFit 5 times per week.  He controls his pain with Tylenol and ibuprofen as needed.  He has not participated in any formal physical therapy.  He denies any saddle anesthesia or bowel or bladder incontinence.  He does state that he feels somewhat better when being leaned over a shopping cart.  States that standing and axial loading with heavy weights makes his pain worse.  Laying down significantly improved his pain.  Overall, at this point, he primarily is having back pain, as the epidural steroid injection significantly relieved  his leg pain.         Past Medical History:     Past Medical History:   Diagnosis Date     DDD (degenerative disc disease), cervical      Hyperlipidemia LDL goal < 130             Past Surgical History:     Past Surgical History:   Procedure Laterality Date     COLONOSCOPY  2007    Repeat  for screening purposes in 10 yrs.     COLONOSCOPY  2013    Procedure: COLONOSCOPY;  Colonoscopy;  Surgeon: Emmett San MD;  Location: [facility]     COLONOSCOPY N/A 2018    Procedure: COLONOSCOPY;  colonoscopy;  Surgeon: Israel Moreland MD;  Location: [facility]     ESOPHAGOSCOPY, GASTROSCOPY, DUODENOSCOPY (EGD), COMBINED N/A 2022    Procedure: ESOPHAGOGASTRODUODENOSCOPY (EGD);  Surgeon: Antoinette Lagunas MD;  Location:  GI     HC SHLDR ARTHROSCOP,SURG,W/ROTAT CUFF REPR Left      HC VASECTOMY UNILAT/BILAT W POSTOP SEMEN  1998    Vasectomy            Social History:     Social History     Tobacco Use     Smoking status: Former     Packs/day: 2.00     Years: 15.00     Pack years: 30.00     Types: Cigarettes     Quit date: 1987     Years since quittin.7     Smokeless tobacco: Never   Substance Use Topics     Alcohol use: Not Currently     Comment: rare             Family History:     Family History   Problem Relation Age of Onset     Allergies Brother         on meds     Arthritis Mother      Depression Mother         on meds. is in nursing home     Hypertension Mother      Osteoporosis Mother      Heart Disease Father          at age 57  ? MI--no autopsy     Cancer Father         jaw--smoked [place]     Hypertension Brother      Osteoporosis Sister         on meds     Family History Negative Other         No breast or colon cancer hx in family     Hypertension Sister      Osteoporosis Brother             Allergies:     Allergies   Allergen Reactions     Seasonal Allergies             Medications:     Current Outpatient Medications   Medication     fluticasone (FLONASE) 50 MCG/ACT  "nasal spray     latanoprost (XALATAN) 0.005 % ophthalmic solution     Misc Natural Products (GLUCOSAMINE CHONDROITIN ADV PO)     MULTI-DAY VITAMINS OR TABS     Omega-3 Fatty Acids (FISH OIL) 1200 MG capsule     simvastatin (ZOCOR) 20 MG tablet     omeprazole (PRILOSEC) 40 MG DR capsule     tiZANidine (ZANAFLEX) 4 MG tablet     Current Facility-Administered Medications   Medication     methylPREDNISolone (DEPO-MEDROL) injection 40 mg     methylPREDNISolone (DEPO-MEDROL) injection 40 mg     triamcinolone (KENALOG-40) injection 40 mg     triamcinolone (KENALOG-40) injection 40 mg     triamcinolone (KENALOG-40) injection 40 mg     triamcinolone (KENALOG-40) injection 40 mg     triamcinolone (KENALOG-40) injection 40 mg     triamcinolone (KENALOG-40) injection 40 mg     triamcinolone (KENALOG-40) injection 40 mg     triamcinolone (KENALOG-40) injection 40 mg     triamcinolone (KENALOG-40) injection 40 mg     triamcinolone (KENALOG-40) injection 40 mg     triamcinolone (KENALOG-40) injection 40 mg     triamcinolone (KENALOG-40) injection 40 mg     triamcinolone (KENALOG-40) injection 40 mg     triamcinolone (KENALOG-40) injection 40 mg             Review of Systems:     A 10 point ROS was performed and reviewed. Specific responses to these questions are noted at the end of the document.         Physical Exam:   Vitals: BP (!) 166/77 (BP Location: Right arm, Patient Position: Sitting, Cuff Size: Adult Regular)   Pulse 55   Ht 1.702 m (5' 7\")   Wt 84.4 kg (186 lb)   BMI 29.13 kg/m    Constitutional: awake, alert, cooperative, no apparent distress, appears stated age.    Eyes: The sclera are white.  Ears, Nose, Throat: The trachea is midline.  Psychiatric: The patient has a normal affect.  Respiratory: breathing non-labored  Cardiovascular: The extremities are warm and perfused.  Skin: no obvious rashes or lesions.  Musculoskeletal, Neurologic, and Spine:        Lumbar Spine:    Appearance - No gross stepoffs or " deformities    Motor -     L2-3: Hip flexion 5/5 R and 5/5 L strength          L3/4:  Knee extension R 5/5 and L 5/5 strength         L4/5:  Foot dorsiflexion R 5/5 L 5/5 and       EHL dorsiflexion R 5/5 L 5/5 strength         S1:  Plantarflexion/Peroneal Muscles  R 5/5 and L 5/5 strength    Sensation: intact to light touch L3-S1 distribution BLE      Neurologic:      REFLEXES Left Right   Biceps 1+ 1+   Triceps 1+ 1+   Brachioradialis 1+ 1+   Patella 1+ 1+   Ankle jerk 1+ 1+   Babinski No upgoing great toe No upgoing great toe   Clonus 0 beats 0 beats     Hip Exam:  No pain with hip log roll and no tenderness over the greater trochanters.    Alignment:  Patient stands with a neutral standing sagittal balance.           Imaging:   We ordered and independently reviewed new radiographs at this clinic visit. The results were discussed with the patient.  Findings include:    MRI of the lumbar spine dated 03/27/2023 personally reviewed interpreted by me today demonstrating moderate L4-L5 spinal stenosis with associated foraminal narrowing of the right L4 nerve root.  There is noted to be a small far lateral disc herniation as well along with a osteophyte extending into the right L4-L5 neural foramen.    Radiographs of the lumbar spine dated 08/04/2023 demonstrate typical appearing spine radiographs without any significant evidence of instability or listhesis.             Assessment and Plan:   Assessment:  68 year old male with   Mild neurogenic claudication in the setting of L4-L5 spinal stenosis  Mild right L4 radiculopathy    Discussed with the patient today his clinical symptoms, exam findings, and imaging which are consistent with the above assessment.  Fortunately, he remains very functional, and he has obtained significant relief with nonoperative treatment modalities.  At this time, he is able to participate in CrossFit for 5 days/week.  It does not really seem like physical therapy would really be that  beneficial for him for this reason.  We did discuss the possibility of what a foraminotomies with possible decompression would look like for him.  We also discussed the possibility of a fusion surgery at the L4-L5 level.  We briefly discussed the risks and benefits of these treatment.  Ultimately, the patient continues to do well with nonoperative treatment modalities, and any surgery would be fairly limiting to his current quality of life.  He would like to hold off on any sort of surgical intervention for the time being which is very reasonable as he has no clinical signs/symptoms or exam findings warranting more urgent intervention.  He will continue to undergo the epidural steroid injections every 3 to 6 months as needed for his pain.  He is aware that we would happily see him back if he would like to proceed with surgical intervention in the future.  Patient expresses understanding and agreement with this assessment and plan.  All questions answered.     Plan:  Continue with right-sided L4-L5 transforaminal epidural steroid injections as needed for pain control  Did advise patient to try to avoid axial loading of his spine while performing heavy weight lifting activities.  Discussed activity modification with him.  Patient will follow-up on an as-needed basis    This patient was personally seen and evaluated with Dr. Polanco who is in agreement with this assessment and plan.    Sandoval Laboy MD  Orthopaedic Surgery PGY-4      Respectfully,  Noé Polanco MD  Spine Surgery  [facility]      Attending MD (Dr. Noé Polanco) :  I met with the patient, reviewed and verified the history and physical exam of the patient and discussed the patient's management with the other clinical providers involved in this patient's care including any involved residents or physicians assistants. I reviewed the above note and agree with the documented findings and plan of care, which were communicated  to the patient.      Noé Polanco MD      Again, thank you for allowing me to participate in the care of your patient.        Sincerely,        Noé Polanco MD

## 2023-08-04 NOTE — PROGRESS NOTES
Spine Surgery Consultation    REFERRING PHYSICIAN: Taz Maurice   PRIMARY CARE PHYSICIAN: Bigg Morgan           Chief Complaint:   New Patient      History of Present Illness:  Symptom Profile Including: location of symptoms, onset, severity, exacerbating/alleviating factors, previous treatments:        Deven Smith is a 68 year old male who presents to the orthopedic spine clinic due to ongoing right low back pain and associated right leg pain aggravated most recently after he slipped on the ice this past spring.  Patient notes that he has had this issue chronically since at least 2017.  He most recently underwent a right-sided L4-L5 epidural steroid injection in May which provided him significant relief of his right leg pain, but does not really help much with his right low back pain.  He states that the right leg pain primarily seems to be over his anterior thigh and anterior medial calf consistent with an L4 dermatomal distribution.  He denies any weakness in his right leg or to his left lower extremity as well.  States that overall his pain is not too limiting for him.  He is able to participate in CrossFit 5 times per week.  He controls his pain with Tylenol and ibuprofen as needed.  He has not participated in any formal physical therapy.  He denies any saddle anesthesia or bowel or bladder incontinence.  He does state that he feels somewhat better when being leaned over a shopping cart.  States that standing and axial loading with heavy weights makes his pain worse.  Laying down significantly improved his pain.  Overall, at this point, he primarily is having back pain, as the epidural steroid injection significantly relieved his leg pain.         Past Medical History:     Past Medical History:   Diagnosis Date    DDD (degenerative disc disease), cervical     Hyperlipidemia LDL goal < 130             Past Surgical History:     Past Surgical History:   Procedure Laterality Date    COLONOSCOPY   2007    Repeat  for screening purposes in 10 yrs.    COLONOSCOPY  2013    Procedure: COLONOSCOPY;  Colonoscopy;  Surgeon: Emmett San MD;  Location: [facility]    COLONOSCOPY N/A 2018    Procedure: COLONOSCOPY;  colonoscopy;  Surgeon: Israel Moreland MD;  Location:  GI    ESOPHAGOSCOPY, GASTROSCOPY, DUODENOSCOPY (EGD), COMBINED N/A 2022    Procedure: ESOPHAGOGASTRODUODENOSCOPY (EGD);  Surgeon: Antoinette Lagunas MD;  Location: [facility]    HC SHLDR ARTHROSCOP,SURG,W/ROTAT CUFF REPR Left     HC VASECTOMY UNILAT/BILAT W POSTOP SEMEN  1998    Vasectomy            Social History:     Social History     Tobacco Use    Smoking status: Former     Packs/day: 2.00     Years: 15.00     Pack years: 30.00     Types: Cigarettes     Quit date: 1987     Years since quittin.7    Smokeless tobacco: Never   Substance Use Topics    Alcohol use: Not Currently     Comment: rare             Family History:     Family History   Problem Relation Age of Onset    Allergies Brother         on meds    Arthritis Mother     Depression Mother         on meds. is in nursing home    Hypertension Mother     Osteoporosis Mother     Heart Disease Father          at age 57  ? MI--no autopsy    Cancer Father         jaw--smoked [place]    Hypertension Brother     Osteoporosis Sister         on meds    Family History Negative Other         No breast or colon cancer hx in family    Hypertension Sister     Osteoporosis Brother             Allergies:     Allergies   Allergen Reactions    Seasonal Allergies             Medications:     Current Outpatient Medications   Medication    fluticasone (FLONASE) 50 MCG/ACT nasal spray    latanoprost (XALATAN) 0.005 % ophthalmic solution    Misc Natural Products (GLUCOSAMINE CHONDROITIN ADV PO)    MULTI-DAY VITAMINS OR TABS    Omega-3 Fatty Acids (FISH OIL) 1200 MG capsule    simvastatin (ZOCOR) 20 MG tablet    omeprazole (PRILOSEC) 40 MG DR capsule    tiZANidine  "(ZANAFLEX) 4 MG tablet     Current Facility-Administered Medications   Medication    methylPREDNISolone (DEPO-MEDROL) injection 40 mg    methylPREDNISolone (DEPO-MEDROL) injection 40 mg    triamcinolone (KENALOG-40) injection 40 mg    triamcinolone (KENALOG-40) injection 40 mg    triamcinolone (KENALOG-40) injection 40 mg    triamcinolone (KENALOG-40) injection 40 mg    triamcinolone (KENALOG-40) injection 40 mg    triamcinolone (KENALOG-40) injection 40 mg    triamcinolone (KENALOG-40) injection 40 mg    triamcinolone (KENALOG-40) injection 40 mg    triamcinolone (KENALOG-40) injection 40 mg    triamcinolone (KENALOG-40) injection 40 mg    triamcinolone (KENALOG-40) injection 40 mg    triamcinolone (KENALOG-40) injection 40 mg    triamcinolone (KENALOG-40) injection 40 mg    triamcinolone (KENALOG-40) injection 40 mg             Review of Systems:     A 10 point ROS was performed and reviewed. Specific responses to these questions are noted at the end of the document.         Physical Exam:   Vitals: BP (!) 166/77 (BP Location: Right arm, Patient Position: Sitting, Cuff Size: Adult Regular)   Pulse 55   Ht 1.702 m (5' 7\")   Wt 84.4 kg (186 lb)   BMI 29.13 kg/m    Constitutional: awake, alert, cooperative, no apparent distress, appears stated age.    Eyes: The sclera are white.  Ears, Nose, Throat: The trachea is midline.  Psychiatric: The patient has a normal affect.  Respiratory: breathing non-labored  Cardiovascular: The extremities are warm and perfused.  Skin: no obvious rashes or lesions.  Musculoskeletal, Neurologic, and Spine:        Lumbar Spine:    Appearance - No gross stepoffs or deformities    Motor -     L2-3: Hip flexion 5/5 R and 5/5 L strength          L3/4:  Knee extension R 5/5 and L 5/5 strength         L4/5:  Foot dorsiflexion R 5/5 L 5/5 and       EHL dorsiflexion R 5/5 L 5/5 strength         S1:  Plantarflexion/Peroneal Muscles  R 5/5 and L 5/5 strength    Sensation: intact to light touch " L3-S1 distribution BLE      Neurologic:      REFLEXES Left Right   Biceps 1+ 1+   Triceps 1+ 1+   Brachioradialis 1+ 1+   Patella 1+ 1+   Ankle jerk 1+ 1+   Babinski No upgoing great toe No upgoing great toe   Clonus 0 beats 0 beats     Hip Exam:  No pain with hip log roll and no tenderness over the greater trochanters.    Alignment:  Patient stands with a neutral standing sagittal balance.           Imaging:   We ordered and independently reviewed new radiographs at this clinic visit. The results were discussed with the patient.  Findings include:    MRI of the lumbar spine dated 03/27/2023 personally reviewed interpreted by me today demonstrating moderate L4-L5 spinal stenosis with associated foraminal narrowing of the right L4 nerve root.  There is noted to be a small far lateral disc herniation as well along with a osteophyte extending into the right L4-L5 neural foramen.    Radiographs of the lumbar spine dated 08/04/2023 demonstrate typical appearing spine radiographs without any significant evidence of instability or listhesis.             Assessment and Plan:   Assessment:  68 year old male with   Mild neurogenic claudication in the setting of L4-L5 spinal stenosis  Mild right L4 radiculopathy    Discussed with the patient today his clinical symptoms, exam findings, and imaging which are consistent with the above assessment.  Fortunately, he remains very functional, and he has obtained significant relief with nonoperative treatment modalities.  At this time, he is able to participate in CrossFit for 5 days/week.  It does not really seem like physical therapy would really be that beneficial for him for this reason.  We did discuss the possibility of what a foraminotomies with possible decompression would look like for him.  We also discussed the possibility of a fusion surgery at the L4-L5 level.  We briefly discussed the risks and benefits of these treatment.  Ultimately, the patient continues to do well  with nonoperative treatment modalities, and any surgery would be fairly limiting to his current quality of life.  He would like to hold off on any sort of surgical intervention for the time being which is very reasonable as he has no clinical signs/symptoms or exam findings warranting more urgent intervention.  He will continue to undergo the epidural steroid injections every 3 to 6 months as needed for his pain.  He is aware that we would happily see him back if he would like to proceed with surgical intervention in the future.  Patient expresses understanding and agreement with this assessment and plan.  All questions answered.     Plan:  Continue with right-sided L4-L5 transforaminal epidural steroid injections as needed for pain control  Did advise patient to try to avoid axial loading of his spine while performing heavy weight lifting activities.  Discussed activity modification with him.  Patient will follow-up on an as-needed basis    This patient was personally seen and evaluated with Dr. Polanco who is in agreement with this assessment and plan.    Sandoval Laboy MD  Orthopaedic Surgery PGY-4      Respectfully,  Noé Polanco MD  Spine Surgery  [facility]      Attending MD (Dr. Noé Polanco) :  I met with the patient, reviewed and verified the history and physical exam of the patient and discussed the patient's management with the other clinical providers involved in this patient's care including any involved residents or physicians assistants. I reviewed the above note and agree with the documented findings and plan of care, which were communicated to the patient.      Noé Polanco MD

## 2023-08-04 NOTE — TELEPHONE ENCOUNTER
[facility] Call Center    Phone Message    May a detailed message be left on voicemail: yes     Reason for Call: Other: Per Dr. Polanco 2nd opinion/consult today stick with injections for now.  Pt. Calling to schedule spine injection.  Please enter referral. Thanks!      Action Taken: Message routed to:  Adult Clinics: Sports Medicine p [id number]    Travel Screening: Not Applicable

## 2023-08-07 NOTE — TELEPHONE ENCOUNTER
8/7 Called and spoke to patient, appointment is currently scheduled.     [place] Procedure   Orthopedics, Podiatry, Sports Medicine, Ent ,Eye , Audiology, Adult Endocrine & Diabetes, Nutrition & Medication Therapy Management Specialties   [facility]       Add 05898 Cpt? (Important Note: In 2017 The Use Of 78872 Is Being Tracked By Cms To Determine Future Global Period Reimbursement For Global Periods): no Detail Level: Detailed

## 2023-08-08 ENCOUNTER — ANCILLARY PROCEDURE (OUTPATIENT)
Dept: GENERAL RADIOLOGY | Facility: CLINIC | Age: 68
End: 2023-08-08
Attending: FAMILY MEDICINE
Payer: MEDICARE

## 2023-08-08 DIAGNOSIS — M54.16 LUMBAR RADICULOPATHY: ICD-10-CM

## 2023-08-08 PROCEDURE — 62323 NJX INTERLAMINAR LMBR/SAC: CPT | Performed by: RADIOLOGY

## 2023-08-08 RX ORDER — LIDOCAINE HYDROCHLORIDE 10 MG/ML
5 INJECTION, SOLUTION EPIDURAL; INFILTRATION; INTRACAUDAL; PERINEURAL ONCE
Status: COMPLETED | OUTPATIENT
Start: 2023-08-08 | End: 2023-08-08

## 2023-08-08 RX ORDER — METHYLPREDNISOLONE ACETATE 80 MG/ML
80 INJECTION, SUSPENSION INTRA-ARTICULAR; INTRALESIONAL; INTRAMUSCULAR; SOFT TISSUE ONCE
Status: COMPLETED | OUTPATIENT
Start: 2023-08-08 | End: 2023-08-08

## 2023-08-08 RX ORDER — IOPAMIDOL 408 MG/ML
2 INJECTION, SOLUTION INTRATHECAL ONCE
Status: COMPLETED | OUTPATIENT
Start: 2023-08-08 | End: 2023-08-08

## 2023-08-08 RX ORDER — BUPIVACAINE HYDROCHLORIDE 5 MG/ML
2 INJECTION, SOLUTION PERINEURAL ONCE
Status: COMPLETED | OUTPATIENT
Start: 2023-08-08 | End: 2023-08-08

## 2023-08-08 RX ADMIN — BUPIVACAINE HYDROCHLORIDE 10 MG: 5 INJECTION, SOLUTION PERINEURAL at 13:46

## 2023-08-08 RX ADMIN — IOPAMIDOL 2 ML: 408 INJECTION, SOLUTION INTRATHECAL at 13:46

## 2023-08-08 RX ADMIN — LIDOCAINE HYDROCHLORIDE 5 ML: 10 INJECTION, SOLUTION EPIDURAL; INFILTRATION; INTRACAUDAL; PERINEURAL at 13:46

## 2023-08-08 RX ADMIN — METHYLPREDNISOLONE ACETATE 80 MG: 80 INJECTION, SUSPENSION INTRA-ARTICULAR; INTRALESIONAL; INTRAMUSCULAR; SOFT TISSUE at 13:46

## 2023-08-08 NOTE — DISCHARGE SUMMARY
AFTER YOU GO HOME    DO relax; minimize your activity for 24 hours  You may resume normal activity tomorrow  You may remove the bandage in the evening or next morning  You may resume bathing the next day  Drink at least 4 extra glasses of fluid today if not on fluid restrictions  DO NOT drive or operate machinery at home or at work for at least 24 hours      VISIT THE EMERGENCY ROOM OR URGENT CARE IF:    There is redness or swelling at the injection site  There is discharge from the injection site  You develop a temperature of 101  F or greater      ADDITIONAL INSTRUCTIONS:     You may resume your Coumadin or other blood thinner at your regular dose today.  Follow up with your physician to have your INR rechecked if indicated.  If you gain no relief from the injection after two (2) weeks, follow-up with your provider for your options.        Contacts:    During business hours from 8 to 5 pm, you may call [phone] to reach a nurse advisor at [facility].  After hours, call [facility]  [phone].  Ask for the Radiologist on-call.  Someone is on-call 24 hrs/day.  [facility] Toll Free Number   [phone]

## 2023-08-08 NOTE — PROGRESS NOTES
Deven was seen in X-ray today for a lumbar epidural injection. Patient rated pain before procedure 2/10. After procedure patient rated pain 1/10.   This pain level is acceptable to patient. Patient discharged home with .

## 2023-09-13 DIAGNOSIS — J30.2 SEASONAL ALLERGIC RHINITIS, UNSPECIFIED TRIGGER: ICD-10-CM

## 2023-09-20 RX ORDER — FLUTICASONE PROPIONATE 50 MCG
2 SPRAY, SUSPENSION (ML) NASAL DAILY
Qty: 16 G | Refills: 1 | Status: SHIPPED | OUTPATIENT
Start: 2023-09-20 | End: 2024-07-18

## 2023-10-25 ENCOUNTER — OFFICE VISIT (OUTPATIENT)
Dept: ORTHOPEDICS | Facility: CLINIC | Age: 68
End: 2023-10-25
Payer: MEDICARE

## 2023-10-25 DIAGNOSIS — M17.0 BILATERAL PRIMARY OSTEOARTHRITIS OF KNEE: Primary | ICD-10-CM

## 2023-10-25 PROCEDURE — 20610 DRAIN/INJ JOINT/BURSA W/O US: CPT | Mod: 50 | Performed by: FAMILY MEDICINE

## 2023-10-25 RX ORDER — TRIAMCINOLONE ACETONIDE 40 MG/ML
40 INJECTION, SUSPENSION INTRA-ARTICULAR; INTRAMUSCULAR
Status: DISCONTINUED | OUTPATIENT
Start: 2023-10-25 | End: 2024-02-26

## 2023-10-25 RX ADMIN — TRIAMCINOLONE ACETONIDE 40 MG: 40 INJECTION, SUSPENSION INTRA-ARTICULAR; INTRAMUSCULAR at 14:12

## 2023-10-25 ASSESSMENT — PAIN SCALES - GENERAL: PAINLEVEL: MILD PAIN (2)

## 2023-10-25 NOTE — NURSING NOTE
[facility]   ORTHOPEDICS & SPORTS MEDICINE  [address] [place] [address]  Dept: [phone]  ______________________________________________________________________________    Patient: Deven Smith   : 1955   MRN: [id number]   2023    INVASIVE PROCEDURE SAFETY CHECKLIST    Date: 10/25/23   Procedure:Bilateral Knee Injection  Patient Name: Deven Smith  MRN: [id number]  YOB: 1955    Action: Complete sections as appropriate. Any discrepancy results in a HARD COPY until resolved.     PRE PROCEDURE:  Patient ID verified with 2 identifiers (name and  or MRN): Yes  Procedure and site verified with patient/designee (when able): Yes  Accurate consent documentation in medical record: Yes  H&P (or appropriate assessment) documented in medical record: Yes  H&P must be up to 20 days prior to procedure and updates within 24 hours of procedure as applicable: NA  Relevant diagnostic and radiology test results appropriately labeled and displayed as applicable: NA  Procedure site(s) marked with provider initials: NA    TIMEOUT:  Time-Out performed immediately prior to starting procedure, including verbal and active participation of all team members addressing the following:Yes  * Correct patient identify  * Confirmed that the correct side and site are marked  * An accurate procedure consent form  * Agreement on the procedure to be done  * Correct patient position  * Relevant images and results are properly labeled and appropriately displayed  * The need to administer antibiotics or fluids for irrigation purposes during the procedure as applicable   * Safety precautions based on patient history or medication use    DURING PROCEDURE: Verification of correct person, site, and procedures any time the responsibility for care of the patient is transferred to another member of the care team.       Prior to injection, verified patient identity using patient's name and date  of birth.  Due to injection administration, patient instructed to remain in clinic for 15 minutes  afterwards, and to report any adverse reaction to me immediately.    Joint injection was performed.   x2    Drug Amount Wasted:  None.  Vial/Syringe: Single dose vial x2  Expiration Date:  06/2025 x2      Cong Lamas ATC  October 25, 2023

## 2023-10-25 NOTE — PROGRESS NOTES
Homero has bilateral knee osteoarthritis.  He is here for bilateral knee injections.      Large Joint Injection/Arthocentesis: bilateral knee    Date/Time: 10/25/2023 2:12 PM    Performed by: Taz Maurice DO  Authorized by: Taz Maurice DO    Indications:  Pain  Needle Size:  22 G  Guidance: landmark guided    Location:  Knee  Laterality:  Bilateral      Medications (Right):  40 mg triamcinolone 40 MG/ML  Medications (Left):  40 mg triamcinolone 40 MG/ML  Outcome:  Tolerated well, no immediate complications  Procedure discussed: discussed risks, benefits, and alternatives    Consent Given by:  Patient  Timeout: timeout called immediately prior to procedure    Prep: patient was prepped and draped in usual sterile fashion         PROCEDURE    Knee Injections - Intraarticular    The patient was informed of the risks and the benefits of the procedure and a written consent was signed.    The patient's left knee was prepped with chlorhexidine in sterile fashion.   40 mg of triamcinolone suspension was drawn up into a 5 mL syringe with 4 mL of 1% lidocaine.  Injection was performed using substerile technique.  A 1.5-inch 22-gauge needle was used to enter the lateral aspect of the left knee.  Injection performed successfully without difficulty.  There were no complications. The patient tolerated the procedure well. There was negligible bleeding.     The patient's right knee was prepped with chlorhexidine in sterile fashion.   40 mg of triamcinolone suspension was drawn up into a 5 mL syringe with 4 mL of 1% lidocaine.  Injection was performed using substerile technique.  A 1.5-inch 22-gauge needle was used to enter the lateral aspect of the right knee.  Injection performed successfully without difficulty.  There were no complications. The patient tolerated the procedure well. There was negligible bleeding.

## 2023-10-25 NOTE — LETTER
10/25/2023         RE: Deven Smith  [id number] [address] [place] [id number]        Dear Colleague,    Thank you for referring your patient, Deven Smith, to the [facility] [place]. Please see a copy of my visit note below.      Homero has bilateral knee osteoarthritis.  He is here for bilateral knee injections.      Large Joint Injection/Arthocentesis: bilateral knee    Date/Time: 10/25/2023 2:12 PM    Performed by: Taz Maurice DO  Authorized by: Taz Maurice DO    Indications:  Pain  Needle Size:  22 G  Guidance: landmark guided    Location:  Knee  Laterality:  Bilateral      Medications (Right):  40 mg triamcinolone 40 MG/ML  Medications (Left):  40 mg triamcinolone 40 MG/ML  Outcome:  Tolerated well, no immediate complications  Procedure discussed: discussed risks, benefits, and alternatives    Consent Given by:  Patient  Timeout: timeout called immediately prior to procedure    Prep: patient was prepped and draped in usual sterile fashion         PROCEDURE    Knee Injections - Intraarticular    The patient was informed of the risks and the benefits of the procedure and a written consent was signed.    The patient's left knee was prepped with chlorhexidine in sterile fashion.   40 mg of triamcinolone suspension was drawn up into a 5 mL syringe with 4 mL of 1% lidocaine.  Injection was performed using substerile technique.  A 1.5-inch 22-gauge needle was used to enter the lateral aspect of the left knee.  Injection performed successfully without difficulty.  There were no complications. The patient tolerated the procedure well. There was negligible bleeding.     The patient's right knee was prepped with chlorhexidine in sterile fashion.   40 mg of triamcinolone suspension was drawn up into a 5 mL syringe with 4 mL of 1% lidocaine.  Injection was performed using substerile technique.  A 1.5-inch 22-gauge needle was used to enter the lateral aspect of the right  knee.  Injection performed successfully without difficulty.  There were no complications. The patient tolerated the procedure well. There was negligible bleeding.                 Again, thank you for allowing me to participate in the care of your patient.        Sincerely,        Taz Maurice, DO

## 2023-12-28 ASSESSMENT — ENCOUNTER SYMPTOMS
ABDOMINAL PAIN: 0
COUGH: 0
HEMATOCHEZIA: 0
PALPITATIONS: 0
SHORTNESS OF BREATH: 0
NERVOUS/ANXIOUS: 0
CHILLS: 0
HEMATURIA: 0
ARTHRALGIAS: 1
PARESTHESIAS: 0
JOINT SWELLING: 0
SORE THROAT: 0
FREQUENCY: 0
CONSTIPATION: 0
MYALGIAS: 0
DIZZINESS: 1
WEAKNESS: 0
HEARTBURN: 1
DIARRHEA: 0
FEVER: 0
NAUSEA: 0
EYE PAIN: 0
HEADACHES: 0
DYSURIA: 0

## 2023-12-28 ASSESSMENT — ACTIVITIES OF DAILY LIVING (ADL): CURRENT_FUNCTION: NO ASSISTANCE NEEDED

## 2023-12-29 ENCOUNTER — OFFICE VISIT (OUTPATIENT)
Dept: FAMILY MEDICINE | Facility: CLINIC | Age: 68
End: 2023-12-29
Payer: MEDICARE

## 2023-12-29 VITALS
BODY MASS INDEX: 29.65 KG/M2 | OXYGEN SATURATION: 96 % | HEART RATE: 51 BPM | DIASTOLIC BLOOD PRESSURE: 80 MMHG | WEIGHT: 188.9 LBS | SYSTOLIC BLOOD PRESSURE: 128 MMHG | HEIGHT: 67 IN | TEMPERATURE: 97.4 F | RESPIRATION RATE: 16 BRPM

## 2023-12-29 DIAGNOSIS — Z12.11 SCREEN FOR COLON CANCER: ICD-10-CM

## 2023-12-29 DIAGNOSIS — M54.50 CHRONIC LOW BACK PAIN WITHOUT SCIATICA, UNSPECIFIED BACK PAIN LATERALITY: ICD-10-CM

## 2023-12-29 DIAGNOSIS — M50.30 DDD (DEGENERATIVE DISC DISEASE), CERVICAL: ICD-10-CM

## 2023-12-29 DIAGNOSIS — H40.89 OTHER SPECIFIED GLAUCOMA, UNSPECIFIED LATERALITY: ICD-10-CM

## 2023-12-29 DIAGNOSIS — R01.1 HEART MURMUR: ICD-10-CM

## 2023-12-29 DIAGNOSIS — M17.2 POST-TRAUMATIC OSTEOARTHRITIS OF BOTH KNEES: ICD-10-CM

## 2023-12-29 DIAGNOSIS — Z00.00 ENCOUNTER FOR MEDICARE ANNUAL WELLNESS EXAM: Primary | ICD-10-CM

## 2023-12-29 DIAGNOSIS — I49.3 PVC'S (PREMATURE VENTRICULAR CONTRACTIONS): ICD-10-CM

## 2023-12-29 DIAGNOSIS — Z83.719 FAMILY HISTORY OF COLONIC POLYPS: ICD-10-CM

## 2023-12-29 DIAGNOSIS — M25.511 CHRONIC PAIN OF BOTH SHOULDERS: ICD-10-CM

## 2023-12-29 DIAGNOSIS — E78.5 HYPERLIPIDEMIA WITH TARGET LDL LESS THAN 130: ICD-10-CM

## 2023-12-29 DIAGNOSIS — G89.29 CHRONIC LOW BACK PAIN WITHOUT SCIATICA, UNSPECIFIED BACK PAIN LATERALITY: ICD-10-CM

## 2023-12-29 DIAGNOSIS — G89.29 CHRONIC PAIN OF BOTH SHOULDERS: ICD-10-CM

## 2023-12-29 DIAGNOSIS — M25.512 CHRONIC PAIN OF BOTH SHOULDERS: ICD-10-CM

## 2023-12-29 PROBLEM — F10.20 ALCOHOL DEPENDENCE (H): Status: RESOLVED | Noted: 2022-03-01 | Resolved: 2023-12-29

## 2023-12-29 LAB
ANION GAP SERPL CALCULATED.3IONS-SCNC: 9 MMOL/L (ref 7–15)
BUN SERPL-MCNC: 25.6 MG/DL (ref 8–23)
CALCIUM SERPL-MCNC: 9.3 MG/DL (ref 8.8–10.2)
CHLORIDE SERPL-SCNC: 102 MMOL/L (ref 98–107)
CHOLEST SERPL-MCNC: 189 MG/DL
CREAT SERPL-MCNC: 1.09 MG/DL (ref 0.67–1.17)
DEPRECATED HCO3 PLAS-SCNC: 28 MMOL/L (ref 22–29)
EGFRCR SERPLBLD CKD-EPI 2021: 75 ML/MIN/1.73M2
FASTING STATUS PATIENT QL REPORTED: YES
GLUCOSE SERPL-MCNC: 99 MG/DL (ref 70–99)
HDLC SERPL-MCNC: 66 MG/DL
LDLC SERPL CALC-MCNC: 115 MG/DL
NONHDLC SERPL-MCNC: 123 MG/DL
POTASSIUM SERPL-SCNC: 4.4 MMOL/L (ref 3.4–5.3)
SODIUM SERPL-SCNC: 139 MMOL/L (ref 135–145)
TRIGL SERPL-MCNC: 42 MG/DL

## 2023-12-29 PROCEDURE — 80061 LIPID PANEL: CPT | Performed by: STUDENT IN AN ORGANIZED HEALTH CARE EDUCATION/TRAINING PROGRAM

## 2023-12-29 PROCEDURE — 80048 BASIC METABOLIC PNL TOTAL CA: CPT | Performed by: STUDENT IN AN ORGANIZED HEALTH CARE EDUCATION/TRAINING PROGRAM

## 2023-12-29 PROCEDURE — 36415 COLL VENOUS BLD VENIPUNCTURE: CPT | Performed by: STUDENT IN AN ORGANIZED HEALTH CARE EDUCATION/TRAINING PROGRAM

## 2023-12-29 PROCEDURE — G0439 PPPS, SUBSEQ VISIT: HCPCS | Performed by: STUDENT IN AN ORGANIZED HEALTH CARE EDUCATION/TRAINING PROGRAM

## 2023-12-29 ASSESSMENT — ENCOUNTER SYMPTOMS
JOINT SWELLING: 0
EYE PAIN: 0
HEMATOCHEZIA: 0
CHILLS: 0
NERVOUS/ANXIOUS: 0
WEAKNESS: 0
NAUSEA: 0
COUGH: 0
DIZZINESS: 1
FREQUENCY: 0
ABDOMINAL PAIN: 0
SORE THROAT: 0
DYSURIA: 0
PARESTHESIAS: 0
DIARRHEA: 0
MYALGIAS: 0
HEADACHES: 0
ARTHRALGIAS: 1
HEARTBURN: 1
PALPITATIONS: 0
FEVER: 0
CONSTIPATION: 0
SHORTNESS OF BREATH: 0
HEMATURIA: 0

## 2023-12-29 ASSESSMENT — PAIN SCALES - GENERAL: PAINLEVEL: NO PAIN (0)

## 2023-12-29 ASSESSMENT — ACTIVITIES OF DAILY LIVING (ADL): CURRENT_FUNCTION: NO ASSISTANCE NEEDED

## 2023-12-29 NOTE — PATIENT INSTRUCTIONS
Patient Education   Personalized Prevention Plan  You are due for the preventive services outlined below.  Your care team is available to assist you in scheduling these services.  If you have already completed any of these items, please share that information with your care team to update in your medical record.  Health Maintenance Due   Topic Date Due     ANNUAL REVIEW OF HM ORDERS  03/01/2023     Colorectal Cancer Screening  08/06/2023     Cholesterol Lab  11/29/2023     Learning About Dietary Guidelines  What are the Dietary Guidelines for Americans?     Dietary Guidelines for Americans provide tips for eating well and staying healthy. This helps reduce the risk for long-term (chronic) diseases.  These guidelines recommend that you:  Eat and drink the right amount for you. The U.S. government's food guide is called MyPlate. It can help you make your own well-balanced eating plan.  Try to balance your eating with your activity. This helps you stay at a healthy weight.  Drink alcohol in moderation, if at all.  Limit foods high in salt, saturated fat, trans fat, and added sugar.  These guidelines are from the U.S. Department of Agriculture and the U.S. Department of Health and Human Services. They are updated every 5 years.  What is MyPlate?  MyPlate is the U.S. government's food guide. It can help you make your own well-balanced eating plan. A balanced eating plan means that you eat enough, but not too much, and that your food gives you the nutrients you need to stay healthy.  MyPlate focuses on eating plenty of whole grains, fruits, and vegetables, and on limiting fat and sugar. It is available online at www.ChooseMyPlate.gov.  How can you get started?  If you're trying to eat healthier, you can slowly change your eating habits over time. You don't have to make big changes all at once. Start by adding one or two healthy foods to your meals each day.  Grains  Choose whole-grain breads and cereals and whole-wheat  Pt mtr given verbal & written dc instructions. rx given. Follow-up education given.   Pt mtr verbalized understanding.  Pt stable and ambulatory to exit at this time.    "pasta and whole-grain crackers.  Vegetables  Eat a variety of vegetables every day. They have lots of nutrients and are part of a heart-healthy diet.  Fruits  Eat a variety of fruits every day. Fruits contain lots of nutrients. Choose fresh fruit instead of fruit juice.  Protein foods  Choose fish and lean poultry more often. Eat red meat and fried meats less often. Dried beans, tofu, and nuts are also good sources of protein.  Dairy  Choose low-fat or fat-free products from this food group. If you have problems digesting milk, try eating cheese or yogurt instead.  Fats and oils  Limit fats and oils if you're trying to cut calories. Choose healthy fats when you cook. These include canola oil and olive oil.  Where can you learn more?  Go to https://www.[employer].net/patiented  Enter D676 in the search box to learn more about \"Learning About Dietary Guidelines.\"  Current as of: February 28, 2023               Content Version: 13.8    6936-5930 [employer].   Care instructions adapted under license by your healthcare professional. If you have questions about a medical condition or this instruction, always ask your healthcare professional. [employer], [employer] disclaims any warranty or liability for your use of this information.         "

## 2023-12-29 NOTE — PROGRESS NOTES
"SUBJECTIVE:   Homero is a 68 year old, presenting for the following:  Physical        12/29/2023    12:44 PM   Additional Questions   Roomed by Zoey MYRICK       Are you in the first 12 months of your Medicare coverage?  No    Healthy Habits:     In general, how would you rate your overall health?  Good    Frequency of exercise:  4-5 days/week    Duration of exercise:  Greater than 60 minutes    Do you usually eat at least 4 servings of fruit and vegetables a day, include whole grains    & fiber and avoid regularly eating high fat or \"junk\" foods?  No    Taking medications regularly:  Yes    Medication side effects:  None    Ability to successfully perform activities of daily living:  No assistance needed    Home Safety:  No safety concerns identified    Hearing Impairment:  No hearing concerns    In the past 6 months, have you been bothered by leaking of urine?  No    In general, how would you rate your overall mental or emotional health?  Good    Additional concerns today:  Yes      Today's PHQ-2 Score:       12/28/2023     1:12 PM   PHQ-2 ( 1999 [employer])   Q1: Little interest or pleasure in doing things 0   Q2: Feeling down, depressed or hopeless 0   PHQ-2 Score 0   Q1: Little interest or pleasure in doing things Not at all   Q2: Feeling down, depressed or hopeless Not at all   PHQ-2 Score 0         Have you ever done Advance Care Planning? (For example, a Health Directive, POLST, or a discussion with a medical provider or your loved ones about your wishes): No, advance care planning information given to patient to review.  Patient declined advance care planning discussion at this time.     Fall risk  Fallen 2 or more times in the past year?: No  Any fall with injury in the past year?: No    Cognitive Screening   1) Repeat 3 items (Leader, Season, Table)    2) Clock draw: NORMAL  3) 3 item recall: Recalls 1 object   Results: NORMAL clock, 1-2 items recalled: COGNITIVE IMPAIRMENT LESS LIKELY    Mini-CogTM Copyright S " [employer]. Licensed by the author for use in [facility]; reprinted with permission (inge@[facility]). All rights reserved.      Do you have sleep apnea, excessive snoring or daytime drowsiness? : no    Reviewed and updated as needed this visit by clinical staff   Tobacco  Allergies  Meds              Reviewed and updated as needed this visit by Provider                 Social History     Tobacco Use    Smoking status: Former     Packs/day: 2.00     Years: 15.00     Additional pack years: 0.00     Total pack years: 30.00     Types: Cigarettes     Quit date: 1987     Years since quittin.1    Smokeless tobacco: Never   Substance Use Topics    Alcohol use: Not Currently     Comment: rare              2023     1:15 PM   Alcohol Use   Prescreen: >3 drinks/day or >7 drinks/week? Not Applicable     Do you have a current opioid prescription? No  Do you use any other controlled substances or medications that are not prescribed by a provider? None        Current providers sharing in care for this patient include:   Patient Care Team:  Bigg Morgan MD as PCP - General (Family Medicine)  Sanya Vieyra DO as Assigned Surgical Provider  Bigg Morgan MD as Assigned PCP  Noé Polanco MD as Assigned Musculoskeletal Provider    The following health maintenance items are reviewed in Epic and correct as of today:  Health Maintenance   Topic Date Due    COLORECTAL CANCER SCREENING  2023    LIPID  2023    MEDICARE ANNUAL WELLNESS VISIT  2024    ANNUAL REVIEW OF HM ORDERS  2024    FALL RISK ASSESSMENT  2024    DTAP/TDAP/TD IMMUNIZATION (2 - Td or Tdap) 2026    ADVANCE CARE PLANNING  2028    HEPATITIS C SCREENING  Completed    PHQ-2 (once per calendar year)  Completed    INFLUENZA VACCINE  Completed    Pneumococcal Vaccine: 65+ Years  Completed    ZOSTER IMMUNIZATION  Completed    RSV VACCINE (Pregnancy & 60+)  Completed    AORTIC  "ANEURYSM SCREENING (SYSTEM ASSIGNED)  Completed    COVID-19 Vaccine  Completed    IPV IMMUNIZATION  Aged Out    HPV IMMUNIZATION  Aged Out    MENINGITIS IMMUNIZATION  Aged Out    RSV MONOCLONAL ANTIBODY  Aged Out     Lab work is in process      Review of Systems   Constitutional:  Negative for chills and fever.   HENT:  Negative for congestion, ear pain, hearing loss and sore throat.    Eyes:  Negative for pain and visual disturbance.   Respiratory:  Negative for cough and shortness of breath.    Cardiovascular:  Negative for chest pain, palpitations and peripheral edema.   Gastrointestinal:  Positive for heartburn. Negative for abdominal pain, constipation, diarrhea, hematochezia and nausea.   Genitourinary:  Negative for dysuria, frequency, genital sores, hematuria, impotence, penile discharge and urgency.   Musculoskeletal:  Positive for arthralgias. Negative for joint swelling and myalgias.   Skin:  Negative for rash.   Neurological:  Positive for dizziness. Negative for weakness, headaches and paresthesias.   Psychiatric/Behavioral:  Negative for mood changes. The patient is not nervous/anxious.        OBJECTIVE:   /80   Pulse 51   Temp 97.4  F (36.3  C) (Temporal)   Resp 16   Ht 1.702 m (5' 7.01\")   Wt 85.7 kg (188 lb 14.4 oz)   SpO2 96%   BMI 29.58 kg/m   Estimated body mass index is 29.58 kg/m  as calculated from the following:    Height as of this encounter: 1.702 m (5' 7.01\").    Weight as of this encounter: 85.7 kg (188 lb 14.4 oz).  Physical Exam  GENERAL: healthy, alert and no distress  EYES: Eyes grossly normal to inspection, PERRL and conjunctivae and sclerae normal  HENT: ear canals and TM's normal, nose and mouth without ulcers or lesions  NECK: no adenopathy, no asymmetry, masses, or scars and thyroid normal to palpation  RESP: lungs clear to auscultation - no rales, rhonchi or wheezes  CV: regular rate and rhythm, normal S1 S2, no S3 or S4, no murmur, click or rub, no peripheral edema " and peripheral pulses strong  ABDOMEN: soft, nontender, no hepatosplenomegaly, no masses and bowel sounds normal  MS: no gross musculoskeletal defects noted, no edema  SKIN: no suspicious lesions or rashes  NEURO: Normal strength and tone, mentation intact and speech normal  PSYCH: mentation appears normal, affect normal/bright    Diagnostic Test Results:  Labs reviewed in Epic    ASSESSMENT / PLAN:   Homero was seen today for physical.    Diagnoses and all orders for this visit:    Encounter for Medicare annual wellness exam  -     Basic metabolic panel  (Ca, Cl, CO2, Creat, Gluc, K, Na, BUN); Future  -     Basic metabolic panel  (Ca, Cl, CO2, Creat, Gluc, K, Na, BUN)    Screen for colon cancer  -     Colonoscopy Screening  Referral; Future    Hyperlipidemia with target LDL less than 130  Repeat lipids today.  Plan to continue simvastatin and focus on diet and exercise as discussed previously.  -     Lipid panel reflex to direct LDL Non-fasting; Future  -     Lipid panel reflex to direct LDL Non-fasting    DDD (degenerative disc disease), cervical    Other specified glaucoma, unspecified laterality    Chronic low back pain without sciatica, unspecified back pain laterality    Post-traumatic osteoarthritis of both knees  Chronic pain of both shoulders  Follows with orthopedics and does well with injection  Family history of colonic polyps    Heart murmur  PVC's (premature ventricular contractions)  Previous echo and Holter monitor done with cardiology.  Not bothersome.  Monitor for worsening symptoms.    Other orders  -     REVIEW OF HEALTH MAINTENANCE PROTOCOL ORDERS  -     PRIMARY CARE FOLLOW-UP SCHEDULING; Future        Patient has been advised of split billing requirements and indicates understanding: Yes      COUNSELING:  Reviewed preventive health counseling, as reflected in patient instructions       Consider AAA screening for ages 65-75 and smoking history       Regular exercise       Healthy  "diet/nutrition       Vision screening       Hearing screening       Dental care       Bladder control       Fall risk prevention       Immunizations       Alcohol Use        Safe sex practices/STD prevention       Hepatitis C screening       HIV screening for high risk patient       Consider lung cancer screening for ages 55-80 years (77 for Medicare) and 20 pack-year smoking history       Colon cancer screening       Prostate cancer screening      BMI:   Estimated body mass index is 29.58 kg/m  as calculated from the following:    Height as of this encounter: 1.702 m (5' 7.01\").    Weight as of this encounter: 85.7 kg (188 lb 14.4 oz).   Weight management plan: Discussed healthy diet and exercise guidelines      He reports that he quit smoking about 36 years ago. His smoking use included cigarettes. He has a 30 pack-year smoking history. He has never used smokeless tobacco.      Appropriate preventive services were discussed with this patient, including applicable screening as appropriate for fall prevention, nutrition, physical activity, Tobacco-use cessation, weight loss and cognition.  Checklist reviewing preventive services available has been given to the patient.    Reviewed patients plan of care and provided an AVS. The Basic Care Plan (routine screening as documented in Health Maintenance) for Deven meets the Care Plan requirement. This Care Plan has been established and reviewed with the Patient.          Bigg Morgan MD  [facility]    Identified Health Risks:  The patient was counseled and encouraged to consider modifying their diet and eating habits. He was provided with information on recommended healthy diet options.  "

## 2024-01-04 ENCOUNTER — TELEPHONE (OUTPATIENT)
Dept: GASTROENTEROLOGY | Facility: CLINIC | Age: 69
End: 2024-01-04
Payer: MEDICARE

## 2024-01-04 NOTE — TELEPHONE ENCOUNTER
"Endoscopy Scheduling Screen    Have you had a positive Covid test in the last 14 days?  No    Are you active on MyChart?   Yes    What insurance is in the chart?  Other:  MEDICARE    Ordering/Referring Provider: Bigg Morgan MD     (If ordering provider performs procedure, schedule with ordering provider unless otherwise instructed. )    BMI: Estimated body mass index is 29.58 kg/m  as calculated from the following:    Height as of 12/29/23: 1.702 m (5' 7.01\").    Weight as of 12/29/23: 85.7 kg (188 lb 14.4 oz).     Sedation Ordered  moderate sedation.   If patient BMI > 50 do not schedule in [facility].    If patient BMI > 45 do not schedule at [facility].    Are you taking methadone or Suboxone?  No    Are you taking any prescription medications for pain 3 or more times per week?   NO - No RN review required.    Do you have a history of malignant hyperthermia or adverse reaction to anesthesia?  No    (Females) Are you currently pregnant?   No     Have you been diagnosed or told you have pulmonary hypertension?   No    Do you have an LVAD?  No    Have you been told you have moderate to severe sleep apnea?  No    Have you been told you have COPD, asthma, or any other lung disease?  No    Do you have any heart conditions?  No     Have you ever had an organ transplant?   No    Have you ever had or are you awaiting a heart or lung transplant?   No    Have you had a stroke or transient ischemic attack (TIA aka \"mini stroke\" in the last 6 months?   No    Have you been diagnosed with or been told you have cirrhosis of the liver?   No    Are you currently on dialysis?   No    Do you need assistance transferring?   No    BMI: Estimated body mass index is 29.58 kg/m  as calculated from the following:    Height as of 12/29/23: 1.702 m (5' 7.01\").    Weight as of 12/29/23: 85.7 kg (188 lb 14.4 oz).     Is patients BMI > 40 and scheduling location [facility]?  No    Do you take an injectable medication for weight loss or diabetes " (excluding insulin)?  No    Do you take the medication Naltrexone?  No    Do you take blood thinners?  No       Prep   Are you currently on dialysis or do you have chronic kidney disease?  No    Do you have a diagnosis of diabetes?  No    Do you have a diagnosis of cystic fibrosis (CF)?  No    On a regular basis do you go 3 -5 days between bowel movements?  No    BMI > 40?  No    Preferred Pharmacy:      [facility] [id number]  [address] [facility] [place] [address]  Phone: [phone] Fax: [phone]      Final Scheduling Details   Colonoscopy prep sent?  Standard MiraLAX    Procedure scheduled  Colonoscopy    Surgeon:  ROSENDO     Date of procedure:  2/5     Pre-OP / PAC:   No - Not required for this site.    Location  [facility] - Patient preference.    Sedation   MAC/Deep Sedation  PER LOCATION      Patient Reminders:   You will receive a call from a Nurse to review instructions and health history.  This assessment must be completed prior to your procedure.  Failure to complete the Nurse assessment may result in the procedure being cancelled.      On the day of your procedure, please designate an adult(s) who can drive you home stay with you for the next 24 hours. The medicines used in the exam will make you sleepy. You will not be able to drive.      You cannot take public transportation, ride share services, or non-medical taxi service without a responsible caregiver.  Medical transport services are allowed with the requirement that a responsible caregiver will receive you at your destination.  We require that drivers and caregivers are confirmed prior to your procedure.

## 2024-01-05 ENCOUNTER — TELEPHONE (OUTPATIENT)
Dept: GASTROENTEROLOGY | Facility: CLINIC | Age: 69
End: 2024-01-05
Payer: MEDICARE

## 2024-01-05 NOTE — TELEPHONE ENCOUNTER
Caller:   Reason for Reschedule/Cancellation (please be detailed, any staff messages or encounters to note?): WIFE HAS SURGERY      Prior to reschedule please review:  Ordering Provider:     NIESHA SALINAS     Sedation per order: CS  Does patient have any ASC Exclusions, please identify?:       Notes on Cancelled Procedure:  Procedure: Lower Endoscopy [Colonoscopy]   Date: 2/5  Location: [facility]; [address] [place] [address] [place] [address]  Surgeon: ROSENDO      Rescheduled: Yes  Procedure: Lower Endoscopy [Colonoscopy]   Date: 3/4  Location: [facility]; [address] [place] [address] , [place] [address]  Surgeon: ROSENDO  Sedation Level Scheduled  MAC,  Reason for Sedation Level PH  Prep/Instructions updated and sent: N       Send In - basket message to Panc - Melvin Pool if [facility]  procedure is canceled or rescheduled: [ N/A, YES or NO]

## 2024-01-26 ENCOUNTER — NURSE TRIAGE (OUTPATIENT)
Dept: FAMILY MEDICINE | Facility: CLINIC | Age: 69
End: 2024-01-26
Payer: MEDICARE

## 2024-01-26 ENCOUNTER — MYC MEDICAL ADVICE (OUTPATIENT)
Dept: FAMILY MEDICINE | Facility: CLINIC | Age: 69
End: 2024-01-26
Payer: MEDICARE

## 2024-01-26 DIAGNOSIS — R00.0 TACHYCARDIA: Primary | ICD-10-CM

## 2024-01-26 DIAGNOSIS — R00.2 PALPITATIONS: ICD-10-CM

## 2024-01-26 NOTE — TELEPHONE ENCOUNTER
Patient reports he has been having a spike in his heart rate during his regular work outs that has been going higher than usual and spiking quickly. He reports it goes up to 180-190's and he has to stop what he is doing so it comes down.    He denies chest pain or worsening shortness of breath. No worsening palpitations. No heart rate increases at rest. He is asking if he needs a stress test or further testing done to ensure nothing more is going on.    Last stress test was in 2014. Last office visit was 12/29/2023.    Jarrod Brown,TRUDIN, RN        Homero LUONG [employer] Messages (supporting Bigg Morgan MD)2 hours ago (11:12 AM)       Good morning Dr. Pride.  Do you think you can refer me for a stress test when I am working out recently my heart rate really spikes up to the upper 180s close to the low 190s I have to stop and let my heart rate come down before I continue something just doesn t feel right . let me know what you think. Thanks.       Additional Information   Negative: Passed out (i.e., lost consciousness, collapsed and was not responding)   Negative: Shock suspected (e.g., cold/pale/clammy skin, too weak to stand, low BP, rapid pulse)   Negative: Difficult to awaken or acting confused (e.g., disoriented, slurred speech)   Negative: Visible sweat on face or sweat dripping down face   Negative: Unable to walk, or can only walk with assistance (e.g., requires support)   Negative: Received SHOCK from implantable cardiac defibrillator and has persisting symptoms (i.e., palpitations, lightheadedness)   Negative: Dizziness, lightheadedness, or weakness and heart beating very rapidly (e.g., > 140 / minute)   Negative: Dizziness, lightheadedness, or weakness and heart beating very slowly (e.g., < 50 / minute)   Negative: Sounds like a life-threatening emergency to the triager   Negative: Chest pain   Negative: Difficulty breathing   Negative: Dizziness, lightheadedness, or weakness   Negative:  Heart beating very rapidly (e.g., > 140 / minute) and present now  (Exception: During exercise.)   Negative: Heart beating very slowly (e.g., < 50 / minute)  (Exception: Athlete and heart rate normal for caller.)   Negative: New or worsened shortness of breath with activity (dyspnea on exertion)   Negative: Patient sounds very sick or weak to the triager   Negative: Wearing a 'Holter monitor' or 'cardiac event monitor'   Negative: Received SHOCK from implantable cardiac defibrillator (and now feels well)   Negative: Heart beating very rapidly (e.g., > 140 / minute) and not present now  (Exception: During exercise.)   Negative: Skipped or extra beat(s) and increases with exercise or exertion   Negative: Skipped or extra beat(s) and occurs 4 or more times per minute   Negative: History of heart disease (i.e., heart attack, bypass surgery, angina, angioplasty)  (Exception: Brief heartbeat symptoms that went away and now feels well.)   Negative: Age > 60 years  (Exception: Brief heartbeat symptoms that went away and now feels well.)   Negative: Taking water pill (i.e., diuretic) or heart medication (e.g., digoxin)   Negative: Patient wants to be seen   Negative: Heart rhythm alert (e.g., 'you have irregular heartbeat') from personal wearable device (e.g., Apple Watch)   Negative: History of hyperthyroidism or taking thyroid medication   Negative: Substance use (drug use) or misuse, known or suspected   Negative: ADHD and taking stimulant medication   Negative: Palpitations and no improvement after following Care Advice    Protocols used: Heart Rate and Heartbeat [id number]

## 2024-01-31 NOTE — TELEPHONE ENCOUNTER
Phoned patient and informed him of documentation per Dr. Cathy MD as stated below.  Patient stated understanding.    Apoorva Holley RN

## 2024-02-07 ENCOUNTER — HOSPITAL ENCOUNTER (OUTPATIENT)
Dept: CARDIOLOGY | Facility: CLINIC | Age: 69
Discharge: HOME | End: 2024-02-07
Attending: STUDENT IN AN ORGANIZED HEALTH CARE EDUCATION/TRAINING PROGRAM | Admitting: STUDENT IN AN ORGANIZED HEALTH CARE EDUCATION/TRAINING PROGRAM
Payer: MEDICARE

## 2024-02-07 DIAGNOSIS — R00.2 PALPITATIONS: ICD-10-CM

## 2024-02-07 DIAGNOSIS — R00.0 TACHYCARDIA: ICD-10-CM

## 2024-02-07 PROCEDURE — 93016 CV STRESS TEST SUPVJ ONLY: CPT | Performed by: INTERNAL MEDICINE

## 2024-02-07 PROCEDURE — 93325 DOPPLER ECHO COLOR FLOW MAPG: CPT | Mod: 26 | Performed by: INTERNAL MEDICINE

## 2024-02-07 PROCEDURE — 93321 DOPPLER ECHO F-UP/LMTD STD: CPT | Mod: 26 | Performed by: INTERNAL MEDICINE

## 2024-02-07 PROCEDURE — 93018 CV STRESS TEST I&R ONLY: CPT | Performed by: INTERNAL MEDICINE

## 2024-02-07 PROCEDURE — 93350 STRESS TTE ONLY: CPT | Mod: 26 | Performed by: INTERNAL MEDICINE

## 2024-02-07 PROCEDURE — 93325 DOPPLER ECHO COLOR FLOW MAPG: CPT | Mod: TC

## 2024-02-19 DIAGNOSIS — M17.0 BILATERAL PRIMARY OSTEOARTHRITIS OF KNEE: Primary | ICD-10-CM

## 2024-02-26 ENCOUNTER — OFFICE VISIT (OUTPATIENT)
Dept: ORTHOPEDICS | Facility: CLINIC | Age: 69
End: 2024-02-26
Payer: MEDICARE

## 2024-02-26 ENCOUNTER — ANCILLARY PROCEDURE (OUTPATIENT)
Dept: GENERAL RADIOLOGY | Facility: CLINIC | Age: 69
End: 2024-02-26
Attending: FAMILY MEDICINE
Payer: MEDICARE

## 2024-02-26 DIAGNOSIS — M76.51 PATELLAR TENDINITIS OF BOTH KNEES: Primary | ICD-10-CM

## 2024-02-26 DIAGNOSIS — M17.0 BILATERAL PRIMARY OSTEOARTHRITIS OF KNEE: ICD-10-CM

## 2024-02-26 DIAGNOSIS — M76.52 PATELLAR TENDINITIS OF BOTH KNEES: Primary | ICD-10-CM

## 2024-02-26 PROCEDURE — 73564 X-RAY EXAM KNEE 4 OR MORE: CPT | Mod: LT | Performed by: RADIOLOGY

## 2024-02-26 PROCEDURE — 99214 OFFICE O/P EST MOD 30 MIN: CPT | Performed by: FAMILY MEDICINE

## 2024-02-26 RX ORDER — MELOXICAM 7.5 MG/1
TABLET ORAL
Qty: 30 TABLET | Refills: 1 | Status: SHIPPED | OUTPATIENT
Start: 2024-02-26 | End: 2024-07-18

## 2024-02-26 ASSESSMENT — PAIN SCALES - GENERAL: PAINLEVEL: MILD PAIN (3)

## 2024-02-26 NOTE — PROGRESS NOTES
CHIEF COMPLAINT:  Pain and Follow Up of the Left Knee and Pain and Follow Up of the Right Knee       HISTORY OF PRESENT ILLNESS  Mr. Smith is a pleasant 68 year old male who presents to clinic today with bilateral knee pain.  Homero has been experiencing bilateral knee pain that has been worsening since replacing struts on his daughter's car about 6 weeks ago.  His left knee feels worse than his right, he points to the anterior inferior knee where he feels severe tenderness.  He feels pain when going up and down stairs, he cannot participate in workouts due to the pain.        Additional history: as documented    MEDICAL HISTORY  Patient Active Problem List   Diagnosis    Other specified glaucoma    Allergic rhinitis    Family history of other cardiovascular diseases    Labyrinthitis    Hyperlipidemia with target LDL less than 130    Advanced directives, counseling/discussion    DDD (degenerative disc disease), cervical    Supraspinatus tendon tear, left, subsequent encounter    Cervical disc disorder with radiculopathy of cervical region    Postprandial epigastric pain    Heart murmur    Family history of colonic polyps    Chronic pain of both shoulders    Post-traumatic osteoarthritis of both knees    Chronic low back pain without sciatica, unspecified back pain laterality    PVC's (premature ventricular contractions)       Current Outpatient Medications   Medication Sig Dispense Refill    fluticasone (FLONASE) 50 MCG/ACT nasal spray Spray 2 sprays into both nostrils daily 16 g 1    latanoprost (XALATAN) 0.005 % ophthalmic solution Place 0.005 drops into both eyes At Bedtime      losartan (COZAAR) 25 MG tablet Take 1 tablet (25 mg) by mouth daily 90 tablet 0    meloxicam (MOBIC) 7.5 MG tablet Take daily for 2 weeks, then as needed daily 30 tablet 1    Misc Natural Products (GLUCOSAMINE CHONDROITIN ADV PO) Take 6 tablets by mouth daily.      MULTI-DAY VITAMINS OR TABS 1 tablet daily 0 0    Omega-3 Fatty Acids  (FISH OIL) 1200 MG capsule Take 1,200 mg by mouth daily as needed      simvastatin (ZOCOR) 20 MG tablet Take 1 tablet (20 mg) by mouth At Bedtime 90 tablet 3       Allergies   Allergen Reactions    Seasonal Allergies        Family History   Problem Relation Age of Onset    Allergies Brother         on meds    Arthritis Mother     Depression Mother         on meds. is in nursing home    Hypertension Mother     Osteoporosis Mother     Heart Disease Father          at age 57  ? MI--no autopsy    Cancer Father         jaw--smoked [place]    Hypertension Brother     Osteoporosis Sister         on meds    Family History Negative Other         No breast or colon cancer hx in family    Hypertension Sister     Osteoporosis Brother     Osteoporosis Brother        Additional medical/Social/Surgical histories reviewed in [facility] and updated as appropriate.        PHYSICAL EXAM  General  - normal appearance, in no obvious distress  Musculoskeletal - right and left knee  - stance: normal gait without limp  - inspection: no swelling or effusion  - palpation: no joint line tenderness, normal popliteal pulse, tender over central aspect of the patellar tendon, L > R  - ROM: 135 degrees flexion, -5 degrees extension, painful passive flexion terminally  - strength: 5/5 in flexion, 5/5 in extension  - special tests:  (-) Lachman  (-) Brenden   (-) varus at 0 and 30 degrees flexion  (-) valgus at 0 and 30 degrees flexion  Neuro  - no sensory or motor deficit, grossly normal coordination, normal muscle tone             ASSESSMENT & PLAN  Mr. Smith is a 68 year old male who presents to clinic today with bilateral knee pain.    I ordered and independently reviewed an xray of his knees, this reveals no significant change compared to his previous x-rays.    His symptoms are consistent with patellar tendinitis.  I am referring him to PT, we also discussed the use of a patellar strap, diclofenac gel, and an oral  anti-inflammatory.  I am prescribing a short-term oral anti-inflammatory to help with the pain.    If his symptoms are not improving whatsoever I may consider either advanced imaging or injection based therapies such as PRP or Tenex in the future.    It was a pleasure seeing Homero today.    Taz Maurice DO, [facility]  Primary Care Sports Medicine      This note was constructed using Dragon dictation software, please excuse any minor errors in spelling, grammar, or syntax.

## 2024-02-26 NOTE — LETTER
2/26/2024         RE: Deven Smith  [id number] [address] [id number]        Dear Colleague,    Thank you for referring your patient, Deven Smith, to the [facility] [place]. Please see a copy of my visit note below.    CHIEF COMPLAINT:  Pain and Follow Up of the Left Knee and Pain and Follow Up of the Right Knee       HISTORY OF PRESENT ILLNESS  Mr. Smith is a pleasant 68 year old male who presents to clinic today with bilateral knee pain.  Homero has been experiencing bilateral knee pain that has been worsening since replacing struts on his daughter's car about 6 weeks ago.  His left knee feels worse than his right, he points to the anterior inferior knee where he feels severe tenderness.  He feels pain when going up and down stairs, he cannot participate in workouts due to the pain.        Additional history: as documented    MEDICAL HISTORY  Patient Active Problem List   Diagnosis     Other specified glaucoma     Allergic rhinitis     Family history of other cardiovascular diseases     Labyrinthitis     Hyperlipidemia with target LDL less than 130     Advanced directives, counseling/discussion     DDD (degenerative disc disease), cervical     Supraspinatus tendon tear, left, subsequent encounter     Cervical disc disorder with radiculopathy of cervical region     Postprandial epigastric pain     Heart murmur     Family history of colonic polyps     Chronic pain of both shoulders     Post-traumatic osteoarthritis of both knees     Chronic low back pain without sciatica, unspecified back pain laterality     PVC's (premature ventricular contractions)       Current Outpatient Medications   Medication Sig Dispense Refill     fluticasone (FLONASE) 50 MCG/ACT nasal spray Spray 2 sprays into both nostrils daily 16 g 1     latanoprost (XALATAN) 0.005 % ophthalmic solution Place 0.005 drops into both eyes At Bedtime       losartan (COZAAR) 25 MG tablet Take 1 tablet  (25 mg) by mouth daily 90 tablet 0     meloxicam (MOBIC) 7.5 MG tablet Take daily for 2 weeks, then as needed daily 30 tablet 1     Misc Natural Products (GLUCOSAMINE CHONDROITIN ADV PO) Take 6 tablets by mouth daily.       MULTI-DAY VITAMINS OR TABS 1 tablet daily 0 0     Omega-3 Fatty Acids (FISH OIL) 1200 MG capsule Take 1,200 mg by mouth daily as needed       simvastatin (ZOCOR) 20 MG tablet Take 1 tablet (20 mg) by mouth At Bedtime 90 tablet 3       Allergies   Allergen Reactions     Seasonal Allergies        Family History   Problem Relation Age of Onset     Allergies Brother         on meds     Arthritis Mother      Depression Mother         on meds. is in nursing home     Hypertension Mother      Osteoporosis Mother      Heart Disease Father          at age 57  ? MI--no autopsy     Cancer Father         jaw--smoked [place]     Hypertension Brother      Osteoporosis Sister         on meds     Family History Negative Other         No breast or colon cancer hx in family     Hypertension Sister      Osteoporosis Brother      Osteoporosis Brother        Additional medical/Social/Surgical histories reviewed in [facility] and updated as appropriate.        PHYSICAL EXAM  General  - normal appearance, in no obvious distress  Musculoskeletal - right and left knee  - stance: normal gait without limp  - inspection: no swelling or effusion  - palpation: no joint line tenderness, normal popliteal pulse, tender over central aspect of the patellar tendon, L > R  - ROM: 135 degrees flexion, -5 degrees extension, painful passive flexion terminally  - strength: 5/5 in flexion, 5/5 in extension  - special tests:  (-) Lachman  (-) Brenden   (-) varus at 0 and 30 degrees flexion  (-) valgus at 0 and 30 degrees flexion  Neuro  - no sensory or motor deficit, grossly normal coordination, normal muscle tone             ASSESSMENT & PLAN  Mr. Simth is a 68 year old male who presents to clinic today with bilateral knee  pain.    I ordered and independently reviewed an xray of his knees, this reveals no significant change compared to his previous x-rays.    His symptoms are consistent with patellar tendinitis.  I am referring him to PT, we also discussed the use of a patellar strap, diclofenac gel, and an oral anti-inflammatory.  I am prescribing a short-term oral anti-inflammatory to help with the pain.    If his symptoms are not improving whatsoever I may consider either advanced imaging or injection based therapies such as PRP or Tenex in the future.    It was a pleasure seeing Homero today.    Taz Maurice DO, [facility]  Primary Care Sports Medicine      This note was constructed using Dragon dictation software, please excuse any minor errors in spelling, grammar, or syntax.      Again, thank you for allowing me to participate in the care of your patient.        Sincerely,        Taz Maurice DO

## 2024-02-26 NOTE — NURSING NOTE
Chief Complaint   Patient presents with    Left Knee - Pain, Follow Up    Right Knee - Pain, Follow Up       There were no vitals filed for this visit.    There is no height or weight on file to calculate BMI.      JUSTICE Wyatt NREMT                       Z Plasty Text: The lesion was extirpated to the level of the fat with a #15 scalpel blade.  Given the location of the defect, shape of the defect and the proximity to free margins a Z-plasty was deemed most appropriate for repair.  Using a sterile surgical marker, the appropriate transposition arms of the Z-plasty were drawn incorporating the defect and placing the expected incisions within the relaxed skin tension lines where possible.    The area thus outlined was incised deep to adipose tissue with a #15 scalpel blade.  The skin margins were undermined to an appropriate distance in all directions utilizing iris scissors.  The opposing transposition arms were then transposed into place in opposite direction and anchored with interrupted buried subcutaneous sutures.

## 2024-03-04 ENCOUNTER — ANESTHESIA (OUTPATIENT)
Dept: GASTROENTEROLOGY | Facility: CLINIC | Age: 69
End: 2024-03-04
Payer: MEDICARE

## 2024-03-04 ENCOUNTER — ANESTHESIA EVENT (OUTPATIENT)
Dept: GASTROENTEROLOGY | Facility: CLINIC | Age: 69
End: 2024-03-04
Payer: MEDICARE

## 2024-03-04 ENCOUNTER — HOSPITAL ENCOUNTER (OUTPATIENT)
Facility: CLINIC | Age: 69
Discharge: HOME | End: 2024-03-04
Attending: SURGERY | Admitting: SURGERY
Payer: MEDICARE

## 2024-03-04 VITALS
DIASTOLIC BLOOD PRESSURE: 63 MMHG | RESPIRATION RATE: 16 BRPM | OXYGEN SATURATION: 96 % | TEMPERATURE: 97.5 F | HEART RATE: 53 BPM | SYSTOLIC BLOOD PRESSURE: 102 MMHG

## 2024-03-04 LAB — COLONOSCOPY: NORMAL

## 2024-03-04 PROCEDURE — 250N000009 HC RX 250: Performed by: NURSE ANESTHETIST, CERTIFIED REGISTERED

## 2024-03-04 PROCEDURE — 88305 TISSUE EXAM BY PATHOLOGIST: CPT | Mod: TC | Performed by: SURGERY

## 2024-03-04 PROCEDURE — 88305 TISSUE EXAM BY PATHOLOGIST: CPT | Mod: 26 | Performed by: PATHOLOGY

## 2024-03-04 PROCEDURE — 250N000011 HC RX IP 250 OP 636: Performed by: NURSE ANESTHETIST, CERTIFIED REGISTERED

## 2024-03-04 PROCEDURE — 258N000003 HC RX IP 258 OP 636: Performed by: NURSE ANESTHETIST, CERTIFIED REGISTERED

## 2024-03-04 PROCEDURE — 370N000017 HC ANESTHESIA TECHNICAL FEE, PER MIN: Performed by: SURGERY

## 2024-03-04 PROCEDURE — 45385 COLONOSCOPY W/LESION REMOVAL: CPT | Performed by: SURGERY

## 2024-03-04 RX ORDER — ONDANSETRON 2 MG/ML
4 INJECTION INTRAMUSCULAR; INTRAVENOUS EVERY 30 MIN PRN
Status: DISCONTINUED | OUTPATIENT
Start: 2024-03-04 | End: 2024-03-04 | Stop reason: HOSPADM

## 2024-03-04 RX ORDER — ONDANSETRON 2 MG/ML
4 INJECTION INTRAMUSCULAR; INTRAVENOUS EVERY 6 HOURS PRN
Status: CANCELLED | OUTPATIENT
Start: 2024-03-04

## 2024-03-04 RX ORDER — LIDOCAINE HYDROCHLORIDE 20 MG/ML
INJECTION, SOLUTION INFILTRATION; PERINEURAL PRN
Status: DISCONTINUED | OUTPATIENT
Start: 2024-03-04 | End: 2024-03-04

## 2024-03-04 RX ORDER — PROCHLORPERAZINE MALEATE 5 MG
5 TABLET ORAL EVERY 6 HOURS PRN
Status: CANCELLED | OUTPATIENT
Start: 2024-03-04

## 2024-03-04 RX ORDER — NALOXONE HYDROCHLORIDE 0.4 MG/ML
0.2 INJECTION, SOLUTION INTRAMUSCULAR; INTRAVENOUS; SUBCUTANEOUS
Status: CANCELLED | OUTPATIENT
Start: 2024-03-04

## 2024-03-04 RX ORDER — ONDANSETRON 4 MG/1
4 TABLET, ORALLY DISINTEGRATING ORAL EVERY 30 MIN PRN
Status: DISCONTINUED | OUTPATIENT
Start: 2024-03-04 | End: 2024-03-04 | Stop reason: HOSPADM

## 2024-03-04 RX ORDER — LIDOCAINE 40 MG/G
CREAM TOPICAL
Status: DISCONTINUED | OUTPATIENT
Start: 2024-03-04 | End: 2024-03-04 | Stop reason: HOSPADM

## 2024-03-04 RX ORDER — PROPOFOL 10 MG/ML
INJECTION, EMULSION INTRAVENOUS CONTINUOUS PRN
Status: DISCONTINUED | OUTPATIENT
Start: 2024-03-04 | End: 2024-03-04

## 2024-03-04 RX ORDER — ONDANSETRON 2 MG/ML
4 INJECTION INTRAMUSCULAR; INTRAVENOUS
Status: DISCONTINUED | OUTPATIENT
Start: 2024-03-04 | End: 2024-03-04 | Stop reason: HOSPADM

## 2024-03-04 RX ORDER — NALOXONE HYDROCHLORIDE 0.4 MG/ML
0.1 INJECTION, SOLUTION INTRAMUSCULAR; INTRAVENOUS; SUBCUTANEOUS
Status: DISCONTINUED | OUTPATIENT
Start: 2024-03-04 | End: 2024-03-04 | Stop reason: HOSPADM

## 2024-03-04 RX ORDER — SODIUM CHLORIDE, SODIUM LACTATE, POTASSIUM CHLORIDE, CALCIUM CHLORIDE 600; 310; 30; 20 MG/100ML; MG/100ML; MG/100ML; MG/100ML
INJECTION, SOLUTION INTRAVENOUS CONTINUOUS
Status: DISCONTINUED | OUTPATIENT
Start: 2024-03-04 | End: 2024-03-04 | Stop reason: HOSPADM

## 2024-03-04 RX ORDER — NALOXONE HYDROCHLORIDE 0.4 MG/ML
0.4 INJECTION, SOLUTION INTRAMUSCULAR; INTRAVENOUS; SUBCUTANEOUS
Status: CANCELLED | OUTPATIENT
Start: 2024-03-04

## 2024-03-04 RX ORDER — ONDANSETRON 4 MG/1
4 TABLET, ORALLY DISINTEGRATING ORAL EVERY 6 HOURS PRN
Status: CANCELLED | OUTPATIENT
Start: 2024-03-04

## 2024-03-04 RX ORDER — FLUMAZENIL 0.1 MG/ML
0.2 INJECTION, SOLUTION INTRAVENOUS
Status: CANCELLED | OUTPATIENT
Start: 2024-03-04 | End: 2024-03-05

## 2024-03-04 RX ORDER — PROPOFOL 10 MG/ML
INJECTION, EMULSION INTRAVENOUS PRN
Status: DISCONTINUED | OUTPATIENT
Start: 2024-03-04 | End: 2024-03-04

## 2024-03-04 RX ADMIN — SODIUM CHLORIDE, POTASSIUM CHLORIDE, SODIUM LACTATE AND CALCIUM CHLORIDE: 600; 310; 30; 20 INJECTION, SOLUTION INTRAVENOUS at 13:01

## 2024-03-04 RX ADMIN — LIDOCAINE HYDROCHLORIDE 50 MG: 20 INJECTION, SOLUTION INFILTRATION; PERINEURAL at 13:09

## 2024-03-04 RX ADMIN — PROPOFOL 100 MG: 10 INJECTION, EMULSION INTRAVENOUS at 13:09

## 2024-03-04 RX ADMIN — PROPOFOL 150 MCG/KG/MIN: 10 INJECTION, EMULSION INTRAVENOUS at 13:01

## 2024-03-04 ASSESSMENT — LIFESTYLE VARIABLES: TOBACCO_USE: 1

## 2024-03-04 ASSESSMENT — ACTIVITIES OF DAILY LIVING (ADL)
ADLS_ACUITY_SCORE: 35
ADLS_ACUITY_SCORE: 33

## 2024-03-04 NOTE — ANESTHESIA PREPROCEDURE EVALUATION
Anesthesia Pre-Procedure Evaluation    Patient: Deven Smith   MRN: [id number] : 1955        Procedure : Procedure(s):  Colonoscopy          Past Medical History:   Diagnosis Date    DDD (degenerative disc disease), cervical     Hyperlipidemia LDL goal < 130       Past Surgical History:   Procedure Laterality Date    COLONOSCOPY  2007    Repeat  for screening purposes in 10 yrs.    COLONOSCOPY  2013    Procedure: COLONOSCOPY;  Colonoscopy;  Surgeon: Emmett San MD;  Location: [facility]    COLONOSCOPY N/A 2018    Procedure: COLONOSCOPY;  colonoscopy;  Surgeon: Israel Moreland MD;  Location: [facility]    ESOPHAGOSCOPY, GASTROSCOPY, DUODENOSCOPY (EGD), COMBINED N/A 2022    Procedure: ESOPHAGOGASTRODUODENOSCOPY (EGD);  Surgeon: Antoinette Lagunas MD;  Location: [facility]    HC SHLDR ARTHROSCOP,SURG,W/ROTAT CUFF REPR Left     HC VASECTOMY UNILAT/BILAT W POSTOP SEMEN      Vasectomy      Allergies   Allergen Reactions    Seasonal Allergies       Social History     Tobacco Use    Smoking status: Former     Packs/day: 2.00     Years: 15.00     Additional pack years: 0.00     Total pack years: 30.00     Types: Cigarettes     Quit date: 1987     Years since quittin.3    Smokeless tobacco: Never   Substance Use Topics    Alcohol use: Not Currently     Comment: rare       Wt Readings from Last 1 Encounters:   23 85.7 kg (188 lb 14.4 oz)        Anesthesia Evaluation   Pt has had prior anesthetic. Type: MAC and General.    No history of anesthetic complications       ROS/MED HX  ENT/Pulmonary:     (+)           allergic rhinitis,     tobacco use, Past use,  30  Pack-Year Hx,                      Neurologic:       Cardiovascular:     (+) Dyslipidemia - -   -  - -                           valvular problems/murmurs      Previous cardiac testing   Echo: Date: 19 Results:  Interpretation Summary     There is mild to moderate concentric left ventricular hypertrophy.  Left  ventricular systolic function is normal.  The visual ejection fraction is estimated at 60-65%.  No regional wall motion abnormalities noted.  There is no comparison study available.    Stress Test:  Date: 2/7/24 Results:  Interpretation Summary  1. Exercise stress echocardiogram study is negative for ischemia at a  diagnostic level of peak stress (101% MPHR).  2. EKG at rest and with stress demonstrated no ischemic ST changes. No  exercise induced arrhythmias.  3. No chest pain with exercise.  4. Patient was hypertensive to start test (190/62 mmHg). With peak exercise BP  increased to 210/68 mmHg.  5. Above average functional capacity for age (14 METS).  6. Baseline screening echocardiogram demonstrated no significant valve  dysfunction. Normal appearing aortic root.  ______________________________________________________________________________  Stress  Exercise was stopped due to dyspnea.  The patient did not exhibit any symptoms during exercise.  There was a hypertensive BP response to exercise.  A moderately-high workload was achieved.  Target Heart Rate was achieved.  There were no ST segment changes observed with stress.  Normal left ventricular function and wall motion at rest and post-stress.  The visual ejection fraction is >70%.  Left ventricular cavity size decreases with exercise.  Global LV systolic function augments with exercise.     Baseline  Resting ECG is normal.  Normal left ventricular function and wall motion at rest.  The visual ejection fraction is estimated at 60-65%.    ECG Reviewed:  Date: 10/10/19 Results:  Sinus  Bradycardia   WITHIN NORMAL LIMITS    Cath:  Date: Results:      METS/Exercise Tolerance:     Hematologic:       Musculoskeletal:   (+)  arthritis,             GI/Hepatic:     (+)        bowel prep,            Renal/Genitourinary:  - neg Renal ROS     Endo:  - neg endo ROS     Psychiatric/Substance Use:       Infectious Disease:  - neg infectious disease ROS     Malignancy:  -  "neg malignancy ROS     Other:      (+)  , H/O Chronic Pain,         Physical Exam    Airway  airway exam normal      Mallampati: II   TM distance: > 3 FB   Neck ROM: full   Mouth opening: > 3 cm    Respiratory Devices and Support         Dental       (+) Minor Abnormalities - some fillings, tiny chips      Cardiovascular   cardiovascular exam normal       Rhythm and rate: regular and normal     Pulmonary   pulmonary exam normal        breath sounds clear to auscultation           OUTSIDE LABS:  CBC:   Lab Results   Component Value Date    WBC 4.7 03/01/2022    WBC 5.6 10/10/2019    HGB 13.4 03/01/2022    HGB 14.2 10/10/2019    HCT 40.1 03/01/2022    HCT 41.1 10/10/2019     03/01/2022     10/10/2019     BMP:   Lab Results   Component Value Date     12/29/2023     03/01/2022    POTASSIUM 4.4 12/29/2023    POTASSIUM 4.0 03/01/2022    CHLORIDE 102 12/29/2023    CHLORIDE 104 03/01/2022    CO2 28 12/29/2023    CO2 30 03/01/2022    BUN 25.6 (H) 12/29/2023    BUN 22 03/01/2022    CR 1.09 12/29/2023    CR 0.95 03/01/2022    GLC 99 12/29/2023    GLC 96 03/01/2022     COAGS: No results found for: \"PTT\", \"INR\", \"FIBR\"  POC: No results found for: \"BGM\", \"HCG\", \"HCGS\"  HEPATIC:   Lab Results   Component Value Date    ALBUMIN 3.7 03/01/2022    PROTTOTAL 6.7 (L) 03/01/2022    ALT 33 03/01/2022    AST 28 03/01/2022    ALKPHOS 63 03/01/2022    BILITOTAL 0.4 03/01/2022     OTHER:   Lab Results   Component Value Date    ROSIE 9.3 12/29/2023    LIPASE 64 (L) 03/01/2022    TSH 1.23 12/11/2019    T4 0.82 10/10/2019    CRP <2.9 03/01/2022    SED 5 03/01/2022       Anesthesia Plan    ASA Status:  2    NPO Status:  NPO Appropriate    Anesthesia Type: MAC.     - Reason for MAC: straight local not clinically adequate              Consents    Anesthesia Plan(s) and associated risks, benefits, and realistic alternatives discussed. Questions answered and patient/representative(s) expressed understanding.     - Discussed:   "   - Discussed with:  Patient      - Extended Intubation/Ventilatory Support Discussed: No.      - Patient is DNR/DNI Status: No     Use of blood products discussed: No .     Postoperative Care            Comments:               SCOTT Plummer CRNA    I have reviewed the pertinent notes and labs in the chart from the past 30 days and (re)examined the patient.  Any updates or changes from those notes are reflected in this note.

## 2024-03-04 NOTE — LETTER
Deven Smith  [id number] [address] [place] [id number]  March 8, 2024    Dear Deven,  This letter is to inform you of the results of your pathology report from your colonoscopy.  Your pathology report was:  Final Diagnosis   A. Colon, Descending, polyp, polypectomy:  -Tubular adenoma  -Negative for high-grade dysplasia and malignancy.   These are benign polyps. These types of polyps do carry a small risk of developing into a cancer over time if not removed. Yours were completely removed at the time of your colonoscopy. You should have another surveillance colonoscopy in 5 years.  If you have further questions please don t hesitate to call our clinic at [phone].   Sincerely,     Sanya Vieyra, DO

## 2024-03-04 NOTE — ANESTHESIA POSTPROCEDURE EVALUATION
Patient: Deven Smith    Procedure: Procedure(s):  Colonoscopy       Anesthesia Type:  MAC    Note:  Disposition: Outpatient   Postop Pain Control: Uneventful            Sign Out: Well controlled pain   PONV: No   Neuro/Psych: Uneventful            Sign Out: Acceptable/Baseline neuro status   Airway/Respiratory: Uneventful            Sign Out: Acceptable/Baseline resp. status   CV/Hemodynamics: Uneventful            Sign Out: Acceptable CV status   Other NRE: NONE   DID A NON-ROUTINE EVENT OCCUR? No    Event details/Postop Comments:  Pt was happy with anesthesia care.  No complications.  I will follow up with the pt if needed.           Last vitals:  Vitals Value Taken Time   BP 88/54 03/04/24 1328   Temp     Pulse 60 03/04/24 1328   Resp     SpO2 98 % 03/04/24 1337   Vitals shown include unfiled device data.    Electronically Signed By: SCOTT Crawford CRNA  March 4, 2024  1:41 PM

## 2024-03-04 NOTE — ANESTHESIA CARE TRANSFER NOTE
Patient: Deven Smith    Procedure: Procedure(s):  Colonoscopy       Diagnosis: Screen for colon cancer [Z12.11]  Diagnosis Additional Information: No value filed.    Anesthesia Type:   MAC     Note:    Oropharynx: oropharynx clear of all foreign objects and spontaneously breathing  Level of Consciousness: drowsy  Oxygen Supplementation: face mask    Independent Airway: airway patency satisfactory and stable  Dentition: dentition unchanged  Vital Signs Stable: post-procedure vital signs reviewed and stable  Report to RN Given: handoff report given  Patient transferred to: Phase II    Handoff Report: Identifed the Patient, Identified the Reponsible Provider, Reviewed the pertinent medical history, Discussed the surgical course, Reviewed Intra-OP anesthesia mangement and issues during anesthesia, Set expectations for post-procedure period and Allowed opportunity for questions and acknowledgement of understanding      Vitals:  Vitals Value Taken Time   BP     Temp     Pulse     Resp     SpO2         Electronically Signed By: SCOTT Crawford CRNA  March 4, 2024  1:26 PM

## 2024-03-04 NOTE — DISCHARGE INSTRUCTIONS
[facility]    Home Care Following Endoscopy          Activity:  You have just undergone an endoscopic procedure under sedation.  Do not work or operate machinery (including a car) for at least 12 hours.      Diet:  Return to the diet you were on before your procedure but eat lightly for the first 12-24 hours.  Drink plenty of water.  Resume any regular medications unless otherwise advised by your physician.  Please begin any new medication prescribed as a result of your procedure as directed by your physician.   If you had any biopsy or polyp removed please refrain from aspirin or aspirin products for 2 days.  If on Coumadin please restart as instructed by your physician.   Pain:  You may take Tylenol as needed for pain.  Expected Recovery:  You can expect some mild abdominal fullness and/or discomfort due to the air used to inflate your intestinal tract. I encourage you to walk and attempt to pass air as soon as possible.      Call Your Physician if You Have:    After Colonoscopy:  Worsening persisting abdominal pain which is worse with activity.  Fevers (>101 degrees F), chills or shakes.  Passage of continued blood with bowel movements.   Any questions or concerns about your recovery, please call [phone] or after hours [phone]-ALYSSA(MARIA GUADALUPE) [phone] Nurse Advice Line.    Follow-up Care:  If you had polyps/biopsy tissue sample(s) removed, the polyps/biopsy tissue sample(s) will be sent to pathology.  You should receive a letter in your My Chart with your results, within 1-2 weeks. If you do not participate in My Chart a physical letter will come in the mail in 2-3 weeks.  Please call if you have not received a notification of your results.

## 2024-03-04 NOTE — H&P
Patient seen for Endoscopy    HPI:  Patient is a 68 year old male w fam hx high risk polyps here for endoscopy. Not taking blood thinning medications. No MI or CVA history. No issues with previous sedation. No recent acute illness.    Review Of Systems    Skin: negative  Ears/Nose/Throat: negative  Respiratory: No shortness of breath, dyspnea on exertion, cough, or hemoptysis  Cardiovascular: negative  Gastrointestinal: negative  Genitourinary: negative  Musculoskeletal: negative  Neurologic: negative  Hematologic/Lymphatic/Immunologic: negative  Endocrine: negative      Past Medical History:   Diagnosis Date    DDD (degenerative disc disease), cervical     Hyperlipidemia LDL goal < 130        Past Surgical History:   Procedure Laterality Date    COLONOSCOPY  2007    Repeat  for screening purposes in 10 yrs.    COLONOSCOPY  2013    Procedure: COLONOSCOPY;  Colonoscopy;  Surgeon: Emmett San MD;  Location:  GI    COLONOSCOPY N/A 2018    Procedure: COLONOSCOPY;  colonoscopy;  Surgeon: Israel Moreland MD;  Location:  GI    ESOPHAGOSCOPY, GASTROSCOPY, DUODENOSCOPY (EGD), COMBINED N/A 2022    Procedure: ESOPHAGOGASTRODUODENOSCOPY (EGD);  Surgeon: Antoinette Lagunas MD;  Location:  GI    HC SHLDR ARTHROSCOP,SURG,W/ROTAT CUFF REPR Left     HC VASECTOMY UNILAT/BILAT W POSTOP SEMEN  1998    Vasectomy       Family History   Problem Relation Age of Onset    Allergies Brother         on meds    Arthritis Mother     Depression Mother         on meds. is in nursing home    Hypertension Mother     Osteoporosis Mother     Heart Disease Father          at age 57  ? MI--no autopsy    Cancer Father         jaw--smoked [place]    Hypertension Brother     Osteoporosis Sister         on meds    Family History Negative Other         No breast or colon cancer hx in family    Hypertension Sister     Osteoporosis Brother     Osteoporosis Brother        Social History     Socioeconomic  History    Marital status:      Spouse name: koko    Number of children: 2    Years of education: 13    Highest education level: Not on file   Occupational History    Occupation:    Tobacco Use    Smoking status: Former     Packs/day: 2.00     Years: 15.00     Additional pack years: 0.00     Total pack years: 30.00     Types: Cigarettes     Quit date: 1987     Years since quittin.3    Smokeless tobacco: Never   Vaping Use    Vaping Use: Never used   Substance and Sexual Activity    Alcohol use: Not Currently     Comment: rare     Drug use: No    Sexual activity: Yes     Partners: Female     Birth control/protection: Surgical     Comment: Vasectomy   Other Topics Concern     Service No    Blood Transfusions No    Caffeine Concern No     Comment: 3 cans soda/day    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    Hobby Hazards Yes     Comment: fishing-[employer] hunting    Sleep Concern Yes     Comment: is awake after 6 hrs of sleep-    Stress Concern No    Weight Concern No    Special Diet No    Back Care No    Exercise Yes     Comment: walks, does physical labor,    Bike Helmet No    Seat Belt Yes    Self-Exams No    Parent/sibling w/ CABG, MI or angioplasty before 65F 55M? No   Social History Narrative    Not on file     Social Determinants of Health     Financial Resource Strain: Low Risk  (2023)    Financial Resource Strain     Within the past 12 months, have you or your family members you live with been unable to get utilities (heat, electricity) when it was really needed?: No   Food Insecurity: Low Risk  (2023)    Food Insecurity     Within the past 12 months, did you worry that your food would run out before you got money to buy more?: No     Within the past 12 months, did the food you bought just not last and you didn t have money to get more?: No   Transportation Needs: Low Risk  (2023)    Transportation Needs     Within the past 12 months,  has lack of transportation kept you from medical appointments, getting your medicines, non-medical meetings or appointments, work, or from getting things that you need?: No   Physical Activity: Not on file   Stress: Not on file   Social Connections: Not on file   Interpersonal Safety: Low Risk  (12/29/2023)    Interpersonal Safety     Do you feel physically and emotionally safe where you currently live?: Yes     Within the past 12 months, have you been hit, slapped, kicked or otherwise physically hurt by someone?: No     Within the past 12 months, have you been humiliated or emotionally abused in other ways by your partner or ex-partner?: No   Housing Stability: Low Risk  (12/28/2023)    Housing Stability     Do you have housing? : Yes     Are you worried about losing your housing?: No       No current outpatient medications on file.       Medications and history reviewed    Physical exam:  Vitals: BP (!) 151/74   Temp 97.5  F (36.4  C) (Axillary)   Resp 20   SpO2 98%   BMI= There is no height or weight on file to calculate BMI.    Constitutional: Healthy, alert, non-distressed   Head: Normo-cephalic, atraumatic, no lesions, masses or tenderness   Cardiovascular: RRR, no new murmurs, +S1, +S2 heart sounds, no clicks, rubs or gallops   Respiratory: CTAB, no rales, rhonchi or wheezing, equal chest rise, good respiratory effort   Gastrointestinal: Soft, non-tender, non distended, no rebound rigidity or guarding, no masses or hernias palpated   : Deferred  Musculoskeletal: Moves all extremities, normal  strength, no deformities noted   Skin: No suspicious lesions or rashes   Psychiatric: Mentation appears normal, affect appropriate   Hematologic/Lymphatic/Immunologic: Normal cervical and supraclavicular lymph nodes   Patient able to get up on table without difficulty.    Labs show:  No results found for this or any previous visit (from the past 24 hour(s)).    Assessment: Endoscopy  Plan: Pt cleared for  anesthesia for proposed procedure.    Sanya Vieyra, DO

## 2024-03-05 ENCOUNTER — THERAPY VISIT (OUTPATIENT)
Dept: PHYSICAL THERAPY | Facility: CLINIC | Age: 69
End: 2024-03-05
Attending: FAMILY MEDICINE
Payer: MEDICARE

## 2024-03-05 DIAGNOSIS — M76.51 PATELLAR TENDINITIS OF BOTH KNEES: ICD-10-CM

## 2024-03-05 DIAGNOSIS — M76.52 PATELLAR TENDINITIS OF BOTH KNEES: ICD-10-CM

## 2024-03-05 PROCEDURE — 97161 PT EVAL LOW COMPLEX 20 MIN: CPT | Mod: GP

## 2024-03-05 PROCEDURE — 97110 THERAPEUTIC EXERCISES: CPT | Mod: GP

## 2024-03-05 ASSESSMENT — ACTIVITIES OF DAILY LIVING (ADL)
SQUAT: ACTIVITY IS SOMEWHAT DIFFICULT
KNEE_ACTIVITY_OF_DAILY_LIVING_SCORE: 68.57
LIMPING: I DO NOT HAVE THE SYMPTOM
PLEASE_INDICATE_YOR_PRIMARY_REASON_FOR_REFERRAL_TO_THERAPY:: KNEE
LIMPING: I DO NOT HAVE THE SYMPTOM
GO UP STAIRS: ACTIVITY IS SOMEWHAT DIFFICULT
KNEEL ON THE FRONT OF YOUR KNEE: ACTIVITY IS FAIRLY DIFFICULT
STIFFNESS: THE SYMPTOM AFFECTS MY ACTIVITY MODERATELY
WALK: ACTIVITY IS MINIMALLY DIFFICULT
SIT WITH YOUR KNEE BENT: ACTIVITY IS NOT DIFFICULT
GO DOWN STAIRS: ACTIVITY IS SOMEWHAT DIFFICULT
HOW_WOULD_YOU_RATE_THE_OVERALL_FUNCTION_OF_YOUR_KNEE_DURING_YOUR_USUAL_DAILY_ACTIVITIES?: NEARLY NORMAL
SWELLING: THE SYMPTOM AFFECTS MY ACTIVITY MODERATELY
GIVING WAY, BUCKLING OR SHIFTING OF KNEE: I DO NOT HAVE THE SYMPTOM
RISE FROM A CHAIR: ACTIVITY IS SOMEWHAT DIFFICULT
WALK: ACTIVITY IS MINIMALLY DIFFICULT
STIFFNESS: THE SYMPTOM AFFECTS MY ACTIVITY MODERATELY
RISE FROM A CHAIR: ACTIVITY IS SOMEWHAT DIFFICULT
WEAKNESS: I DO NOT HAVE THE SYMPTOM
RAW_SCORE: 48
STAND: ACTIVITY IS MINIMALLY DIFFICULT
AS_A_RESULT_OF_YOUR_KNEE_INJURY,_HOW_WOULD_YOU_RATE_YOUR_CURRENT_LEVEL_OF_DAILY_ACTIVITY?: ABNORMAL
GO UP STAIRS: ACTIVITY IS SOMEWHAT DIFFICULT
GO DOWN STAIRS: ACTIVITY IS SOMEWHAT DIFFICULT
WEAKNESS: I DO NOT HAVE THE SYMPTOM
HOW_WOULD_YOU_RATE_THE_OVERALL_FUNCTION_OF_YOUR_KNEE_DURING_YOUR_USUAL_DAILY_ACTIVITIES?: NEARLY NORMAL
SIT WITH YOUR KNEE BENT: ACTIVITY IS NOT DIFFICULT
KNEEL ON THE FRONT OF YOUR KNEE: ACTIVITY IS FAIRLY DIFFICULT
PAIN: THE SYMPTOM AFFECTS MY ACTIVITY MODERATELY
PAIN: THE SYMPTOM AFFECTS MY ACTIVITY MODERATELY
GIVING WAY, BUCKLING OR SHIFTING OF KNEE: I DO NOT HAVE THE SYMPTOM
STAND: ACTIVITY IS MINIMALLY DIFFICULT
SWELLING: THE SYMPTOM AFFECTS MY ACTIVITY MODERATELY
SQUAT: ACTIVITY IS SOMEWHAT DIFFICULT
AS_A_RESULT_OF_YOUR_KNEE_INJURY,_HOW_WOULD_YOU_RATE_YOUR_CURRENT_LEVEL_OF_DAILY_ACTIVITY?: ABNORMAL
KNEE_ACTIVITY_OF_DAILY_LIVING_SUM: 48

## 2024-03-05 NOTE — PROGRESS NOTES
PHYSICAL THERAPY EVALUATION  Type of Visit: Evaluation    See electronic medical record for Abuse and Falls Screening details.    Subjective       Presenting condition or subjective complaint: Left knee is very painful when I squat or get up from a sitting position  Date of onset: 02/26/24    Relevant medical history: Arthritis   Dates & types of surgery: Rotator cuff repair and bicep left shoulder 2010    Patient presents with bilateral knee pain but mostly on the left. Patient reports his pain increased about 2 months ago when kneeling on it working on a car. He had difficulty putting weight on the left about a week ago and has improved greatly since starting meds. He had injection October 2023 in both knees and noted improvement following. He has been doing Crossfit since 2015 but has not been doing any lower body workouts. He continues to work out daily just performing upper body workouts.     Prior diagnostic imaging/testing results: X-ray     Prior therapy history for the same diagnosis, illness or injury: No      Prior Level of Function  Transfers: Independent  Ambulation: Independent  ADL: Independent  IADL:     Living Environment  Social support: With a significant other or spouse   Type of home: House   Stairs to enter the home: Yes 3 Is there a railing: No   Ramp: No   Stairs inside the home: No       Help at home: None  Equipment owned:       Employment: Not Applicable    Hobbies/Interests:      Patient goals for therapy:      Pain assessment: See objective evaluation for additional pain details     Objective   KNEE EVALUATION  PAIN: Pain Level at Rest: 1/10  Pain Level with Use: 4/10  Pain Location: knee  Pain Quality: Aching, Nagging, and Sharp  Pain Frequency: constant  Pain is Worst: dependent on activity level  Pain is Exacerbated By: standing, going down stairs or hills, heat  Pain is Relieved By: cold, NSAIDs, and stretch  Pain Progression: Improved  INTEGUMENTARY (edema, incisions): WNL  POSTURE:  WNL  GAIT:  Weightbearing Status: WBAT  Assistive Device(s): None  Gait Deviations: WNL  BALANCE/PROPRIOCEPTION:   WEIGHTBEARING ALIGNMENT: WNL  NON-WEIGHTBEARING ALIGNMENT: WNL  ROM: AROM WNL    STRENGTH: Gross muscle strength with seated BLE MMT: 5/5 in all B hip, knee and ankle planes of motion.   FLEXIBILITY:   SPECIAL TESTS:    Left Right   Apley's (Meniscus)     Brenden's (Meniscus)     Tc's (ITB/TFL)     Patellar Apprehension Test Negative  Negative    Patella Tracking Lateral patellar tilt    Ligamentous Stability     Anterior Drawer (ACL) Negative,      Posterior Drawer (PCL) Negative,      Prone Dial Test at 30 Deg and 90 Deg (PCL/PLC)     Valgus Stress Testing at 0 Deg and 30 Deg     Varus Stress Testing at 0 Deg and 30 Deg        FUNCTIONAL TESTS: Double Leg Squat: Anterior knee translation  PALPATION: WNL  JOINT MOBILITY:    Left Right   Tib-Fib Proximal     Patellofemoral Medial Hypomobile    Patellofemoral Lateral WNL    Patellofemoral Superior WNL    Patellofemoral Inferior WNL             Assessment & Plan   CLINICAL IMPRESSIONS  Medical Diagnosis: Patellar tendinitis of both knees (M76.51, M76.52)    Treatment Diagnosis: Patellar tendinitis of both knees (M76.51, M76.52)   Impression/Assessment: Patient is a 68 year old male with bilateral knee pain complaints.  The following significant findings have been identified: Pain, Decreased ROM/flexibility, Impaired muscle performance, and Decreased activity tolerance. These impairments interfere with their ability to perform recreational activities and household mobility as compared to previous level of function.     Clinical Decision Making (Complexity):  Clinical Presentation: Stable/Uncomplicated  Clinical Presentation Rationale: based on medical and personal factors listed in PT evaluation  Clinical Decision Making (Complexity): Low complexity    PLAN OF CARE  Treatment Interventions:  Modalities: Cryotherapy, E-stim, Ultrasound  Interventions:  Gait Training, Manual Therapy, Neuromuscular Re-education, Therapeutic Activity, Therapeutic Exercise    Long Term Goals     PT Goal 1  Goal Identifier: KOS  Goal Description: Patient will report improved knee pain ratings and function as shown by increased KOS score of at least 7 points in order to show significant improvement and greater return to previous function and allow him to perform his workouts without limitations.  Rationale: to maximize safety and independence with performance of ADLs and functional tasks  Target Date: 05/14/24  PT Goal 2  Goal Identifier: Home programming  Goal Description: Patient will report good adherence with HEP for 5 weeks performing 5 of 7 days to improve pain and allow him to return to previous level of function without limitations.  Rationale: to maximize safety and independence with performance of ADLs and functional tasks  Target Date: 04/09/24      Frequency of Treatment: 1x/week  Duration of Treatment: 10 weeks    Recommended Referrals to Other Professionals:   Education Assessment:   Learner/Method: Patient;Listening;Reading;Demonstration;Pictures/Video;No Barriers to Learning    Risks and benefits of evaluation/treatment have been explained.   Patient/Family/caregiver agrees with Plan of Care.     Evaluation Time:     PT Eval, Low Complexity Minutes (51099): 15       Signing Clinician: Apoorva Manuel PT      [facility]                                                                                   OUTPATIENT PHYSICAL THERAPY      PLAN OF TREATMENT FOR OUTPATIENT REHABILITATION   Patient's Last Name, First Name, Deven Roque YOB: 1955   Provider's Name   [facility]   Medical Record No.  [id number]     Onset Date: 02/26/24  Start of Care Date: 03/05/24     Medical Diagnosis:  Patellar tendinitis of both knees (M76.51, M76.52)      PT Treatment Diagnosis:  Patellar tendinitis of both  knees (M76.51, M76.52) Plan of Treatment  Frequency/Duration: 1x/week/ 10 weeks    Certification date from 03/05/24 to 05/14/24         See note for plan of treatment details and functional goals     [facility]  O: [phone]  E: [email or web address]                     I CERTIFY THE NEED FOR THESE SERVICES FURNISHED UNDER        THIS PLAN OF TREATMENT AND WHILE UNDER MY CARE     (Physician attestation of this document indicates review and certification of the therapy plan).              Referring Provider:  Taz Maurice, DO    Initial Assessment  See Epic Evaluation- Start of Care Date: 03/05/24

## 2024-03-06 LAB
PATH REPORT.COMMENTS IMP SPEC: NORMAL
PATH REPORT.COMMENTS IMP SPEC: NORMAL
PATH REPORT.FINAL DX SPEC: NORMAL
PATH REPORT.GROSS SPEC: NORMAL
PATH REPORT.MICROSCOPIC SPEC OTHER STN: NORMAL
PATH REPORT.RELEVANT HX SPEC: NORMAL
PHOTO IMAGE: NORMAL

## 2024-03-11 ENCOUNTER — THERAPY VISIT (OUTPATIENT)
Dept: PHYSICAL THERAPY | Facility: CLINIC | Age: 69
End: 2024-03-11
Attending: FAMILY MEDICINE
Payer: MEDICARE

## 2024-03-11 DIAGNOSIS — M76.52 PATELLAR TENDINITIS OF BOTH KNEES: Primary | ICD-10-CM

## 2024-03-11 DIAGNOSIS — M76.51 PATELLAR TENDINITIS OF BOTH KNEES: Primary | ICD-10-CM

## 2024-03-11 PROCEDURE — 97112 NEUROMUSCULAR REEDUCATION: CPT | Mod: GP

## 2024-03-11 PROCEDURE — 97110 THERAPEUTIC EXERCISES: CPT | Mod: GP

## 2024-03-18 ENCOUNTER — PATIENT OUTREACH (OUTPATIENT)
Dept: GASTROENTEROLOGY | Facility: CLINIC | Age: 69
End: 2024-03-18
Payer: MEDICARE

## 2024-03-19 ENCOUNTER — THERAPY VISIT (OUTPATIENT)
Dept: PHYSICAL THERAPY | Facility: CLINIC | Age: 69
End: 2024-03-19
Attending: FAMILY MEDICINE
Payer: MEDICARE

## 2024-03-19 DIAGNOSIS — M76.52 PATELLAR TENDINITIS OF BOTH KNEES: Primary | ICD-10-CM

## 2024-03-19 DIAGNOSIS — M76.51 PATELLAR TENDINITIS OF BOTH KNEES: Primary | ICD-10-CM

## 2024-03-19 PROCEDURE — 97112 NEUROMUSCULAR REEDUCATION: CPT | Mod: GP

## 2024-03-19 PROCEDURE — 97110 THERAPEUTIC EXERCISES: CPT | Mod: GP

## 2024-03-26 ENCOUNTER — THERAPY VISIT (OUTPATIENT)
Dept: PHYSICAL THERAPY | Facility: CLINIC | Age: 69
End: 2024-03-26
Attending: FAMILY MEDICINE
Payer: MEDICARE

## 2024-03-26 DIAGNOSIS — M76.52 PATELLAR TENDINITIS OF BOTH KNEES: Primary | ICD-10-CM

## 2024-03-26 DIAGNOSIS — M76.51 PATELLAR TENDINITIS OF BOTH KNEES: Primary | ICD-10-CM

## 2024-03-26 PROCEDURE — 97110 THERAPEUTIC EXERCISES: CPT | Mod: GP

## 2024-05-06 DIAGNOSIS — I10 HYPERTENSION, UNSPECIFIED TYPE: ICD-10-CM

## 2024-05-06 DIAGNOSIS — E78.5 HYPERLIPIDEMIA WITH TARGET LDL LESS THAN 130: ICD-10-CM

## 2024-05-07 RX ORDER — LOSARTAN POTASSIUM 25 MG/1
25 TABLET ORAL DAILY
Qty: 90 TABLET | Refills: 0 | Status: SHIPPED | OUTPATIENT
Start: 2024-05-07 | End: 2024-07-31

## 2024-05-08 RX ORDER — SIMVASTATIN 20 MG
20 TABLET ORAL AT BEDTIME
Qty: 90 TABLET | Refills: 3 | Status: SHIPPED | OUTPATIENT
Start: 2024-05-08

## 2024-05-23 DIAGNOSIS — M17.0 BILATERAL PRIMARY OSTEOARTHRITIS OF KNEE: Primary | ICD-10-CM

## 2024-06-06 ENCOUNTER — HOSPITAL ENCOUNTER (OUTPATIENT)
Dept: MRI IMAGING | Facility: CLINIC | Age: 69
Discharge: HOME | End: 2024-06-06
Attending: FAMILY MEDICINE | Admitting: FAMILY MEDICINE
Payer: MEDICARE

## 2024-06-06 DIAGNOSIS — M17.0 BILATERAL PRIMARY OSTEOARTHRITIS OF KNEE: ICD-10-CM

## 2024-06-06 PROCEDURE — 73721 MRI JNT OF LWR EXTRE W/O DYE: CPT | Mod: LT,MG

## 2024-06-06 PROCEDURE — 73721 MRI JNT OF LWR EXTRE W/O DYE: CPT | Mod: 26 | Performed by: RADIOLOGY

## 2024-06-06 PROCEDURE — G1010 CDSM STANSON: HCPCS | Performed by: RADIOLOGY

## 2024-06-13 PROBLEM — M76.51 PATELLAR TENDINITIS OF BOTH KNEES: Status: RESOLVED | Noted: 2024-03-05 | Resolved: 2024-06-13

## 2024-06-13 PROBLEM — M76.52 PATELLAR TENDINITIS OF BOTH KNEES: Status: RESOLVED | Noted: 2024-03-05 | Resolved: 2024-06-13

## 2024-06-13 NOTE — PROGRESS NOTES
"    DISCHARGE  Reason for Discharge: Patient has met all goals.    Equipment Issued:     Discharge Plan: Patient to continue home program.    Referring Provider:  Taz Maurice DO       03/26/24 0500   Appointment Info   Signing clinician's name / credentials Apoorva Manuel, PT, DPT   Total/Authorized Visits Medicare   Visits Used 4   Medical Diagnosis Patellar tendinitis of both knees (M76.51, M76.52)   PT Tx Diagnosis Patellar tendinitis of both knees (M76.51, M76.52)   Quick Adds Certification   Progress Note/Certification   Start of Care Date 03/05/24   Onset of illness/injury or Date of Surgery 02/26/24   Therapy Frequency 1x/week   Predicted Duration 10 weeks   Certification date from 03/05/24   Certification date to 05/14/24   Progress Note Due Date 05/14/24   Progress Note Completed Date 03/05/24   GOALS   PT Goals 2;3   PT Goal 1   Goal Identifier KOS   Goal Description Patient will report improved knee pain ratings and function as shown by increased KOS score of at least 7 points in order to show significant improvement and greater return to previous function and allow him to perform his workouts without limitations.   Rationale to maximize safety and independence with performance of ADLs and functional tasks   Target Date 05/14/24   PT Goal 2   Goal Identifier Home programming   Goal Description Patient will report good adherence with HEP for 5 weeks performing 5 of 7 days to improve pain and allow him to return to previous level of function without limitations.   Rationale to maximize safety and independence with performance of ADLs and functional tasks   Target Date 04/09/24   Subjective Report   Subjective Report Patient reports decreased left knee pain today since last session.  He has returned to doing squats, lunges and lifts at the gym and notes minimal to no increase in pain. He also did a weighted walk with 100# and noted minimal increase in knee pain. He trialed 24\" box jumps with jumping up " "and stepping down and noted no increase in pain. He has not trialed jumping rope yet. Patient reports good adherence with HEP 2-3x/daily.   Treatment Interventions (PT)   Interventions Neuromuscular Re-education;Therapeutic Procedure/Exercise   Therapeutic Procedure/Exercise   Therapeutic Procedures: strength, endurance, ROM, flexibility minutes (55071) 30   Ther Proc 1 - Details Matrix bike seat 6 L5 x 11 minutes. Squats x 10 with verbal cues to avoid anterior knee translation. DL and L SL leg press with 66# x 10 each with verbal cues to perform slowly and avoid hyperextension. Forward step downs on 12\" step maintaining L LE on step x 15 with verbal cues to perform slowly. L SLS on inverted bosu 3 x 60 seconds without UE support. Stateless squats x 15 with verbal cues to perform slowly and shift weight posteriorly and increase joint mobilization with belt when squatting. Consecutive DL jumps with timing jumps to audio cue of metronome at 92bpm x 30 seconds.   Skilled Intervention Instructed, provided cues and education as needed   Patient Response/Progress Tolerated well with minimal increase in left knee pain. Increased pain with timed DL jumps, discussed starting with repetitive jumps for a set duration without jump rope and work up to including jump rope.   Education   Learner/Method Patient;Listening;Reading;Demonstration;Pictures/Video;No Barriers to Learning   Education Comments Education provided regarding slowly working into previous crossfit workouts as well as continuing HEP to continue to work on joint stability vs power and speed. Encouraged patient to continue to utilize KT to assist with pain management as he has found it effective in reducing pain to a tolerable level.   Plan   Home program Continue previous HEP. See PTRx for details.   Plan for next session Patient will continue HEP independently and slowly ease back into crossfit workouts. Will D/C from PT at end of certiification period if no " exacerbation of symptoms.   Total Session Time   Timed Code Treatment Minutes 30   Total Treatment Time (sum of timed and untimed services) 30

## 2024-06-14 ENCOUNTER — PRE VISIT (OUTPATIENT)
Dept: ORTHOPEDICS | Facility: CLINIC | Age: 69
End: 2024-06-14
Payer: MEDICARE

## 2024-06-14 NOTE — TELEPHONE ENCOUNTER
DIAGNOSIS:  (L) Knee surgical consult /MRI 6/6    APPOINTMENT DATE: 7/15/24   NOTES STATUS DETAILS   OFFICE NOTE from referring provider Internal Dr Taz Maurice 2/26/24   OFFICE NOTE from other specialist N/A    DISCHARGE SUMMARY from hospital N/A    DISCHARGE REPORT from the ER N/A    OPERATIVE REPORT N/A    MEDICATION LIST Internal    EMG (for Spine) N/A    IMPLANT RECORD/STICKER N/A    LABS     CBC/DIFF N/A    CULTURES N/A    INJECTIONS DONE IN RADIOLOGY N/A    MRI Internal 6/6/24   CT SCAN N/A    XRAYS (IMAGES & REPORTS) Internal 2/26/24   TUMOR     PATHOLOGY  Slides & report N/A

## 2024-07-18 ENCOUNTER — TELEPHONE (OUTPATIENT)
Dept: ORTHOPEDICS | Facility: CLINIC | Age: 69
End: 2024-07-18

## 2024-07-18 ENCOUNTER — OFFICE VISIT (OUTPATIENT)
Dept: ORTHOPEDICS | Facility: CLINIC | Age: 69
End: 2024-07-18
Payer: MEDICARE

## 2024-07-18 DIAGNOSIS — M23.204 OLD COMPLEX TEAR OF MEDIAL MENISCUS OF LEFT KNEE: Primary | ICD-10-CM

## 2024-07-18 PROCEDURE — 99214 OFFICE O/P EST MOD 30 MIN: CPT | Performed by: ORTHOPAEDIC SURGERY

## 2024-07-18 NOTE — LETTER
7/18/2024      Deven Smith  [address] [address] [place] [id number]      Dear Colleague,    Thank you for referring your patient, Deven Smith, to the [facility]. Please see a copy of my visit note below.    CHIEF CONCERN: Medial lateral meniscus tear     HISTORY:   He is a very pleasant 69-year-old male.  presents to see me today for his medial lateral meniscus tears.He has had extensive nonsurgical intervention with oral anti-inflammatories, activity modification, physical therapy and injection management.  These things have all failed to provide him lasting benefit.  It keeps him from doing the things that he wants to do.  He is sufficiently limited.  He describes locking and catching.    PAST MEDICAL HISTORY: (Reviewed with the patient and in the [facility] medical record)  None    PAST SURGICAL HISTORY: (Reviewed with the patient and in the [facility] medical record)  No knee surgery    MEDICATIONS: (Reviewed with the patient and in the [facility] medical record)    Notable medications include: No blood thinners or opioids    ALLERGIES: (Reviewed with the patient and in the [facility] medical record)  None besides seasonal      SOCIAL HISTORY: (Reviewed with the patient and in the medical record)  --Tobacco: Epic reports tobacco use as of February 26, 2024  --Occupation: Semiretired  --Avocation/Sport: Like to be more active than he is    FAMILY HISTORY: (Reviewed with the patient and in the medical record)  -- No family history of bleeding, clotting, or difficulty with anesthesia    REVIEW OF SYSTEMS: (Reviewed with the patient and on the health intake form)  -- A comprehensive 10 point review of systems was conducted and is negative except as noted in the HPI    EXAM:     General: Awake, Alert and Oriented, No acute Distress. Articulate and Interactive    There is no height or weight on file to calculate BMI.    Left lower extremity :  Skin is Warm and Well perfused, no suggestion of  infection  Positive medial joint line tenderness, positive lateral joint line tenderness  Positive medial Brenden's, positive lateral Brenden's stable to varus valgus stress testing.  Stable anterior posterior drawer testing  Lachman 0, no pivot shift  EHL/FHL/TA/GS 5/5  Sensation intact L3-S1  2+ Dorsalis Pedis Pulse    IMAGING:    Radiographs of the left knee from 2/26/2024 were independently reviewed by me and findings were discussed with the patient today. The imaging demonstrates no fractures dislocations well-preserved joint space no significant arthritis.    MRI of the left knee from 6/6/2024  Were independently reviewed by me and findings were discussed with the patient today. The imaging demonstrates medial meniscus tear, lateral meniscus tear with displaced fragments.  ACL PCL intact, collateral ligaments intact.  No significant arthritis.   ASSESSMENT:  Symptomatic medial lateral meniscus tears, definitive failure of nonsurgical intervention    PLAN:  Long discussion with the patient.  Reviewed the diagnosis potential treatment options.  At this time I did offer him examination anesthesia, knee arthroscopy, medial and lateral meniscectomy.  We discussed pros cons risk benefits of surgery versus nonsurgical.  Discussed expected course of recovery and alternative treatment options.  Will look for time to schedule this can be completed.      Again, thank you for allowing me to participate in the care of your patient.        Sincerely,        Edmundo Miller MD

## 2024-07-18 NOTE — TELEPHONE ENCOUNTER
FUTURE VISIT INFORMATION      SURGERY INFORMATION:  Date: 8/6/24  Location:  or  Surgeon:  Edmundo Miller MD   Anesthesia Type:  choice  Procedure: left knee examination under anesthesia, knee arthroscopy, meniscectomy of medial and lateral meniscus   Consult: ov 7/18/24    RECORDS REQUESTED FROM:       Primary Care Provider: [facility]    Pertinent Medical History: Heart murmur, PVC's    Most recent EKG+ Tracing: 10/10/19    Most recent ECHO: 2/7/24

## 2024-07-18 NOTE — PROGRESS NOTES
CHIEF CONCERN: Medial lateral meniscus tear     HISTORY:   He is a very pleasant 69-year-old male.  presents to see me today for his medial lateral meniscus tears.He has had extensive nonsurgical intervention with oral anti-inflammatories, activity modification, physical therapy and injection management.  These things have all failed to provide him lasting benefit.  It keeps him from doing the things that he wants to do.  He is sufficiently limited.  He describes locking and catching.    PAST MEDICAL HISTORY: (Reviewed with the patient and in the [facility] medical record)  None    PAST SURGICAL HISTORY: (Reviewed with the patient and in the [facility] medical record)  No knee surgery    MEDICATIONS: (Reviewed with the patient and in the [facility] medical record)    Notable medications include: No blood thinners or opioids    ALLERGIES: (Reviewed with the patient and in the [facility] medical record)  None besides seasonal      SOCIAL HISTORY: (Reviewed with the patient and in the medical record)  --Tobacco: Epic reports tobacco use as of February 26, 2024  --Occupation: Semiretired  --Avocation/Sport: Like to be more active than he is    FAMILY HISTORY: (Reviewed with the patient and in the medical record)  -- No family history of bleeding, clotting, or difficulty with anesthesia    REVIEW OF SYSTEMS: (Reviewed with the patient and on the health intake form)  -- A comprehensive 10 point review of systems was conducted and is negative except as noted in the HPI    EXAM:     General: Awake, Alert and Oriented, No acute Distress. Articulate and Interactive    There is no height or weight on file to calculate BMI.    Left lower extremity :  Skin is Warm and Well perfused, no suggestion of infection  Positive medial joint line tenderness, positive lateral joint line tenderness  Positive medial Brenden's, positive lateral Brenden's stable to varus valgus stress testing.  Stable anterior posterior drawer testing  Lachman 0, no pivot  shift  EHL/FHL/TA/GS 5/5  Sensation intact L3-S1  2+ Dorsalis Pedis Pulse    IMAGING:    Radiographs of the left knee from 2/26/2024 were independently reviewed by me and findings were discussed with the patient today. The imaging demonstrates no fractures dislocations well-preserved joint space no significant arthritis.    MRI of the left knee from 6/6/2024  Were independently reviewed by me and findings were discussed with the patient today. The imaging demonstrates medial meniscus tear, lateral meniscus tear with displaced fragments.  ACL PCL intact, collateral ligaments intact.  No significant arthritis.   ASSESSMENT:  Symptomatic medial lateral meniscus tears, definitive failure of nonsurgical intervention    PLAN:  Long discussion with the patient.  Reviewed the diagnosis potential treatment options.  At this time I did offer him examination anesthesia, knee arthroscopy, medial and lateral meniscectomy.  We discussed pros cons risk benefits of surgery versus nonsurgical.  Discussed expected course of recovery and alternative treatment options.  Will look for time to schedule this can be completed.

## 2024-07-18 NOTE — TELEPHONE ENCOUNTER
Procedure: Examination under anesthesia, knee arthroscopy, meniscectomy of medial and lateral meniscus   Facility: [facility]  Length: 60 minutes  Anesthesia: Choice  Post-op appointments needed: 2 weeks provider only, 6 weeks with provider only.  Surgery packet/instructions given to patient?  Yes     Pre-Operative Teaching Flowsheet     Person(s) involved in teaching: Patient     Motivation Level:  Receptive (willing/able to accept information) and asks appropriate questions where applicable: Yes  Any cultural factors/Uatsdin beliefs that may influence understanding or compliance? No     Patient demonstrates understanding of the following:  Pre-operative planning, including the necessary appointments and preparation needed prior to surgery: Yes  Which situations necessitate calling provider and whom to contact: Yes  Pain management techniques pre and post op: Yes  How, and when, to access community resources: Yes    Who will drive and stay/ with patient after surgery: Wife Eliana  Pre-op exam PAC  PT to be completed at [facility]         Additional Teaching Concerns Addressed:   Post-operative living arrangements and necessary adaptations to living environment.     Instructional Materials Used/Given: Yes, pre-op packet given including forms for Your surgery day, preparing for surgery, showering before surgery, Stop light tool introduced, Opioid pain medication guideline, pre-op physical form, and map  Patient expressed understanding of all forms given, questions were answered and will review in more detail at home.     Time spent with patient: 20 minutes.

## 2024-07-18 NOTE — TELEPHONE ENCOUNTER
Patient is scheduled for surgery with Dr. Lara.     Spoke with: Patient     Date of Surgery: 8/06/24    Location: [facility] OR     Pre op with Provider: HANNAH     H&P: Patient is scheduled for an in clinic visit with PAC on 7/24/24 at 8:30 AM.     Additional imaging/appointments: Patient will schedule 1 week post op locally.   Patient is scheduled for a 6 week post op on 9/16/24 at 9:10 AM.     Surgery packet: Patient received packet in clinic.     Additional comments: HANNAH Delatorre on 7/18/2024 at 10:57 AM

## 2024-07-18 NOTE — NURSING NOTE
Reason For Visit:   Chief Complaint   Patient presents with    Consult     L knee consult, meniscus tears        ?  No  Occupation. Semi-Retired  Currently working? No.  Work status?  N/A  Date of injury: ~few years  Type of injury: Gradual onset   Date of surgery: No  Type of surgery: No.  Smoker: No  Request smoking cessation information: No    Sane Score  Left knee - Affected  Left Knee- 100 - will pay for it if he does it all.   Right Knee- 100      Cong Lamas, ATC

## 2024-07-24 ENCOUNTER — PRE VISIT (OUTPATIENT)
Dept: SURGERY | Facility: CLINIC | Age: 69
End: 2024-07-24

## 2024-07-24 ENCOUNTER — ANESTHESIA EVENT (OUTPATIENT)
Dept: SURGERY | Facility: AMBULATORY SURGERY CENTER | Age: 69
End: 2024-07-24
Payer: MEDICARE

## 2024-07-26 ENCOUNTER — LAB (OUTPATIENT)
Dept: LAB | Facility: CLINIC | Age: 69
End: 2024-07-26
Payer: MEDICARE

## 2024-07-26 ENCOUNTER — PRE VISIT (OUTPATIENT)
Dept: SURGERY | Facility: CLINIC | Age: 69
End: 2024-07-26

## 2024-07-26 ENCOUNTER — OFFICE VISIT (OUTPATIENT)
Dept: SURGERY | Facility: CLINIC | Age: 69
End: 2024-07-26
Payer: MEDICARE

## 2024-07-26 VITALS
TEMPERATURE: 97.8 F | DIASTOLIC BLOOD PRESSURE: 92 MMHG | SYSTOLIC BLOOD PRESSURE: 163 MMHG | HEIGHT: 67 IN | RESPIRATION RATE: 16 BRPM | WEIGHT: 193 LBS | BODY MASS INDEX: 30.29 KG/M2 | OXYGEN SATURATION: 98 % | HEART RATE: 56 BPM

## 2024-07-26 DIAGNOSIS — R73.9 HYPERGLYCEMIA: ICD-10-CM

## 2024-07-26 DIAGNOSIS — Z01.818 PREOP EXAMINATION: Primary | ICD-10-CM

## 2024-07-26 DIAGNOSIS — Z01.818 PREOP EXAMINATION: ICD-10-CM

## 2024-07-26 DIAGNOSIS — M23.204 OLD COMPLEX TEAR OF MEDIAL MENISCUS OF LEFT KNEE: ICD-10-CM

## 2024-07-26 LAB
ANION GAP SERPL CALCULATED.3IONS-SCNC: 9 MMOL/L (ref 7–15)
BUN SERPL-MCNC: 20.5 MG/DL (ref 8–23)
CALCIUM SERPL-MCNC: 9.5 MG/DL (ref 8.8–10.4)
CHLORIDE SERPL-SCNC: 103 MMOL/L (ref 98–107)
CREAT SERPL-MCNC: 0.92 MG/DL (ref 0.67–1.17)
EGFRCR SERPLBLD CKD-EPI 2021: >90 ML/MIN/1.73M2
ERYTHROCYTE [DISTWIDTH] IN BLOOD BY AUTOMATED COUNT: 13.7 % (ref 10–15)
GLUCOSE SERPL-MCNC: 118 MG/DL (ref 70–99)
HBA1C MFR BLD: 5.8 %
HCO3 SERPL-SCNC: 27 MMOL/L (ref 22–29)
HCT VFR BLD AUTO: 41.7 % (ref 40–53)
HGB BLD-MCNC: 14 G/DL (ref 13.3–17.7)
MCH RBC QN AUTO: 29.4 PG (ref 26.5–33)
MCHC RBC AUTO-ENTMCNC: 33.6 G/DL (ref 31.5–36.5)
MCV RBC AUTO: 88 FL (ref 78–100)
PLATELET # BLD AUTO: 228 10E3/UL (ref 150–450)
POTASSIUM SERPL-SCNC: 4.8 MMOL/L (ref 3.4–5.3)
RBC # BLD AUTO: 4.76 10E6/UL (ref 4.4–5.9)
SODIUM SERPL-SCNC: 139 MMOL/L (ref 135–145)
WBC # BLD AUTO: 4.6 10E3/UL (ref 4–11)

## 2024-07-26 PROCEDURE — 99000 SPECIMEN HANDLING OFFICE-LAB: CPT | Performed by: PATHOLOGY

## 2024-07-26 PROCEDURE — 36415 COLL VENOUS BLD VENIPUNCTURE: CPT | Performed by: PATHOLOGY

## 2024-07-26 PROCEDURE — 83036 HEMOGLOBIN GLYCOSYLATED A1C: CPT | Performed by: CLINICAL NURSE SPECIALIST

## 2024-07-26 PROCEDURE — 80048 BASIC METABOLIC PNL TOTAL CA: CPT | Performed by: PATHOLOGY

## 2024-07-26 PROCEDURE — 99203 OFFICE O/P NEW LOW 30 MIN: CPT | Performed by: CLINICAL NURSE SPECIALIST

## 2024-07-26 PROCEDURE — 85027 COMPLETE CBC AUTOMATED: CPT | Performed by: PATHOLOGY

## 2024-07-26 ASSESSMENT — ENCOUNTER SYMPTOMS
DYSRHYTHMIAS: 0
SEIZURES: 0

## 2024-07-26 ASSESSMENT — PAIN SCALES - GENERAL: PAINLEVEL: MILD PAIN (2)

## 2024-07-26 ASSESSMENT — LIFESTYLE VARIABLES: TOBACCO_USE: 1

## 2024-07-26 NOTE — H&P
Pre-Operative H & P     CC:  Preoperative exam to assess for increased cardiopulmonary risk while undergoing surgery and anesthesia.    Date of Encounter: 7/26/2024  Primary Care Physician:  Bigg Morgan     Reason for visit:   Encounter Diagnoses   Name Primary?    Preop examination Yes    Old complex tear of medial meniscus of left knee     Hyperglycemia        HPI  Deven Smith is a 69 year old male who presents for pre-operative H & P in preparation for  Procedure Information       Case: [id number] Date/Time: 08/06/24 1225    Procedure: left knee examination under anesthesia, knee arthroscopy, meniscectomy of medial and lateral meniscus (Left: Knee)    Anesthesia type: Choice    Diagnosis: Old complex tear of medial meniscus of left knee [M23.204]    Pre-op diagnosis: Old complex tear of medial meniscus of left knee [M23.204]    Location: [place] [address] / [facility]    Providers: Edmundo Miller MD          History is obtained from the patient and chart review    Patient who was evaluated by Dr. Miller on 7- in the orthopedic surgery clinic with concern for medial lateral meniscus tear. Through Dr. Miller's evaluation including imaging, the patient was found to have symptomatic medial lateral meniscus tears, with definitive failure of nonsurgical intervention. Dr. Miller counseled the patient regarding treatment options.  He is scheduled above procedure     His history is otherwise significant for HTN, HLD, h/o PVCs, cervical radiculopathy, DDD and osteoarthritis of bilateral knees.    Hx of abnormal bleeding or anti-platelet use: Denies      Past Medical History  Past Medical History:   Diagnosis Date    DDD (degenerative disc disease), cervical     HTN (hypertension)     Hyperlipidemia LDL goal < 130     Old complex tear of medial meniscus of left knee        Past Surgical History  Past Surgical History:   Procedure  Laterality Date    COLONOSCOPY  03/21/2007    Repeat  for screening purposes in 10 yrs.    COLONOSCOPY  1/2/2013    Procedure: COLONOSCOPY;  Colonoscopy;  Surgeon: Emmett San MD;  Location: [facility]    COLONOSCOPY N/A 8/6/2018    Procedure: COLONOSCOPY;  colonoscopy;  Surgeon: Israel Moreland MD;  Location: [facility]    COLONOSCOPY N/A 3/4/2024    Procedure: Colonoscopy, Flexible, with lesion remvoal using snare;  Surgeon: Sanya Vieyra DO;  Location: [facility]    ESOPHAGOSCOPY, GASTROSCOPY, DUODENOSCOPY (EGD), COMBINED N/A 4/13/2022    Procedure: ESOPHAGOGASTRODUODENOSCOPY (EGD);  Surgeon: Antoinette Lagunas MD;  Location: [facility]    HC SHLDR ARTHROSCOP,SURG,W/ROTAT CUFF REPR Left 2010    HC VASECTOMY UNILAT/BILAT W POSTOP SEMEN  1998    Vasectomy       Prior to Admission Medications  Current Outpatient Medications   Medication Sig Dispense Refill    latanoprost (XALATAN) 0.005 % ophthalmic solution Place 0.005 drops into both eyes At Bedtime      losartan (COZAAR) 25 MG tablet TAKE 1 TABLET (25 MG) BY MOUTH DAILY (Patient taking differently: Take 25 mg by mouth every morning) 90 tablet 0    Misc Natural Products (GLUCOSAMINE CHONDROITIN ADV PO) Take 1 tablet by mouth every morning      MULTI-DAY VITAMINS OR TABS Take 1 tablet by mouth every morning 0 0    Omega-3 Fatty Acids (FISH OIL) 1200 MG capsule Take 1,200 mg by mouth every morning      simvastatin (ZOCOR) 20 MG tablet TAKE 1 TABLET (20 MG) BY MOUTH AT BEDTIME 90 tablet 3       Allergies  Allergies   Allergen Reactions    Seasonal Allergies        Social History  Social History     Socioeconomic History    Marital status:      Spouse name: koko    Number of children: 2    Years of education: 13    Highest education level: Not on file   Occupational History    Occupation:    Tobacco Use    Smoking status: Former     Current packs/day: 0.00     Average packs/day: 2.0 packs/day for 15.0 years (30.0 ttl pk-yrs)     Types: Cigarettes      Start date: 1972     Quit date: 1987     Years since quittin.7    Smokeless tobacco: Never   Vaping Use    Vaping status: Never Used   Substance and Sexual Activity    Alcohol use: Not Currently     Comment: rare     Drug use: No    Sexual activity: Yes     Partners: Female     Birth control/protection: Surgical     Comment: Vasectomy   Other Topics Concern     Service No    Blood Transfusions No    Caffeine Concern No     Comment: 3 cans soda/day    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    Hobby Hazards Yes     Comment: fishing-[employer] hunting    Sleep Concern Yes     Comment: is awake after 6 hrs of sleep-    Stress Concern No    Weight Concern No    Special Diet No    Back Care No    Exercise Yes     Comment: walks, does physical labor,    Bike Helmet No    Seat Belt Yes    Self-Exams No    Parent/sibling w/ CABG, MI or angioplasty before 65F 55M? No   Social History Narrative    Not on file     Social Determinants of Health     Financial Resource Strain: Low Risk  (2023)    Financial Resource Strain     Within the past 12 months, have you or your family members you live with been unable to get utilities (heat, electricity) when it was really needed?: No   Food Insecurity: Low Risk  (2023)    Food Insecurity     Within the past 12 months, did you worry that your food would run out before you got money to buy more?: No     Within the past 12 months, did the food you bought just not last and you didn t have money to get more?: No   Transportation Needs: Low Risk  (2023)    Transportation Needs     Within the past 12 months, has lack of transportation kept you from medical appointments, getting your medicines, non-medical meetings or appointments, work, or from getting things that you need?: No   Physical Activity: Not on file   Stress: Not on file   Social Connections: Not on file   Interpersonal Safety: Low Risk  (2023)    Interpersonal  Safety     Do you feel physically and emotionally safe where you currently live?: Yes     Within the past 12 months, have you been hit, slapped, kicked or otherwise physically hurt by someone?: No     Within the past 12 months, have you been humiliated or emotionally abused in other ways by your partner or ex-partner?: No   Housing Stability: Low Risk  (2023)    Housing Stability     Do you have housing? : Yes     Are you worried about losing your housing?: No       Family History  Family History   Problem Relation Age of Onset    Arthritis Mother     Depression Mother         on meds. is in nursing home    Hypertension Mother     Osteoporosis Mother     Anesthesia Reaction Mother         slow to wake, but elderly and with dementia    Heart Disease Father          at age 57  ? MI--no autopsy    Cancer Father         jaw--smoked [place]    Osteoporosis Sister         on meds    Hypertension Sister     Allergies Brother         on meds    Deep Vein Thrombosis Brother     Hypertension Brother     Osteoporosis Brother     Osteoporosis Brother     Family History Negative Other         No breast or colon cancer hx in family    Bleeding Disorder No family hx of        Review of Systems  The complete review of systems is negative other than noted in the HPI or here.   Anesthesia Evaluation   Pt has had prior anesthetic. Type: General and MAC.    No history of anesthetic complications       ROS/MED HX  ENT/Pulmonary:     (+)     EFREN risk factors,  hypertension,         tobacco use, Past use,                    (-) recent URI   Neurologic:    (-) no seizures and no CVA   Cardiovascular: Comment: Recent negative stress performed due to report of  earlier fatigue when doing workout    (+) Dyslipidemia hypertension-range: self monitoring 120-130s/60-70/ -   -  - -                           valvular problems/murmurs  heart murmur, no sig valve disease on echo.    Previous cardiac testing   Echo: Date: 2019  "Results:    Stress Test:  Date: 2/2024 Results:    ECG Reviewed:  Date: 2019 Results:  Sinus  Bradycardia  57 bpm  WITHIN NORMAL LIMITS      Holter Monitor   Final Interpretation 10/29/2019  1.  Occasional PVCs with a burden of approximately 1%    2.  Monitor activations correlated with PVCs.    Cath:  Date: Results:   (-) taking anticoagulants/antiplatelets, KIM and arrhythmias   METS/Exercise Tolerance: >4 METS Comment: Does Crossfit 3 X weekly   Hematologic:    (-) history of blood clots and history of blood transfusion   Musculoskeletal: Comment: DDD  (+)  arthritis,             GI/Hepatic: Comment: Occ use of omeprazole    (+) GERD, Other,                  Renal/Genitourinary:  - neg Renal ROS     Endo:  - neg endo ROS     Psychiatric/Substance Use:    (-) psychiatric history   Infectious Disease:  - neg infectious disease ROS     Malignancy:  - neg malignancy ROS     Other:  - neg other ROS          BP (!) 163/92 (BP Location: Right arm, Patient Position: Sitting, Cuff Size: Adult Large)   Pulse 56   Temp 97.8  F (36.6  C) (Oral)   Resp 16   Ht 1.702 m (5' 7\")   Wt 87.5 kg (193 lb)   SpO2 98%   BMI 30.23 kg/m      Physical Exam   Constitutional: Awake, alert, cooperative, no apparent distress, and appears stated age.  Eyes: Pupils equal, round and reactive to light, extra ocular muscles intact, sclera clear, conjunctiva normal.  HENT: Normocephalic, oral pharynx with moist mucus membranes, good dentition. No goiter appreciated.   Respiratory: Clear to auscultation bilaterally, no crackles or wheezing. No cough or obvious dyspnea.  Cardiovascular: Regular rate and rhythm, normal, 2/6 systolic murmur noted, heard best at the right sternal border. Carotids +2, no bruits. No edema. Palpable pulses to radial  DP and PT arteries.   GI: Normal bowel sounds, soft, non-distended, non-tender, no masses palpated, no hepatosplenomegaly.    Lymph/Hematologic: No cervical lymphadenopathy and no supraclavicular " lymphadenopathy.  Genitourinary:  Deferred  Skin: Warm and dry.    Musculoskeletal: Full ROM of neck. There is no redness, warmth, or swelling of the joints. Gross motor strength is normal.    Neurologic: Awake, alert, oriented to name, place and time. Cranial nerves II-XII are grossly intact. Gait is normal.   Neuropsychiatric: Calm, cooperative. Normal affect.     Prior Labs/Diagnostic Studies   All labs and imaging personally reviewed     EKG 2019 Sinus bradycardia    Exercise stress echocardiogram 2/7/24  Interpretation Summary  1. Exercise stress echocardiogram study is negative for ischemia at a  diagnostic level of peak stress (101% MPHR).  2. EKG at rest and with stress demonstrated no ischemic ST changes. No  exercise induced arrhythmias.  3. No chest pain with exercise.  4. Patient was hypertensive to start test (190/62 mmHg). With peak exercise BP  increased to 210/68 mmHg.  5. Above average functional capacity for age (14 METS).  6. Baseline screening echocardiogram demonstrated no significant valve  dysfunction. Normal appearing aortic root.    2019 Echocardiogram   Interpretation Summary     There is mild to moderate concentric left ventricular hypertrophy.  Left ventricular systolic function is normal.  The visual ejection fraction is estimated at 60-65%.  No regional wall motion abnormalities noted.  There is no comparison study available.  The patient's records and results personally reviewed by this provider.     Outside records reviewed from: Care Everywhere    LAB/DIAGNOSTIC STUDIES TODAY:    Lab Results   Component Value Date    WBC 4.6 07/26/2024    WBC 5.6 10/10/2019     Lab Results   Component Value Date    RBC 4.76 07/26/2024    RBC 4.44 10/10/2019     Lab Results   Component Value Date    HGB 14.0 07/26/2024    HGB 14.2 10/10/2019     Lab Results   Component Value Date    HCT 41.7 07/26/2024    HCT 41.1 10/10/2019     Lab Results   Component Value Date    MCV 88 07/26/2024    MCV 93  10/10/2019     Lab Results   Component Value Date    MCH 29.4 07/26/2024    MCH 32.0 10/10/2019     Lab Results   Component Value Date    MCHC 33.6 07/26/2024    MCHC 34.5 10/10/2019     Lab Results   Component Value Date    RDW 13.7 07/26/2024    RDW 12.9 10/10/2019     Lab Results   Component Value Date     07/26/2024     10/10/2019     Last Comprehensive Metabolic Panel:  Lab Results   Component Value Date     07/26/2024    POTASSIUM 4.8 07/26/2024    CHLORIDE 103 07/26/2024    CO2 27 07/26/2024    ANIONGAP 9 07/26/2024     (H) 07/26/2024    BUN 20.5 07/26/2024    CR 0.92 07/26/2024    GFRESTIMATED >90 07/26/2024    ROSIE 9.5 07/26/2024     Assessment    Deven Smith is a 69 year old male seen as a PAC referral for risk assessment and optimization for anesthesia.    Plan/Recommendations  Pt will be optimized for the proposed procedure.  See below for details on the assessment, risk, and preoperative recommendations    NEUROLOGY  - No history of TIA, CVA or seizure    -Post Op delirium risk factors:  No risk identified    ENT  - No current airway concerns.  Will need to be reassessed day of surgery.  Mallampati: I  TM: > 3    CARDIAC  HLD. Simvastatin at HS. HYPERTENSION. Self monitors at home with BP range 120-130s/60-70. Will take losartan on DOS for choice anesthesia. Reports feeling comfortable with spinal. Recent exercise stress test negative above. Echocardiogram with no significant valvular abnormalities. Soft systolic heart murmur.  Denies chest pain, irregular heart rate, or dyspnea on exertion.  Patient has excellent exercise tolerance doing CrossFit 3 times a week  - METS (Metabolic Equivalents)>4    RCRI: 0.4% risk of serious cardiac events    PULMONARY  Denies asthma, cough or use of inhaler  Intermediate risk for EFREN    - Tobacco History    History   Smoking Status    Former    Packs/day: 2.00    Years: 15.00    Types: Cigarettes    Quit date: 11/8/1987   Smokeless  "Tobacco    Never       GI: Occ use of omeprazole for heartburn symptoms. May take on DOS if needed  PONV Low Risk  Total Score: 1           1 AN PONV: Patient is not a current smoker        /RENAL  - Baseline Creatinine  0.92    ENDOCRINE    - BMI: Estimated body mass index is 30.23 kg/m  as calculated from the following:    Height as of this encounter: 1.702 m (5' 7\").    Weight as of this encounter: 87.5 kg (193 lb).  Obesity (BMI >30)  - No history of Diabetes Mellitus  Glucose 118 today on fasting lab draw. A1C to be added for screening--5.8. Patient to be notified.     HEME: VTE risk 1.8%  FH of DVT in brother  Denies personal history of blood clots  Denies personal or family history of bleeding disorder  Denies history of blood transfusion     MSK: Frailty score 0/5  OA  DDD    Different anesthesia methods/types have been discussed with the patient, but they are aware that the final plan will be decided by the assigned anesthesia provider on the date of service.    The patient is optimized for their procedure. AVS with information on surgery time/arrival time, meds and NPO status given by nursing staff. No further diagnostic testing indicated.      On the day of service:     Prep time: 13 minutes  Visit time: 12 minutes  Documentation time: 14 minutes  ------------------------------------------  Total time: 39 minutes      SCOTT Yusuf CNS  Preoperative Assessment Center  [facility]  Clinic and Surgery Center  Phone: [phone]  Fax: [phone]    "

## 2024-07-26 NOTE — PATIENT INSTRUCTIONS
Preparing for Your Surgery      Name:  Deven Smith   MRN:  [id number]   :  1955   Today's Date:  2024         Arriving for surgery:  Surgery date:  24  Arrival time:  10:55 am  Surgery time: 12:25 pm    Restrictions due to COVID 19:    Please maintain social distance.  Masking is optional        parking is available for anyone with mobility limitations or disabilities. (Monday- Friday 7 am- 5 pm)    Please come to:    [facility]  [address] [place] [id number]    Check in on the 5th floor, Ambulatory Surgery Center.    What can I eat or drink?    -  You may eat and drink normally until 8 hours before arrival time  (Until 2:55 am)  -  You may have clear liquids until 2 hours before arrival time  (Until 8:55 am)    Examples of clear liquids:  Water  Clear broth  Juices (apple, white grape, white cranberry  and cider) without pulp  Noncarbonated, powder based beverages  (lemonade and Isidro-Aid)  Sodas (Sprite, 7-Up, ginger ale and seltzer)  Coffee or tea (without milk or cream)  Gatorade    --No alcohol or cannabis products for at least 24 hours before surgery    Which medicines can I take?    Hold Aspirin for 7 days before surgery.   Hold Multivitamins for 7 days before surgery.  Hold Supplements for 7 days before surgery. This includes Glucosamine-Chondroitin and Omega 3 Fish Oil  Hold Ibuprofen (Advil, Motrin) for 1 day before surgery--unless otherwise directed by surgeon.  Hold Naproxen (Aleve) for 4 days before surgery.        -  PLEASE TAKE the following medications per your usual routine:  Latanoprost (Xalatan) eye drops  Losartan (Cozaar)  Simvastatin (Zocor)      How do I prepare myself?  - Please take 2 showers (one the night prior to surgery and one the morning of surgery) using Scrubcare or Hibiclens soap.    Use this soap only from the neck to your toes. Do not use in genital area.     Leave the soap on your skin for one minute--then rinse  thoroughly.      You may use your own shampoo and conditioner; no other hair products.     Sleep in clean sheets and wear clean clothes.   - Please remove all jewelry and body piercings.  - No lotions, deodorants or fragrance.  - No makeup or fingernail polish.   - Bring your ID and insurance card.      -If you have a Deep Brain Stimulator, a Spinal Cord Stimulator or any implanted Neuro device you must bring the remote to the Surgery Center        ALL PATIENTS ARE REQUIRED TO HAVE A RESPONSIBLE ADULT TO DRIVE AND BE IN ATTENDANCE WITH THEM FOR 24 HOURS FOLLOWING SURGERY       Covid testing policy as of 12/06/2022  Your surgeon will notify and schedule you for a COVID test if one is needed before surgery--please direct any questions or COVID symptoms to your surgeon      Questions or Concerns:    -For questions regarding the day of surgery please contact the Ambulatory Surgery Center at [phone].    -If you have health changes between today and your surgery please contact your surgeon.     For questions after surgery please call your surgeons office.

## 2024-07-30 DIAGNOSIS — I10 HYPERTENSION, UNSPECIFIED TYPE: ICD-10-CM

## 2024-07-31 RX ORDER — LOSARTAN POTASSIUM 25 MG/1
25 TABLET ORAL DAILY
Qty: 90 TABLET | Refills: 4 | Status: SHIPPED | OUTPATIENT
Start: 2024-07-31

## 2024-08-02 NOTE — PROGRESS NOTES
[facility] Dermatology Note  Encounter Date: Aug 8, 2024  Office Visit     Reviewed patients past medical history and pertinent chart review prior to patients visit today.     Dermatology Problem List:  Last skin check: 08/08/2024    Actinic Keratosis   - Right cheek s/p cryotherapy 08/08/24     Personal Hx: no personal history of skin cancer  Family Hx: Mother had skin cancer- unknown type.   _________________________________________    Assessment & Plan:     # actinic keratoses  Actinic keratoses are pre-cancerous skin growths caused by sun exposure. Treatment is recommended and medically indicated. Treated with cryotherapy as outlined below.     Procedures performed:   - Cryotherapy procedure note, location(s): Right cheek. After verbal consent and discussion of risks and benefits including, but not limited to, dyspigmentation/scar, blister, and pain, 2 lesion(s) was(were) treated with 1-2 mm freeze border for 1-2 cycles with liquid nitrogen. Post cryotherapy instructions were provided.       # Benign skin findings including: seborrheic keratoses, cherry angioma, lentigines and benign nevi.   - No further intervention required. Patient to report changes.   - Patient reassured of the benign nature of these lesions.    #Signs and Symptoms of non-melanoma skin cancer and ABCDEs of melanoma reviewed with patient. Patient encouraged to perform monthly self skin exams and educated on how to perform them. UV precautions reviewed with patient. Patient was asked about new or changing moles/lesions on body.     #Reviewed Sunscreen: Apply 20 minutes prior to going outdoors and reapply every two hours, when wet or sweating. We recommend using an SPF 30 or higher, and to use one that is water resistant.       Follow-up:  1 year(s) for follow up full body skin exam, prn for new or changing lesions or new concerns    Candy Lovell PA-C  [facility]  Dermatology    ____________________________________________    CC: Skin Check (FBSC- spot on right cheek. /)    HPI:  Mr. Deven Smith is a(n) 69 year old male who presents today as a new patient for a full body skin cancer screening. Patient has concerns today about a spot on his right cheek. This lesion can be rough in nature. Patient reports being diligent with photoprotection.     Patient is otherwise feeling well, without additional skin concerns.     Physical Exam:  Vitals: There were no vitals taken for this visit.  SKIN: Total skin excluding the genitalia areas was performed. The exam included the head/face, neck, both arms, chest, back, abdomen, both legs, digits, mons pubis, buttock and nails. (Patient recently had surgery on his left knee and it was wrapped. The left knee, calf and thigh not evaluated given this)  -Right cheek x 2 , pink macule(s) with overlying adherent scale consistent with an actinic keratosis   -several 1-2mm red dome shaped symmetric papules scattered on the trunk  -multiple tan/brown flat round macules and raised papules scattered throughout trunk, extremities and head. No worrisome features for malignancy noted on examination.  -scattered tan, homogenous macules scattered on sun exposed areas of trunk, extremities and face.   -scattered waxy, stuck on tan/brown papules and patches on the trunk     - No other lesions of concern on areas examined.     Medications:  Current Outpatient Medications   Medication Sig Dispense Refill    acetaminophen (TYLENOL) 325 MG tablet Take 2 tablets (650 mg) by mouth every 4 hours as needed for mild pain 50 tablet 0    aspirin 81 MG EC tablet Take 2 tablets (162 mg) by mouth daily for 28 days 56 tablet 0    hydrOXYzine HCl (ATARAX) 25 MG tablet Take 1 tablet (25 mg) by mouth 3 times daily as needed for itching 20 tablet 0    latanoprost (XALATAN) 0.005 % ophthalmic solution Place 0.005 drops into both eyes At Bedtime      losartan (COZAAR) 25 MG tablet TAKE  1 TABLET (25 MG) BY MOUTH DAILY 90 tablet 4    Misc Natural Products (GLUCOSAMINE CHONDROITIN ADV PO) Take 1 tablet by mouth every morning      MULTI-DAY VITAMINS OR TABS Take 1 tablet by mouth every morning 0 0    Omega-3 Fatty Acids (FISH OIL) 1200 MG capsule Take 1,200 mg by mouth every morning      ondansetron (ZOFRAN ODT) 4 MG ODT tab Take 1 tablet (4 mg) by mouth every 8 hours as needed for nausea 4 tablet 0    oxyCODONE (ROXICODONE) 5 MG tablet Take 1-2 tablets (5-10 mg) by mouth every 4 hours as needed for moderate to severe pain 10 tablet 0    senna-docusate (SENOKOT-S/PERICOLACE) 8.6-50 MG tablet Take 1-2 tablets by mouth 2 times daily 30 tablet 0    simvastatin (ZOCOR) 20 MG tablet TAKE 1 TABLET (20 MG) BY MOUTH AT BEDTIME 90 tablet 3     No current facility-administered medications for this visit.      Past Medical History:   Patient Active Problem List   Diagnosis    Other specified glaucoma    Allergic rhinitis    Family history of other cardiovascular diseases    Labyrinthitis    Hyperlipidemia with target LDL less than 130    DDD (degenerative disc disease), cervical    Supraspinatus tendon tear, left, subsequent encounter    Cervical disc disorder with radiculopathy of cervical region    Postprandial epigastric pain    Heart murmur    Family history of colonic polyps    Chronic pain of both shoulders    Post-traumatic osteoarthritis of both knees    Chronic low back pain without sciatica, unspecified back pain laterality    PVC's (premature ventricular contractions)    Old complex tear of medial meniscus of left knee     Past Medical History:   Diagnosis Date    DDD (degenerative disc disease), cervical     HTN (hypertension)     Hyperlipidemia LDL goal < 130     Old complex tear of medial meniscus of left knee        CC Referred Self, MD  No address on file on close of this encounter.

## 2024-08-02 NOTE — PATIENT INSTRUCTIONS
Cryotherapy    What is it?  Use of a very cold liquid, such as liquid nitrogen, to freeze and destroy abnormal skin cells that need to be removed    What should I expect?  Tenderness and redness  A small blister that might grow and fill with dark purple blood. There may be crusting.  More than one treatment may be needed if the lesions do not go away.    How do I care for the treated area?  Gently wash the area with your hands when bathing.  Use a thin layer of Vaseline to help with healing. You may use a Band-Aid.   The area should heal within 7-10 days and may leave behind a pink or lighter color.   Do not use an antibiotic or Neosporin ointment.   You may take acetaminophen (Tylenol) for pain.     Call your doctor if you have:  Severe pain  Signs of infection (warmth, redness, cloudy yellow drainage, and or a bad smell)  Questions or concerns    Who should I call with questions?      [facility]: [phone]      [facility]: [phone]      For urgent needs outside of business hours call the [facility] [place] at [phone] and ask for the dermatology resident on call             The ABCDEs of Melanoma    Skin cancer can develop anywhere on the skin. Ask someone for help when checking your skin, especially in hard to see places. If you notice a mole different from others, or that changes, enlarges, itches, or bleeds (even if it is small), you should see a dermatologist.

## 2024-08-06 ENCOUNTER — HOSPITAL ENCOUNTER (OUTPATIENT)
Facility: AMBULATORY SURGERY CENTER | Age: 69
Discharge: HOME | End: 2024-08-06
Attending: ORTHOPAEDIC SURGERY
Payer: MEDICARE

## 2024-08-06 ENCOUNTER — ANESTHESIA (OUTPATIENT)
Dept: SURGERY | Facility: AMBULATORY SURGERY CENTER | Age: 69
End: 2024-08-06
Payer: MEDICARE

## 2024-08-06 VITALS
OXYGEN SATURATION: 95 % | HEART RATE: 49 BPM | DIASTOLIC BLOOD PRESSURE: 64 MMHG | WEIGHT: 193 LBS | BODY MASS INDEX: 30.29 KG/M2 | SYSTOLIC BLOOD PRESSURE: 124 MMHG | TEMPERATURE: 97.2 F | RESPIRATION RATE: 20 BRPM | HEIGHT: 67 IN

## 2024-08-06 DIAGNOSIS — M23.204 OLD COMPLEX TEAR OF MEDIAL MENISCUS OF LEFT KNEE: ICD-10-CM

## 2024-08-06 PROCEDURE — 29880 ARTHRS KNE SRG MNISECTMY M&L: CPT | Mod: LT

## 2024-08-06 PROCEDURE — 29880 ARTHRS KNE SRG MNISECTMY M&L: CPT | Performed by: ANESTHESIOLOGY

## 2024-08-06 PROCEDURE — 29880 ARTHRS KNE SRG MNISECTMY M&L: CPT | Performed by: NURSE ANESTHETIST, CERTIFIED REGISTERED

## 2024-08-06 RX ORDER — ONDANSETRON 4 MG/1
4 TABLET, ORALLY DISINTEGRATING ORAL EVERY 30 MIN PRN
Status: DISCONTINUED | OUTPATIENT
Start: 2024-08-06 | End: 2024-08-07 | Stop reason: HOSPADM

## 2024-08-06 RX ORDER — NALOXONE HYDROCHLORIDE 0.4 MG/ML
0.1 INJECTION, SOLUTION INTRAMUSCULAR; INTRAVENOUS; SUBCUTANEOUS
Status: DISCONTINUED | OUTPATIENT
Start: 2024-08-06 | End: 2024-08-06 | Stop reason: HOSPADM

## 2024-08-06 RX ORDER — PROPOFOL 10 MG/ML
INJECTION, EMULSION INTRAVENOUS CONTINUOUS PRN
Status: DISCONTINUED | OUTPATIENT
Start: 2024-08-06 | End: 2024-08-06

## 2024-08-06 RX ORDER — AMOXICILLIN 250 MG
1-2 CAPSULE ORAL 2 TIMES DAILY
Qty: 30 TABLET | Refills: 0 | Status: SHIPPED | OUTPATIENT
Start: 2024-08-06 | End: 2024-09-19

## 2024-08-06 RX ORDER — CEFAZOLIN SODIUM 2 G/50ML
2 SOLUTION INTRAVENOUS SEE ADMIN INSTRUCTIONS
Status: DISCONTINUED | OUTPATIENT
Start: 2024-08-06 | End: 2024-08-06 | Stop reason: HOSPADM

## 2024-08-06 RX ORDER — HYDROMORPHONE HYDROCHLORIDE 1 MG/ML
0.4 INJECTION, SOLUTION INTRAMUSCULAR; INTRAVENOUS; SUBCUTANEOUS EVERY 5 MIN PRN
Status: DISCONTINUED | OUTPATIENT
Start: 2024-08-06 | End: 2024-08-06 | Stop reason: HOSPADM

## 2024-08-06 RX ORDER — ACETAMINOPHEN 325 MG/1
650 TABLET ORAL
Status: DISCONTINUED | OUTPATIENT
Start: 2024-08-06 | End: 2024-08-07 | Stop reason: HOSPADM

## 2024-08-06 RX ORDER — NALOXONE HYDROCHLORIDE 0.4 MG/ML
0.2 INJECTION, SOLUTION INTRAMUSCULAR; INTRAVENOUS; SUBCUTANEOUS
Status: DISCONTINUED | OUTPATIENT
Start: 2024-08-06 | End: 2024-08-06 | Stop reason: HOSPADM

## 2024-08-06 RX ORDER — NALOXONE HYDROCHLORIDE 0.4 MG/ML
0.1 INJECTION, SOLUTION INTRAMUSCULAR; INTRAVENOUS; SUBCUTANEOUS
Status: DISCONTINUED | OUTPATIENT
Start: 2024-08-06 | End: 2024-08-07 | Stop reason: HOSPADM

## 2024-08-06 RX ORDER — ACETAMINOPHEN 325 MG/1
975 TABLET ORAL ONCE
Status: COMPLETED | OUTPATIENT
Start: 2024-08-06 | End: 2024-08-06

## 2024-08-06 RX ORDER — LIDOCAINE HYDROCHLORIDE 20 MG/ML
INJECTION, SOLUTION INFILTRATION; PERINEURAL PRN
Status: DISCONTINUED | OUTPATIENT
Start: 2024-08-06 | End: 2024-08-06

## 2024-08-06 RX ORDER — ONDANSETRON 4 MG/1
4 TABLET, ORALLY DISINTEGRATING ORAL EVERY 30 MIN PRN
Status: DISCONTINUED | OUTPATIENT
Start: 2024-08-06 | End: 2024-08-06 | Stop reason: HOSPADM

## 2024-08-06 RX ORDER — METOPROLOL TARTRATE 1 MG/ML
1-2 INJECTION, SOLUTION INTRAVENOUS EVERY 5 MIN PRN
Status: DISCONTINUED | OUTPATIENT
Start: 2024-08-06 | End: 2024-08-06 | Stop reason: HOSPADM

## 2024-08-06 RX ORDER — OXYCODONE HYDROCHLORIDE 5 MG/1
5-10 TABLET ORAL EVERY 4 HOURS PRN
Qty: 10 TABLET | Refills: 0 | Status: SHIPPED | OUTPATIENT
Start: 2024-08-06 | End: 2024-09-19

## 2024-08-06 RX ORDER — OXYCODONE HYDROCHLORIDE 5 MG/1
10 TABLET ORAL EVERY 4 HOURS PRN
Status: DISCONTINUED | OUTPATIENT
Start: 2024-08-06 | End: 2024-08-07 | Stop reason: HOSPADM

## 2024-08-06 RX ORDER — CEFAZOLIN SODIUM 2 G/50ML
2 SOLUTION INTRAVENOUS
Status: COMPLETED | OUTPATIENT
Start: 2024-08-06 | End: 2024-08-06

## 2024-08-06 RX ORDER — FLUMAZENIL 0.1 MG/ML
0.2 INJECTION, SOLUTION INTRAVENOUS
Status: DISCONTINUED | OUTPATIENT
Start: 2024-08-06 | End: 2024-08-06 | Stop reason: HOSPADM

## 2024-08-06 RX ORDER — ONDANSETRON 2 MG/ML
INJECTION INTRAMUSCULAR; INTRAVENOUS PRN
Status: DISCONTINUED | OUTPATIENT
Start: 2024-08-06 | End: 2024-08-06

## 2024-08-06 RX ORDER — FENTANYL CITRATE 50 UG/ML
INJECTION, SOLUTION INTRAMUSCULAR; INTRAVENOUS PRN
Status: DISCONTINUED | OUTPATIENT
Start: 2024-08-06 | End: 2024-08-06

## 2024-08-06 RX ORDER — NALOXONE HYDROCHLORIDE 0.4 MG/ML
0.4 INJECTION, SOLUTION INTRAMUSCULAR; INTRAVENOUS; SUBCUTANEOUS
Status: DISCONTINUED | OUTPATIENT
Start: 2024-08-06 | End: 2024-08-06 | Stop reason: HOSPADM

## 2024-08-06 RX ORDER — KETOROLAC TROMETHAMINE 30 MG/ML
INJECTION, SOLUTION INTRAMUSCULAR; INTRAVENOUS PRN
Status: DISCONTINUED | OUTPATIENT
Start: 2024-08-06 | End: 2024-08-06

## 2024-08-06 RX ORDER — ONDANSETRON 4 MG/1
4 TABLET, ORALLY DISINTEGRATING ORAL EVERY 8 HOURS PRN
Qty: 4 TABLET | Refills: 0 | Status: SHIPPED | OUTPATIENT
Start: 2024-08-06 | End: 2024-09-19

## 2024-08-06 RX ORDER — GLYCOPYRROLATE 0.2 MG/ML
INJECTION, SOLUTION INTRAMUSCULAR; INTRAVENOUS PRN
Status: DISCONTINUED | OUTPATIENT
Start: 2024-08-06 | End: 2024-08-06

## 2024-08-06 RX ORDER — HYDROXYZINE HYDROCHLORIDE 25 MG/1
25 TABLET, FILM COATED ORAL
Status: DISCONTINUED | OUTPATIENT
Start: 2024-08-06 | End: 2024-08-07 | Stop reason: HOSPADM

## 2024-08-06 RX ORDER — DEXAMETHASONE SODIUM PHOSPHATE 10 MG/ML
4 INJECTION, SOLUTION INTRAMUSCULAR; INTRAVENOUS
Status: DISCONTINUED | OUTPATIENT
Start: 2024-08-06 | End: 2024-08-06 | Stop reason: HOSPADM

## 2024-08-06 RX ORDER — DEXAMETHASONE SODIUM PHOSPHATE 10 MG/ML
4 INJECTION, SOLUTION INTRAMUSCULAR; INTRAVENOUS
Status: DISCONTINUED | OUTPATIENT
Start: 2024-08-06 | End: 2024-08-07 | Stop reason: HOSPADM

## 2024-08-06 RX ORDER — FENTANYL CITRATE 50 UG/ML
50 INJECTION, SOLUTION INTRAMUSCULAR; INTRAVENOUS EVERY 5 MIN PRN
Status: DISCONTINUED | OUTPATIENT
Start: 2024-08-06 | End: 2024-08-06 | Stop reason: HOSPADM

## 2024-08-06 RX ORDER — FENTANYL CITRATE 50 UG/ML
25 INJECTION, SOLUTION INTRAMUSCULAR; INTRAVENOUS
Status: DISCONTINUED | OUTPATIENT
Start: 2024-08-06 | End: 2024-08-07 | Stop reason: HOSPADM

## 2024-08-06 RX ORDER — ACETAMINOPHEN 325 MG/1
975 TABLET ORAL ONCE
Status: DISCONTINUED | OUTPATIENT
Start: 2024-08-06 | End: 2024-08-06 | Stop reason: HOSPADM

## 2024-08-06 RX ORDER — SODIUM CHLORIDE, SODIUM LACTATE, POTASSIUM CHLORIDE, CALCIUM CHLORIDE 600; 310; 30; 20 MG/100ML; MG/100ML; MG/100ML; MG/100ML
INJECTION, SOLUTION INTRAVENOUS CONTINUOUS
Status: DISCONTINUED | OUTPATIENT
Start: 2024-08-06 | End: 2024-08-06 | Stop reason: HOSPADM

## 2024-08-06 RX ORDER — EPHEDRINE SULFATE 50 MG/ML
INJECTION, SOLUTION INTRAMUSCULAR; INTRAVENOUS; SUBCUTANEOUS PRN
Status: DISCONTINUED | OUTPATIENT
Start: 2024-08-06 | End: 2024-08-06

## 2024-08-06 RX ORDER — OXYCODONE HYDROCHLORIDE 5 MG/1
5 TABLET ORAL
Status: COMPLETED | OUTPATIENT
Start: 2024-08-06 | End: 2024-08-06

## 2024-08-06 RX ORDER — BUPIVACAINE HYDROCHLORIDE AND EPINEPHRINE 2.5; 5 MG/ML; UG/ML
INJECTION, SOLUTION INFILTRATION; PERINEURAL PRN
Status: DISCONTINUED | OUTPATIENT
Start: 2024-08-06 | End: 2024-08-06 | Stop reason: HOSPADM

## 2024-08-06 RX ORDER — HYDRALAZINE HYDROCHLORIDE 20 MG/ML
2.5-5 INJECTION INTRAMUSCULAR; INTRAVENOUS EVERY 10 MIN PRN
Status: DISCONTINUED | OUTPATIENT
Start: 2024-08-06 | End: 2024-08-06 | Stop reason: HOSPADM

## 2024-08-06 RX ORDER — ONDANSETRON 2 MG/ML
4 INJECTION INTRAMUSCULAR; INTRAVENOUS EVERY 30 MIN PRN
Status: DISCONTINUED | OUTPATIENT
Start: 2024-08-06 | End: 2024-08-06 | Stop reason: HOSPADM

## 2024-08-06 RX ORDER — ONDANSETRON 2 MG/ML
4 INJECTION INTRAMUSCULAR; INTRAVENOUS EVERY 30 MIN PRN
Status: DISCONTINUED | OUTPATIENT
Start: 2024-08-06 | End: 2024-08-07 | Stop reason: HOSPADM

## 2024-08-06 RX ORDER — ACETAMINOPHEN 325 MG/1
650 TABLET ORAL EVERY 4 HOURS PRN
Qty: 50 TABLET | Refills: 0 | Status: SHIPPED | OUTPATIENT
Start: 2024-08-06

## 2024-08-06 RX ORDER — ONDANSETRON 4 MG/1
4 TABLET, ORALLY DISINTEGRATING ORAL
Status: DISCONTINUED | OUTPATIENT
Start: 2024-08-06 | End: 2024-08-07 | Stop reason: HOSPADM

## 2024-08-06 RX ORDER — FENTANYL CITRATE 50 UG/ML
25 INJECTION, SOLUTION INTRAMUSCULAR; INTRAVENOUS EVERY 5 MIN PRN
Status: DISCONTINUED | OUTPATIENT
Start: 2024-08-06 | End: 2024-08-06 | Stop reason: HOSPADM

## 2024-08-06 RX ORDER — ASPIRIN 81 MG/1
162 TABLET ORAL DAILY
Qty: 56 TABLET | Refills: 0 | Status: SHIPPED | OUTPATIENT
Start: 2024-08-06 | End: 2024-09-03

## 2024-08-06 RX ORDER — LIDOCAINE 40 MG/G
CREAM TOPICAL
Status: DISCONTINUED | OUTPATIENT
Start: 2024-08-06 | End: 2024-08-06 | Stop reason: HOSPADM

## 2024-08-06 RX ORDER — FENTANYL CITRATE 50 UG/ML
25-50 INJECTION, SOLUTION INTRAMUSCULAR; INTRAVENOUS
Status: DISCONTINUED | OUTPATIENT
Start: 2024-08-06 | End: 2024-08-06 | Stop reason: HOSPADM

## 2024-08-06 RX ORDER — HYDROXYZINE HYDROCHLORIDE 25 MG/1
25 TABLET, FILM COATED ORAL 3 TIMES DAILY PRN
Qty: 20 TABLET | Refills: 0 | Status: SHIPPED | OUTPATIENT
Start: 2024-08-06 | End: 2024-09-19

## 2024-08-06 RX ORDER — OXYCODONE HYDROCHLORIDE 5 MG/1
5 TABLET ORAL EVERY 4 HOURS PRN
Status: DISCONTINUED | OUTPATIENT
Start: 2024-08-06 | End: 2024-08-07 | Stop reason: HOSPADM

## 2024-08-06 RX ORDER — DEXAMETHASONE SODIUM PHOSPHATE 4 MG/ML
INJECTION, SOLUTION INTRA-ARTICULAR; INTRALESIONAL; INTRAMUSCULAR; INTRAVENOUS; SOFT TISSUE PRN
Status: DISCONTINUED | OUTPATIENT
Start: 2024-08-06 | End: 2024-08-06

## 2024-08-06 RX ORDER — HYDROMORPHONE HYDROCHLORIDE 1 MG/ML
0.2 INJECTION, SOLUTION INTRAMUSCULAR; INTRAVENOUS; SUBCUTANEOUS EVERY 5 MIN PRN
Status: DISCONTINUED | OUTPATIENT
Start: 2024-08-06 | End: 2024-08-06 | Stop reason: HOSPADM

## 2024-08-06 RX ORDER — PROPOFOL 10 MG/ML
INJECTION, EMULSION INTRAVENOUS PRN
Status: DISCONTINUED | OUTPATIENT
Start: 2024-08-06 | End: 2024-08-06

## 2024-08-06 RX ADMIN — PROPOFOL 200 MG: 10 INJECTION, EMULSION INTRAVENOUS at 13:09

## 2024-08-06 RX ADMIN — FENTANYL CITRATE 50 MCG: 50 INJECTION, SOLUTION INTRAMUSCULAR; INTRAVENOUS at 13:07

## 2024-08-06 RX ADMIN — PROPOFOL 120 MCG/KG/MIN: 10 INJECTION, EMULSION INTRAVENOUS at 13:48

## 2024-08-06 RX ADMIN — CEFAZOLIN SODIUM 2 G: 2 SOLUTION INTRAVENOUS at 13:01

## 2024-08-06 RX ADMIN — ACETAMINOPHEN 975 MG: 325 TABLET ORAL at 10:53

## 2024-08-06 RX ADMIN — DEXAMETHASONE SODIUM PHOSPHATE 4 MG: 4 INJECTION, SOLUTION INTRA-ARTICULAR; INTRALESIONAL; INTRAMUSCULAR; INTRAVENOUS; SOFT TISSUE at 13:18

## 2024-08-06 RX ADMIN — GLYCOPYRROLATE 0.2 MG: 0.2 INJECTION, SOLUTION INTRAMUSCULAR; INTRAVENOUS at 13:07

## 2024-08-06 RX ADMIN — KETOROLAC TROMETHAMINE 30 MG: 30 INJECTION, SOLUTION INTRAMUSCULAR; INTRAVENOUS at 14:09

## 2024-08-06 RX ADMIN — FENTANYL CITRATE 25 MCG: 50 INJECTION, SOLUTION INTRAMUSCULAR; INTRAVENOUS at 14:39

## 2024-08-06 RX ADMIN — FENTANYL CITRATE 50 MCG: 50 INJECTION, SOLUTION INTRAMUSCULAR; INTRAVENOUS at 13:25

## 2024-08-06 RX ADMIN — FENTANYL CITRATE 25 MCG: 50 INJECTION, SOLUTION INTRAMUSCULAR; INTRAVENOUS at 14:25

## 2024-08-06 RX ADMIN — EPHEDRINE SULFATE 10 MG: 50 INJECTION, SOLUTION INTRAMUSCULAR; INTRAVENOUS; SUBCUTANEOUS at 13:35

## 2024-08-06 RX ADMIN — Medication 0.5 MG: at 13:27

## 2024-08-06 RX ADMIN — FENTANYL CITRATE 25 MCG: 50 INJECTION, SOLUTION INTRAMUSCULAR; INTRAVENOUS at 14:34

## 2024-08-06 RX ADMIN — HYDROMORPHONE HYDROCHLORIDE 0.4 MG: 1 INJECTION, SOLUTION INTRAMUSCULAR; INTRAVENOUS; SUBCUTANEOUS at 14:48

## 2024-08-06 RX ADMIN — SODIUM CHLORIDE, SODIUM LACTATE, POTASSIUM CHLORIDE, CALCIUM CHLORIDE: 600; 310; 30; 20 INJECTION, SOLUTION INTRAVENOUS at 11:00

## 2024-08-06 RX ADMIN — LIDOCAINE HYDROCHLORIDE 100 MG: 20 INJECTION, SOLUTION INFILTRATION; PERINEURAL at 13:09

## 2024-08-06 RX ADMIN — ONDANSETRON 4 MG: 2 INJECTION INTRAMUSCULAR; INTRAVENOUS at 13:56

## 2024-08-06 RX ADMIN — OXYCODONE HYDROCHLORIDE 5 MG: 5 TABLET ORAL at 14:34

## 2024-08-06 RX ADMIN — FENTANYL CITRATE 25 MCG: 50 INJECTION, SOLUTION INTRAMUSCULAR; INTRAVENOUS at 14:30

## 2024-08-06 RX ADMIN — PROPOFOL 150 MCG/KG/MIN: 10 INJECTION, EMULSION INTRAVENOUS at 13:09

## 2024-08-06 ASSESSMENT — ENCOUNTER SYMPTOMS
DYSRHYTHMIAS: 0
SEIZURES: 0

## 2024-08-06 ASSESSMENT — LIFESTYLE VARIABLES: TOBACCO_USE: 1

## 2024-08-06 NOTE — ANESTHESIA CARE TRANSFER NOTE
Patient: Deven Smith    Procedure: Procedure(s):  left knee examination under anesthesia, knee arthroscopy, meniscectomy of medial and lateral meniscus       Diagnosis: Old complex tear of medial meniscus of left knee [M23.204]  Diagnosis Additional Information: No value filed.    Anesthesia Type:   General     Note:    Oropharynx: oropharynx clear of all foreign objects and spontaneously breathing  Level of Consciousness: awake  Oxygen Supplementation: face mask    Independent Airway: airway patency satisfactory and stable  Dentition: dentition unchanged  Vital Signs Stable: post-procedure vital signs reviewed and stable  Report to RN Given: handoff report given  Patient transferred to: PACU    Handoff Report: Identifed the Patient, Identified the Reponsible Provider, Reviewed the pertinent medical history, Discussed the surgical course, Reviewed Intra-OP anesthesia mangement and issues during anesthesia, Set expectations for post-procedure period and Allowed opportunity for questions and acknowledgement of understanding      Vitals:  Vitals Value Taken Time   /83 08/06/24 1422   Temp 36.2  C (97.2  F) 08/06/24 1423   Pulse 61 08/06/24 1426   Resp 27 08/06/24 1426   SpO2 97 % 08/06/24 1426   Vitals shown include unfiled device data.    Electronically Signed By: SCOTT Camara CRNA  August 6, 2024  2:26 PM

## 2024-08-06 NOTE — OP NOTE
PREOPERATIVE DIAGNOSIS: Left knee medial and lateral meniscus tears.   POSTOPERATIVE DIAGNOSIS: Left knee medial and lateral meniscus tears.   PROCEDURES:   1. Examination under anesthesia, left knee.   2. Left knee arthroscopy, partial medial and lateral meniscectomy.   SURGEON: Edmundo Miller MD   ASSISTANT: Ryann Saucedo PA-C. The assistance of Mrs. Saucedo was necessary for positioning, arthroscopic visualization, retraction, and meniscectomy. There was no qualified available resident or fellow who was aware of the intricies of the procedure.  OPERATIVE INDICATIONS: Deven is a very pleasant 69 year old male who presented to my clinic with medial and lateral joint line pain. An MRI had been obtained by his primary care physician demonstrating a displaced medial and lateral meniscus tears. I reviewed with him his history, physical and imaging exam. I felt that he would be a candidate for knee arthroscopy, partial medial and lateral meniscectomy. He was apprised of the risks and benefits of surgery and desired to proceed despite the risks.   OPERATIVE DETAILS: In the preoperative area, the patient's informed consent was reviewed and he desired to proceed. Left knee was marked. The patient was in agreement. he was taken to the operating room, timeout was performed and all parties were in agreement. Preoperative antibiotics was given within 1 hour of time of surgery. He was placed supine on the operating room table, surrendered to LMA anesthesia and examination under anesthesia was performed with the following findings: Full passive range of motion 0 to 135 degrees. No patellar instability. Stable to varus and valgus stress at 0 and 30 degrees. Stable anterior, posterior drawer. No pivot shift. Negative dial test. Posterior drawer 0. Lachman 0. At this time, a bump was placed underneath the ipsilateral hip. Egg crate was placed beneath the well leg. No tourniquet was placed. A side post was utilized. Left leg was  prepped and draped in the usual sterile fashion. Standard anteromedial and anterolateral arthroscopic portals were created and diagnostic arthroscopy was performed with the following findings: Medial patellar facet was Grade 2, lateral patellar facet was Grade 2, central patellar ridge was Grade 3, central trochlea was Grade 1, medial femoral condyle was Grade 2, medial tibial plateau was Grade 2, lateral femoral condyle was Grade 1, lateral tibial plateau was Grade 3. Lateral meniscus had a displaced tear. Medial meniscus had a displaced tear. ACL and PCL were intact. Popliteus tendon intact.  Alternating then between a motorized shaver and a small biter, a partial medial and lateral meniscectomy was performed until a balanced stable rim of meniscal tissue remained. At this time, all excess arthroscopic fluid and meniscal debris was removed from the knee joint. Copious irrigation was performed. Portal sites were closed with nylon. Sterile dressings were applied. The patient was awakened from anesthesia and transferred to the recovery room in stable condition with stable vital signs.   COMPLICATIONS: None apparent.   DRAINS: None.   SPECIMENS: None.   ESTIMATED BLOOD LOSS: 5 mL.   TOURNIQUET: No tourniquet was placed.  POSTOPERATIVE PLAN: The patient will be weightbearing as tolerated, range of motion as tolerated, can shower on postoperative day #3. No submerging the wounds. No chemical DVT prophylaxis. Follow up in my Orthopedic Clinic in 1 week time. No running, jumping, pounding sports for 6 weeks.  daily x 4 weeks

## 2024-08-06 NOTE — DISCHARGE INSTRUCTIONS
"NEXT DOSE OF IBUPROFEN AT 8:10 PM        Safety Tips for Using Crutches    Crutch Fit:  Assume good standing posture with shoulders relaxed and crutch tips 6-8 inches out from the side of the foot.  The underarm pad should fall 2-3 fingers width below the armpit.  The handgrip is positioned level with the wrist to allow 30  flexion at the elbow.    Safety Tips:  Bear weight on your hands, not on your armpits.  Do not add extra padding to the underarm pad. This will, in effect, lengthen the crutches and increase risk of nerve injury.  Wear flat, properly fitting shoes. Do not walk in stocking feet, high heels or slippers.  Household hazards:  --Throw rugs should be removed from floors.  --Stairs should be cleared of obstacles.  --Use extra caution on slippery, highly polished, littered or uneven floor surfaces.  --Check for electric cords.  Check crutch tips for excessive wear and keep wing nuts tight.  While walking, look forward with  head up  and  eyes open.  Take equal length steps.  Use BOTH crutches.    Stairs Sequence:  UP: \"Good\" leg first, followed by  bad  leg, then crutches.  DOWN: Crutches, followed by  bad  leg, \"good\" leg.     Walking with Crutches:  Move both crutches forward at the same time.  Non-Weight Bearing (NWB):  Hold the involved leg up and swing through the crutches with the involved leg. The involved leg does not touch the floor.  Toe Touch Weight Bearing (TTWB): Move the involved leg forward. Rest it lightly on the floor for balance only. Step through the crutches with the uninvolved leg.  Partial Weight Bearing (PWB): Move the involved leg forward. Step down the weight of the leg only.  Step through the crutches with the uninvolved leg.  Weight Bearing As Tolerated (WBAT): Move the involved leg forward. Put as much pressure through the involved leg as you can tolerate comfortably. Then step through the crutches with the uninvolved leg.      [facility]  Home Care Following Anesthesia  For 24 hours after surgery:  Get plenty of rest.  A responsible adult must stay with you for at least 24 hours after you leave the surgery center.  Do not drive or use heavy equipment.  If you have weakness or tingling, don't drive or use heavy equipment until this feeling goes away.   Do not drink alcohol.   Avoid strenuous or risky activities.  Ask for help when climbing stairs.  You may feel lightheaded.  IF so, sit for a few minutes before standing.  Have someone help you get up.   If you have nausea (feel sick to your stomach): Drink only clear liquids such as apple juice, ginger ale, broth or 7-Up.  Rest may also help.  Be sure to drink enough fluids.  Move to a regular diet as you feel able.   You may have a slight fever.  Call the doctor if your fever is over 100 F (37.7 C) (taken under the tongue) or lasts longer than 24 hours.  You may have a dry mouth, a sore throat, muscle aches or trouble sleeping. These should go away after 24 hours.  Do not make important or legal decisions.   It is recommended to avoid smoking.               Tips for taking pain medications  To get the best pain relief possible, remember these points:  Take pain medications as directed, before pain becomes severe.  Pain medication can upset your stomach: taking it with food may help.  Constipation is a common side effect of pain medication. Drink plenty of  fluids.  Eat foods high in fiber. Take a stool softener if recommended by your doctor or pharmacist.  Do not drink alcohol, drive or operate machinery while taking pain medications.  Ask about other ways to control pain, such as with heat, ice or relaxation.    Tylenol/Acetaminophen Consumption    If you feel your pain relief is insufficient, you may take Tylenol/Acetaminophen in addition to your narcotic pain medication.   Be careful not to exceed 4,000 mg of Tylenol/Acetaminophen in a 24 hour period from all sources.  If you are taking  extra strength Tylenol/acetaminophen (500 mg), the maximum dose is 8 tablets in 24 hours.  If you are taking regular strength acetaminophen (325 mg), the maximum dose is 12 tablets in 24 hours.    Call a doctor for any of the following:  Signs of infection (fever, growing tenderness at the surgery site, a large amount of drainage or bleeding, severe pain, foul-smelling drainage, redness, swelling).  It has been over 8 to 10 hours since surgery and you are still not able to urinate (pass water).  Headache for over 24 hours.  Numbness, tingling or weakness the day after surgery (if you had spinal anesthesia).  Signs of Covid-19 infection (temperature over 100 degrees, shortness of breath, cough, loss of taste/smell, generalized body aches, persistent headache, chills, sore throat, nausea/vomiting/diarrhea)  Your doctor is:       Dr. Edmundo Miller, Orthopaedics: [phone]               Or dial [phone] and ask for the resident on call for:  Orthopaedics  For emergency care, call the:  [facility] Emergency Department: [phone] (TTY for hearing impaired: [phone])

## 2024-08-06 NOTE — ANESTHESIA PREPROCEDURE EVALUATION
Pre-Operative H & P     CC:  Preoperative exam to assess for increased cardiopulmonary risk while undergoing surgery and anesthesia.    Date of Encounter: 7/26/2024  Primary Care Physician:  Bigg Morgan     Reason for visit:   Encounter Diagnosis   Name Primary?     Old complex tear of medial meniscus of left knee        HPI  Deven Smith is a 69 year old male who presents for pre-operative H & P in preparation for  Procedure Information       Case: [id number] Date/Time: 08/06/24 1240    Procedure: left knee examination under anesthesia, knee arthroscopy, meniscectomy of medial and lateral meniscus (Left: Knee)    Anesthesia type: Choice    Diagnosis: Old complex tear of medial meniscus of left knee [M23.204]    Pre-op diagnosis: Old complex tear of medial meniscus of left knee [M23.204]    Location: [place] [address] / [facility]    Providers: Edmundo Miller MD          History is obtained from the patient and chart review    Patient who was evaluated by Dr. Miller on 7- in the orthopedic surgery clinic with concern for medial lateral meniscus tear. Through Dr. Miller's evaluation including imaging, the patient was found to have symptomatic medial lateral meniscus tears, with definitive failure of nonsurgical intervention. Dr. Miller counseled the patient regarding treatment options.  He is scheduled above procedure     His history is otherwise significant for HTN, HLD, h/o PVCs, cervical radiculopathy, DDD and osteoarthritis of bilateral knees.    Hx of abnormal bleeding or anti-platelet use: Denies      Past Medical History  Past Medical History:   Diagnosis Date     DDD (degenerative disc disease), cervical      HTN (hypertension)      Hyperlipidemia LDL goal < 130      Old complex tear of medial meniscus of left knee        Past Surgical History  Past Surgical History:   Procedure Laterality Date     COLONOSCOPY  03/21/2007     Repeat  for screening purposes in 10 yrs.     COLONOSCOPY  2013    Procedure: COLONOSCOPY;  Colonoscopy;  Surgeon: Emmett San MD;  Location: [facility]     COLONOSCOPY N/A 2018    Procedure: COLONOSCOPY;  colonoscopy;  Surgeon: Israel Moreland MD;  Location: [facility]     COLONOSCOPY N/A 3/4/2024    Procedure: Colonoscopy, Flexible, with lesion remvoal using snare;  Surgeon: Sanya Vieyra DO;  Location: [facility]     ESOPHAGOSCOPY, GASTROSCOPY, DUODENOSCOPY (EGD), COMBINED N/A 2022    Procedure: ESOPHAGOGASTRODUODENOSCOPY (EGD);  Surgeon: Antoinette Lagunas MD;  Location: [facility]     HC SHLDR ARTHROSCOP,SURG,W/ROTAT CUFF REPR Left      HC VASECTOMY UNILAT/BILAT W POSTOP SEMEN  1998    Vasectomy       Prior to Admission Medications  Current Outpatient Medications   Medication Sig Dispense Refill     latanoprost (XALATAN) 0.005 % ophthalmic solution Place 0.005 drops into both eyes At Bedtime       losartan (COZAAR) 25 MG tablet TAKE 1 TABLET (25 MG) BY MOUTH DAILY 90 tablet 4     Misc Natural Products (GLUCOSAMINE CHONDROITIN ADV PO) Take 1 tablet by mouth every morning       MULTI-DAY VITAMINS OR TABS Take 1 tablet by mouth every morning 0 0     Omega-3 Fatty Acids (FISH OIL) 1200 MG capsule Take 1,200 mg by mouth every morning       simvastatin (ZOCOR) 20 MG tablet TAKE 1 TABLET (20 MG) BY MOUTH AT BEDTIME 90 tablet 3       Allergies  Allergies   Allergen Reactions     Seasonal Allergies        Social History  Social History     Socioeconomic History     Marital status:      Spouse name: koko     Number of children: 2     Years of education: 13     Highest education level: Not on file   Occupational History     Occupation:    Tobacco Use     Smoking status: Former     Current packs/day: 0.00     Average packs/day: 2.0 packs/day for 15.0 years (30.0 ttl pk-yrs)     Types: Cigarettes     Start date: 1972     Quit date: 1987     Years since quittin.7      Smokeless tobacco: Never   Vaping Use     Vaping status: Never Used   Substance and Sexual Activity     Alcohol use: Not Currently     Comment: rare      Drug use: No     Sexual activity: Yes     Partners: Female     Birth control/protection: Surgical     Comment: Vasectomy   Other Topics Concern      Service No     Blood Transfusions No     Caffeine Concern No     Comment: 3 cans soda/day     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     Hobby Hazards Yes     Comment: fishing-[employer] hunting     Sleep Concern Yes     Comment: is awake after 6 hrs of sleep-     Stress Concern No     Weight Concern No     Special Diet No     Back Care No     Exercise Yes     Comment: walks, does physical labor,     Bike Helmet No     Seat Belt Yes     Self-Exams No     Parent/sibling w/ CABG, MI or angioplasty before 65F 55M? No   Social History Narrative     Not on file     Social Determinants of Health     Financial Resource Strain: Low Risk  (12/28/2023)    Financial Resource Strain      Within the past 12 months, have you or your family members you live with been unable to get utilities (heat, electricity) when it was really needed?: No   Food Insecurity: Low Risk  (12/28/2023)    Food Insecurity      Within the past 12 months, did you worry that your food would run out before you got money to buy more?: No      Within the past 12 months, did the food you bought just not last and you didn t have money to get more?: No   Transportation Needs: Low Risk  (12/28/2023)    Transportation Needs      Within the past 12 months, has lack of transportation kept you from medical appointments, getting your medicines, non-medical meetings or appointments, work, or from getting things that you need?: No   Physical Activity: Not on file   Stress: Not on file   Social Connections: Not on file   Interpersonal Safety: Low Risk  (12/29/2023)    Interpersonal Safety      Do you feel physically and emotionally safe  where you currently live?: Yes      Within the past 12 months, have you been hit, slapped, kicked or otherwise physically hurt by someone?: No      Within the past 12 months, have you been humiliated or emotionally abused in other ways by your partner or ex-partner?: No   Housing Stability: Low Risk  (2023)    Housing Stability      Do you have housing? : Yes      Are you worried about losing your housing?: No       Family History  Family History   Problem Relation Age of Onset     Arthritis Mother      Depression Mother         on meds. is in nursing home     Hypertension Mother      Osteoporosis Mother      Anesthesia Reaction Mother         slow to wake, but elderly and with dementia     Heart Disease Father          at age 57  ? MI--no autopsy     Cancer Father         jaw--smoked [place]     Osteoporosis Sister         on meds     Hypertension Sister      Allergies Brother         on meds     Deep Vein Thrombosis Brother      Hypertension Brother      Osteoporosis Brother      Osteoporosis Brother      Family History Negative Other         No breast or colon cancer hx in family     Bleeding Disorder No family hx of        Review of Systems  The complete review of systems is negative other than noted in the HPI or here.   Anesthesia Evaluation   Pt has had prior anesthetic. Type: General and MAC.    No history of anesthetic complications       ROS/MED HX  ENT/Pulmonary:     (+)     EFREN risk factors,  hypertension,         tobacco use, Past use,                    (-) recent URI   Neurologic:    (-) no seizures and no CVA   Cardiovascular: Comment: Recent negative stress performed due to report of  earlier fatigue when doing workout    (+) Dyslipidemia hypertension-range: self monitoring 120-130s/60-70/ -   -  - -                           valvular problems/murmurs  heart murmur, no sig valve disease on echo.    Previous cardiac testing   Echo: Date: 2019 Results:    Stress Test:  Date: 2024  "Results:    ECG Reviewed:  Date: 2019 Results:  Sinus  Bradycardia  57 bpm  WITHIN NORMAL LIMITS      Holter Monitor   Final Interpretation 10/29/2019  1.  Occasional PVCs with a burden of approximately 1%    2.  Monitor activations correlated with PVCs.    Cath:  Date: Results:   (-) taking anticoagulants/antiplatelets, KIM and arrhythmias   METS/Exercise Tolerance: >4 METS Comment: Does Crossfit 3 X weekly   Hematologic:    (-) history of blood clots and history of blood transfusion   Musculoskeletal: Comment: DDD  (+)  arthritis,             GI/Hepatic: Comment: Occ use of omeprazole    (+) GERD, Other,                  Renal/Genitourinary:  - neg Renal ROS     Endo:  - neg endo ROS     Psychiatric/Substance Use:    (-) psychiatric history   Infectious Disease:  - neg infectious disease ROS     Malignancy:  - neg malignancy ROS     Other:  - neg other ROS          BP (!) 167/99 (BP Location: Right arm)   Pulse 61   Temp 36.6  C (97.8  F) (Temporal)   Resp 18   Ht 1.702 m (5' 7\")   Wt 87.5 kg (193 lb)   SpO2 97%   BMI 30.23 kg/m      Physical Exam   Constitutional: Awake, alert, cooperative, no apparent distress, and appears stated age.  Eyes: Pupils equal, round and reactive to light, extra ocular muscles intact, sclera clear, conjunctiva normal.  HENT: Normocephalic, oral pharynx with moist mucus membranes, good dentition. No goiter appreciated.   Respiratory: Clear to auscultation bilaterally, no crackles or wheezing. No cough or obvious dyspnea.  Cardiovascular: Regular rate and rhythm, normal, 2/6 systolic murmur noted, heard best at the right sternal border. Carotids +2, no bruits. No edema. Palpable pulses to radial  DP and PT arteries.   GI: Normal bowel sounds, soft, non-distended, non-tender, no masses palpated, no hepatosplenomegaly.    Lymph/Hematologic: No cervical lymphadenopathy and no supraclavicular lymphadenopathy.  Genitourinary:  Deferred  Skin: Warm and dry.    Musculoskeletal: Full ROM " of neck. There is no redness, warmth, or swelling of the joints. Gross motor strength is normal.    Neurologic: Awake, alert, oriented to name, place and time. Cranial nerves II-XII are grossly intact. Gait is normal.   Neuropsychiatric: Calm, cooperative. Normal affect.     Prior Labs/Diagnostic Studies   All labs and imaging personally reviewed     EKG 2019 Sinus bradycardia    Exercise stress echocardiogram 2/7/24  Interpretation Summary  1. Exercise stress echocardiogram study is negative for ischemia at a  diagnostic level of peak stress (101% MPHR).  2. EKG at rest and with stress demonstrated no ischemic ST changes. No  exercise induced arrhythmias.  3. No chest pain with exercise.  4. Patient was hypertensive to start test (190/62 mmHg). With peak exercise BP  increased to 210/68 mmHg.  5. Above average functional capacity for age (14 METS).  6. Baseline screening echocardiogram demonstrated no significant valve  dysfunction. Normal appearing aortic root.    2019 Echocardiogram   Interpretation Summary     There is mild to moderate concentric left ventricular hypertrophy.  Left ventricular systolic function is normal.  The visual ejection fraction is estimated at 60-65%.  No regional wall motion abnormalities noted.  There is no comparison study available.  The patient's records and results personally reviewed by this provider.     Outside records reviewed from: Care Everywhere    LAB/DIAGNOSTIC STUDIES TODAY:    Lab Results   Component Value Date    WBC 4.6 07/26/2024    WBC 5.6 10/10/2019     Lab Results   Component Value Date    RBC 4.76 07/26/2024    RBC 4.44 10/10/2019     Lab Results   Component Value Date    HGB 14.0 07/26/2024    HGB 14.2 10/10/2019     Lab Results   Component Value Date    HCT 41.7 07/26/2024    HCT 41.1 10/10/2019     Lab Results   Component Value Date    MCV 88 07/26/2024    MCV 93 10/10/2019     Lab Results   Component Value Date    MCH 29.4 07/26/2024    MCH 32.0 10/10/2019      Lab Results   Component Value Date    MCHC 33.6 07/26/2024    MCHC 34.5 10/10/2019     Lab Results   Component Value Date    RDW 13.7 07/26/2024    RDW 12.9 10/10/2019     Lab Results   Component Value Date     07/26/2024     10/10/2019     Last Comprehensive Metabolic Panel:  Lab Results   Component Value Date     07/26/2024    POTASSIUM 4.8 07/26/2024    CHLORIDE 103 07/26/2024    CO2 27 07/26/2024    ANIONGAP 9 07/26/2024     (H) 07/26/2024    BUN 20.5 07/26/2024    CR 0.92 07/26/2024    GFRESTIMATED >90 07/26/2024    ROSIE 9.5 07/26/2024     Assessment    Deven Smith is a 69 year old male seen as a PAC referral for risk assessment and optimization for anesthesia.    Plan/Recommendations  Pt will be optimized for the proposed procedure.  See below for details on the assessment, risk, and preoperative recommendations    NEUROLOGY  - No history of TIA, CVA or seizure    -Post Op delirium risk factors:  No risk identified    ENT  - No current airway concerns.  Will need to be reassessed day of surgery.  Mallampati: I  TM: > 3    CARDIAC  HLD. Simvastatin at HS. HYPERTENSION. Self monitors at home with BP range 120-130s/60-70. Will take losartan on DOS for choice anesthesia. Reports feeling comfortable with spinal. Recent exercise stress test negative above. Echocardiogram with no significant valvular abnormalities. Soft systolic heart murmur.  Denies chest pain, irregular heart rate, or dyspnea on exertion.  Patient has excellent exercise tolerance doing CrossFit 3 times a week  - METS (Metabolic Equivalents)>4    RCRI: 0.4% risk of serious cardiac events    PULMONARY  Denies asthma, cough or use of inhaler  Intermediate risk for EFREN    - Tobacco History    History   Smoking Status     Former     Packs/day: 2.00     Years: 15.00     Types: Cigarettes     Quit date: 11/8/1987   Smokeless Tobacco     Never       GI: Occ use of omeprazole for heartburn symptoms. May take on DOS if  "needed  PONV Low Risk  Total Score: 1           1 AN PONV: Patient is not a current smoker        /RENAL  - Baseline Creatinine  0.92    ENDOCRINE    - BMI: Estimated body mass index is 30.23 kg/m  as calculated from the following:    Height as of this encounter: 1.702 m (5' 7\").    Weight as of this encounter: 87.5 kg (193 lb).  Obesity (BMI >30)  - No history of Diabetes Mellitus  Glucose 118 today on fasting lab draw. A1C to be added for screening--5.8. Patient to be notified.     HEME: VTE risk 1.8%  FH of DVT in brother  Denies personal history of blood clots  Denies personal or family history of bleeding disorder  Denies history of blood transfusion     MSK: Frailty score 0/5  OA  DDD    Different anesthesia methods/types have been discussed with the patient, but they are aware that the final plan will be decided by the assigned anesthesia provider on the date of service.    The patient is optimized for their procedure. AVS with information on surgery time/arrival time, meds and NPO status given by nursing staff. No further diagnostic testing indicated.      On the day of service:     Prep time: 13 minutes  Visit time: 12 minutes  Documentation time: 14 minutes  ------------------------------------------  Total time: 39 minutes      SCOTT Yusuf CNS  Preoperative Assessment Center  [facility]  Phone: [phone]  Fax: [phone]    Physical Exam    Airway        Mallampati: II   TM distance: > 3 FB   Neck ROM: full   Mouth opening: > 3 cm    Respiratory Devices and Support         Dental       (+) Modest Abnormalities - crowns, retainers, 1 or 2 missing teeth      Cardiovascular   cardiovascular exam normal       Rhythm and rate: regular and normal     Pulmonary   pulmonary exam normal        breath sounds clear to auscultation       Anesthesia Plan    ASA Status:  3    NPO Status:  NPO Appropriate    Anesthesia Type: General.     - Airway: LMA "   Induction: Intravenous, Propofol.   Maintenance: TIVA.        Consents    Anesthesia Plan(s) and associated risks, benefits, and realistic alternatives discussed. Questions answered and patient/representative(s) expressed understanding.     - Discussed:     - Discussed with:  Patient            Postoperative Care    Pain management: Multi-modal analgesia.   PONV prophylaxis: Ondansetron (or other 5HT-3), Background Propofol Infusion     Comments:

## 2024-08-06 NOTE — ANESTHESIA PROCEDURE NOTES
Airway       Patient location during procedure: OR       Procedure Start/Stop Times: 8/6/2024 1:12 PM  Staff -        CRNA: Chelsie Resendez APRN CRNA       Performed By: CRNA  Consent for Airway        Urgency: elective  Indications and Patient Condition       Indications for airway management: beatriz-procedural       Induction type:intravenous       Mask difficulty assessment: 1 - vent by mask    Final Airway Details       Final airway type: supraglottic airway    Supraglottic Airway Details        Type: LMA       Brand: LMA Unique       LMA size: 5    Post intubation assessment        Placement verified by: capnometry, equal breath sounds and chest rise        Number of attempts at approach: 1       Number of other approaches attempted: 0       Secured with: tape       Ease of procedure: easy       Dentition: Intact and Unchanged    Medication(s) Administered   Medication Administration Time: 8/6/2024 1:12 PM    Additional Comments       Place by Medical student; Candy Oneill MS4

## 2024-08-06 NOTE — ANESTHESIA POSTPROCEDURE EVALUATION
Patient: eDven Smith    Procedure: Procedure(s):  left knee examination under anesthesia, knee arthroscopy, meniscectomy of medial and lateral meniscus       Anesthesia Type:  General    Note:  Disposition: Outpatient   Postop Pain Control: Uneventful            Sign Out: Well controlled pain   PONV: No   Neuro/Psych: Uneventful            Sign Out: Acceptable/Baseline neuro status   Airway/Respiratory: Uneventful            Sign Out: Acceptable/Baseline resp. status   CV/Hemodynamics: Uneventful            Sign Out: Acceptable CV status; No obvious hypovolemia; No obvious fluid overload   Other NRE: NONE   DID A NON-ROUTINE EVENT OCCUR? No       Last vitals:  Vitals Value Taken Time   /72 08/06/24 1455   Temp 36.1  C (96.9  F) 08/06/24 1455   Pulse 50 08/06/24 1455   Resp 11 08/06/24 1455   SpO2 96 % 08/06/24 1455       Electronically Signed By: Meg Quintero MD  August 6, 2024  4:10 PM

## 2024-08-08 ENCOUNTER — OFFICE VISIT (OUTPATIENT)
Dept: DERMATOLOGY | Facility: CLINIC | Age: 69
End: 2024-08-08
Payer: MEDICARE

## 2024-08-08 DIAGNOSIS — L81.4 LENTIGINES: Primary | ICD-10-CM

## 2024-08-08 DIAGNOSIS — D22.9 MULTIPLE BENIGN NEVI: ICD-10-CM

## 2024-08-08 DIAGNOSIS — D18.01 CHERRY ANGIOMA: ICD-10-CM

## 2024-08-08 DIAGNOSIS — L57.0 ACTINIC KERATOSES: ICD-10-CM

## 2024-08-08 DIAGNOSIS — L82.1 SEBORRHEIC KERATOSIS: ICD-10-CM

## 2024-08-08 PROCEDURE — 17000 DESTRUCT PREMALG LESION: CPT | Performed by: STUDENT IN AN ORGANIZED HEALTH CARE EDUCATION/TRAINING PROGRAM

## 2024-08-08 PROCEDURE — 99203 OFFICE O/P NEW LOW 30 MIN: CPT | Mod: 25 | Performed by: STUDENT IN AN ORGANIZED HEALTH CARE EDUCATION/TRAINING PROGRAM

## 2024-08-08 PROCEDURE — 17003 DESTRUCT PREMALG LES 2-14: CPT | Performed by: STUDENT IN AN ORGANIZED HEALTH CARE EDUCATION/TRAINING PROGRAM

## 2024-08-08 ASSESSMENT — PAIN SCALES - GENERAL: PAINLEVEL: MILD PAIN (3)

## 2024-08-08 NOTE — NURSING NOTE
Deven Smith's chief complaint for this visit includes:  Chief Complaint   Patient presents with    Skin Check     FBSC- spot on right cheek.        PCP: Bigg Morgan    Referring Provider:  Referred Self, MD  No address on file    There were no vitals taken for this visit.  Mild Pain (3)        Allergies   Allergen Reactions    Seasonal Allergies          Do you need any medication refills at today's visit?  No.     Anabel Mendosa RN on 8/8/2024 at 2:01 PM

## 2024-08-08 NOTE — LETTER
8/8/2024      Deven Smith  [address] [address] [place] [phone]      Dear Colleague,    Thank you for referring your patient, Deven Smith, to the [facility]. Please see a copy of my visit note below.    [facility] Dermatology Note  Encounter Date: Aug 8, 2024  Office Visit     Reviewed patients past medical history and pertinent chart review prior to patients visit today.     Dermatology Problem List:  Last skin check: 08/08/2024    Actinic Keratosis   - Right cheek s/p cryotherapy 08/08/24     Personal Hx: no personal history of skin cancer  Family Hx: Mother had skin cancer- unknown type.   _________________________________________    Assessment & Plan:     # actinic keratoses  Actinic keratoses are pre-cancerous skin growths caused by sun exposure. Treatment is recommended and medically indicated. Treated with cryotherapy as outlined below.     Procedures performed:   - Cryotherapy procedure note, location(s): Right cheek. After verbal consent and discussion of risks and benefits including, but not limited to, dyspigmentation/scar, blister, and pain, 2 lesion(s) was(were) treated with 1-2 mm freeze border for 1-2 cycles with liquid nitrogen. Post cryotherapy instructions were provided.       # Benign skin findings including: seborrheic keratoses, cherry angioma, lentigines and benign nevi.   - No further intervention required. Patient to report changes.   - Patient reassured of the benign nature of these lesions.    #Signs and Symptoms of non-melanoma skin cancer and ABCDEs of melanoma reviewed with patient. Patient encouraged to perform monthly self skin exams and educated on how to perform them. UV precautions reviewed with patient. Patient was asked about new or changing moles/lesions on body.     #Reviewed Sunscreen: Apply 20 minutes prior to going outdoors and reapply every two hours, when wet or sweating. We recommend using an SPF 30 or higher, and  to use one that is water resistant.       Follow-up:  1 year(s) for follow up full body skin exam, prn for new or changing lesions or new concerns    Candy Lovell PA-C  [facility]  Dermatology   ____________________________________________    CC: Skin Check ([facility]- spot on right cheek. /)    HPI:  Mr. Deven Smith is a(n) 69 year old male who presents today as a new patient for a full body skin cancer screening. Patient has concerns today about a spot on his right cheek. This lesion can be rough in nature. Patient reports being diligent with photoprotection.     Patient is otherwise feeling well, without additional skin concerns.     Physical Exam:  Vitals: There were no vitals taken for this visit.  SKIN: Total skin excluding the genitalia areas was performed. The exam included the head/face, neck, both arms, chest, back, abdomen, both legs, digits, mons pubis, buttock and nails. (Patient recently had surgery on his left knee and it was wrapped. The left knee, calf and thigh not evaluated given this)  -Right cheek x 2 , pink macule(s) with overlying adherent scale consistent with an actinic keratosis   -several 1-2mm red dome shaped symmetric papules scattered on the trunk  -multiple tan/brown flat round macules and raised papules scattered throughout trunk, extremities and head. No worrisome features for malignancy noted on examination.  -scattered tan, homogenous macules scattered on sun exposed areas of trunk, extremities and face.   -scattered waxy, stuck on tan/brown papules and patches on the trunk     - No other lesions of concern on areas examined.     Medications:  Current Outpatient Medications   Medication Sig Dispense Refill     acetaminophen (TYLENOL) 325 MG tablet Take 2 tablets (650 mg) by mouth every 4 hours as needed for mild pain 50 tablet 0     aspirin 81 MG EC tablet Take 2 tablets (162 mg) by mouth daily for 28 days 56 tablet 0     hydrOXYzine HCl (ATARAX) 25 MG tablet Take 1  tablet (25 mg) by mouth 3 times daily as needed for itching 20 tablet 0     latanoprost (XALATAN) 0.005 % ophthalmic solution Place 0.005 drops into both eyes At Bedtime       losartan (COZAAR) 25 MG tablet TAKE 1 TABLET (25 MG) BY MOUTH DAILY 90 tablet 4     Misc Natural Products (GLUCOSAMINE CHONDROITIN ADV PO) Take 1 tablet by mouth every morning       MULTI-DAY VITAMINS OR TABS Take 1 tablet by mouth every morning 0 0     Omega-3 Fatty Acids (FISH OIL) 1200 MG capsule Take 1,200 mg by mouth every morning       ondansetron (ZOFRAN ODT) 4 MG ODT tab Take 1 tablet (4 mg) by mouth every 8 hours as needed for nausea 4 tablet 0     oxyCODONE (ROXICODONE) 5 MG tablet Take 1-2 tablets (5-10 mg) by mouth every 4 hours as needed for moderate to severe pain 10 tablet 0     senna-docusate (SENOKOT-S/PERICOLACE) 8.6-50 MG tablet Take 1-2 tablets by mouth 2 times daily 30 tablet 0     simvastatin (ZOCOR) 20 MG tablet TAKE 1 TABLET (20 MG) BY MOUTH AT BEDTIME 90 tablet 3     No current facility-administered medications for this visit.      Past Medical History:   Patient Active Problem List   Diagnosis     Other specified glaucoma     Allergic rhinitis     Family history of other cardiovascular diseases     Labyrinthitis     Hyperlipidemia with target LDL less than 130     DDD (degenerative disc disease), cervical     Supraspinatus tendon tear, left, subsequent encounter     Cervical disc disorder with radiculopathy of cervical region     Postprandial epigastric pain     Heart murmur     Family history of colonic polyps     Chronic pain of both shoulders     Post-traumatic osteoarthritis of both knees     Chronic low back pain without sciatica, unspecified back pain laterality     PVC's (premature ventricular contractions)     Old complex tear of medial meniscus of left knee     Past Medical History:   Diagnosis Date     DDD (degenerative disc disease), cervical      HTN (hypertension)      Hyperlipidemia LDL goal < 130       Old complex tear of medial meniscus of left knee        CC Referred Self, MD  No address on file on close of this encounter.      Again, thank you for allowing me to participate in the care of your patient.        Sincerely,        Candy Lovell PA-C

## 2024-08-09 ENCOUNTER — THERAPY VISIT (OUTPATIENT)
Dept: PHYSICAL THERAPY | Facility: CLINIC | Age: 69
End: 2024-08-09
Attending: ORTHOPAEDIC SURGERY
Payer: MEDICARE

## 2024-08-09 DIAGNOSIS — M23.204 OLD COMPLEX TEAR OF MEDIAL MENISCUS OF LEFT KNEE: ICD-10-CM

## 2024-08-09 PROCEDURE — 97110 THERAPEUTIC EXERCISES: CPT | Mod: GP

## 2024-08-09 PROCEDURE — 97161 PT EVAL LOW COMPLEX 20 MIN: CPT | Mod: GP

## 2024-08-09 ASSESSMENT — ACTIVITIES OF DAILY LIVING (ADL): PLEASE_INDICATE_YOR_PRIMARY_REASON_FOR_REFERRAL_TO_THERAPY:: KNEE

## 2024-08-09 NOTE — PROGRESS NOTES
PHYSICAL THERAPY EVALUATION  Type of Visit: Evaluation     Fall Risk Screen:  Fall screen completed by: PT  Have you fallen 2 or more times in the past year?: No  Have you fallen and had an injury in the past year?: No  Is patient a fall risk?: No    Subjective          Patient is a 69 year old male s/p LLE medial and lateral meniscectomy on 08/06/2024 with continued LLE knee pain, ROM, and instability deficits. Patient reports descending stairs, left knee flexion ROM, prolonged walking, and prolonged standing demands are his primary pain aggravators at this time. Patient reports using relative rest, tylenol, advil, and oxycodone for pain relief at this time. Patient reports wishing to participate in physical therapy services for reduced left knee pain and instability with his stair climbing and recreational crossfit workout demands. Patient will benefit from skilled physical therapy services and has sufficient social support.     Presenting condition or subjective complaint:  s/p left knee medial and lateral meniscectomy.    Date of onset: 08/06/24      Relevant medical history:   Labyrinthitis; Cervical disc disorder with radiculopathy of cervical region; Post-prandial epigastric pain; Chronic pain of both shoulders; Chronic low back pain without sciatica; Allergic Rhinitis; Hyperlipidemia; Heart Murmur; PVC's; Cervical DDD; Left Supraspinatus tendon tear; Post-traumatic OA of both knees; Old complex tear of medial meniscus of left knee; Other specified glaucoma; Family history of other cardiovascular diseases and colonic polyps.     Dates & types of surgery:      Past Surgical History:   Procedure Laterality Date    ARTHROSCOPY KNEE WITH MEDIAL MENISCECTOMY Left 8/6/2024    Procedure: left knee examination under anesthesia, knee arthroscopy, meniscectomy of medial and lateral meniscus;  Surgeon: Edmundo Miller MD;  Location: [facility] OR    COLONOSCOPY  03/21/2007    Repeat  for screening purposes in 10  yrs.    COLONOSCOPY  1/2/2013    Procedure: COLONOSCOPY;  Colonoscopy;  Surgeon: Emmett San MD;  Location: [facility]    COLONOSCOPY N/A 8/6/2018    Procedure: COLONOSCOPY;  colonoscopy;  Surgeon: Israel Moreland MD;  Location: [facility]    COLONOSCOPY N/A 3/4/2024    Procedure: Colonoscopy, Flexible, with lesion remvoal using snare;  Surgeon: Sanya Vieyra DO;  Location:  GI    ESOPHAGOSCOPY, GASTROSCOPY, DUODENOSCOPY (EGD), COMBINED N/A 4/13/2022    Procedure: ESOPHAGOGASTRODUODENOSCOPY (EGD);  Surgeon: Antoinette Lagunas MD;  Location:  GI    HC SHLDR ARTHROSCOP,SURG,W/ROTAT CUFF REPR Left 2010    HC VASECTOMY UNILAT/BILAT W POSTOP SEMEN  1998    Vasectomy        Prior diagnostic imaging/testing results:         Left Knee MRI (06/06/2024):  Impression:  1. Since comparison MR, new horizontal tear posterior horn of medial  meniscus.  2. Since comparison MR, new high-grade partial tear posterior  horn/root ligament junction of lateral meniscus with likely  degeneration/partial tear posterior root ligament. Also new horizontal  tear lateral meniscal body and posterior horn.  3. Proximal patellar tendinosis with low to moderate grade partial  tear at the patellar attachment with mild reactive edema in the  inferior pole of patella.   4. Relatively stable grade IV chondromalacia in the patellofemoral and  lateral compartments.    Bilateral Knee X-Ray:  IMPRESSION: Unchanged mild bilateral medial and patellofemoral compartment knee osteoarthritis. No acute displaced knee fracture or dislocation. Small to moderate-sized left knee joint effusion without sizable right knee joint effusion. No anterior knee   soft tissue swelling. Unchanged focal area of bony proliferation along the medial left medial tibia.    Prior therapy history for the same diagnosis, illness or injury:    Yes, physical therapy.     Prior Level of Function  Transfers: Independent  Ambulation: Independent  ADL: Independent  IADL:  "Driving, Finances, Housekeeping, Laundry, Meal preparation, Yard work    Living Environment  Social support:   Lives with wife at home.   Type of home:   House.   Stairs to enter the home:       1 to enter front door with post for upper extremity support.   Ramp:   No.   Stairs inside the home:       None.   Help at home:   None.  Equipment owned:   None.     Employment:     None listed.   Hobbies/Interests:   Crossfit.     Patient goals for therapy:  Return to running and crossfit workouts without LLE knee pain.     Pain assessment: Pain present  Location: Left Knee/Ratin-2/10  See objective evaluation for additional pain details     Objective   KNEE EVALUATION  PAIN: Pain Level at Rest: 1/10  Pain Level with Use: 210  Pain Location: knee  Pain Quality: Aching and Dull  Pain Frequency: constant or daily  Pain is Exacerbated By: Driving, prolonged walking and standing.   Pain is Relieved By: NSAIDs and Tylenol, Advil, Oxycodone, relative rest.  INTEGUMENTARY (edema, incisions):  No abnormal incisional LLE knee healing concerns observed at today's session.   POSTURE: WFL  GAIT:  Weightbearing Status: WBAT, for LLE  Assistive Device(s): None  Gait Deviations: Antalgic  Base of support increased; LLE hip circumduction.   BALANCE/PROPRIOCEPTION: WFL  WEIGHTBEARING ALIGNMENT: WFL  NON-WEIGHTBEARING ALIGNMENT: WFL  ROM:   (Degrees) Left AROM Left PROM  Right AROM Right PROM   Knee Flexion 121 degrees with \"+\" left knee pain in supine 137 degrees with \"+\" left knee pain via AAROM using rolled bed sheet to bend knee in supine WFL's    Knee Extension  0 degrees with towel roll under ankle without pain WFL's    Pain: As detailed in ROM table above.   End feel: Firm end feels for all BLE ROM measures detailed above.   STRENGTH:  4+/5 for BLE strengthening in seated MMT testing positions without LLE knee pain.   FLEXIBILITY:  WNL's for BLE's with seated and supine assessment.   SPECIAL TESTS:  Not assessed today. "   FUNCTIONAL TESTS:   PALPATION:  Grossly NTTP for BLE's.   JOINT MOBILITY:  Min-mod tibiofemoral LLE joint restriction, otherwise WFL's for BLE's.  SENSATION: WNL's with patient reporting no BLE numbness since his LLE surgical meniscectomy on 08/06/2024.     Assessment & Plan   CLINICAL IMPRESSIONS  Medical Diagnosis: Old complex tear of medial meniscus of left knee (M23.204)    Treatment Diagnosis: Old complex tear of medial meniscus of left knee (M23.204)   Impression/Assessment: Patient is a 69 year old male with left knee pain, ROM, and instability deficit complaints.  The following significant findings have been identified: Pain, Decreased ROM/flexibility, Decreased joint mobility, Edema, Impaired gait, Impaired muscle performance, Decreased activity tolerance, and Instability. These impairments interfere with their ability to perform recreational activities, household chores, driving , household mobility, and community mobility as compared to previous level of function.     Clinical Decision Making (Complexity):  Clinical Presentation: Stable/Uncomplicated  Clinical Presentation Rationale: based on medical and personal factors listed in PT evaluation  Clinical Decision Making (Complexity): Low complexity    PLAN OF CARE  Treatment Interventions:  Modalities: Cryotherapy, Hot Pack  Interventions: Gait Training, Manual Therapy, Neuromuscular Re-education, Therapeutic Activity, Therapeutic Exercise, Aquatic Therapy    Long Term Goals     PT Goal 1  Goal Identifier: Home Exercise Programs  Goal Description: Patient will demonstrate proper performance and good adherence to his HEP's for 10 weeks for 5 of 7 days per week to demonstrate improved long term independence with management of his left knee pain, ROM, and instability deficits.  Rationale: to maximize safety and independence with performance of ADLs and functional tasks;to maximize safety and independence within the home;to maximize safety and independence  within the community  Goal Progress: Patient tolerated today's selected strengthening, dynamic mobility, and ROM stretching exercises for his left knee without abnormal left knee pain exacerbation during today's session.  Target Date: 10/04/24  PT Goal 2  Goal Identifier: Knee Outcome Survey  Goal Description: Patient will improve his initial KOS assessment score by 10 points or greater to demonstrate reduced restriction of his left knee pain, ROM deficit, and instability with safe and tolerable performance of his required stair climbing and recreational crossfit exercise demands.  Rationale: to maximize safety and independence with performance of ADLs and functional tasks;to maximize safety and independence within the home;to maximize safety and independence within the community  Goal Progress: See initial KOS assessment score.  Target Date: 10/04/24      Frequency of Treatment: 1 visit per week  Duration of Treatment: 8 weeks    Recommended Referrals to Other Professionals:  Currently following with orthopedic medical team for s/p left knee meniscectomy recovery cares at this time.   Education Assessment:   Learner/Method: Patient;Listening;Demonstration;No Barriers to Learning  Education Comments: Patient reports understanding of his future therapeutic progression.    Risks and benefits of evaluation/treatment have been explained.   Patient/Family/caregiver agrees with Plan of Care.     Evaluation Time:     PT Eval, Low Complexity Minutes (51383): 15     Signing Clinician:     Bakari Sexton PT, DPT    [facility]  O: [phone]  E: [email or web address]                                                                                   OUTPATIENT PHYSICAL THERAPY      PLAN OF TREATMENT FOR OUTPATIENT REHABILITATION   Patient's Last Name, First Name, Deven Roque YOB: 1955   Provider's Name   [facility]  Rehabilitation Services   Medical Record No.  [id number]     Onset Date: 08/06/24  Start of Care Date: 08/09/24     Medical Diagnosis:  Old complex tear of medial meniscus of left knee (M23.204)      PT Treatment Diagnosis:  Old complex tear of medial meniscus of left knee (M23.204) Plan of Treatment  Frequency/Duration: 1 visit per week/ 8 weeks    Certification date from 08/09/24 to 10/04/24         See note for plan of treatment details and functional goals     Bakari Sexton, PT, DPT    [facility]  O: [phone]  E: [email or web address]                          I CERTIFY THE NEED FOR THESE SERVICES FURNISHED UNDER        THIS PLAN OF TREATMENT AND WHILE UNDER MY CARE .    Physician Signature               Date    X_____________________________________________________                Referring Provider:  Edmundo Miller MD    Initial Assessment  See Epic Evaluation- Start of Care Date: 08/09/24

## 2024-08-15 ENCOUNTER — THERAPY VISIT (OUTPATIENT)
Dept: PHYSICAL THERAPY | Facility: CLINIC | Age: 69
End: 2024-08-15
Attending: ORTHOPAEDIC SURGERY
Payer: MEDICARE

## 2024-08-15 DIAGNOSIS — M76.51 PATELLAR TENDINITIS OF BOTH KNEES: ICD-10-CM

## 2024-08-15 DIAGNOSIS — M76.52 PATELLAR TENDINITIS OF BOTH KNEES: ICD-10-CM

## 2024-08-15 DIAGNOSIS — M23.204 OLD COMPLEX TEAR OF MEDIAL MENISCUS OF LEFT KNEE: Primary | ICD-10-CM

## 2024-08-15 PROCEDURE — 97110 THERAPEUTIC EXERCISES: CPT | Mod: GP

## 2024-08-22 ENCOUNTER — THERAPY VISIT (OUTPATIENT)
Dept: PHYSICAL THERAPY | Facility: CLINIC | Age: 69
End: 2024-08-22
Attending: ORTHOPAEDIC SURGERY
Payer: MEDICARE

## 2024-08-22 DIAGNOSIS — M23.204 OLD COMPLEX TEAR OF MEDIAL MENISCUS OF LEFT KNEE: Primary | ICD-10-CM

## 2024-08-22 PROCEDURE — 97110 THERAPEUTIC EXERCISES: CPT | Mod: GP

## 2024-08-22 PROCEDURE — 97140 MANUAL THERAPY 1/> REGIONS: CPT | Mod: GP

## 2024-08-22 NOTE — PROGRESS NOTES
DIAGNOSIS:   Left knee medial and lateral meniscus tears.     PROCEDURES: on 8/6/24 with Dr. Miller. 17 days post-op  1. Examination under anesthesia, left knee.   2. Left knee arthroscopy, partial medial and lateral meniscectomy.     HISTORY:  Ongoing knee pain since 1/2024, exhausted conservative options for pain relief.     EXAM:     General: Awake, Alert, and oriented. Articulates and communicates with a normal affect     left Lower Extremity:  Incisions well healed without evidence of infection  Normal post-operative effusion and ecchymosis  Range of motion and stability exam not performed  Neurovascularly intact    IMAGING:  Radiographs and surgical images of the knee were independently reviewed by me and findings were discussed with the patient today.     ASSESSMENT:  68 y/o male, 17 days s/p left knee arthroscopy with medial and lateral meniscus debridements with Dr. Miller. Doing well.     PLAN:   Weightbearing as tolerated  Range of motion as tolerated  Sutures removed in clinic  Leave steri-strips in place until they fall off  OK to shower allowing water to run over incision  No soaking, scrubbing, baths, or lake for 1 additional week  Continue PT as scheduled   Pain medications reviewed and no refills required.   Operative report provided and arthroscopic images reviewed  No running, jumping, pounding sports for 6 weeks.    daily x 4 weeks    Return to clinic as needed    Redd Ayala PA-C, CAQ-OS  Dept. of Orthopedic Surgery  [facility]

## 2024-08-23 ENCOUNTER — OFFICE VISIT (OUTPATIENT)
Dept: ORTHOPEDICS | Facility: CLINIC | Age: 69
End: 2024-08-23
Payer: MEDICARE

## 2024-08-23 DIAGNOSIS — Z98.890 S/P ARTHROSCOPY OF LEFT KNEE: Primary | ICD-10-CM

## 2024-08-23 PROCEDURE — 99024 POSTOP FOLLOW-UP VISIT: CPT | Performed by: PHYSICIAN ASSISTANT

## 2024-08-23 NOTE — LETTER
8/23/2024      Deven Smith  [address] [address] [place] [phone]      Dear Colleague,    Thank you for referring your patient, Deven Smith, to the [facility]. Please see a copy of my visit note below.    DIAGNOSIS:   Left knee medial and lateral meniscus tears.     PROCEDURES: on 8/6/24 with Dr. Miller. 17 days post-op  1. Examination under anesthesia, left knee.   2. Left knee arthroscopy, partial medial and lateral meniscectomy.     HISTORY:  Ongoing knee pain since 1/2024, exhausted conservative options for pain relief.     EXAM:     General: Awake, Alert, and oriented. Articulates and communicates with a normal affect     left Lower Extremity:  Incisions well healed without evidence of infection  Normal post-operative effusion and ecchymosis  Range of motion and stability exam not performed  Neurovascularly intact    IMAGING:  Radiographs and surgical images of the knee were independently reviewed by me and findings were discussed with the patient today.     ASSESSMENT:  68 y/o male, 17 days s/p left knee arthroscopy with medial and lateral meniscus debridements with Dr. Miller. Doing well.     PLAN:   Weightbearing as tolerated  Range of motion as tolerated  Sutures removed in clinic  Leave steri-strips in place until they fall off  OK to shower allowing water to run over incision  No soaking, scrubbing, baths, or lake for 1 additional week  Continue PT as scheduled   Pain medications reviewed and no refills required.   Operative report provided and arthroscopic images reviewed  No running, jumping, pounding sports for 6 weeks.    daily x 4 weeks    Return to clinic as needed    Redd Ayala PA-C, CAQ-OS  Dept. of Orthopedic Surgery  [facility]        Again, thank you for allowing me to participate in the care of your patient.        Sincerely,        SWAPNA Hager

## 2024-09-09 ENCOUNTER — THERAPY VISIT (OUTPATIENT)
Dept: PHYSICAL THERAPY | Facility: CLINIC | Age: 69
End: 2024-09-09
Attending: ORTHOPAEDIC SURGERY
Payer: MEDICARE

## 2024-09-09 DIAGNOSIS — M23.204 OLD COMPLEX TEAR OF MEDIAL MENISCUS OF LEFT KNEE: Primary | ICD-10-CM

## 2024-09-09 PROCEDURE — 97140 MANUAL THERAPY 1/> REGIONS: CPT | Mod: GP

## 2024-09-09 PROCEDURE — 97110 THERAPEUTIC EXERCISES: CPT | Mod: GP

## 2024-09-19 ENCOUNTER — OFFICE VISIT (OUTPATIENT)
Dept: ORTHOPEDICS | Facility: CLINIC | Age: 69
End: 2024-09-19
Payer: MEDICARE

## 2024-09-19 DIAGNOSIS — Z98.890 S/P ARTHROSCOPY OF LEFT KNEE: Primary | ICD-10-CM

## 2024-09-19 PROCEDURE — 99024 POSTOP FOLLOW-UP VISIT: CPT | Performed by: ORTHOPAEDIC SURGERY

## 2024-09-19 NOTE — LETTER
9/19/2024      Deven Smith  [address] [address] [place] [id number]      Dear Colleague,    Thank you for referring your patient, Deven Smith, to the [facility]. Please see a copy of my visit note below.    DIAGNOSIS:   Medial meniscus tear, lateral meniscus tear    PROCEDURES: on  with Dr. Miller. 17 days post-op  1. Examination under anesthesia, left knee.   2. Left knee arthroscopy, partial medial and lateral meniscectomy.   8/6/24    HISTORY:  6 weeks status post the above.  Doing well.  Pain control.  Feels improved from his preoperative state.  Very happy.  SANE score 100    EXAM:     General: Awake, Alert, and oriented. Articulates and communicates with a normal affect     left Lower Extremity:  Incisions well healed without evidence of infection  No post-operative effusion or ecchymosis  Range of motion is full    stability exam not performed  Neurovascularly intact    IMAGING:  No new imaging    ASSESSMENT:  6 weeks status post meniscectomy    PLAN:   Weightbearing as tolerated  Motion as tolerated  Transition back to desired activities  Follow-up as needed      Again, thank you for allowing me to participate in the care of your patient.        Sincerely,        Edmundo Miller MD

## 2024-09-19 NOTE — NURSING NOTE
Reason For Visit:   Chief Complaint   Patient presents with    Surgical Followup     6wk s./p Menisectomy        ?  No  Occupation Semi-Retired.  Currently working? No.  Work status?  NA.  Date of surgery: 8/6/24  Type of surgery: Menisectomy.  Smoker: No  Request smoking cessation information: No    Sane Score  Left knee - Affected  Left Knee- 100  Right Knee- 100      Cong Lamas, ATC

## 2024-09-19 NOTE — PROGRESS NOTES
DIAGNOSIS:   Medial meniscus tear, lateral meniscus tear    PROCEDURES: on  with Dr. Miller. 17 days post-op  1. Examination under anesthesia, left knee.   2. Left knee arthroscopy, partial medial and lateral meniscectomy.   8/6/24    HISTORY:  6 weeks status post the above.  Doing well.  Pain control.  Feels improved from his preoperative state.  Very happy.  SANE score 100    EXAM:     General: Awake, Alert, and oriented. Articulates and communicates with a normal affect     left Lower Extremity:  Incisions well healed without evidence of infection  No post-operative effusion or ecchymosis  Range of motion is full    stability exam not performed  Neurovascularly intact    IMAGING:  No new imaging    ASSESSMENT:  6 weeks status post meniscectomy    PLAN:   Weightbearing as tolerated  Motion as tolerated  Transition back to desired activities  Follow-up as needed

## 2024-12-06 ENCOUNTER — ANCILLARY PROCEDURE (OUTPATIENT)
Dept: GENERAL RADIOLOGY | Facility: CLINIC | Age: 69
End: 2024-12-06
Attending: FAMILY MEDICINE
Payer: MEDICARE

## 2024-12-06 DIAGNOSIS — M54.16 LUMBAR RADICULOPATHY: ICD-10-CM

## 2024-12-06 PROCEDURE — 62323 NJX INTERLAMINAR LMBR/SAC: CPT | Performed by: RADIOLOGY

## 2024-12-06 RX ORDER — METHYLPREDNISOLONE ACETATE 80 MG/ML
80 INJECTION, SUSPENSION INTRA-ARTICULAR; INTRALESIONAL; INTRAMUSCULAR; SOFT TISSUE ONCE
Status: COMPLETED | OUTPATIENT
Start: 2024-12-06 | End: 2024-12-06

## 2024-12-06 RX ORDER — BUPIVACAINE HYDROCHLORIDE 5 MG/ML
10 INJECTION, SOLUTION PERINEURAL ONCE
Status: COMPLETED | OUTPATIENT
Start: 2024-12-06 | End: 2024-12-06

## 2024-12-06 RX ORDER — IOPAMIDOL 408 MG/ML
10 INJECTION, SOLUTION INTRATHECAL ONCE
Status: COMPLETED | OUTPATIENT
Start: 2024-12-06 | End: 2024-12-06

## 2024-12-06 RX ADMIN — IOPAMIDOL 2 ML: 408 INJECTION, SOLUTION INTRATHECAL at 10:17

## 2024-12-06 RX ADMIN — BUPIVACAINE HYDROCHLORIDE 2 ML: 5 INJECTION, SOLUTION PERINEURAL at 10:17

## 2024-12-06 RX ADMIN — METHYLPREDNISOLONE ACETATE 80 MG: 80 INJECTION, SUSPENSION INTRA-ARTICULAR; INTRALESIONAL; INTRAMUSCULAR; SOFT TISSUE at 10:18

## 2024-12-06 NOTE — PROGRESS NOTES
Imaging Discharge Instructions - Post Injection       After you go home:  Be cautious when walking. Numbness or weakness in the lower extremities may occur for up to 6-8 hours after the procedure due to the effects of the local anesthetic   Minimize your activity for 24 hours  You may resume normal activity the following day   You may remove the bandage in the evening or the next morning   You may resume bathing the following day   Drink 2 extra glasses of fluid today (unless on fluid restrictions)   DO NOT drive or operate machinery at home or work for 24 hours after injections The use of local anesthetic can slow your reflexes  You may experience mild to moderate increase in pain for several days following your injection. You may use ice packs and/or over the counter pain relievers if needed   Visit an Urgent Care or Emergency Room if you experience the following:  Worsening redness or swelling at the injections site   Discharge from the injection site   You develop a temperature of 101  F or higher   Additional Instructions:  You may resume blood thinners at your regular dose the day following your procedure. Follow up with your physician to have your INR checked if indicated   Follow up with your ordering provider 2 weeks after injection regarding pain relief from this procedure   Contact Information:   To reach a nurse advisor at the [facility] during regular business hours please call [phone].  After hours, call [facility] at [phone]/Toll Free [phone] and ask for the Radiologist on call.

## 2024-12-06 NOTE — PROGRESS NOTES
Deven was seen in X-ray today for a Lumbar epidural injection. Patient rated pain before procedure 2/10. After procedure patient rated pain 0/10.   This pain level is acceptable to patient. Patient discharged home with .

## 2025-01-02 ENCOUNTER — OFFICE VISIT (OUTPATIENT)
Dept: ORTHOPEDICS | Facility: CLINIC | Age: 70
End: 2025-01-02
Payer: MEDICARE

## 2025-01-02 DIAGNOSIS — M17.0 BILATERAL PRIMARY OSTEOARTHRITIS OF KNEE: Primary | ICD-10-CM

## 2025-01-02 RX ORDER — LIDOCAINE HYDROCHLORIDE 10 MG/ML
4 INJECTION, SOLUTION INFILTRATION; PERINEURAL
Status: COMPLETED | OUTPATIENT
Start: 2025-01-02 | End: 2025-01-02

## 2025-01-02 RX ORDER — TRIAMCINOLONE ACETONIDE 40 MG/ML
40 INJECTION, SUSPENSION INTRA-ARTICULAR; INTRAMUSCULAR
Status: COMPLETED | OUTPATIENT
Start: 2025-01-02 | End: 2025-01-02

## 2025-01-02 RX ADMIN — LIDOCAINE HYDROCHLORIDE 4 ML: 10 INJECTION, SOLUTION INFILTRATION; PERINEURAL at 08:24

## 2025-01-02 RX ADMIN — TRIAMCINOLONE ACETONIDE 40 MG: 40 INJECTION, SUSPENSION INTRA-ARTICULAR; INTRAMUSCULAR at 08:24

## 2025-01-02 NOTE — PROGRESS NOTES
ESTABLISHED PATIENT FOLLOW-UP:  RECHECK of the Left Knee and RECHECK of the Right Knee       HISTORY OF PRESENT ILLNESS  Mr. Smith is a pleasant 69 year old year old male who presents to clinic today for follow-up of bilateral knee pain/OA. He is seeking a repeat cortisone injection today.  He reports since his surgery on 8/6/2024 with Dr. Miller he no longer has the catching in his left knee but he will have aching and throbbing pain in his bilateral knees.     Date of injury: None   Date last seen: 2/26/2024  Following Therapeutic Plan: Yes, he attended formal physical therapy and continues with HEP. He has found the exercises to be helpful. He will alternate between using ibuprofen and Tylenol to help treat his knee pain.   Pain: worsening by the end of the day.  Brace/Sleeve: he will use when he exercises daily, which he has found to be helpful  Function: difficult being on his feet for an extended period of time or use of stairs.   Interval History: 8/6/2024 with Dr. Miller   PROCEDURES:   1. Examination under anesthesia, left knee.   2. Left knee arthroscopy, partial medial and lateral meniscectomy.     Additional medical/Social/Surgical histories reviewed in [facility] and updated as appropriate.       PHYSICAL EXAM  General  - normal appearance, in no obvious distress  Musculoskeletal -right and left knee  - stance: mildly antalgic gait  - palpation: mildly tender patellar tendon origin, bilaterally  - ROM: 120 degrees flexion, 0 degrees extension  - strength: 5/5 in flexion, 5/5 in extension  - special tests:  (-) Brenden  (-) varus at 0 and 30 degrees flexion  (-) valgus at 0 and 30 degrees flexion  Neuro  - no sensory or motor deficit, grossly normal coordination, normal muscle tone        ASSESSMENT & PLAN  Mr. Smith is a 69 year old year old male who presents to clinic today for follow-up of bilateral knee pain.    Homero does have osteoarthritis, through shared decision making we did decide to  inject both of his knees today as it has been approximately a year since his last injection.    He is going to keep a close eye on his pain moving forward, specifically with regards to his patellar tendons.  If his patellar tendons are still bothering him with daily tasks after his intra-articular knee injection we could consider a percutaneous tenotomy with Tenex.  Homero is going to contemplate this, especially if still painful.    It was a pleasure seeing Homero.    Greater than 20 minutes were spent on the day of visit reviewing records, in direct face-to-face consultation and exam, and documentation independent of the procedure.       Taz Maurice DO, [facility]  Primary Care Sports Medicine    Large Joint Injection: bilateral knee    Date/Time: 1/2/2025 8:24 AM    Performed by: Taz Maurice DO  Authorized by: Taz Maurice DO    Indications:  Pain and osteoarthritis  Needle Size:  22 G  Guidance: landmark guided    Approach:  Anterolateral  Location:  Knee  Laterality:  Bilateral      Medications (Right):  40 mg triamcinolone 40 MG/ML; 4 mL lidocaine 1 %  Medications (Left):  40 mg triamcinolone 40 MG/ML; 4 mL lidocaine 1 %  Outcome:  Tolerated well, no immediate complications  Procedure discussed: discussed risks, benefits, and alternatives    Consent Given by:  Patient  Timeout: timeout called immediately prior to procedure    Prep: patient was prepped and draped in usual sterile fashion         PROCEDURE    Knee Injections - Intraarticular    The patient was informed of the risks and the benefits of the procedure and a written consent was signed.    The patient's left knee was prepped with chlorhexidine in sterile fashion.   40 mg of triamcinolone suspension was drawn up into a 5 mL syringe with 4 mL of 1% lidocaine.  Injection was performed using substerile technique.  A 1.5-inch 22-gauge needle was used to enter the lateral aspect of the left knee.  Injection performed successfully without difficulty.   There were no complications. The patient tolerated the procedure well. There was negligible bleeding.     The patient's right knee was prepped with chlorhexidine in sterile fashion.   40 mg of triamcinolone suspension was drawn up into a 5 mL syringe with 4 mL of 1% lidocaine.  Injection was performed using substerile technique.  A 1.5-inch 22-gauge needle was used to enter the lateral aspect of the right knee.  Injection performed successfully without difficulty.  There were no complications. The patient tolerated the procedure well. There was negligible bleeding.

## 2025-01-02 NOTE — NURSING NOTE
[address] [address]  Dept: [phone]  ______________________________________________________________________________    Patient: Deven Smith   : 1955   MRN: [id number]   2025    INVASIVE PROCEDURE SAFETY CHECKLIST    Date: 2025   Procedure: bilateral knee joint injections with kenalog   Patient Name: Deven Smith  MRN: [id number]  YOB: 1955    Action: Complete sections as appropriate. Any discrepancy results in a HARD COPY until resolved.     PRE PROCEDURE:  Patient ID verified with 2 identifiers (name and  or MRN): Yes  Procedure and site verified with patient/designee (when able): Yes  Accurate consent documentation in medical record: Yes  H&P (or appropriate assessment) documented in medical record: Yes  H&P must be up to 20 days prior to procedure and updates within 24 hours of procedure as applicable: Yes  Relevant diagnostic and radiology test results appropriately labeled and displayed as applicable: NA  Procedure site(s) marked with provider initials: NA    TIMEOUT:  Time-Out performed immediately prior to starting procedure, including verbal and active participation of all team members addressing the following:Yes  * Correct patient identify  * Confirmed that the correct side and site are marked  * An accurate procedure consent form  * Agreement on the procedure to be done  * Correct patient position  * Relevant images and results are properly labeled and appropriately displayed  * The need to administer antibiotics or fluids for irrigation purposes during the procedure as applicable   * Safety precautions based on patient history or medication use    DURING PROCEDURE: Verification of correct person, site, and procedures any time the responsibility for care of the patient is transferred to another member of the care team.       Prior to injection, verified patient identity using  patient's name and date of birth.  Due to injection administration, patient instructed to remain in clinic for 15 minutes  afterwards, and to report any adverse reaction to me immediately.    Joint injection was performed.      Kenalog x2  Drug Amount Wasted:  None.  Vial/Syringe: Single dose vial  Expiration Date:  07/01/2026    Nighat Odonnell, ATC  January 2, 2025

## 2025-01-02 NOTE — LETTER
1/2/2025      Deven Smith  [address] [address] [place] [id number]      Dear Colleague,    Thank you for referring your patient, Deven Smith, to the [facility] [place]. Please see a copy of my visit note below.    ESTABLISHED PATIENT FOLLOW-UP:  RECHECK of the Left Knee and RECHECK of the Right Knee       HISTORY OF PRESENT ILLNESS  Mr. Smith is a pleasant 69 year old year old male who presents to clinic today for follow-up of bilateral knee pain/OA. He is seeking a repeat cortisone injection today.  He reports since his surgery on 8/6/2024 with Dr. Miller he no longer has the catching in his left knee but he will have aching and throbbing pain in his bilateral knees.     Date of injury: None   Date last seen: 2/26/2024  Following Therapeutic Plan: Yes, he attended formal physical therapy and continues with HEP. He has found the exercises to be helpful. He will alternate between using ibuprofen and Tylenol to help treat his knee pain.   Pain: worsening by the end of the day.  Brace/Sleeve: he will use when he exercises daily, which he has found to be helpful  Function: difficult being on his feet for an extended period of time or use of stairs.   Interval History: 8/6/2024 with Dr. Miller   PROCEDURES:   1. Examination under anesthesia, left knee.   2. Left knee arthroscopy, partial medial and lateral meniscectomy.     Additional medical/Social/Surgical histories reviewed in [facility] and updated as appropriate.       PHYSICAL EXAM  General  - normal appearance, in no obvious distress  Musculoskeletal -right and left knee  - stance: mildly antalgic gait  - palpation: mildly tender patellar tendon origin, bilaterally  - ROM: 120 degrees flexion, 0 degrees extension  - strength: 5/5 in flexion, 5/5 in extension  - special tests:  (-) Brenden  (-) varus at 0 and 30 degrees flexion  (-) valgus at 0 and 30 degrees flexion  Neuro  - no sensory or motor deficit, grossly  normal coordination, normal muscle tone        ASSESSMENT & PLAN  Mr. Smith is a 69 year old year old male who presents to clinic today for follow-up of bilateral knee pain.    Homero does have osteoarthritis, through shared decision making we did decide to inject both of his knees today as it has been approximately a year since his last injection.    He is going to keep a close eye on his pain moving forward, specifically with regards to his patellar tendons.  If his patellar tendons are still bothering him with daily tasks after his intra-articular knee injection we could consider a percutaneous tenotomy with Tenex.  Homero is going to contemplate this, especially if still painful.    It was a pleasure seeing Homero.    Greater than 20 minutes were spent on the day of visit reviewing records, in direct face-to-face consultation and exam, and documentation independent of the procedure.       Taz Maurice DO, [facility]  Primary Care Sports Medicine    Large Joint Injection: bilateral knee    Date/Time: 1/2/2025 8:24 AM    Performed by: Taz Maurice DO  Authorized by: Taz Maurice DO    Indications:  Pain and osteoarthritis  Needle Size:  22 G  Guidance: landmark guided    Approach:  Anterolateral  Location:  Knee  Laterality:  Bilateral      Medications (Right):  40 mg triamcinolone 40 MG/ML; 4 mL lidocaine 1 %  Medications (Left):  40 mg triamcinolone 40 MG/ML; 4 mL lidocaine 1 %  Outcome:  Tolerated well, no immediate complications  Procedure discussed: discussed risks, benefits, and alternatives    Consent Given by:  Patient  Timeout: timeout called immediately prior to procedure    Prep: patient was prepped and draped in usual sterile fashion         PROCEDURE    Knee Injections - Intraarticular    The patient was informed of the risks and the benefits of the procedure and a written consent was signed.    The patient's left knee was prepped with chlorhexidine in sterile fashion.   40 mg of triamcinolone  suspension was drawn up into a 5 mL syringe with 4 mL of 1% lidocaine.  Injection was performed using substerile technique.  A 1.5-inch 22-gauge needle was used to enter the lateral aspect of the left knee.  Injection performed successfully without difficulty.  There were no complications. The patient tolerated the procedure well. There was negligible bleeding.     The patient's right knee was prepped with chlorhexidine in sterile fashion.   40 mg of triamcinolone suspension was drawn up into a 5 mL syringe with 4 mL of 1% lidocaine.  Injection was performed using substerile technique.  A 1.5-inch 22-gauge needle was used to enter the lateral aspect of the right knee.  Injection performed successfully without difficulty.  There were no complications. The patient tolerated the procedure well. There was negligible bleeding.                   Again, thank you for allowing me to participate in the care of your patient.        Sincerely,        Taz Maurice DO    Electronically signed

## 2025-01-12 SDOH — HEALTH STABILITY: PHYSICAL HEALTH: ON AVERAGE, HOW MANY DAYS PER WEEK DO YOU ENGAGE IN MODERATE TO STRENUOUS EXERCISE (LIKE A BRISK WALK)?: 5 DAYS

## 2025-01-12 SDOH — HEALTH STABILITY: PHYSICAL HEALTH: ON AVERAGE, HOW MANY MINUTES DO YOU ENGAGE IN EXERCISE AT THIS LEVEL?: 70 MIN

## 2025-01-12 ASSESSMENT — SOCIAL DETERMINANTS OF HEALTH (SDOH): HOW OFTEN DO YOU GET TOGETHER WITH FRIENDS OR RELATIVES?: MORE THAN THREE TIMES A WEEK

## 2025-01-15 ENCOUNTER — OFFICE VISIT (OUTPATIENT)
Dept: FAMILY MEDICINE | Facility: CLINIC | Age: 70
End: 2025-01-15
Attending: STUDENT IN AN ORGANIZED HEALTH CARE EDUCATION/TRAINING PROGRAM
Payer: MEDICARE

## 2025-01-15 VITALS
OXYGEN SATURATION: 97 % | BODY MASS INDEX: 29.88 KG/M2 | WEIGHT: 190.4 LBS | RESPIRATION RATE: 20 BRPM | HEIGHT: 67 IN | DIASTOLIC BLOOD PRESSURE: 78 MMHG | SYSTOLIC BLOOD PRESSURE: 125 MMHG | TEMPERATURE: 97.5 F | HEART RATE: 50 BPM

## 2025-01-15 DIAGNOSIS — R73.03 PREDIABETES: ICD-10-CM

## 2025-01-15 DIAGNOSIS — R25.2 MUSCLE CRAMPS: ICD-10-CM

## 2025-01-15 DIAGNOSIS — J30.9 ALLERGIC RHINITIS, UNSPECIFIED SEASONALITY, UNSPECIFIED TRIGGER: ICD-10-CM

## 2025-01-15 DIAGNOSIS — Z83.719 FAMILY HISTORY OF COLONIC POLYPS: ICD-10-CM

## 2025-01-15 DIAGNOSIS — Z12.5 SCREENING FOR PROSTATE CANCER: ICD-10-CM

## 2025-01-15 DIAGNOSIS — Z00.00 ENCOUNTER FOR MEDICARE ANNUAL WELLNESS EXAM: Primary | ICD-10-CM

## 2025-01-15 DIAGNOSIS — I10 HYPERTENSION, UNSPECIFIED TYPE: ICD-10-CM

## 2025-01-15 DIAGNOSIS — I49.3 PVC'S (PREMATURE VENTRICULAR CONTRACTIONS): ICD-10-CM

## 2025-01-15 DIAGNOSIS — E78.5 HYPERLIPIDEMIA WITH TARGET LDL LESS THAN 130: ICD-10-CM

## 2025-01-15 DIAGNOSIS — M50.30 DDD (DEGENERATIVE DISC DISEASE), CERVICAL: ICD-10-CM

## 2025-01-15 DIAGNOSIS — M17.2 POST-TRAUMATIC OSTEOARTHRITIS OF BOTH KNEES: ICD-10-CM

## 2025-01-15 PROBLEM — R10.13 POSTPRANDIAL EPIGASTRIC PAIN: Status: RESOLVED | Noted: 2022-03-01 | Resolved: 2025-01-15

## 2025-01-15 PROBLEM — F10.21 ALCOHOL DEPENDENCE IN REMISSION (H): Status: ACTIVE | Noted: 2025-01-15

## 2025-01-15 PROBLEM — F10.21 ALCOHOL DEPENDENCE IN REMISSION (H): Status: RESOLVED | Noted: 2025-01-15 | Resolved: 2025-01-15

## 2025-01-15 LAB
ANION GAP SERPL CALCULATED.3IONS-SCNC: 10 MMOL/L (ref 7–15)
BUN SERPL-MCNC: 18 MG/DL (ref 8–23)
CALCIUM SERPL-MCNC: 9.2 MG/DL (ref 8.8–10.4)
CHLORIDE SERPL-SCNC: 105 MMOL/L (ref 98–107)
CHOLEST SERPL-MCNC: 153 MG/DL
CREAT SERPL-MCNC: 1.17 MG/DL (ref 0.67–1.17)
EGFRCR SERPLBLD CKD-EPI 2021: 68 ML/MIN/1.73M2
EST. AVERAGE GLUCOSE BLD GHB EST-MCNC: 111 MG/DL
FASTING STATUS PATIENT QL REPORTED: YES
FASTING STATUS PATIENT QL REPORTED: YES
GLUCOSE SERPL-MCNC: 101 MG/DL (ref 70–99)
HBA1C MFR BLD: 5.5 %
HCO3 SERPL-SCNC: 26 MMOL/L (ref 22–29)
HDLC SERPL-MCNC: 71 MG/DL
LDLC SERPL CALC-MCNC: 75 MG/DL
NONHDLC SERPL-MCNC: 82 MG/DL
POTASSIUM SERPL-SCNC: 4.6 MMOL/L (ref 3.4–5.3)
PSA SERPL DL<=0.01 NG/ML-MCNC: 2.7 NG/ML (ref 0–4.5)
SODIUM SERPL-SCNC: 141 MMOL/L (ref 135–145)
TRIGL SERPL-MCNC: 37 MG/DL

## 2025-01-15 PROCEDURE — 99214 OFFICE O/P EST MOD 30 MIN: CPT | Mod: 25 | Performed by: STUDENT IN AN ORGANIZED HEALTH CARE EDUCATION/TRAINING PROGRAM

## 2025-01-15 PROCEDURE — G2211 COMPLEX E/M VISIT ADD ON: HCPCS | Performed by: STUDENT IN AN ORGANIZED HEALTH CARE EDUCATION/TRAINING PROGRAM

## 2025-01-15 PROCEDURE — 36415 COLL VENOUS BLD VENIPUNCTURE: CPT | Performed by: STUDENT IN AN ORGANIZED HEALTH CARE EDUCATION/TRAINING PROGRAM

## 2025-01-15 PROCEDURE — 80061 LIPID PANEL: CPT | Performed by: STUDENT IN AN ORGANIZED HEALTH CARE EDUCATION/TRAINING PROGRAM

## 2025-01-15 PROCEDURE — 83036 HEMOGLOBIN GLYCOSYLATED A1C: CPT | Performed by: STUDENT IN AN ORGANIZED HEALTH CARE EDUCATION/TRAINING PROGRAM

## 2025-01-15 PROCEDURE — G0103 PSA SCREENING: HCPCS | Performed by: STUDENT IN AN ORGANIZED HEALTH CARE EDUCATION/TRAINING PROGRAM

## 2025-01-15 PROCEDURE — 80048 BASIC METABOLIC PNL TOTAL CA: CPT | Performed by: STUDENT IN AN ORGANIZED HEALTH CARE EDUCATION/TRAINING PROGRAM

## 2025-01-15 PROCEDURE — G0439 PPPS, SUBSEQ VISIT: HCPCS | Performed by: STUDENT IN AN ORGANIZED HEALTH CARE EDUCATION/TRAINING PROGRAM

## 2025-01-15 ASSESSMENT — PAIN SCALES - GENERAL: PAINLEVEL_OUTOF10: NO PAIN (0)

## 2025-01-15 NOTE — PATIENT INSTRUCTIONS
Patient Education   Preventive Care Advice   This is general advice given by our system to help you stay healthy. However, your care team may have specific advice just for you. Please talk to your care team about your preventive care needs.  Nutrition  Eat 5 or more servings of fruits and vegetables each day.  Try wheat bread, brown rice and whole grain pasta (instead of white bread, rice, and pasta).  Get enough calcium and vitamin D. Check the label on foods and aim for 100% of the RDA (recommended daily allowance).  Lifestyle  Exercise at least 150 minutes each week  (30 minutes a day, 5 days a week).  Do muscle strengthening activities 2 days a week. These help control your weight and prevent disease.  No smoking.  Wear sunscreen to prevent skin cancer.  Have a dental exam and cleaning every 6 months.  Yearly exams  See your health care team every year to talk about:  Any changes in your health.  Any medicines your care team has prescribed.  Preventive care, family planning, and ways to prevent chronic diseases.  Shots (vaccines)   HPV shots (up to age 26), if you've never had them before.  Hepatitis B shots (up to age 59), if you've never had them before.  COVID-19 shot: Get this shot when it's due.  Flu shot: Get a flu shot every year.  Tetanus shot: Get a tetanus shot every 10 years.  Pneumococcal, hepatitis A, and RSV shots: Ask your care team if you need these based on your risk.  Shingles shot (for age 50 and up)  General health tests  Diabetes screening:  Starting at age 35, Get screened for diabetes at least every 3 years.  If you are younger than age 35, ask your care team if you should be screened for diabetes.  Cholesterol test: At age 39, start having a cholesterol test every 5 years, or more often if advised.  Bone density scan (DEXA): At age 50, ask your care team if you should have this scan for osteoporosis (brittle bones).  Hepatitis C: Get tested at least once in your life.  STIs (sexually  transmitted infections)  Before age 24: Ask your care team if you should be screened for STIs.  After age 24: Get screened for STIs if you're at risk. You are at risk for STIs (including HIV) if:  You are sexually active with more than one person.  You don't use condoms every time.  You or a partner was diagnosed with a sexually transmitted infection.  If you are at risk for HIV, ask about PrEP medicine to prevent HIV.  Get tested for HIV at least once in your life, whether you are at risk for HIV or not.  Cancer screening tests  Cervical cancer screening: If you have a cervix, begin getting regular cervical cancer screening tests starting at age 21.  Breast cancer scan (mammogram): If you've ever had breasts, begin having regular mammograms starting at age 40. This is a scan to check for breast cancer.  Colon cancer screening: It is important to start screening for colon cancer at age 45.  Have a colonoscopy test every 10 years (or more often if you're at risk) Or, ask your provider about stool tests like a FIT test every year or Cologuard test every 3 years.  To learn more about your testing options, visit:   .  For help making a decision, visit:   https://bit.ly/pi26212.  Prostate cancer screening test: If you have a prostate, ask your care team if a prostate cancer screening test (PSA) at age 55 is right for you.  Lung cancer screening: If you are a current or former smoker ages 50 to 80, ask your care team if ongoing lung cancer screenings are right for you.  For informational purposes only. Not to replace the advice of your health care provider. Copyright   2023 [facility]. All rights reserved. Clinically reviewed by the [facility] Transitions Program. [employer] 166250 - REV 01/24.  Learning About Stress  What is stress?     Stress is your body's response to a hard situation. Your body can have a physical, emotional, or mental response. Stress is a fact of life for most people, and it  affects everyone differently. What causes stress for you may not be stressful for someone else.  A lot of things can cause stress. You may feel stress when you go on a job interview, take a test, or run a race. This kind of short-term stress is normal and even useful. It can help you if you need to work hard or react quickly. For example, stress can help you finish an important job on time.  Long-term stress is caused by ongoing stressful situations or events. Examples of long-term stress include long-term health problems, ongoing problems at work, or conflicts in your family. Long-term stress can harm your health.  How does stress affect your health?  When you are stressed, your body responds as though you are in danger. It makes hormones that speed up your heart, make you breathe faster, and give you a burst of energy. This is called the fight-or-flight stress response. If the stress is over quickly, your body goes back to normal and no harm is done.  But if stress happens too often or lasts too long, it can have bad effects. Long-term stress can make you more likely to get sick, and it can make symptoms of some diseases worse. If you tense up when you are stressed, you may develop neck, shoulder, or low back pain. Stress is linked to high blood pressure and heart disease.  Stress also harms your emotional health. It can make you swanson, tense, or depressed. Your relationships may suffer, and you may not do well at work or school.  What can you do to manage stress?  You can try these things to help manage stress:   Do something active. Exercise or activity can help reduce stress. Walking is a great way to get started. Even everyday activities such as housecleaning or yard work can help.  Try yoga or jones chi. These techniques combine exercise and meditation. You may need some training at first to learn them.  Do something you enjoy. For example, listen to music or go to a movie. Practice your hobby or do volunteer  "work.  Meditate. This can help you relax, because you are not worrying about what happened before or what may happen in the future.  Do guided imagery. Imagine yourself in any setting that helps you feel calm. You can use online videos, books, or a teacher to guide you.  Do breathing exercises. For example:  From a standing position, bend forward from the waist with your knees slightly bent. Let your arms dangle close to the floor.  Breathe in slowly and deeply as you return to a standing position. Roll up slowly and lift your head last.  Hold your breath for just a few seconds in the standing position.  Breathe out slowly and bend forward from the waist.  Let your feelings out. Talk, laugh, cry, and express anger when you need to. Talking with supportive friends or family, a counselor, or a radha leader about your feelings is a healthy way to relieve stress. Avoid discussing your feelings with people who make you feel worse.  Write. It may help to write about things that are bothering you. This helps you find out how much stress you feel and what is causing it. When you know this, you can find better ways to cope.  What can you do to prevent stress?  You might try some of these things to help prevent stress:  Manage your time. This helps you find time to do the things you want and need to do.  Get enough sleep. Your body recovers from the stresses of the day while you are sleeping.  Get support. Your family, friends, and community can make a difference in how you experience stress.  Limit your news feed. Avoid or limit time on social media or news that may make you feel stressed.  Do something active. Exercise or activity can help reduce stress. Walking is a great way to get started.  Where can you learn more?  Go to https://www.J[employer].net/patiented  Enter N032 in the search box to learn more about \"Learning About Stress.\"  Current as of: October 24, 2023  Content Version: 14.3    2024 [employer]. "   Care instructions adapted under license by your healthcare professional. If you have questions about a medical condition or this instruction, always ask your healthcare professional. [employer] disclaims any warranty or liability for your use of this information.

## 2025-01-15 NOTE — PROGRESS NOTES
Preventive Care Visit  [facility]  Bigg Morgan MD, Family Medicine  Adrian 15, 2025      Assessment & Plan   Problem List Items Addressed This Visit          Respiratory    Allergic rhinitis       Endocrine    Hyperlipidemia with target LDL less than 130    Relevant Orders    Lipid panel reflex to direct LDL Non-fasting       Circulatory    PVC's (premature ventricular contractions)       Musculoskeletal and Integumentary    DDD (degenerative disc disease), cervical    Post-traumatic osteoarthritis of both knees       Other    Family history of colonic polyps     Other Visit Diagnoses       Encounter for Medicare annual wellness exam    -  Primary    Muscle cramps        Prediabetes        Relevant Orders    Hemoglobin A1c (Completed)    Hypertension, unspecified type        Relevant Orders    Basic metabolic panel  (Ca, Cl, CO2, Creat, Gluc, K, Na, BUN)    Screening for prostate cancer        Relevant Orders    PSA, screen           Age-appropriate screening and immunization reviewed.  Repeat labs today with PSA.  Will check electrolytes with recent cramping but I suspect related to his increased exercise in recent months.  Encouraged hydration with electrolyte intake.  Can do trial of B complex vitamin as well as heat and stretching as needed.  Patient has been stable on statin for many years and I do not think playing a role but can do trial off of this and see if things improve.  If LDL elevated could increase or transition to atorvastatin.  Follow-up pending lab results.  Stop losartan as blood pressure has been good with his exercise and watching his weight.  History of PVCs that he does not rarely but not associated with exertion.  Recommend avoiding caffeine and he is working on cutting down on this.  Follow-up for yearly physical but sooner if new or worsening issues arise.    The longitudinal plan of care for the diagnosis(es)/condition(s) as documented were addressed  "during this visit. Due to the added complexity in care, I will continue to support Homero in the subsequent management and with ongoing continuity of care.      Patient has been advised of split billing requirements and indicates understanding: Yes       BMI  Estimated body mass index is 29.54 kg/m  as calculated from the following:    Height as of this encounter: 1.71 m (5' 7.32\").    Weight as of this encounter: 86.4 kg (190 lb 6.4 oz).   Weight management plan: Discussed healthy diet and exercise guidelines    Counseling  Appropriate preventive services were addressed with this patient via screening, questionnaire, or discussion as appropriate for fall prevention, nutrition, physical activity, Tobacco-use cessation, social engagement, weight loss and cognition.  Checklist reviewing preventive services available has been given to the patient.  Reviewed patient's diet, addressing concerns and/or questions.   He is at risk for psychosocial distress and has been provided with information to reduce risk.         Subjective   Homero is a 69 year old, presenting for the following:  Wellness Visit        1/15/2025    10:55 AM   Additional Questions   Roomed by Billy MONTANO    Health Care Directive  Patient does not have a Health Care Directive: Discussed advance care planning with patient; information given to patient to review.      1/12/2025   General Health   How would you rate your overall physical health? Good   Feel stress (tense, anxious, or unable to sleep) To some extent   (!) STRESS CONCERN      1/12/2025   Nutrition   Diet: Regular (no restrictions)         1/12/2025   Exercise   Days per week of moderate/strenous exercise 5 days   Average minutes spent exercising at this level 70 min         1/12/2025   Social Factors   Frequency of gathering with friends or relatives More than three times a week   Worry food won't last until get money to buy more No   Food not last or not have enough money for food? No "   Do you have housing? (Housing is defined as stable permanent housing and does not include staying ouside in a car, in a tent, in an abandoned building, in an overnight shelter, or couch-surfing.) Yes   Are you worried about losing your housing? No   Lack of transportation? No   Unable to get utilities (heat,electricity)? No         1/12/2025   Fall Risk   Fallen 2 or more times in the past year? No    No   Trouble with walking or balance? No    No       Multiple values from one day are sorted in reverse-chronological order          1/12/2025   Activities of Daily Living- Home Safety   Needs help with the following daily activites None of the above   Safety concerns in the home None of the above         1/12/2025   Dental   Dentist two times every year? Yes         1/12/2025   Hearing Screening   Hearing concerns? None of the above         1/12/2025   Driving Risk Screening   Patient/family members have concerns about driving No         1/12/2025   General Alertness/Fatigue Screening   Have you been more tired than usual lately? No         1/12/2025   Urinary Incontinence Screening   Bothered by leaking urine in past 6 months No         1/12/2025   TB Screening   Were you born outside of the [place]? No         Today's PHQ-2 Score:       1/15/2025    10:48 AM   PHQ-2 ( 1999 [employer])   Q1: Little interest or pleasure in doing things 0    Q2: Feeling down, depressed or hopeless 0    PHQ-2 Score 0    Q1: Little interest or pleasure in doing things Not at all   Q2: Feeling down, depressed or hopeless Not at all   PHQ-2 Score 0       Proxy-reported           1/12/2025   Substance Use   Alcohol more than 3/day or more than 7/wk No   Do you have a current opioid prescription? No   How severe/bad is pain from 1 to 10? 2/10   Do you use any other substances recreationally? No     Social History     Tobacco Use    Smoking status: Former     Current packs/day: 0.00     Average packs/day: 2.0 packs/day for 15.0 years (30.0 ttl  pk-yrs)     Types: Cigarettes     Start date: 1972     Quit date: 1987     Years since quittin.2    Smokeless tobacco: Never   Vaping Use    Vaping status: Never Used   Substance Use Topics    Alcohol use: Not Currently     Comment: rare     Drug use: No       Last PSA:   PSA   Date Value Ref Range Status   2020 1.78 0 - 4 ug/L Final     Comment:     Assay Method:  Chemiluminescence using Siemens Vista analyzer     Prostate Specific Antigen Screen   Date Value Ref Range Status   2021 2.46 0.00 - 4.00 ug/L Final     ASCVD Risk   The 10-year ASCVD risk score (Manuel LAWRENCE, et al., 2019) is: 11.9%    Values used to calculate the score:      Age: 69 years      Sex: Male      Is Non- : No      Diabetic: No      Tobacco smoker: No      Systolic Blood Pressure: 125 mmHg      Is BP treated: No      HDL Cholesterol: 66 mg/dL      Total Cholesterol: 189 mg/dL    Fracture Risk Assessment Tool  Link to Frax Calculator  Use the information below to complete the Frax calculator  : 1955  Sex: male  Weight (kg): 86.4 kg (actual weight)  Height (cm): 171 cm  Previous Fragility Fracture:  No  History of parent with fractured hip:  No  Current Smoking:  No  Patient has been on glucocorticoids for more than 3 months (5mg/day or more): No  Rheumatoid Arthritis on Problem List:  No  Secondary Osteoporosis on Problem List:  No  Consumes 3 or more units of alcohol per day: No  Femoral Neck BMD (g/cm2)            Reviewed and updated as needed this visit by Provider                    Past Medical History:   Diagnosis Date    DDD (degenerative disc disease), cervical     HTN (hypertension)     Hyperlipidemia LDL goal < 130     Old complex tear of medial meniscus of left knee      Past Surgical History:   Procedure Laterality Date    ARTHROSCOPY KNEE WITH MEDIAL MENISCECTOMY Left 2024    Procedure: left knee examination under anesthesia, knee arthroscopy, meniscectomy of  medial and lateral meniscus;  Surgeon: Edmundo Miller MD;  Location: [facility] OR    COLONOSCOPY  03/21/2007    Repeat  for screening purposes in 10 yrs.    COLONOSCOPY  1/2/2013    Procedure: COLONOSCOPY;  Colonoscopy;  Surgeon: Emmett San MD;  Location: [facility]    COLONOSCOPY N/A 8/6/2018    Procedure: COLONOSCOPY;  colonoscopy;  Surgeon: Israel Moreland MD;  Location: [facility]    COLONOSCOPY N/A 3/4/2024    Procedure: Colonoscopy, Flexible, with lesion remvoal using snare;  Surgeon: Sanya Vieyra DO;  Location: [facility]    ESOPHAGOSCOPY, GASTROSCOPY, DUODENOSCOPY (EGD), COMBINED N/A 4/13/2022    Procedure: ESOPHAGOGASTRODUODENOSCOPY (EGD);  Surgeon: Antoinette Lagunas MD;  Location: [facility]    HC SHLDR ARTHROSCOP,SURG,W/ROTAT CUFF REPR Left 2010    HC VASECTOMY UNILAT/BILAT W POSTOP SEMEN  1998    Vasectomy     Current providers sharing in care for this patient include:  Patient Care Team:  Bigg Morgan MD as PCP - General (Family Medicine)  Bigg Morgan MD as Assigned PCP  Melinda Juárez PA-C as Physician Assistant (Dermatology)  RedCandy jarvis PA-C (Dermatology)  Edmundo Miller MD as Assigned Musculoskeletal Provider  Candy Lovell PA-C as Assigned Surgical Provider    The following health maintenance items are reviewed in Epic and correct as of today:  Health Maintenance   Topic Date Due    LIPID  12/29/2024    BMP  07/26/2025    MEDICARE ANNUAL WELLNESS VISIT  01/15/2026    ANNUAL REVIEW OF HM ORDERS  01/15/2026    FALL RISK ASSESSMENT  01/15/2026    DTAP/TDAP/TD IMMUNIZATION (2 - Td or Tdap) 02/08/2026    GLUCOSE  07/26/2027    COLORECTAL CANCER SCREENING  03/04/2029    ADVANCE CARE PLANNING  01/15/2030    HEPATITIS C SCREENING  Completed    PHQ-2 (once per calendar year)  Completed    INFLUENZA VACCINE  Completed    Pneumococcal Vaccine: 50+ Years  Completed    ZOSTER IMMUNIZATION  Completed    RSV VACCINE  Completed    AORTIC ANEURYSM  "SCREENING (SYSTEM ASSIGNED)  Completed    COVID-19 Vaccine  Completed    HPV IMMUNIZATION  Aged Out    MENINGITIS IMMUNIZATION  Aged Out    RSV MONOCLONAL ANTIBODY  Aged Out         Review of Systems  Constitutional, HEENT, cardiovascular, pulmonary, GI, , musculoskeletal, neuro, skin, endocrine and psych systems are negative, except as otherwise noted.     Objective    Exam  /78 (BP Location: Left arm, Patient Position: Sitting, Cuff Size: Adult Regular)   Pulse 50   Temp 97.5  F (36.4  C) (Temporal)   Resp 20   Ht 1.71 m (5' 7.32\")   Wt 86.4 kg (190 lb 6.4 oz)   SpO2 97%   BMI 29.54 kg/m     Estimated body mass index is 29.54 kg/m  as calculated from the following:    Height as of this encounter: 1.71 m (5' 7.32\").    Weight as of this encounter: 86.4 kg (190 lb 6.4 oz).    Physical Exam  GENERAL: alert and no distress  EYES: Eyes grossly normal to inspection, PERRL and conjunctivae and sclerae normal  HENT: ear canals and TM's normal, nose and mouth without ulcers or lesions  NECK: no adenopathy, no asymmetry, masses, or scars  RESP: lungs clear to auscultation - no rales, rhonchi or wheezes  CV: regular rate and rhythm, normal S1 S2, no S3 or S4, no murmur, click or rub, no peripheral edema  ABDOMEN: soft, nontender, no hepatosplenomegaly, no masses and bowel sounds normal  MS: no gross musculoskeletal defects noted, no edema  SKIN: no suspicious lesions or rashes  NEURO: Normal strength and tone, mentation intact and speech normal  PSYCH: mentation appears normal, affect normal/bright        1/15/2025   Mini Cog   Clock Draw Score 2 Normal   3 Item Recall 2 objects recalled   Mini Cog Total Score 4              Signed Electronically by: Bigg Morgan MD    "

## 2025-04-06 DIAGNOSIS — E78.5 HYPERLIPIDEMIA WITH TARGET LDL LESS THAN 130: ICD-10-CM

## 2025-04-07 RX ORDER — SIMVASTATIN 20 MG
20 TABLET ORAL AT BEDTIME
Qty: 90 TABLET | Refills: 2 | Status: SHIPPED | OUTPATIENT
Start: 2025-04-07

## 2025-06-09 ENCOUNTER — MYC MEDICAL ADVICE (OUTPATIENT)
Dept: ORTHOPEDICS | Facility: CLINIC | Age: 70
End: 2025-06-09
Payer: MEDICARE

## 2025-06-09 DIAGNOSIS — M54.16 LUMBAR RADICULOPATHY: Primary | ICD-10-CM

## 2025-06-11 SDOH — HEALTH STABILITY: PHYSICAL HEALTH: ON AVERAGE, HOW MANY MINUTES DO YOU ENGAGE IN EXERCISE AT THIS LEVEL?: 90 MIN

## 2025-06-11 SDOH — HEALTH STABILITY: PHYSICAL HEALTH: ON AVERAGE, HOW MANY DAYS PER WEEK DO YOU ENGAGE IN MODERATE TO STRENUOUS EXERCISE (LIKE A BRISK WALK)?: 5 DAYS

## 2025-06-11 NOTE — TELEPHONE ENCOUNTER
rx pended and pharmacy. Spoke to patient that you will be signing rx.     Shari Polanco MA 12/16/2021  2:11 PM         Syncope

## 2025-06-12 ENCOUNTER — VIRTUAL VISIT (OUTPATIENT)
Dept: ORTHOPEDICS | Facility: CLINIC | Age: 70
End: 2025-06-12
Payer: MEDICARE

## 2025-06-12 DIAGNOSIS — M54.16 LUMBAR RADICULOPATHY: Primary | ICD-10-CM

## 2025-06-12 NOTE — LETTER
6/12/2025      Deven Smith  [address] [address] [place] [id number]      Dear Colleague,    Thank you for referring your patient, Deven Smith, to the [facility] [place]. Please see a copy of my visit note below.    Homero is a 70 year old who is being evaluated via a billable telephone visit.    {ROOMING STAFF complete during rooming of virtual visit (Optional):882306}  Originating Location (pt. Location): Home  {PROVIDER LOCATION On-site should be selected for visits conducted from your clinic location or adjoining Health system hospital, academic office, or other nearby Health system building. Off-site should be selected for all other provider locations, including home:761369}  Distant Location (provider location):  On-site  Telephone visit completed due to inaccessibility of video    Assessment & Plan    Lumbar radiculopathy  Homero has ongoing symptoms relatable to his history of lumbar radiculopathy.  He has found significant relief with injection based therapy in the past, I do think it would be reasonable to repeat this.  However, he needs to have a new MRI first in order to be compliant with radiology standards of having an MRI every 2 years.  I am ordering both of these to be done, I will get in touch with him with his MRI results.  - MR Lumbar Spine w/o Contrast; Future  - XR Lumbar Epidural Injection Incl Imaging; Future    Marek Maurice, DO CAQSM    {Follow-up (Optional):269733}    Subjective  Homero is a 70 year old gentleman following up with back and leg pain.  Homero has a history of lumbar radiculopathy, his symptoms have been worsening for months.  Sleeping and sitting have become a big problem for him.  He cannot stand for more than a minute without lifting his right leg.  Homero has been continuing PT exercises and is taking ibuprofen.  He gets about 95% relief after his injection, this lasts for about 4-5 months.    {ROS Picklists (Optional):142651}      Objective          Vitals:  No  vitals were obtained today due to virtual visit.    Physical Exam   General: Alert and no distress //Respiratory: No audible wheeze, cough, or shortness of breath // Psychiatric:  Appropriate affect, tone, and pace of words      {Diagnostic Test Results (Optional):282311}      Phone call duration: 12 minutes  Signed Electronically by: Taz Maurice DO  {Email feedback regarding this note to [email or web address]   :[id number]}      Again, thank you for allowing me to participate in the care of your patient.        Sincerely,        Taz Maurcie DO    Electronically signed

## 2025-06-12 NOTE — PROGRESS NOTES
Homero is a 70 year old who is being evaluated via a billable telephone visit.      Originating Location (pt. Location): Home    Distant Location (provider location):  On-site  Telephone visit completed due to inaccessibility of video    Assessment & Plan     Lumbar radiculopathy  Homero has ongoing symptoms relatable to his history of lumbar radiculopathy.  He has found significant relief with injection based therapy in the past, I do think it would be reasonable to repeat this.  However, he needs to have a new MRI first in order to be compliant with radiology standards of having an MRI every 2 years.  I am ordering both of these to be done, I will get in touch with him with his MRI results.  - MR Lumbar Spine w/o Contrast; Future  - XR Lumbar Epidural Injection Incl Imaging; Future    Marek Maurice DO CAQSM        Subjective   Homero is a 70 year old gentleman following up with back and leg pain.  Homero has a history of lumbar radiculopathy, his symptoms have been worsening for months.  Sleeping and sitting have become a big problem for him.  He cannot stand for more than a minute without lifting his right leg.  Homero has been continuing PT exercises and is taking ibuprofen.  He gets about 95% relief after his injection, this lasts for about 4-5 months.          Objective           Vitals:  No vitals were obtained today due to virtual visit.    Physical Exam   General: Alert and no distress //Respiratory: No audible wheeze, cough, or shortness of breath // Psychiatric:  Appropriate affect, tone, and pace of words            Phone call duration: 12 minutes  Signed Electronically by: Taz Maurice DO

## 2025-06-12 NOTE — LETTER
6/12/2025      Deven Smith  [address] [address] [place] [id number]      Dear Colleague,    Thank you for referring your patient, Deven Smith, to the [facility] [place]. Please see a copy of my visit note below.    Homero is a 70 year old who is being evaluated via a billable telephone visit.    {ROOMING STAFF complete during rooming of virtual visit (Optional):177533}  Originating Location (pt. Location): Home  {PROVIDER LOCATION On-site should be selected for visits conducted from your clinic location or adjoining [facility] hospital, academic office, or other nearby [facility] building. Off-site should be selected for all other provider locations, including home:144942}  Distant Location (provider location):  On-site  Telephone visit completed due to inaccessibility of video    Assessment & Plan    Lumbar radiculopathy  Homero has ongoing symptoms relatable to his history of lumbar radiculopathy.  He has found significant relief with injection based therapy in the past, I do think it would be reasonable to repeat this.  However, he needs to have a new MRI first in order to be compliant with radiology standards of having an MRI every 2 years.  I am ordering both of these to be done, I will get in touch with him with his MRI results.  - MR Lumbar Spine w/o Contrast; Future  - XR Lumbar Epidural Injection Incl Imaging; Future    Marek Maurice, DO CAQSM    {Follow-up (Optional):134856}    Subjective  Homero is a 70 year old gentleman following up with back and leg pain.  Homero has a history of lumbar radiculopathy, his symptoms have been worsening for months.  Sleeping and sitting have become a big problem for him.  He cannot stand for more than a minute without lifting his right leg.  Homero has been continuing PT exercises and is taking ibuprofen.  He gets about 95% relief after his injection, this lasts for about 4-5 months.    {ROS Picklists (Optional):181767}      Objective          Vitals:  No  vitals were obtained today due to virtual visit.    Physical Exam   General: Alert and no distress //Respiratory: No audible wheeze, cough, or shortness of breath // Psychiatric:  Appropriate affect, tone, and pace of words      {Diagnostic Test Results (Optional):189891}      Phone call duration: 12 minutes  Signed Electronically by: Taz Maurice DO  {Email feedback regarding this note to [email or web address]   :[id number]}      Again, thank you for allowing me to participate in the care of your patient.        Sincerely,        Taz Maurice DO    Electronically signed

## 2025-06-19 ENCOUNTER — HOSPITAL ENCOUNTER (OUTPATIENT)
Dept: MRI IMAGING | Facility: CLINIC | Age: 70
Discharge: HOME | End: 2025-06-19
Attending: FAMILY MEDICINE
Payer: MEDICARE

## 2025-06-19 DIAGNOSIS — M54.16 LUMBAR RADICULOPATHY: ICD-10-CM

## 2025-06-19 PROCEDURE — 72148 MRI LUMBAR SPINE W/O DYE: CPT

## 2025-06-25 ENCOUNTER — RESULTS FOLLOW-UP (OUTPATIENT)
Dept: ORTHOPEDICS | Facility: CLINIC | Age: 70
End: 2025-06-25

## 2025-06-25 DIAGNOSIS — M54.16 LUMBAR RADICULOPATHY: Primary | ICD-10-CM

## 2025-06-26 ENCOUNTER — TELEPHONE (OUTPATIENT)
Dept: MEDSURG UNIT | Facility: CLINIC | Age: 70
End: 2025-06-26
Payer: MEDICARE

## 2025-06-30 ENCOUNTER — HOSPITAL ENCOUNTER (OUTPATIENT)
Dept: GENERAL RADIOLOGY | Facility: CLINIC | Age: 70
Discharge: HOME | End: 2025-06-30
Attending: FAMILY MEDICINE
Payer: MEDICARE

## 2025-06-30 ENCOUNTER — HOSPITAL ENCOUNTER (OUTPATIENT)
Facility: CLINIC | Age: 70
Discharge: HOME | End: 2025-06-30
Admitting: PHYSICIAN ASSISTANT
Payer: MEDICARE

## 2025-06-30 VITALS
DIASTOLIC BLOOD PRESSURE: 77 MMHG | RESPIRATION RATE: 16 BRPM | SYSTOLIC BLOOD PRESSURE: 146 MMHG | OXYGEN SATURATION: 99 % | HEART RATE: 45 BPM

## 2025-06-30 VITALS — DIASTOLIC BLOOD PRESSURE: 74 MMHG | OXYGEN SATURATION: 99 % | SYSTOLIC BLOOD PRESSURE: 152 MMHG

## 2025-06-30 DIAGNOSIS — M54.16 LUMBAR RADICULOPATHY: ICD-10-CM

## 2025-06-30 PROCEDURE — 250N000009 HC RX 250: Performed by: FAMILY MEDICINE

## 2025-06-30 PROCEDURE — 62323 NJX INTERLAMINAR LMBR/SAC: CPT

## 2025-06-30 PROCEDURE — 96372 THER/PROPH/DIAG INJ SC/IM: CPT | Performed by: FAMILY MEDICINE

## 2025-06-30 PROCEDURE — 999N000154 HC STATISTIC RADIOLOGY XRAY, US, CT, MAR, NM

## 2025-06-30 PROCEDURE — 255N000002 HC RX 255 OP 636: Performed by: FAMILY MEDICINE

## 2025-06-30 PROCEDURE — 250N000011 HC RX IP 250 OP 636: Performed by: FAMILY MEDICINE

## 2025-06-30 RX ORDER — DEXTROSE MONOHYDRATE 25 G/50ML
25-50 INJECTION, SOLUTION INTRAVENOUS
Status: DISCONTINUED | OUTPATIENT
Start: 2025-06-30 | End: 2025-07-01 | Stop reason: HOSPADM

## 2025-06-30 RX ORDER — LIDOCAINE HYDROCHLORIDE 10 MG/ML
30 INJECTION, SOLUTION EPIDURAL; INFILTRATION; INTRACAUDAL; PERINEURAL ONCE
Status: COMPLETED | OUTPATIENT
Start: 2025-06-30 | End: 2025-06-30

## 2025-06-30 RX ORDER — DEXAMETHASONE SODIUM PHOSPHATE 10 MG/ML
20 INJECTION, SOLUTION INTRAMUSCULAR; INTRAVENOUS ONCE
Status: COMPLETED | OUTPATIENT
Start: 2025-06-30 | End: 2025-06-30

## 2025-06-30 RX ORDER — NICOTINE POLACRILEX 4 MG
15-30 LOZENGE BUCCAL
Status: DISCONTINUED | OUTPATIENT
Start: 2025-06-30 | End: 2025-07-01 | Stop reason: HOSPADM

## 2025-06-30 RX ORDER — LIDOCAINE HYDROCHLORIDE 10 MG/ML
1 INJECTION, SOLUTION EPIDURAL; INFILTRATION; INTRACAUDAL; PERINEURAL ONCE
Status: COMPLETED | OUTPATIENT
Start: 2025-06-30 | End: 2025-06-30

## 2025-06-30 RX ORDER — IOPAMIDOL 408 MG/ML
5 INJECTION, SOLUTION INTRATHECAL ONCE
Status: COMPLETED | OUTPATIENT
Start: 2025-06-30 | End: 2025-06-30

## 2025-06-30 RX ADMIN — LIDOCAINE HYDROCHLORIDE 1 ML: 10 INJECTION, SOLUTION EPIDURAL; INFILTRATION; INTRACAUDAL; PERINEURAL at 09:44

## 2025-06-30 RX ADMIN — IOPAMIDOL 1 ML: 408 INJECTION, SOLUTION INTRATHECAL at 09:42

## 2025-06-30 RX ADMIN — DEXAMETHASONE SODIUM PHOSPHATE 20 MG: 10 INJECTION, SOLUTION INTRAMUSCULAR; INTRAVENOUS at 09:43

## 2025-06-30 RX ADMIN — LIDOCAINE HYDROCHLORIDE 2 ML: 10 INJECTION, SOLUTION EPIDURAL; INFILTRATION; INTRACAUDAL; PERINEURAL at 09:37

## 2025-06-30 ASSESSMENT — ACTIVITIES OF DAILY LIVING (ADL)
ADLS_ACUITY_SCORE: 41
ADLS_ACUITY_SCORE: 41

## 2025-06-30 NOTE — DISCHARGE INSTRUCTIONS
Steroid Injection Discharge Instructions     After you go home:    You may resume your normal diet.    Care of Puncture Site:    If you have a bandaid on your puncture site, you may remove it the next morning  You may shower tomorrow  No bath tubs, whirlpools or swimming pool for at least 48 hours  Use ice packs as needed for discomfort     Activity:    Minimize your activity today. You may gradually resume your normal activity as tolerated  Avoid vigorous or strenuous activity until your symptoms improve or as directed by your doctor  Do NOT drive a vehicle for a few hours after the injection - or longer if you develop numbness in your arm or leg    Medicines:    You may resume all medications, including blood thinners  Resume your Warfarin/Coumadin at your regular dose today. Follow up with your provider to have your INR rechecked  Resume your Platelet Inhibitors and Aspirin tomorrow at your regular dose  For minor discomfort, you may take Acetaminophen (Tylenol) or Ibuprofen (Advil)    Pain:     You may experience increased or different pain over the next 24-48 hours  For the next 48 hrs - you may use ice packs for discomfort     Call your primary care doctor if:    You have severe pain that does not improve with pain medication  You have chills or a fever greater than 101 F (38 C)  The site is red, swollen, hot or tender  Increase in pain, weakness or numbness  New problems with your bowel or bladder  Any questions or concerns    What to watch for:    It can be normal to have some bruising or slight swelling at the puncture site.   After the procedure, you may have some new weakness or numbness down your arm/leg from the numbing medicine. This should resolve in a few hours.   You may feel some temporary relief from the numbing medicine, but that will wear off within a few hours.  Your symptoms may return to pre procedure level, or can even be worse for the first 1-2 days.  For many people, the steroid begins to  provide some relief within 2-3 days, but it can take up to 2 weeks to obtain the full results.  Some people will get lasting relief from a single injection. Others may require up to 3 injections to get results. If you have more than one steroid injection, they should be given 2 weeks apart.  If you have no improvement in your symptoms after two weeks, please contact the doctor who ordered this procedure to discuss the next steps.  Side effects of your steroid injection are mild and will go away in 2-3 days  Insomnia  Irritability  Flushed face  Water retention  Restlessness  Difficulty sleeping  Increased appetite  Increased blood sugar  If you are diabetic, monitor your blood sugar closely. Contact the provider who manages your diabetes to help you control your blood sugar if needed.    If you have questions or concerns call:                  [facility] Radiology Dept @ [phone]                                    between 8am-4:30pm Mon-Fri    If you have urgent questions outside of these normal business hours, please contact the [facility] on call doctor @ [phone]    Or you can contact your provider via My Chart

## 2025-06-30 NOTE — PROGRESS NOTES
Care Suites Discharge Nursing Note    Patient Information  Name: Deven Smith  Age: 70 year old    Discharge Education:  Discharge instructions reviewed: Yes  Additional education/resources provided: none  Patient/patient representative verbalizes understanding: Yes  Patient discharging on new medications: No  Medication education completed: N/A    Discharge Plans:   Discharge location: home  Discharge ride contacted: Yes  Approximate discharge time: 1010    Discharge Criteria:  Discharge criteria met and vital signs stable: Yes    Patient Belongs:  Patient belongings returned to patient: N/A    Joyce Ritter RN

## 2025-06-30 NOTE — PROCEDURES
RADIOLOGY PROCEDURE NOTE  Patient name: Deven Smith  MRN: [id number]  : 1955    Pre-procedure diagnosis: Right leg pain.  Post-procedure diagnosis: Same    Procedure Date/Time: 2025  9:46 AM  Procedure: Right L4-L5 TFESI.  Severe stenosis.  Infraneural approach with contrast in perineural fat.  No extension to the lateral epidural space but slightly expected given anatomy.    Estimated blood loss: None  Specimen(s) collected with description: none  The patient tolerated the procedure well with no immediate complications.  Significant findings:none    See imaging dictation for procedural details.    Provider name: John Hogan PA-C  Assistant(s):None

## 2025-09-04 ENCOUNTER — OFFICE VISIT (OUTPATIENT)
Dept: FAMILY MEDICINE | Facility: CLINIC | Age: 70
End: 2025-09-04
Payer: MEDICARE

## 2025-09-04 ENCOUNTER — ALLIED HEALTH/NURSE VISIT (OUTPATIENT)
Dept: CARDIOLOGY | Facility: CLINIC | Age: 70
End: 2025-09-04
Attending: PEDIATRICS
Payer: MEDICARE

## 2025-09-04 VITALS
BODY MASS INDEX: 30.17 KG/M2 | TEMPERATURE: 96.8 F | DIASTOLIC BLOOD PRESSURE: 88 MMHG | OXYGEN SATURATION: 96 % | RESPIRATION RATE: 16 BRPM | SYSTOLIC BLOOD PRESSURE: 134 MMHG | HEART RATE: 55 BPM | WEIGHT: 192.2 LBS | HEIGHT: 67 IN

## 2025-09-04 DIAGNOSIS — R00.1 BRADYCARDIA: ICD-10-CM

## 2025-09-04 DIAGNOSIS — I51.7 ENLARGED LA (LEFT ATRIUM): ICD-10-CM

## 2025-09-04 DIAGNOSIS — R55 NEAR SYNCOPE: Primary | ICD-10-CM

## 2025-09-04 DIAGNOSIS — I49.3 PVC'S (PREMATURE VENTRICULAR CONTRACTIONS): ICD-10-CM

## 2025-09-04 DIAGNOSIS — R55 NEAR SYNCOPE: ICD-10-CM

## 2025-09-04 DIAGNOSIS — R00.1 SINUS BRADYCARDIA: ICD-10-CM

## 2025-09-04 DIAGNOSIS — I44.0 FIRST DEGREE AV BLOCK: ICD-10-CM

## 2025-09-04 DIAGNOSIS — I51.7 LVH (LEFT VENTRICULAR HYPERTROPHY): ICD-10-CM

## 2025-09-04 PROBLEM — R01.1 HEART MURMUR: Status: RESOLVED | Noted: 2023-12-29 | Resolved: 2025-09-04

## 2025-09-04 ASSESSMENT — PAIN SCALES - GENERAL: PAINLEVEL_OUTOF10: MILD PAIN (2)

## (undated) DEVICE — SOL NACL 0.9% IRRIG 3000ML BAG 2B7477

## (undated) DEVICE — GLOVE EXAM NITRILE LG

## (undated) DEVICE — TUBING SUCTION 12"X1/4" N612

## (undated) DEVICE — PAD ARMBOARD FOAM EGGCRATE COVIDEN 3114367

## (undated) DEVICE — BUR ARTHREX COOLCUT SABRE 4.0MMX13CM AR-8400SR

## (undated) DEVICE — LINEN DRAPE 54X72" 5467

## (undated) DEVICE — GLOVE BIOGEL PI MICRO SZ 8.0 48580

## (undated) DEVICE — SOL WATER IRRIG 1000ML BOTTLE 07139-09

## (undated) DEVICE — KIT ENDO TURNOVER/PROCEDURE CARRY-ON 101822

## (undated) DEVICE — SUCTION MANIFOLD NEPTUNE 2 SYS 4 PORT 0702-020-000

## (undated) DEVICE — LUBRICATING JELLY 4.25OZ

## (undated) DEVICE — PACK ARTHROSCOPY CUSTOM ASC

## (undated) DEVICE — TUBING SYSTEM ARTHREX PUMP REDEUCE AR-6411

## (undated) DEVICE — PREP CHLORAPREP 26ML TINTED HI-LITE ORANGE 930815

## (undated) DEVICE — ENDO SNARE EXACTO COLD 9MM LOOP 2.4MMX230CM 00711115

## (undated) DEVICE — TUBING SYSTEM ARTHREX PATIENT REDEUCE AR-6421

## (undated) DEVICE — SU ETHILON 3-0 PS-1 18" 1663H

## (undated) DEVICE — GLOVE BIOGEL PI MICRO INDICATOR UNDERGLOVE SZ 8.0 48980

## (undated) DEVICE — LINEN TOWEL PACK X5 5464

## (undated) RX ORDER — CEFAZOLIN SODIUM 2 G/50ML
SOLUTION INTRAVENOUS
Status: DISPENSED
Start: 2024-08-06

## (undated) RX ORDER — DEXAMETHASONE SODIUM PHOSPHATE 10 MG/ML
INJECTION, SOLUTION INTRAMUSCULAR; INTRAVENOUS
Status: DISPENSED
Start: 2025-06-30

## (undated) RX ORDER — DEXAMETHASONE SODIUM PHOSPHATE 10 MG/ML
INJECTION, SOLUTION INTRAMUSCULAR; INTRAVENOUS
Status: DISPENSED
Start: 2023-05-11

## (undated) RX ORDER — OXYCODONE HYDROCHLORIDE 5 MG/1
TABLET ORAL
Status: DISPENSED
Start: 2024-08-06

## (undated) RX ORDER — KETOROLAC TROMETHAMINE 30 MG/ML
INJECTION, SOLUTION INTRAMUSCULAR; INTRAVENOUS
Status: DISPENSED
Start: 2024-08-06

## (undated) RX ORDER — GLYCOPYRROLATE 0.2 MG/ML
INJECTION INTRAMUSCULAR; INTRAVENOUS
Status: DISPENSED
Start: 2024-08-06

## (undated) RX ORDER — PROPOFOL 10 MG/ML
INJECTION, EMULSION INTRAVENOUS
Status: DISPENSED
Start: 2024-08-06

## (undated) RX ORDER — FENTANYL CITRATE 50 UG/ML
INJECTION, SOLUTION INTRAMUSCULAR; INTRAVENOUS
Status: DISPENSED
Start: 2024-08-06

## (undated) RX ORDER — LIDOCAINE HYDROCHLORIDE 10 MG/ML
INJECTION, SOLUTION EPIDURAL; INFILTRATION; INTRACAUDAL; PERINEURAL
Status: DISPENSED
Start: 2023-04-20

## (undated) RX ORDER — EPHEDRINE SULFATE 50 MG/ML
INJECTION, SOLUTION INTRAMUSCULAR; INTRAVENOUS; SUBCUTANEOUS
Status: DISPENSED
Start: 2024-08-06

## (undated) RX ORDER — HYDROMORPHONE HYDROCHLORIDE 1 MG/ML
INJECTION, SOLUTION INTRAMUSCULAR; INTRAVENOUS; SUBCUTANEOUS
Status: DISPENSED
Start: 2024-08-06

## (undated) RX ORDER — LIDOCAINE HYDROCHLORIDE 10 MG/ML
INJECTION, SOLUTION EPIDURAL; INFILTRATION; INTRACAUDAL; PERINEURAL
Status: DISPENSED
Start: 2023-05-11

## (undated) RX ORDER — ACETAMINOPHEN 325 MG/1
TABLET ORAL
Status: DISPENSED
Start: 2024-08-06

## (undated) RX ORDER — ONDANSETRON 2 MG/ML
INJECTION INTRAMUSCULAR; INTRAVENOUS
Status: DISPENSED
Start: 2024-08-06

## (undated) RX ORDER — LIDOCAINE HYDROCHLORIDE 10 MG/ML
INJECTION, SOLUTION EPIDURAL; INFILTRATION; INTRACAUDAL; PERINEURAL
Status: DISPENSED
Start: 2025-06-30

## (undated) RX ORDER — BETAMETHASONE SODIUM PHOSPHATE AND BETAMETHASONE ACETATE 3; 3 MG/ML; MG/ML
INJECTION, SUSPENSION INTRA-ARTICULAR; INTRALESIONAL; INTRAMUSCULAR; SOFT TISSUE
Status: DISPENSED
Start: 2023-04-20

## (undated) RX ORDER — DEXAMETHASONE SODIUM PHOSPHATE 10 MG/ML
INJECTION, SOLUTION INTRAMUSCULAR; INTRAVENOUS
Status: DISPENSED
Start: 2023-04-20

## (undated) RX ORDER — DEXAMETHASONE SODIUM PHOSPHATE 4 MG/ML
INJECTION, SOLUTION INTRA-ARTICULAR; INTRALESIONAL; INTRAMUSCULAR; INTRAVENOUS; SOFT TISSUE
Status: DISPENSED
Start: 2024-08-06